# Patient Record
Sex: MALE | Race: BLACK OR AFRICAN AMERICAN | NOT HISPANIC OR LATINO | Employment: OTHER | URBAN - METROPOLITAN AREA
[De-identification: names, ages, dates, MRNs, and addresses within clinical notes are randomized per-mention and may not be internally consistent; named-entity substitution may affect disease eponyms.]

---

## 2017-04-06 ENCOUNTER — LAB VISIT (OUTPATIENT)
Dept: LAB | Facility: HOSPITAL | Age: 42
End: 2017-04-06
Attending: UROLOGY
Payer: MEDICARE

## 2017-04-06 ENCOUNTER — OFFICE VISIT (OUTPATIENT)
Dept: UROLOGY | Facility: CLINIC | Age: 42
End: 2017-04-06
Payer: MEDICARE

## 2017-04-06 VITALS
HEIGHT: 72 IN | DIASTOLIC BLOOD PRESSURE: 118 MMHG | WEIGHT: 192 LBS | HEART RATE: 89 BPM | BODY MASS INDEX: 26.01 KG/M2 | SYSTOLIC BLOOD PRESSURE: 198 MMHG

## 2017-04-06 DIAGNOSIS — C61 PROSTATE CANCER: Primary | ICD-10-CM

## 2017-04-06 DIAGNOSIS — Z01.818 PRE-TRANSPLANT EVALUATION FOR CHRONIC KIDNEY DISEASE: ICD-10-CM

## 2017-04-06 DIAGNOSIS — C61 PROSTATE CANCER: ICD-10-CM

## 2017-04-06 LAB — COMPLEXED PSA SERPL-MCNC: 3.1 NG/ML

## 2017-04-06 PROCEDURE — 84153 ASSAY OF PSA TOTAL: CPT

## 2017-04-06 PROCEDURE — 99999 PR PBB SHADOW E&M-EST. PATIENT-LVL III: CPT | Mod: PBBFAC,TXP,, | Performed by: UROLOGY

## 2017-04-06 PROCEDURE — 99214 OFFICE O/P EST MOD 30 MIN: CPT | Mod: S$PBB,TXP,, | Performed by: UROLOGY

## 2017-04-06 PROCEDURE — 36415 COLL VENOUS BLD VENIPUNCTURE: CPT

## 2017-04-06 RX ORDER — TADALAFIL 5 MG/1
5 TABLET ORAL
Qty: 10 TABLET | Refills: 11 | Status: SHIPPED | OUTPATIENT
Start: 2017-04-06 | End: 2018-01-23

## 2017-04-06 RX ORDER — LABETALOL 300 MG/1
TABLET, FILM COATED ORAL
COMMUNITY
Start: 2017-03-28

## 2017-04-06 NOTE — PROGRESS NOTES
Clinic Note  4/6/2017      Subjective:       Patient ID:  Prem is a 41 y.o. male being seen for an new visit.      Chief Complaint:   HPI     Prem Saldana is a 41 y.o. male recently diagnosed with  [id number] prostate cancer. Lives in [place]. 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  Consult from Dr. Rojas.  Originally from [place]. [place]. On Trelstar, started 12/8/2016.     PSAi- 158  Stage- T3/4  Biopsy 10/24/16- [place] 3+4 12/12 cores positive  NENA score- 8  High risk disease  Imaging- Bone scan - no mets, CT scan- no nodes but loss of rectal fat plane    Lab Results   Component Value Date    .0 (H) 07/27/2016    .4 (H) 06/02/2016        Past Medical History:   Diagnosis Date    Anemia of renal disease     ESRD on dialysis since 4/3/15     Essential hypertension     Secondary hyperparathyroidism of renal origin      Family History   Problem Relation Age of Onset    Diabetes Mother     Cancer Maternal Grandmother     Hypertension Maternal Grandfather     Heart attack Maternal Grandfather      MI in his 60s or 70s    Kidney disease Neg Hx      Social History     Social History    Marital status: Single     Spouse name: N/A    Number of children: N/A    Years of education: N/A     Occupational History    Not on file.     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   HENT: Negative for nosebleeds.    Respiratory: Negative for shortness of breath and wheezing.    Cardiovascular: Negative for chest pain, palpitations and leg swelling.   Gastrointestinal: Negative for abdominal distention, abdominal pain, constipation, diarrhea, nausea and vomiting.   Genitourinary: Negative for difficulty urinating, dysuria and hematuria.        Complains of decreased libido and erectile dysfunction.   Musculoskeletal: Negative for arthralgias and back pain.   Skin: Negative for pallor.   Neurological: Negative for dizziness, seizures and syncope.   Hematological: Negative for adenopathy.   Psychiatric/Behavioral: Negative for dysphoric mood.         Objective:      There were no vitals taken for this visit.  Estimated body mass index is 26.91 kg/(m^2) as calculated from the following:    Height as of 12/8/16: 6' (1.829 m).    Weight as of 12/8/16: 90 kg (198 lb 6.6 oz).  Physical Exam   Constitutional: He is oriented to person, place, and time. He appears well-developed and well-nourished. No distress.   HENT:   Head: Atraumatic.   Neck: No tracheal deviation present.   Cardiovascular: Normal rate.    Pulmonary/Chest: Effort normal. No respiratory distress. He has no wheezes.   Abdominal: Soft. Bowel sounds are normal. He exhibits no distension and no mass. There is no tenderness. There is no rebound and no guarding.   Neurological: He is alert and oriented to person, place, and time.   Skin: Skin is  warm and dry. He is not diaphoretic.     Psychiatric: He has a normal mood and affect. His behavior is normal. Judgment and thought content normal.         Assessment and Plan:           Problem List Items Addressed This Visit     Pre-transplant evaluation for chronic kidney disease    Prostate cancer - Primary          Follow up:   Await PSA. Discussed XRT. Cialis 5 mg every 72 hours prn for erectile dysfunction.  F/U as scheduled for [place].    Satish Au

## 2017-04-06 NOTE — MR AVS SNAPSHOT
[facility]  [address]  [place] [address]  Phone: [phone]                  Prem Saldana   2017 10:15 AM   Office Visit    Description:  Male : 1975   Provider:  Satish Au MD   [facility]           Diagnoses this Visit        Comments    Prostate cancer    -  Primary     Pre-transplant evaluation for chronic kidney disease                To Do List           Future Appointments        Provider Department Dept Phone    2017 9:30 AM LAB, HEMONC CANCER BLDG [facility] [phone]    2017 11:00 AM MD [facility] [phone]      Goals (5 Years of Data)     None      Follow-Up and Disposition     Return in about 2 months (around 2017).       These Medications        Disp Refills Start End    tadalafil (CIALIS) 5 MG tablet 10 tablet 11 2017    Take 1 tablet (5 mg total) by mouth every 72 hours as needed for Erectile Dysfunction. - Oral    Pharmacy: [id number]  [place]ANCY ASHRAF - [address]  #: [phone]         [facility]     [facility] Nurse Care Line -  Assistance  Unless otherwise directed by your provider, please contact [facility], our nurse care line that is available for  assistance.     Registered nurses in the [facility] Center provide: appointment scheduling, clinical advisement, health education, and other advisory services.  Call: [phone] (toll free)               Medications           Message regarding Medications     Verify the changes and/or additions to your medication regime listed below are the same as discussed with your clinician today.  If any of these changes or additions are incorrect, please notify your healthcare provider.        START taking these NEW medications        Refills    tadalafil (CIALIS) 5 MG tablet 11    Sig: Take 1 tablet (5 mg total) by mouth every 72  hours as needed for Erectile Dysfunction.    Class: Normal    Route: Oral      STOP taking these medications     cloNIDine (CATAPRES) 0.1 MG tablet Take 0.1 mg by mouth 3 (three) times daily.           Verify that the below list of medications is an accurate representation of the medications you are currently taking.  If none reported, the list may be blank. If incorrect, please contact your healthcare provider. Carry this list with you in case of emergency.           Current Medications     calcitRIOL (ROCALTROL) 0.25 MCG Cap Take 0.25 mcg by mouth 2 (two) times daily.    labetalol (NORMODYNE) 300 MG tablet     minoxidil (LONITEN) 10 MG Tab Take 2.5 mg by mouth 2 (two) times daily.    tadalafil (CIALIS) 5 MG tablet Take 1 tablet (5 mg total) by mouth every 72 hours as needed for Erectile Dysfunction.           Clinical Reference Information           Your Vitals Were     BP Pulse Height Weight BMI    198/118 (BP Location: Right arm, Patient Position: Sitting, BP Method: Automatic) 89 6' (1.829 m) 87.1 kg (192 lb 0.3 oz) 26.04 kg/m2      Blood Pressure          Most Recent Value    BP  (!)  198/118      Allergies as of 4/6/2017     No Known Allergies      Immunizations Administered on Date of Encounter - 4/6/2017     None      Orders Placed During Today's Visit     Future Labs/Procedures Expected by Expires    Prostate Specific Antigen, Diagnostic  6/6/2017 4/6/2018      Maintenance Dialysis History     Start End Type Comments Center    4/3/2015  [place]  [facility]            Current Dialysis Center Information     [facility] [address] Phone #:  [phone]    Contact:  N/A [place]Y [place]RE[place]  [address] Fax #:  [phone]            Transplant Information        Txp Date Organ Coordinator Care Team     Kidney Amanda Cary Referring Physician:  Edward Pabon MD   Current Nephrologist:  Edward Pabon MD         Smoking Cessation     If you would like to quit  smoking:   You may be eligible for free services if you are a [place] resident and started smoking cigarettes before September 1, 1988.  Call the Smoking Cessation Trust (SCT) toll free at (598) 071-4394 or (513) 846-4836.   Call 1-800-QUIT-NOW if you do not meet the above criteria.   Contact us via email: [email or web address]   View our website for more information: [email or web address]        Language Assistance Services     ATTENTION: Language assistance services are available, free of charge. Please call [phone].      ATENCIÓN: Si habla español, tiene a kurtz disposición servicios gratuitos de asistencia lingüística. Llame al [phone].     CHÚ Ý: N?u b?n nói Ti?ng Vi?t, có các d?ch v? h? tr? ngôn ng? mi?n phí dành cho b?n. G?i s? [phone].         [facility] complies with applicable Federal civil rights laws and does not discriminate on the basis of race, color, national origin, age, disability, or sex.

## 2017-06-08 ENCOUNTER — LAB VISIT (OUTPATIENT)
Dept: LAB | Facility: HOSPITAL | Age: 42
End: 2017-06-08
Attending: UROLOGY
Payer: MEDICARE

## 2017-06-08 DIAGNOSIS — C61 PROSTATE CANCER: ICD-10-CM

## 2017-06-08 LAB — COMPLEXED PSA SERPL-MCNC: 2.3 NG/ML

## 2017-06-08 PROCEDURE — 84153 ASSAY OF PSA TOTAL: CPT | Mod: TXP

## 2017-06-08 PROCEDURE — 36415 COLL VENOUS BLD VENIPUNCTURE: CPT | Mod: TXP

## 2017-06-12 ENCOUNTER — TELEPHONE (OUTPATIENT)
Dept: UROLOGY | Facility: CLINIC | Age: 42
End: 2017-06-12

## 2017-06-12 NOTE — TELEPHONE ENCOUNTER
I spoke with patient, who stated due to transportation issues he can't keep his appt. And will not be able to come in until July he will be behind for his trelstar injection.  notified.

## 2017-06-12 NOTE — TELEPHONE ENCOUNTER
----- Message from Eusebia Gaines sent at 6/12/2017 11:04 AM CDT -----  Contact: Pt  Pt called in to reschedule his appt from 6/13/2017 due to having no transportation.    Pt can be reached at [phone]    Thank you

## 2017-06-12 NOTE — TELEPHONE ENCOUNTER
----- Message from Eusebia Gaines sent at 6/12/2017 11:04 AM CDT -----  Contact: Pt  Pt called in to reschedule his appt from 6/13/2017 due to having no transportation.    Pt can be reached at [phone]    Thank you

## 2017-07-06 ENCOUNTER — OFFICE VISIT (OUTPATIENT)
Dept: UROLOGY | Facility: CLINIC | Age: 42
End: 2017-07-06
Payer: MEDICARE

## 2017-07-06 VITALS
WEIGHT: 192 LBS | SYSTOLIC BLOOD PRESSURE: 162 MMHG | HEIGHT: 72 IN | DIASTOLIC BLOOD PRESSURE: 88 MMHG | RESPIRATION RATE: 15 BRPM | BODY MASS INDEX: 26.01 KG/M2 | HEART RATE: 75 BPM

## 2017-07-06 DIAGNOSIS — C61 PROSTATE CANCER: Primary | ICD-10-CM

## 2017-07-06 PROCEDURE — 99213 OFFICE O/P EST LOW 20 MIN: CPT | Mod: PBBFAC,TXP | Performed by: UROLOGY

## 2017-07-06 PROCEDURE — 99999 PR PBB SHADOW E&M-EST. PATIENT-LVL III: CPT | Mod: PBBFAC,TXP,, | Performed by: UROLOGY

## 2017-07-06 PROCEDURE — 99214 OFFICE O/P EST MOD 30 MIN: CPT | Mod: S$PBB,NTX,, | Performed by: UROLOGY

## 2017-07-06 RX ADMIN — TRIPTORELIN PAMOATE 22.5 MG: KIT at 01:07

## 2017-07-06 NOTE — PROGRESS NOTES
Distress Screening Results: Psychosocial Distress screening score of Distress Score: 0 noted and reviewed. No intervention indicated.

## 2017-07-06 NOTE — PROGRESS NOTES
Clinic Note  7/6/2017      Subjective:         Chief Complaint:   HPI  Prem Saldana is a 42 y.o. male recently diagnosed with  [id number] prostate cancer. Lives in [place]. 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  Consult from Dr. Rojas.  Originally from [place]. [place]. On Trelstar, started 12/8/2016.     PSAi- 158  Stage- T3/4  Biopsy 10/24/16- [place] 3+4 12/12 cores positive  NENA score- 8  High risk disease  Imaging- Bone scan - no mets, CT scan- no nodes but loss of rectal fat plane      Lab Results   Component Value Date    .0 (H) 07/27/2016    .4 (H) 06/02/2016    PSADIAG 2.3 06/08/2017    PSADIAG 3.1 04/06/2017      Past Medical History:   Diagnosis Date    Anemia of renal disease     ESRD on dialysis since 4/3/15     Essential hypertension     Secondary hyperparathyroidism of renal origin      Family History   Problem Relation Age of Onset    Diabetes Mother     Cancer Maternal Grandmother     Hypertension Maternal Grandfather     Heart attack Maternal Grandfather      MI in his 60s or 70s    Kidney disease Neg Hx      Social History     Social History    Marital status: Single     Spouse name: N/A    Number of children: N/A    Years of education: N/A     Occupational History    Not on file.     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   HENT: Negative for nosebleeds.    Respiratory: Positive for chest tightness. Negative for shortness of breath and wheezing.    Cardiovascular: Negative for chest pain, palpitations and leg swelling.   Gastrointestinal: Negative for abdominal distention, abdominal pain, constipation, diarrhea, nausea and vomiting.   Genitourinary: Negative for dysuria and nocturia.   Musculoskeletal: Negative for arthralgias and back pain.   Skin: Negative for pallor.   Neurological: Negative for dizziness, seizures and syncope.   Hematological: Negative for adenopathy.   Psychiatric/Behavioral: Negative for dysphoric mood.         Objective:      BP (!) 162/88   Pulse 75   Resp 15   Ht 6' (1.829 m)   Wt 87.1 kg (192 lb)   BMI 26.04 kg/m²   Estimated body mass index is 26.04 kg/m² as calculated from the following:    Height as of this encounter: 6' (1.829 m).    Weight as of this encounter: 87.1 kg (192 lb).  Physical Exam   Constitutional: He is oriented to person, place, and time. He appears well-developed and well-nourished. No distress.   HENT:   Head: Atraumatic.   Neck: No tracheal deviation present.   Cardiovascular: Normal rate.    Pulmonary/Chest: Effort normal. No respiratory distress. He has no wheezes.   Abdominal: Soft. Bowel sounds are normal. He exhibits no distension and no mass. There is no tenderness. There is no rebound and no guarding.   Neurological: He is alert and oriented to person, place, and time.   Skin: Skin is warm and dry. He is  not diaphoretic.     Psychiatric: He has a normal mood and affect. His behavior is normal. Judgment and thought content normal.         Assessment and Plan:           Problem List Items Addressed This Visit     None      Visit Diagnoses    None.         Follow up:   6 months with PSA and Trelstar.  Patient interested in exploring option of XRT.  Consult Dr. Handy.    Satish Au

## 2017-08-03 ENCOUNTER — HOSPITAL ENCOUNTER (OUTPATIENT)
Dept: INTENSIVE CARE | Facility: HOSPITAL | Age: 42
End: 2017-08-04
Attending: INTERNAL MEDICINE | Admitting: INTERNAL MEDICINE

## 2017-08-03 LAB
ABS NEUT (OLG): 4.15 X10(3)/MCL (ref 2.1–9.2)
ALBUMIN SERPL-MCNC: 3 GM/DL (ref 3.4–5)
ALBUMIN/GLOB SERPL: 0.8 {RATIO}
ALP SERPL-CCNC: 110 UNIT/L (ref 50–136)
ALT SERPL-CCNC: 14 UNIT/L (ref 12–78)
APTT PPP: 32.6 SECOND(S) (ref 20.6–36)
AST SERPL-CCNC: 15 UNIT/L (ref 15–37)
BASOPHILS # BLD AUTO: 0 X10(3)/MCL (ref 0–0.2)
BASOPHILS NFR BLD AUTO: 0 %
BILIRUB SERPL-MCNC: 0.3 MG/DL (ref 0.2–1)
BILIRUBIN DIRECT+TOT PNL SERPL-MCNC: 0.1 MG/DL (ref 0–0.2)
BILIRUBIN DIRECT+TOT PNL SERPL-MCNC: 0.2 MG/DL (ref 0–0.8)
BUN SERPL-MCNC: 44 MG/DL (ref 7–18)
CALCIUM SERPL-MCNC: 9 MG/DL (ref 8.5–10.1)
CHLORIDE SERPL-SCNC: 112 MMOL/L (ref 98–107)
CO2 SERPL-SCNC: 24 MMOL/L (ref 21–32)
CREAT SERPL-MCNC: 3.68 MG/DL (ref 0.7–1.3)
EOSINOPHIL # BLD AUTO: 0 X10(3)/MCL (ref 0–0.9)
EOSINOPHIL NFR BLD AUTO: 1 %
ERYTHROCYTE [DISTWIDTH] IN BLOOD BY AUTOMATED COUNT: 14.4 % (ref 11.5–17)
GLOBULIN SER-MCNC: 3.6 GM/DL (ref 2.4–3.5)
GLUCOSE SERPL-MCNC: 105 MG/DL (ref 74–106)
HBV SURFACE AG SERPL QL IA: NEGATIVE
HCT VFR BLD AUTO: 39.3 % (ref 42–52)
HGB BLD-MCNC: 12.4 GM/DL (ref 14–18)
INR PPP: 0.97 (ref 0–1.27)
LYMPHOCYTES # BLD AUTO: 0.5 X10(3)/MCL (ref 0.6–4.6)
LYMPHOCYTES NFR BLD AUTO: 9 %
MCH RBC QN AUTO: 28.2 PG (ref 27–31)
MCHC RBC AUTO-ENTMCNC: 31.6 GM/DL (ref 33–36)
MCV RBC AUTO: 89.3 FL (ref 80–94)
MONOCYTES # BLD AUTO: 0.3 X10(3)/MCL (ref 0.1–1.3)
MONOCYTES NFR BLD AUTO: 6 %
NEUTROPHILS # BLD AUTO: 4.15 X10(3)/MCL (ref 2.1–9.2)
NEUTROPHILS NFR BLD AUTO: 82 %
PLATELET # BLD AUTO: 219 X10(3)/MCL (ref 130–400)
PMV BLD AUTO: 10.1 FL (ref 9.4–12.4)
POTASSIUM SERPL-SCNC: 3.6 MMOL/L (ref 3.5–5.1)
PROT SERPL-MCNC: 6.6 GM/DL (ref 6.4–8.2)
PROTHROMBIN TIME: 12.7 SECOND(S) (ref 12.1–14.2)
RBC # BLD AUTO: 4.4 X10(6)/MCL (ref 4.7–6.1)
SODIUM SERPL-SCNC: 142 MMOL/L (ref 136–145)
WBC # SPEC AUTO: 5 X10(3)/MCL (ref 4.5–11.5)

## 2017-08-04 LAB
BUN SERPL-MCNC: 36 MG/DL (ref 7–18)
CALCIUM SERPL-MCNC: 9 MG/DL (ref 8.5–10.1)
CHLORIDE SERPL-SCNC: 110 MMOL/L (ref 98–107)
CHOLEST SERPL-MCNC: 233 MG/DL (ref 0–200)
CHOLEST/HDLC SERPL: 4.2 {RATIO} (ref 0–5)
CO2 SERPL-SCNC: 23 MMOL/L (ref 21–32)
CREAT SERPL-MCNC: 3.7 MG/DL (ref 0.7–1.3)
ERYTHROCYTE [DISTWIDTH] IN BLOOD BY AUTOMATED COUNT: 14.3 % (ref 11.5–17)
EST. AVERAGE GLUCOSE BLD GHB EST-MCNC: 97 MG/DL
GLUCOSE SERPL-MCNC: 110 MG/DL (ref 74–106)
HBA1C MFR BLD: 5 % (ref 4.2–6.3)
HCT VFR BLD AUTO: 38.9 % (ref 42–52)
HDLC SERPL-MCNC: 55 MG/DL (ref 35–60)
HGB BLD-MCNC: 13 GM/DL (ref 14–18)
LDLC SERPL CALC-MCNC: 144 MG/DL (ref 0–129)
MCH RBC QN AUTO: 28.9 PG (ref 27–31)
MCHC RBC AUTO-ENTMCNC: 33.4 GM/DL (ref 33–36)
MCV RBC AUTO: 86.4 FL (ref 80–94)
PLATELET # BLD AUTO: 251 X10(3)/MCL (ref 130–400)
PMV BLD AUTO: 11.1 FL (ref 9.4–12.4)
POTASSIUM SERPL-SCNC: 3.7 MMOL/L (ref 3.5–5.1)
RBC # BLD AUTO: 4.5 X10(6)/MCL (ref 4.7–6.1)
SODIUM SERPL-SCNC: 146 MMOL/L (ref 136–145)
TRIGL SERPL-MCNC: 168 MG/DL (ref 30–150)
VLDLC SERPL CALC-MCNC: 34 MG/DL
WBC # SPEC AUTO: 4.4 X10(3)/MCL (ref 4.5–11.5)

## 2017-08-07 ENCOUNTER — TELEPHONE (OUTPATIENT)
Dept: RADIATION ONCOLOGY | Facility: CLINIC | Age: 42
End: 2017-08-07

## 2017-08-07 NOTE — TELEPHONE ENCOUNTER
Called pt re: missed appt with Dr. EVANGELISTA this morning.  No answer; left message to call back.  Will continue to f/u.

## 2017-08-08 ENCOUNTER — TELEPHONE (OUTPATIENT)
Dept: RADIATION ONCOLOGY | Facility: CLINIC | Age: 42
End: 2017-08-08

## 2017-08-08 NOTE — TELEPHONE ENCOUNTER
Called again. No answer/left message.  Will continue to f/u.     ----- Message from Diana Rabago RN sent at 8/7/2017 10:07 AM CDT -----  Call re: no show yesterday  appt with TS.

## 2017-08-11 ENCOUNTER — TELEPHONE (OUTPATIENT)
Dept: RADIATION ONCOLOGY | Facility: CLINIC | Age: 42
End: 2017-08-11

## 2017-08-11 NOTE — TELEPHONE ENCOUNTER
Called to reschedule missed appt.  No answer at mobile/left message.  Called second number, no answer/no answering machine.  Will f/u next week.

## 2017-08-15 ENCOUNTER — TELEPHONE (OUTPATIENT)
Dept: TRANSPLANT | Facility: CLINIC | Age: 42
End: 2017-08-15

## 2017-08-15 NOTE — TELEPHONE ENCOUNTER
Attempted to contact patient, left voicemail, to discuss follow up with Oncology/Urology for prostate cancer. Patient's transplant evaluation is outdated. Will send 30 day letter for patient to contact Dr. Carlson's for follow up appt. If no response to phone call/30 day letter, chart will be closed. He may be re-referred for consideration for transplant in the future but will need to come in with Urology clearance.

## 2017-08-16 ENCOUNTER — TELEPHONE (OUTPATIENT)
Dept: TRANSPLANT | Facility: CLINIC | Age: 42
End: 2017-08-16

## 2017-08-16 NOTE — TELEPHONE ENCOUNTER
Spoke to patient, he rescheduled his appt with Dr. Handy for 8/18/17. Informed him that we sent 30 day letter so just continue to follow up with Dr. Handy and go through recommended cancer treatment. Will need clearance for transplant. Pt voiced understanding.

## 2017-08-18 ENCOUNTER — DOCUMENTATION ONLY (OUTPATIENT)
Dept: RADIATION ONCOLOGY | Facility: CLINIC | Age: 42
End: 2017-08-18

## 2017-08-18 ENCOUNTER — INITIAL CONSULT (OUTPATIENT)
Dept: RADIATION ONCOLOGY | Facility: CLINIC | Age: 42
End: 2017-08-18
Payer: MEDICARE

## 2017-08-18 VITALS
WEIGHT: 198.19 LBS | TEMPERATURE: 98 F | RESPIRATION RATE: 18 BRPM | SYSTOLIC BLOOD PRESSURE: 197 MMHG | DIASTOLIC BLOOD PRESSURE: 113 MMHG | HEART RATE: 82 BPM | BODY MASS INDEX: 26.88 KG/M2

## 2017-08-18 DIAGNOSIS — Z99.2 ESRD ON DIALYSIS: Chronic | ICD-10-CM

## 2017-08-18 DIAGNOSIS — N18.6 ESRD ON DIALYSIS: Chronic | ICD-10-CM

## 2017-08-18 DIAGNOSIS — C61 CANCER OF PROSTATE WITH HIGH RECURRENCE RISK (STAGE T3A OR GLEASON 8-10 OR PSA > 20): Primary | ICD-10-CM

## 2017-08-18 PROCEDURE — 99999 PR PBB SHADOW E&M-EST. PATIENT-LVL III: CPT | Mod: PBBFAC,TXP,, | Performed by: RADIOLOGY

## 2017-08-18 PROCEDURE — 99213 OFFICE O/P EST LOW 20 MIN: CPT | Mod: PBBFAC,TXP | Performed by: RADIOLOGY

## 2017-08-18 PROCEDURE — 99203 OFFICE O/P NEW LOW 30 MIN: CPT | Mod: S$PBB,TXP,, | Performed by: RADIOLOGY

## 2017-08-18 RX ORDER — AMOXICILLIN AND CLAVULANATE POTASSIUM 875; 125 MG/1; MG/1
TABLET, FILM COATED ORAL
COMMUNITY
Start: 2017-08-15 | End: 2018-01-23

## 2017-08-18 NOTE — PROGRESS NOTES
HISTORY OF PRESENT ILLNESS:   This patient is referred for discussion of definitive radiotherapy for a locally advanced adenocarcinoma of the prostate.     Mr. Saldana's PMHx is significant for end stage renal disease.  He has been on peritoneal dialysis for the past 2.5 years.  The patient was being evaluated for possible transplant when screening PSA in June of 2015 returned markedly elevated at 130.4 ng/ml.  Repeat PSA on 7/27/16 remained elevated at 158 ng/ml.  The patient was referred to urology for further evaluation.  BAUTISTA revealed a very firm 35 gm prostate.  The patient was taken for TRUS and biopsy of the prostate on 10/24/16.  The prostate measured 38 cc.  100% of his cores contained adenocarcinoma.  The [place] score was 7 (4+3) in biopsies from the Lt. mid gland and involved 95% of the cores.  The remaining areas revealed [place] 7 (3+4) disease and involved 90 - 95% of the specimens.  Further work up with bone scan and CT scan of the abdomen and pelvis on 11/11/16 did not reveal evidence of metastatic disease.  CT did reveal an enlarged prostate.  There was also a very small sclerotic area in the Rt. femur thought to represent a bone island.  The patient was evaluated in Dr. Au's clinic.  The prostate was felt to be fixed in place with evidence of extracapsular extension.  He began hormonal therapy with Trelstar.  His second 6 month injection was given on 7/6/17.  Repeat PSA on 6/8/17 returned at 2.3 ng/ml.  The patient presents for discussion of definitive radiotherapy.  Today, the patient states he feels well.  Notes hot flashes.  States he still produces a small amount of urine.        REVIEW OF SYSTEMS:   Review of Systems   Constitutional: Positive for malaise/fatigue. Negative for chills, fever and weight loss.   Respiratory: Negative for cough and sputum production.    Cardiovascular: Negative for chest pain and palpitations.   Gastrointestinal: Negative for abdominal pain, constipation and  diarrhea.   Genitourinary: Negative for dysuria, frequency, hematuria and urgency.   Neurological: Negative for weakness.         PAST MEDICAL HISTORY:  Past Medical History:   Diagnosis Date    Anemia of renal disease     ESRD on dialysis since 4/3/15     Essential hypertension     Secondary hyperparathyroidism of renal origin        PAST SURGICAL HISTORY:  Past Surgical History:   Procedure Laterality Date    AV FISTULA PLACEMENT Left 07/2015    Peritoneal Dialysis Catheter Placement  01/2016    Permcath Placement          ALLERGIES:   Review of patient's allergies indicates:  No Known Allergies    MEDICATIONS:  Current Outpatient Prescriptions   Medication Sig    amoxicillin-clavulanate 875-125mg (AUGMENTIN) 875-125 mg per tablet     calcitRIOL (ROCALTROL) 0.25 MCG Cap Take 0.25 mcg by mouth 2 (two) times daily.    labetalol (NORMODYNE) 300 MG tablet     minoxidil (LONITEN) 10 MG Tab Take 2.5 mg by mouth 2 (two) times daily.    tadalafil (CIALIS) 5 MG tablet Take 1 tablet (5 mg total) by mouth every 72 hours as needed for Erectile Dysfunction.     Current Facility-Administered Medications   Medication    triptorelin pamoate Syrg 22.5 mg       SOCIAL HISTORY:  Social History     Social History    Marital status: Single     Spouse name: N/A    Number of children: N/A    Years of education: N/A     Occupational History    Not on file.     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   The remainder of his exam was deferred.     ASSESSMENT/PLAN:  Stage IV (T4, N0, M0) adenocarcinoma of the prostate    ECO    I had a long discussion with the patient.  We reviewed the prognotic factors associated with prostate cancer.  Explained that based on his presentation, he is considered to have "high risk, high volume " prostate cancer.  Discussed the implications of this classification.  Explained there is a chance that, although his work up was negative, he could have micrometastatic disease.  We discussed these concepts.  Discussed the rational for consideration of definitive radiotherapy to the prostate seminal vesicles and lower pelvic nodes.  Discussed the procedures, risks and benefits of therapy.  The patient would like to be considered for transplant in the future.  Explained this would require definitive radiotherapy followed by 12 - 18 months of continued hormonal deprivation, followed by 3 years of follow up with a very low PSA after he begins to produce testosterone.  The patient would like to proceed with therapy.  He currently lives 2 + hours from [place].  Will arrange for evaluation in [facility] for radiotherapy.  We would recommend 45 Gy to the prostate, SV and pelvic nodes followed by a boost to prostate gland.  Thank you for allowing us to participate in the care of this patient.      Psychosocial Distress screening score of Distress Score: 1 noted and reviewed. No intervention indicated.    I " spent approximately 45 minutes reviewing the available records and evaluating the patient, out of which over 50% of the time was spent face to face with the patient in counseling and coordinating this patient's care.

## 2017-08-18 NOTE — LETTER
August 18, 2017      Satish Au MD  [address]  [place] [address]           [facility]  [address] [place] [address]  Phone: [phone]  Fax: [phone]          Patient: Prem Saldana   MR Number: [id number]   YOB: 1975   Date of Visit: 8/18/2017       Dear Dr. Satish Au:    Thank you for referring Prem Saldana to me for evaluation. Attached you will find relevant portions of my assessment and plan of care.    If you have questions, please do not hesitate to call me. I look forward to following Prem Saldana along with you.    Sincerely,    Joseph Handy Jr., MD    Enclosure  CC:  No Recipients    If you would like to receive this communication electronically, please contact [email or web address] or [phone] to request more information on [employer] access.    For providers and/or their staff who would like to refer a patient to [facility], please contact us through our one-stop-shop provider referral line, [facility], at [phone].    If you feel you have received this communication in error or would no longer like to receive these types of communications, please e-mail [email or web address]

## 2017-08-18 NOTE — PROGRESS NOTES
Called [facility] per Dr. Handy to refer this patient for radiation.  S/w nurse; who said to send progress notes and they will follow up with pt.  Fax was received.  Will follow up next week.

## 2017-08-23 ENCOUNTER — HISTORICAL (OUTPATIENT)
Dept: RADIATION THERAPY | Facility: HOSPITAL | Age: 42
End: 2017-08-23

## 2017-09-07 ENCOUNTER — HISTORICAL (OUTPATIENT)
Dept: RADIATION THERAPY | Facility: HOSPITAL | Age: 42
End: 2017-09-07

## 2017-09-27 ENCOUNTER — TELEPHONE (OUTPATIENT)
Dept: TRANSPLANT | Facility: CLINIC | Age: 42
End: 2017-09-27

## 2017-09-27 NOTE — TELEPHONE ENCOUNTER
Left voicemail for patient. He still has not responded to 30 day letter. Calling to inquire about prostate cancer treatment in [place]. The dialysis nurse states he is receiving radiation but she does not know where. She also stated patient is currently using Medicare Transportation and does not have his own at this time. She states he takes a few days to return calls as well.

## 2017-09-28 ENCOUNTER — TELEPHONE (OUTPATIENT)
Dept: TRANSPLANT | Facility: CLINIC | Age: 42
End: 2017-09-28

## 2017-09-28 NOTE — TELEPHONE ENCOUNTER
Mr. Saldana called to inform that he is in his first week of radiation (1 of 8) for prostate cancer. States he is being treated at [facility]. Will request records once treatment is complete.

## 2017-12-04 ENCOUNTER — TELEPHONE (OUTPATIENT)
Dept: TRANSPLANT | Facility: CLINIC | Age: 42
End: 2017-12-04

## 2017-12-04 NOTE — TELEPHONE ENCOUNTER
"Transportation forms:       SW contacted pt regarding transportation difficulties. Pt reports he is continuing to utilize Medicare transportation to and from radiation treatment. Pt reports he missed radiation treatment on Friday because Medicare transportation did not  pt. SW inquired if anyone else was able to bring him to treatment and pt reports "I just missed it". SW provided strong concern regarding transportation and it's correlation with transplant. Pt verbalized understanding. Pt reports he and his polina are "working on getting a car in the next two months". SW verbalized understanding and inquired if pt has a secondary transportation plan. Pt reports his fiance's mother and sister will provide transportation for pt as needed. Pt provided permission to speak with pt's polina's mother and sister to confirm transportation plan.     SW contacted pt's polina's sister Paula to confirm transportation plan. SW was not able to speak with Paula and was not able to leave a message due to voicemail box being full.     SW contacted Ines,pt's polina's mother, to confirm transportation plan. SW provided education regarding transportation and per/post transplant expectations. Ines reports " well, it would depend on the day but one of us will be able to drive them. I would probably be the one most of the time". SW provided understanding and education regarding the need for transportation at any given moment, especially once organ is accepted and post transplant care. Godfrey verbalized understanding and reports ability to help. SW strongly encouraged Godfrey to help form team of reliable people who can help with transportation. Godfrey verbalized understanding and agreement.     LISSA attempted to contact dialysis unit for more insight and was unable to speak with her. SW will attempt to follow up at a later time. SW remains available.   "

## 2017-12-14 ENCOUNTER — TELEPHONE (OUTPATIENT)
Dept: TRANSPLANT | Facility: CLINIC | Age: 42
End: 2017-12-14

## 2018-01-15 ENCOUNTER — TELEPHONE (OUTPATIENT)
Dept: TRANSPLANT | Facility: CLINIC | Age: 43
End: 2018-01-15

## 2018-01-15 NOTE — TELEPHONE ENCOUNTER
----- Message from Lauren Helms sent at 1/15/2018  1:38 PM CST -----  Contact: girlfriend.. Comfort  Please call back and explain exactly what clearances the patient needs. [phone].  He is seeing a oncologist for radiation treatment and urology locally. Was see our physicians but it was too far to travel.

## 2018-01-16 ENCOUNTER — TELEPHONE (OUTPATIENT)
Dept: TRANSPLANT | Facility: CLINIC | Age: 43
End: 2018-01-16

## 2018-01-16 NOTE — TELEPHONE ENCOUNTER
----- Message from Belia Mueller sent at 1/15/2018  4:24 PM CST -----  Contact: Pt  Returning Amanda call, [phone]

## 2018-01-23 ENCOUNTER — TELEPHONE (OUTPATIENT)
Dept: TRANSPLANT | Facility: CLINIC | Age: 43
End: 2018-01-23

## 2018-01-23 ENCOUNTER — OFFICE VISIT (OUTPATIENT)
Dept: UROLOGY | Facility: CLINIC | Age: 43
End: 2018-01-23
Payer: MEDICARE

## 2018-01-23 ENCOUNTER — LAB VISIT (OUTPATIENT)
Dept: LAB | Facility: HOSPITAL | Age: 43
End: 2018-01-23
Attending: UROLOGY
Payer: MEDICARE

## 2018-01-23 VITALS
DIASTOLIC BLOOD PRESSURE: 112 MMHG | BODY MASS INDEX: 27.17 KG/M2 | HEART RATE: 99 BPM | SYSTOLIC BLOOD PRESSURE: 181 MMHG | WEIGHT: 200.63 LBS | HEIGHT: 72 IN

## 2018-01-23 DIAGNOSIS — Z76.82 ORGAN TRANSPLANT CANDIDATE: Primary | ICD-10-CM

## 2018-01-23 DIAGNOSIS — C61 PROSTATE CANCER: ICD-10-CM

## 2018-01-23 DIAGNOSIS — C61 PROSTATE CANCER: Primary | ICD-10-CM

## 2018-01-23 LAB — COMPLEXED PSA SERPL-MCNC: 0.21 NG/ML

## 2018-01-23 PROCEDURE — 99213 OFFICE O/P EST LOW 20 MIN: CPT | Mod: PBBFAC,TXP | Performed by: UROLOGY

## 2018-01-23 PROCEDURE — 36415 COLL VENOUS BLD VENIPUNCTURE: CPT | Mod: TXP

## 2018-01-23 PROCEDURE — 99214 OFFICE O/P EST MOD 30 MIN: CPT | Mod: S$PBB,NTX,, | Performed by: UROLOGY

## 2018-01-23 PROCEDURE — 84153 ASSAY OF PSA TOTAL: CPT | Mod: TXP

## 2018-01-23 PROCEDURE — 99999 PR PBB SHADOW E&M-EST. PATIENT-LVL III: CPT | Mod: PBBFAC,TXP,, | Performed by: UROLOGY

## 2018-01-23 RX ORDER — HYDROCODONE BITARTRATE AND ACETAMINOPHEN 10; 325 MG/1; MG/1
TABLET ORAL
COMMUNITY
Start: 2018-01-04 | End: 2019-01-29

## 2018-01-23 NOTE — TELEPHONE ENCOUNTER
Pt called while in his clinic visit with Dr. Au. Spoke to Dr. Au, who states patient just completed his radiation treatments in [place] (kidney coordinator is trying to get those records) and states his PSA is normal but it may take up to 12 months for all cancer cells to be destroyed by the radiation. He will follow up with Dr. Au every 6 months. Will present information to the Kidney Transplant Team.

## 2018-01-23 NOTE — PROGRESS NOTES
Clinic Note  1/23/2018      Subjective:         Chief Complaint:   HPI  Prem Saldana is a 42 y.o. male diagnosed with  [id number] prostate cancer. Lives in [place]. 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  Consult from Dr. Rojas.  Originally from [place]. [place]. On Trelstar, started 12/8/2016. Completed XRT two weeks ago.     PSAi- 158  Stage- T3/4  Biopsy 10/24/16- [place] 3+4 12/12 cores positive  NENA score- 8  High risk disease  Imaging- Bone scan - no mets, CT scan- no nodes but loss of rectal fat plane         Lab Results   Component Value Date    .0 (H) 07/27/2016    .4 (H) 06/02/2016    PSADIAG 0.21 01/23/2018    PSADIAG 2.3 06/08/2017    PSADIAG 3.1 04/06/2017      Past Medical History:   Diagnosis Date    Anemia of renal disease     ESRD on dialysis since 4/3/15     Essential hypertension     Secondary hyperparathyroidism of renal origin      Family History   Problem Relation Age of Onset    Diabetes Mother     Cancer Maternal Grandmother     Hypertension Maternal Grandfather     Heart attack Maternal Grandfather      MI in his 60s or 70s    Kidney disease Neg Hx      Social History     Social History    Marital status: Single     Spouse name: N/A    Number of children: N/A    Years of education: N/A     Occupational History    Not on file.     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)     Past Surgical History:   Procedure Laterality Date     AV FISTULA PLACEMENT Left 07/2015    Peritoneal Dialysis Catheter Placement  01/2016    Permcath Placement        Patient Active Problem List   Diagnosis    ESRD on dialysis since 4/3/15    Essential hypertension    Anemia of renal disease    Secondary hyperparathyroidism of renal origin    Organ transplant candidate    Pre-transplant evaluation for chronic kidney disease    Prostate cancer    Cancer of prostate with high recurrence risk (stage T3a or Lee 8-10 or PSA > 20)     Review of Systems   Constitutional: Negative for appetite change, chills, fatigue, fever and unexpected weight change.   HENT: Negative for nosebleeds.    Respiratory: Negative for chest tightness, shortness of breath and wheezing.    Cardiovascular: Negative for chest pain, palpitations and leg swelling.   Gastrointestinal: Negative for abdominal distention, abdominal pain, constipation, diarrhea, nausea and vomiting.   Genitourinary: Negative for dysuria and hematuria.   Musculoskeletal: Negative for arthralgias and back pain.   Skin: Negative for pallor.   Neurological: Negative for dizziness, seizures and syncope.   Hematological: Negative for adenopathy.   Psychiatric/Behavioral: Negative for dysphoric mood.         Objective:      There were no vitals taken for this visit.  Estimated body mass index is 26.88 kg/m² as calculated from the following:    Height as of 7/6/17: 6' (1.829 m).    Weight as of 8/18/17: 89.9 kg (198 lb 3.1 oz).  Physical Exam   Constitutional: He is oriented to person, place, and time. He appears well-developed and well-nourished. No distress.   HENT:   Head: Atraumatic.   Neck: No tracheal deviation present.   Cardiovascular: Normal rate.    Pulmonary/Chest: Effort normal. No respiratory distress. He has no wheezes.   Abdominal: Soft. Bowel sounds are normal. He exhibits no distension and no mass. There is no tenderness. There is no rebound and no guarding.   Neurological: He is alert and oriented to  person, place, and time.   Skin: Skin is warm and dry. He is not diaphoretic.     Psychiatric: He has a normal mood and affect. His behavior is normal. Judgment and thought content normal.         Assessment and Plan:           Problem List Items Addressed This Visit     Prostate cancer - Primary          Follow up:     2nd Trelstar today. Continue LHRH therapy.  6 months with PSA.  Satish Au

## 2018-02-19 ENCOUNTER — HOSPITAL ENCOUNTER (OUTPATIENT)
Dept: CARDIOLOGY | Facility: HOSPITAL | Age: 43
Discharge: HOME | End: 2018-02-19
Attending: INTERNAL MEDICINE
Payer: MEDICARE

## 2018-02-19 ENCOUNTER — HOSPITAL ENCOUNTER (OUTPATIENT)
Dept: RADIOLOGY | Facility: HOSPITAL | Age: 43
Discharge: HOME | End: 2018-02-19
Attending: INTERNAL MEDICINE
Payer: MEDICARE

## 2018-02-19 ENCOUNTER — HOSPITAL ENCOUNTER (OUTPATIENT)
Dept: PULMONOLOGY | Facility: HOSPITAL | Age: 43
Discharge: HOME | End: 2018-02-19
Attending: INTERNAL MEDICINE
Payer: MEDICARE

## 2018-02-19 DIAGNOSIS — Z76.82 ORGAN TRANSPLANT CANDIDATE: ICD-10-CM

## 2018-02-19 DIAGNOSIS — I51.7 CARDIOMEGALY: ICD-10-CM

## 2018-02-19 LAB
AORTIC VALVE REGURGITATION: ABNORMAL
DIASTOLIC DYSFUNCTION: NO
GLOBAL PERICARDIAL EFFUSION: ABNORMAL
RETIRED EF AND QEF - SEE NOTES: 60 (ref 55–65)

## 2018-02-19 PROCEDURE — 78452 HT MUSCLE IMAGE SPECT MULT: CPT | Mod: 26,TXP,, | Performed by: INTERNAL MEDICINE

## 2018-02-19 PROCEDURE — 93306 TTE W/DOPPLER COMPLETE: CPT | Mod: 26,TXP,, | Performed by: INTERNAL MEDICINE

## 2018-02-19 PROCEDURE — A9502 TC99M TETROFOSMIN: HCPCS | Mod: TXP

## 2018-02-19 PROCEDURE — 93016 CV STRESS TEST SUPVJ ONLY: CPT | Mod: TXP,,, | Performed by: INTERNAL MEDICINE

## 2018-02-19 PROCEDURE — 93306 TTE W/DOPPLER COMPLETE: CPT | Mod: TXP

## 2018-02-19 PROCEDURE — 93018 CV STRESS TEST I&R ONLY: CPT | Mod: TXP,,, | Performed by: INTERNAL MEDICINE

## 2018-02-19 PROCEDURE — 93017 CV STRESS TEST TRACING ONLY: CPT | Mod: TXP | Performed by: GENERAL PRACTICE

## 2018-02-19 RX ORDER — REGADENOSON 0.08 MG/ML
0.4 INJECTION, SOLUTION INTRAVENOUS ONCE
Qty: 0.4 MG | Refills: 0 | Status: SHIPPED | OUTPATIENT
Start: 2018-02-19 | End: 2018-02-20 | Stop reason: SDUPTHER

## 2018-02-19 NOTE — PROGRESS NOTES
Negative NM stress test; LV EF reported as low at 40% on resting but stress test is not best study to evaluate EF and he had a 2D echo today which showed normal EF of 60-65%.

## 2018-02-20 RX ORDER — REGADENOSON 0.08 MG/ML
0.4 INJECTION, SOLUTION INTRAVENOUS ONCE
Qty: 0.4 MG | Refills: 0 | OUTPATIENT
Start: 2018-02-19 | End: 2018-02-19

## 2018-02-20 RX ORDER — REGADENOSON 0.08 MG/ML
0.4 INJECTION, SOLUTION INTRAVENOUS ONCE
Qty: 0.4 MG | Refills: 0 | OUTPATIENT
Start: 2018-02-19 | End: 2018-02-20 | Stop reason: HOSPADM

## 2018-03-02 ENCOUNTER — TELEPHONE (OUTPATIENT)
Dept: RADIOLOGY | Facility: HOSPITAL | Age: 43
End: 2018-03-02

## 2018-03-13 ENCOUNTER — TELEPHONE (OUTPATIENT)
Dept: TRANSPLANT | Facility: HOSPITAL | Age: 43
End: 2018-03-13

## 2018-03-13 NOTE — TELEPHONE ENCOUNTER
"SW attempted to contact pt regarding transportation plan, which was previously discussed. Previously pt reported "he and his fiance are "working on getting a car in the next two months". SW was able to leave a detailed message and will follow up at a later time. SW remains available. Pt did report, and SW confirmed, pt's fiance's mother will also provide transportation "most of the time".Until additional transportation plan is confirmed, pt's transplant suitability is below    Transplant Suitability:  Patient presents as a suitable candidate for kidney transplant at this time. Based on psychosocial risk factors, patient presents as medium risk, due to transportation plan of primary caregiver (couple did not have a car as of 12/4/2017). SW did confirm pt's fiance's mother will provide transportation "most of the time".   "

## 2018-03-15 ENCOUNTER — DOCUMENTATION ONLY (OUTPATIENT)
Dept: TRANSPLANT | Facility: CLINIC | Age: 43
End: 2018-03-15

## 2018-03-15 NOTE — NURSING
PRE-TRANSPLANT NOTE:    This patient's medication therapy was evaluated as part of his pre-transplant evaluation.    The following pharmacologic concerns were noted: Patient has Norco on medication profile     Current Outpatient Prescriptions   Medication Sig Dispense Refill    calcitRIOL (ROCALTROL) 0.25 MCG Cap Take 0.25 mcg by mouth 2 (two) times daily.      hydrocodone-acetaminophen 10-325mg (NORCO)  mg Tab       labetalol (NORMODYNE) 300 MG tablet       minoxidil (LONITEN) 10 MG Tab Take 2.5 mg by mouth 2 (two) times daily.       Current Facility-Administered Medications   Medication Dose Route Frequency Provider Last Rate Last Dose    triptorelin pamoate Syrg 22.5 mg  22.5 mg Intramuscular Q6 Months Satish Au MD   22.5 mg at 07/06/17 1341       I am available for consultation and can be contacted, as needed by the other members of the Kidney Transplant team.

## 2018-03-19 ENCOUNTER — TELEPHONE (OUTPATIENT)
Dept: TRANSPLANT | Facility: CLINIC | Age: 43
End: 2018-03-19

## 2018-03-19 ENCOUNTER — SOCIAL WORK (OUTPATIENT)
Dept: TRANSPLANT | Facility: CLINIC | Age: 43
End: 2018-03-19

## 2018-03-19 NOTE — TELEPHONE ENCOUNTER
SW followed up with pt regarding his transportation plan post transplant.    Pt reports that his plan is largely unchanged with his significant other's mother: Ines Medina and sister: Sally Medina providing transportation. SW asked if pt had his own vehicle as well. Pt reports that he does have his own car but that sometimes he has problems with having enough gas money to get to and from appointments. SW expressed concern and explained why the transplant team needs to evaluate this plan. Pt reports that Ines and Sally can assist financially as well and reports that he will be able to get to his appointments.     Psychosocial Suitability: Patient presents as an acceptable candidate for kidney transplant at this time. Based on psychosocial risk factors, patient presents as medium risk, due to pt's reported concerns about paying for gas for frequent appointments, however reports that he can receive assistance from his significant other's mother and sister. After explanation, pt seemed to understand the importance of having a strong plan and back up plan in place to maintain care post -transplant.

## 2018-03-19 NOTE — PROGRESS NOTES
"According to dialysis unit medical record, in the last three months pt has, 0 AMAs, 0  No Shows and denies issues with transportation and labs. Pt's last PTH was 671 on 3/8/2018. "peritonitis since Dec 17". Form indicates caregiver concerns, "however, pt voices family members are able to assist".       Confirmed by dialysis unit-LISSA      "

## 2018-03-21 ENCOUNTER — TELEPHONE (OUTPATIENT)
Dept: RADIOLOGY | Facility: HOSPITAL | Age: 43
End: 2018-03-21

## 2018-03-22 ENCOUNTER — HOSPITAL ENCOUNTER (OUTPATIENT)
Dept: RADIOLOGY | Facility: HOSPITAL | Age: 43
Discharge: HOME | End: 2018-03-22
Attending: INTERNAL MEDICINE
Payer: MEDICARE

## 2018-03-22 DIAGNOSIS — Z76.82 ORGAN TRANSPLANT CANDIDATE: ICD-10-CM

## 2018-03-22 PROCEDURE — 71046 X-RAY EXAM CHEST 2 VIEWS: CPT | Mod: 26,TXP,, | Performed by: RADIOLOGY

## 2018-03-22 PROCEDURE — 76770 US EXAM ABDO BACK WALL COMP: CPT | Mod: 26,TXP,, | Performed by: RADIOLOGY

## 2018-03-22 PROCEDURE — 71046 X-RAY EXAM CHEST 2 VIEWS: CPT | Mod: TC,TXP

## 2018-03-22 PROCEDURE — 76770 US EXAM ABDO BACK WALL COMP: CPT | Mod: TC,TXP

## 2018-03-23 ENCOUNTER — COMMITTEE REVIEW (OUTPATIENT)
Dept: TRANSPLANT | Facility: CLINIC | Age: 43
End: 2018-03-23

## 2018-03-23 ENCOUNTER — TELEPHONE (OUTPATIENT)
Dept: TRANSPLANT | Facility: CLINIC | Age: 43
End: 2018-03-23

## 2018-03-23 NOTE — TELEPHONE ENCOUNTER
Spoke to wife of patient, explained committee's decision of denial at this time due to not being cleared from XRT treatment for prostate cancer. May be re-referred in the future once cleared by Urology. Encouraged patient to also work on financial plan for transplant. Wife of pt voiced understanding.

## 2018-03-23 NOTE — COMMITTEE REVIEW
Native Organ Dx: Hypertensive Nephrosclerosis      Not approved for LRD/CAD transplant due to recent treatment for prostate cancer, not cleared by Urology at this time. Patient can be re-referred once cleared by urology and has acceptable caregiver and financial plan (pt reported to  that he now has transportation but no money for gas to come to [place]). Urologist stated it may take up to 12 months to see if patient is cleared of all cancer. Workup is outdated.       Note written by Amanda Cary RN    ===============================================    I was present at the meeting and attest to the decision of the committee.    Anaid Belcher MD

## 2018-03-23 NOTE — TELEPHONE ENCOUNTER
----- Message from Vanessa Muñoz sent at 3/23/2018 12:09 PM CDT -----  Contact: wife  Returning call to Amanda Adorno    Pt contact [phone]

## 2018-03-23 NOTE — LETTER
March 23, 2018    Prem Saldana  [address]  [place] [address]    Dear Prem Saldana:  MRN: [id number]    It is the duty of the [facility] Selection Committee to determine which patients are candidates for a transplant. For this reason, our committee has the difficult task of evaluating patients to determine which ones have the greatest chance of having a successful transplant. We are aware of the magnitude of this responsibility, and we approach it with reverence and humility.    It is with regret I inform you that you are not approved as a transplant candidate due to need for clearance from Urology for recent prostate cancer.  Your future transplant appointments for kidney transplant have been canceled.  Based on this review, we have determined that at this time, you are not a candidate for a transplant at [facility].      The selection committee carefully considers each patient's transplant candidacy and determines whether it is safe to proceed with transplantation on a case-by-case basis using established selection criteria.  At present, the risk of proceeding with an elective transplant surgery has become too high.                                                                               Although the selection committee believes you are not a suitable transplant candidate, you have the option to be evaluated at other transplant centers who may have different selection criteria.  You may request your [facility] records be sent to any center of your choice by contacting our Medical Records Department at [phone].                                                                               Attached is a letter from the United Network for Organ Sharing (UNOS).  It describes the services and information offered to patients by UNOS and the Organ Procurement and Transplant Network.    The [facility] Selection Committee sincerely wishes you the best and remains available to answer any questions.   Please do not hesitate to contact our pre-transplant office if we can assist you in any other way.                                                                               Sincerely,      Phyllis Mccarty MD  Medical Director, Kidney & Kidney/Pancreas Transplantation    [facility]    Encl: UNOS Letter          OPTN/UNOS: Your Resource for Organ Transplant Information        If you have a question regarding your own medical care, you always should call your transplant center first. However, for general organ transplant-related information, you can call the [employer] Network for Organ Sharing (UNOS) toll-free patient services line at 1-386.978.1749.    Anyone, including potential transplant candidates, recipients, family members/friends, living donors, and/or donor family members can call this number to:    · talk about organ donation, living donation, how transplant and donation work, the donation process, transplant policies, and transplant/donor information;  · get a free patient information kit with helpful booklets, waiting list and transplant information, and a list of all transplant centers;  · ask questions about the Organ Procurement and Transplantation Network (OPTN) web site (www.optn.transplant.hrsa.gov); the UNOS Web site (www.unos.org); or the UNOS web site for living donors and transplant recipients (www.transplantliving.org);  · learn how UNOS and the OPTN can help you;  · talk about any concerns that you may have with a transplant center and how they perform    UNOS is a not-for-profit organization that provides all of the administrative services for the national OPTN under federal contract to the Health Resources and Services Administration (HRSA), an agency under the U.S. Department of Health and Human Services (HHS).     UNOS and OPTN responsibilities include:    · writing educational material for patients, the public and professionals;  · helping to make  people aware of the need for donated organs and tissue;  · writing organ transplant policy with help from doctors, nurses, transplant patients/candidates, donor families, living donors, and the public;  · coordinating the organ matching and placement process;  · collecting information about every organ transplant and donation that occurs in the [place].    Remember, you should contact your transplant center directly if you have questions or concerns about your own medical care including medical records, work-up progress and test reports. [facility] is not your transplant center, and staff at [facility] will not be able to transfer you to your transplant center, so keep your transplant centers phone number handy. But, while you research your transplant needs and learn as much as you can about transplantation and donation, we welcome your call to our toll-free patient services line at 1-273.471.1212.

## 2018-05-24 DIAGNOSIS — C61 PROSTATE CANCER: Primary | ICD-10-CM

## 2018-05-25 ENCOUNTER — HISTORICAL (OUTPATIENT)
Dept: ADMINISTRATIVE | Facility: HOSPITAL | Age: 43
End: 2018-05-25

## 2018-05-30 LAB
FINAL CULTURE: NORMAL
FINAL CULTURE: NORMAL
GRAM STN SPEC: NORMAL

## 2018-05-31 LAB
FINAL CULTURE: NORMAL
FINAL CULTURE: NORMAL

## 2018-06-03 LAB — FINAL CULTURE: NORMAL

## 2018-06-05 ENCOUNTER — HISTORICAL (OUTPATIENT)
Dept: ADMINISTRATIVE | Facility: HOSPITAL | Age: 43
End: 2018-06-05

## 2018-06-11 LAB
FINAL CULTURE: NORMAL
FINAL CULTURE: NORMAL

## 2018-07-26 ENCOUNTER — LAB VISIT (OUTPATIENT)
Dept: LAB | Facility: HOSPITAL | Age: 43
End: 2018-07-26
Attending: UROLOGY
Payer: MEDICARE

## 2018-07-26 ENCOUNTER — OFFICE VISIT (OUTPATIENT)
Dept: UROLOGY | Facility: CLINIC | Age: 43
End: 2018-07-26
Payer: MEDICARE

## 2018-07-26 VITALS
HEART RATE: 79 BPM | RESPIRATION RATE: 16 BRPM | DIASTOLIC BLOOD PRESSURE: 84 MMHG | WEIGHT: 180 LBS | HEIGHT: 72 IN | SYSTOLIC BLOOD PRESSURE: 137 MMHG | BODY MASS INDEX: 24.38 KG/M2

## 2018-07-26 DIAGNOSIS — C61 PROSTATE CANCER: Primary | ICD-10-CM

## 2018-07-26 DIAGNOSIS — C61 PROSTATE CANCER: ICD-10-CM

## 2018-07-26 LAB — COMPLEXED PSA SERPL-MCNC: 0.03 NG/ML

## 2018-07-26 PROCEDURE — 99213 OFFICE O/P EST LOW 20 MIN: CPT | Mod: PBBFAC,25 | Performed by: UROLOGY

## 2018-07-26 PROCEDURE — 36415 COLL VENOUS BLD VENIPUNCTURE: CPT

## 2018-07-26 PROCEDURE — 84153 ASSAY OF PSA TOTAL: CPT

## 2018-07-26 PROCEDURE — 96402 CHEMO HORMON ANTINEOPL SQ/IM: CPT | Mod: PBBFAC

## 2018-07-26 PROCEDURE — 99999 PR PBB SHADOW E&M-EST. PATIENT-LVL III: CPT | Mod: PBBFAC,,, | Performed by: UROLOGY

## 2018-07-26 PROCEDURE — 99214 OFFICE O/P EST MOD 30 MIN: CPT | Mod: S$PBB,,, | Performed by: UROLOGY

## 2018-07-26 RX ADMIN — LEUPROLIDE ACETATE 45 MG: KIT at 01:07

## 2018-07-26 NOTE — PROGRESS NOTES
Clinic Note  7/26/2018      Subjective:         Chief Complaint:   HPI  Prem Saldana is a 43 y.o. male diagnosed with  [id number] prostate cancer. Lives in [place]. 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  Consult from Dr. Rojas.  Originally from [place]. [place]. On Trelstar, started 12/8/2016. Completed XRT 6 months ago.  Got peritonititis 1 month ago, now on ESRD. erectile dysfunction an issue, pills did not help.Discussed other options. Wants to try CAROL.     PSAi- 158  Stage- T3/4  Biopsy 10/24/16- [place] 3+4 12/12 cores positive  NENA score- 8  High risk disease  Imaging- Bone scan - no mets, CT scan- no nodes but loss of rectal fat plane            Lab Results   Component Value Date    .0 (H) 07/27/2016    .4 (H) 06/02/2016    PSADIAG 0.03 07/26/2018    PSADIAG 0.21 01/23/2018    PSADIAG 2.3 06/08/2017    PSADIAG 3.1 04/06/2017      Past Medical History:   Diagnosis Date    Anemia of renal disease     ESRD on dialysis since 4/3/15     Essential hypertension     Secondary hyperparathyroidism of renal origin      Family History   Problem Relation Age of Onset    Diabetes Mother     Cancer Maternal Grandmother     Hypertension Maternal Grandfather     Heart attack Maternal Grandfather         MI in his 60s or 70s    Kidney disease Neg Hx      Social History     Social History    Marital status: Single     Spouse name: N/A    Number of children: N/A    Years of education: N/A     Occupational History    Not on file.     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)     Past Surgical History:   Procedure Laterality Date    AV FISTULA PLACEMENT Left 07/2015    Peritoneal Dialysis Catheter Placement  01/2016    Permcath Placement        Patient Active Problem List   Diagnosis    ESRD on dialysis since 4/3/15    Essential hypertension    Anemia of renal disease    Secondary hyperparathyroidism of renal origin    Organ transplant candidate    Pre-transplant evaluation for chronic kidney disease    Prostate cancer    Cancer of prostate with high recurrence risk (stage T3a or Lee 8-10 or PSA > 20)     Review of Systems   Constitutional: Negative for appetite change, chills, fatigue, fever and unexpected weight change.   HENT: Negative for nosebleeds.    Respiratory: Negative for shortness of breath and wheezing.    Cardiovascular: Negative for chest pain, palpitations and leg swelling.   Gastrointestinal: Negative for abdominal distention, abdominal pain, constipation, diarrhea, nausea and vomiting.   Genitourinary: Negative for dysuria and hematuria.   Musculoskeletal: Negative for arthralgias and back pain.   Skin: Negative for pallor.   Neurological: Negative for dizziness, seizures and syncope.   Hematological: Negative for adenopathy.   Psychiatric/Behavioral: Negative for dysphoric mood.         Objective:      There were no vitals taken for this visit.  Estimated body mass index is 27.21 kg/m² as calculated from the following:    Height as of 1/23/18: 6' (1.829 m).    Weight as of 1/23/18: 91 kg (200 lb 9.9 oz).  Physical Exam   Constitutional: He is oriented to person, place, and time. He appears well-developed and well-nourished. No distress.   HENT:   Head: Atraumatic.   Neck: No tracheal deviation present.   Cardiovascular: Normal rate.    Pulmonary/Chest: Effort normal. No respiratory distress. He has no wheezes.   Abdominal: Soft. Bowel sounds  are normal. He exhibits no distension and no mass. There is no tenderness. There is no rebound and no guarding.   Neurological: He is alert and oriented to person, place, and time.   Skin: Skin is warm and dry. He is not diaphoretic.     Psychiatric: He has a normal mood and affect. His behavior is normal. Judgment and thought content normal.         Assessment and Plan:           Problem List Items Addressed This Visit     Prostate cancer - Primary          Follow up:   Lindsey today.  6 months with PSA.    Satish Au

## 2018-08-07 ENCOUNTER — TELEPHONE (OUTPATIENT)
Dept: UROLOGY | Facility: CLINIC | Age: 43
End: 2018-08-07

## 2018-08-07 DIAGNOSIS — C61 PROSTATE CANCER: Primary | ICD-10-CM

## 2018-08-07 DIAGNOSIS — N52.35 ERECTILE DYSFUNCTION FOLLOWING RADIATION THERAPY: ICD-10-CM

## 2018-08-07 NOTE — TELEPHONE ENCOUNTER
----- Message from Vidya Barron LPN sent at 8/7/2018  3:25 PM CDT -----  Contact: Self- [phone]  Hanny ortega you order this ?  ----- Message -----  From: Debby Gutiérrez  Sent: 8/7/2018   2:04 PM  To: Angely SANCHEZ Staff    Angely- pt called to get a prescription for a CAROL sent to [facility]- please contact pt at [phone]

## 2018-08-07 NOTE — TELEPHONE ENCOUNTER
----- Message from Debby Gutiérrez sent at 8/7/2018  2:04 PM CDT -----  Contact: Self- [phone]  Angely- pt called to get a prescription for a CAROL sent to [facility]- please contact pt at [phone]

## 2019-01-29 ENCOUNTER — OFFICE VISIT (OUTPATIENT)
Dept: UROLOGY | Facility: CLINIC | Age: 44
End: 2019-01-29
Payer: MEDICARE

## 2019-01-29 ENCOUNTER — LAB VISIT (OUTPATIENT)
Dept: LAB | Facility: HOSPITAL | Age: 44
End: 2019-01-29
Attending: UROLOGY
Payer: MEDICARE

## 2019-01-29 VITALS
HEIGHT: 72 IN | HEART RATE: 72 BPM | SYSTOLIC BLOOD PRESSURE: 147 MMHG | DIASTOLIC BLOOD PRESSURE: 73 MMHG | RESPIRATION RATE: 16 BRPM | BODY MASS INDEX: 23.84 KG/M2 | WEIGHT: 176 LBS

## 2019-01-29 DIAGNOSIS — C61 PROSTATE CANCER: ICD-10-CM

## 2019-01-29 DIAGNOSIS — C61 PROSTATE CANCER: Primary | ICD-10-CM

## 2019-01-29 LAB — COMPLEXED PSA SERPL-MCNC: 0.01 NG/ML

## 2019-01-29 PROCEDURE — 96402 CHEMO HORMON ANTINEOPL SQ/IM: CPT | Mod: PBBFAC

## 2019-01-29 PROCEDURE — 99214 OFFICE O/P EST MOD 30 MIN: CPT | Mod: S$PBB,,, | Performed by: UROLOGY

## 2019-01-29 PROCEDURE — 99999 PR PBB SHADOW E&M-EST. PATIENT-LVL III: ICD-10-PCS | Mod: PBBFAC,,, | Performed by: UROLOGY

## 2019-01-29 PROCEDURE — 84153 ASSAY OF PSA TOTAL: CPT

## 2019-01-29 PROCEDURE — 99213 OFFICE O/P EST LOW 20 MIN: CPT | Mod: PBBFAC,25 | Performed by: UROLOGY

## 2019-01-29 PROCEDURE — 36415 COLL VENOUS BLD VENIPUNCTURE: CPT

## 2019-01-29 PROCEDURE — 99999 PR PBB SHADOW E&M-EST. PATIENT-LVL III: CPT | Mod: PBBFAC,,, | Performed by: UROLOGY

## 2019-01-29 PROCEDURE — 99214 PR OFFICE/OUTPT VISIT, EST, LEVL IV, 30-39 MIN: ICD-10-PCS | Mod: S$PBB,,, | Performed by: UROLOGY

## 2019-01-29 RX ADMIN — LEUPROLIDE ACETATE 45 MG: KIT at 02:01

## 2019-01-29 NOTE — PROGRESS NOTES
Clinic Note  1/29/2019      Subjective:         Chief Complaint:   HPI  Prem Saldana is a 43 y.o. male diagnosed with  [id number] prostate cancer. Lives in [place]. 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  Consult from Dr. Rojas.  Originally from [place]. [place]. On Trelstar, started 12/8/2016. Completed XRT 6 months ago.  Got peritonititis 1 month ago, now on ESRD. erectile dysfunction an issue, pills did not help.Discussed other options. Wants to try CAROL.  Just moved to [facility]. Works at [email or web address].     PSAi- 158  Stage- T3/4  Biopsy 10/24/16- [place] 3+4 12/12 cores positive  NENA score- 8  High risk disease  Imaging- Bone scan - no mets, CT scan- no nodes but loss of rectal fat plane      Lab Results   Component Value Date    .0 (H) 07/27/2016    .4 (H) 06/02/2016    PSADIAG 0.01 01/29/2019    PSADIAG 0.03 07/26/2018    PSADIAG 0.21 01/23/2018    PSADIAG 2.3 06/08/2017    PSADIAG 3.1 04/06/2017      Past Medical History:   Diagnosis Date    Anemia of renal disease     ESRD on dialysis since 4/3/15     Essential hypertension     Secondary hyperparathyroidism of renal origin      Family History   Problem Relation Age of Onset    Diabetes Mother     Cancer Maternal Grandmother     Hypertension Maternal Grandfather     Heart attack Maternal Grandfather         MI in his 60s or 70s    Kidney disease Neg Hx      Social History     Socioeconomic History    Marital status: Single     Spouse name: Not on file    Number of children: Not on file    Years of education: Not on file    Highest education level: Not on file   Social Needs    Financial resource strain: Not on file    Food insecurity - worry: Not on file    Food insecurity - inability: Not on file    Transportation needs - medical: Not on file    Transportation needs - non-medical: Not on file   Occupational History    Not on file   Tobacco Use    Smoking status: Former Smoker     Packs/day: 1.00     Years: 15.00      Pack years: 15.00     Types: Cigarettes    Smokeless tobacco: Never Used    Tobacco comment: currently smokes 2-3 cigs/week; has been smoking for >20 years; at the most smoked 1 ppd   Substance and Sexual Activity    Alcohol use: No     Comment: previously social drinker    Drug use: No     Comment: remote use of marijuana >20 years ago    Sexual activity: No   Other Topics Concern    Not on file   Social History Narrative    Lives with fiance and two dogs     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)     Past Surgical History:   Procedure Laterality Date    AV FISTULA PLACEMENT Left 07/2015    Peritoneal Dialysis Catheter Placement  01/2016    Permcath Placement        Patient Active Problem List   Diagnosis    ESRD on dialysis since 4/3/15    Essential hypertension    Anemia of renal disease    Secondary hyperparathyroidism of renal origin    Organ transplant candidate    Pre-transplant evaluation for chronic kidney disease    Prostate cancer    Cancer of prostate with high recurrence risk (stage T3a or Lee 8-10 or PSA > 20)     Review of Systems   Constitutional: Negative for appetite change, chills, fatigue, fever and unexpected weight change.   HENT: Negative for nosebleeds.    Respiratory: Negative for shortness of breath and wheezing.    Cardiovascular: Negative for chest pain, palpitations and leg swelling.   Gastrointestinal: Negative for abdominal distention, abdominal pain, constipation, diarrhea, nausea and vomiting.   Genitourinary: Negative for dysuria and hematuria.   Musculoskeletal: Negative for arthralgias and back pain.   Skin: Negative for pallor.   Neurological: Negative for dizziness, seizures and syncope.   Hematological: Negative for adenopathy.   Psychiatric/Behavioral: Negative for dysphoric mood.         Objective:      There were no vitals taken for this visit.  Estimated body mass index is 24.41 kg/m² as calculated from  the following:    Height as of 7/26/18: 6' (1.829 m).    Weight as of 7/26/18: 81.6 kg (180 lb).  Physical Exam      Assessment and Plan:           Problem List Items Addressed This Visit     Prostate cancer - Primary          Follow up:   6 months with PSA.  Lupron today.    Satish Au

## 2019-04-09 ENCOUNTER — HISTORICAL (OUTPATIENT)
Dept: ADMINISTRATIVE | Facility: HOSPITAL | Age: 44
End: 2019-04-09

## 2019-04-10 LAB
FINAL CULTURE: NORMAL
GRAM STN SPEC: NORMAL

## 2019-04-14 LAB — FINAL CULTURE: NORMAL

## 2019-06-19 DIAGNOSIS — C61 PROSTATE CANCER: Primary | ICD-10-CM

## 2019-07-30 ENCOUNTER — OFFICE VISIT (OUTPATIENT)
Dept: UROLOGY | Facility: CLINIC | Age: 44
End: 2019-07-30
Payer: MEDICARE

## 2019-07-30 ENCOUNTER — LAB VISIT (OUTPATIENT)
Dept: LAB | Facility: HOSPITAL | Age: 44
End: 2019-07-30
Attending: UROLOGY
Payer: MEDICARE

## 2019-07-30 VITALS
HEART RATE: 89 BPM | WEIGHT: 169.75 LBS | HEIGHT: 72 IN | SYSTOLIC BLOOD PRESSURE: 209 MMHG | BODY MASS INDEX: 22.99 KG/M2 | DIASTOLIC BLOOD PRESSURE: 118 MMHG

## 2019-07-30 DIAGNOSIS — C61 PROSTATE CANCER: ICD-10-CM

## 2019-07-30 DIAGNOSIS — C61 PROSTATE CANCER: Primary | ICD-10-CM

## 2019-07-30 DIAGNOSIS — Z92.3 HISTORY OF EXTERNAL BEAM RADIATION THERAPY: ICD-10-CM

## 2019-07-30 LAB — COMPLEXED PSA SERPL-MCNC: <0.01 NG/ML (ref 0–4)

## 2019-07-30 PROCEDURE — 99213 OFFICE O/P EST LOW 20 MIN: CPT | Mod: PBBFAC | Performed by: NURSE PRACTITIONER

## 2019-07-30 PROCEDURE — 99999 PR PBB SHADOW E&M-EST. PATIENT-LVL III: ICD-10-PCS | Mod: PBBFAC,,, | Performed by: NURSE PRACTITIONER

## 2019-07-30 PROCEDURE — 96402 CHEMO HORMON ANTINEOPL SQ/IM: CPT | Mod: ,,, | Performed by: NURSE PRACTITIONER

## 2019-07-30 PROCEDURE — 99214 PR OFFICE/OUTPT VISIT, EST, LEVL IV, 30-39 MIN: ICD-10-PCS | Mod: S$PBB,25,, | Performed by: NURSE PRACTITIONER

## 2019-07-30 PROCEDURE — 84153 ASSAY OF PSA TOTAL: CPT

## 2019-07-30 PROCEDURE — 36415 COLL VENOUS BLD VENIPUNCTURE: CPT

## 2019-07-30 PROCEDURE — 99999 PR PBB SHADOW E&M-EST. PATIENT-LVL III: CPT | Mod: PBBFAC,,, | Performed by: NURSE PRACTITIONER

## 2019-07-30 PROCEDURE — 96402 PR CHEMOTHER HORMON ANTINEOPL SUB-Q/IM: ICD-10-PCS | Mod: ,,, | Performed by: NURSE PRACTITIONER

## 2019-07-30 PROCEDURE — 96402 CHEMO HORMON ANTINEOPL SQ/IM: CPT | Mod: PBBFAC

## 2019-07-30 PROCEDURE — 99214 OFFICE O/P EST MOD 30 MIN: CPT | Mod: S$PBB,25,, | Performed by: NURSE PRACTITIONER

## 2019-07-30 RX ADMIN — LEUPROLIDE ACETATE 45 MG: KIT at 01:07

## 2019-07-30 NOTE — PROGRESS NOTES
Subjective:       Patient ID: Prem Saldana is a 44 y.o. male.    Chief Complaint: Follow-up and Prostate Cancer (hx of XRT)      Prem Saldana is a 44 y.o. male diagnosed with  [id number] prostate cancer. Lives in [place]. 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  Consult from Dr. Rojas.  Originally from [place]. [place].   Just moved to [facility]. Works at [employer].  On Trelstar, started 12/8/2016.   Explained this would require definitive radiotherapy followed by 12 - 18 months of continued hormonal deprivation, followed by 3 years of follow up with a very low PSA after he begins to produce testosterone  Completed XRT 12/08/2017 at [facility]  Got peritonititis 1 month ago, now on ESRD. erectile dysfunction an issue, pills did not help.Discussed other options. Wants to try CAROL.       PSAi- 158  Stage- T3/4  Biopsy 10/24/16- [place] 3+4 12/12 cores positive  NENA score- 8  High risk disease  Imaging- Bone scan - no mets, CT scan- no nodes but loss of rectal fat plane      01/29/2019 was his last visit.  Today would feliz his 18 month of hormonal deprivation after XRT.  He voices no complaints.  He still on Dialysis; makes very little urine.                                 PSA                  <0.01               07/30/2019                 PSA                  0.01                01/29/2019                 PSA                  0.03                07/26/2018                 PSA                  0.21                01/23/2018                 PSA                  2.3                 06/08/2017                 PSA                  3.1                 04/06/2017                 PSA              158.0 (H)           07/27/2016                 PSA              130.4 (H)           06/02/2016     Review of Systems   Constitutional: Negative for activity change, appetite change, chills and fever.   HENT: Negative for facial swelling and trouble swallowing.    Eyes: Negative for visual  disturbance.   Respiratory: Negative for chest tightness and shortness of breath.    Cardiovascular: Negative for chest pain and palpitations.   Gastrointestinal: Negative.  Negative for abdominal pain, constipation, diarrhea, nausea and vomiting.   Genitourinary: Positive for decreased urine volume. Negative for difficulty urinating, dysuria, flank pain, hematuria, penile pain, penile swelling, scrotal swelling and testicular pain.   Musculoskeletal: Negative for back pain, gait problem, myalgias and neck stiffness.   Skin: Negative for rash.   Neurological: Negative for dizziness and speech difficulty.   Hematological: Does not bruise/bleed easily.   Psychiatric/Behavioral: Negative for behavioral problems.       Objective:      Physical Exam   Nursing note and vitals reviewed.  Constitutional: He is oriented to person, place, and time. He appears well-developed and well-nourished.  Non-toxic appearance. He does not have a sickly appearance.   HENT:   Head: Normocephalic and atraumatic.   Right Ear: External ear normal.   Left Ear: External ear normal.   Nose: Nose normal.   Mouth/Throat: Mucous membranes are normal.   Eyes: Conjunctivae and lids are normal. No scleral icterus.   Neck: Trachea normal, normal range of motion and full passive range of motion without pain. Neck supple. No JVD present. No tracheal deviation present.   Cardiovascular: Normal rate, S1 normal and S2 normal.    Pulmonary/Chest: Effort normal. No respiratory distress. He exhibits no tenderness.   Abdominal: Soft. Normal appearance and bowel sounds are normal. There is no hepatosplenomegaly. There is no tenderness. There is no CVA tenderness.   Genitourinary: Penis normal.   Musculoskeletal: Normal range of motion.   AV fistula noted to MARIBELE.     Neurological: He is alert and oriented to person, place, and time. He has normal strength.   Skin: Skin is warm, dry and intact.     Psychiatric: He has a normal mood and affect. His behavior is  normal. Judgment and thought content normal.       Assessment:       1. Prostate cancer    2. History of external beam radiation therapy        Plan:         I spent 25 minutes with the patient of which more than half was spent in direct consultation with the patient in regards to our treatment and plan.    Education and recommendations of today's plan of care including home remedies.  We discussed his new PSA results.  Reviewed Dr. Handy plan/recomendations.  Lupron 45 mg IM today.   RTC 6 months with PSA.

## 2019-07-30 NOTE — PATIENT INSTRUCTIONS
PSA                  <0.01               07/30/2019                 PSA                  0.01                01/29/2019                 PSA                  0.03                07/26/2018                 PSA                  0.21                01/23/2018                 PSA                  2.3                 06/08/2017                 PSA                  3.1                 04/06/2017                 PSA              158.0 (H)           07/27/2016                 PSA              130.4 (H)           06/02/2016         What Is Prostate Cancer?    Cancer is when cells in the body change and grow out of control. Cancer cells can form lumps of tissue called tumors. Cancer that starts in the prostate is called prostate cancer. It can grow and spread beyond the prostate. Cancer that spreads is harder to treat.  Understanding the prostate  The prostate is a gland in men about the size and shape of a walnut. It surrounds the upper part of the urethra. This is the tube that carries urine from the bladder. The prostate makes some of the fluid thats part of semen. During orgasm, semen leaves the body through the urethra.  When prostate cancer forms  As a man ages, the cells of his prostate may change to form tumors or other growths. The types of growths include:  · Noncancerous growths. As a man ages, the prostate may grow larger. This is called benign prostatic hyperplasia (BPH). With BPH, extra prostate tissue often squeezes the urethra, causing symptoms such as trouble urinating. But BPH is not cancer and does not lead to cancer.  · Atypical cells. Sometimes prostate cells dont look like normal (typical) prostate cells. One type of abnormal growth is called prostatic intraepithelial neoplasia or PIN. Although PIN cells are not cancer cells, they may be a sign that cancer is likely to form.  · Cancer. When abnormal prostate cells grow out of control and start to invade other tissues, they are called cancer cells.  These cells may or may not lead to symptoms. Some tumors can be felt during a physical exam, and some cant. Prostate cancer may grow into nearby organs or spread to nearby lymph nodes. Lymph nodes are small organs around the body that are part of the immune system. In some cases, the cancer spreads to bones or organs in distant parts of the body. This is called metastasis.  Diagnosing prostate cancer  Prostate cancer may not cause symptoms at first. Urinary problems are often not a sign of cancer, but of another condition, such as BPH. To find out if you have prostate cancer, your healthcare provider must examine you and order tests. The tests help confirm a diagnosis of cancer. They also help give more information about a cancerous tumor. Tests might include:  · Prostate specific antigen (PSA) testing. PSA is a chemical made by prostate tissue. The amount of PSA in the blood (PSA level) is tested to check a mans risk for prostate cancer. In general, a high or rising PSA level may mean an increased cancer risk. A PSA test by itself cannot show if a man has prostate cancer. PSA testing is also used to check the success of cancer treatments.  · Core needle biopsy. This test is done to determine if a man has prostate cancer. A hollow needle is used to take small pieces of tissue from the prostate. This helps give more information about the cells. Before the test, pain medicine may be given to prevent pain. During the test, a small probe is inserted into the rectum. The probe sends an image of the prostate to a video monitor. With this image as a guide, the healthcare provider uses a thin, hollow needle to remove tiny tissue samples from the prostate. These are sent to a lab where they are looked at for cancer cells.  Date Last Reviewed: 5/1/2017  © 3444-5858 [employer]. [address] [place] [address], [place] [address]. All rights reserved. This information is not intended as a substitute for professional  medical care. Always follow your healthcare professional's instructions.        Hormone Therapy for Prostate Cancer  Androgens are male hormones. Androgens such as testosterone are made in the testicles. Prostate cancer cells need androgens to grow. Reducing the amount of androgens in the body or blocking prostate cancer cells from using them can help treat prostate cancer. This therapy does not cure the cancer, but it can help control it. It may be used alone. Or it may be used with radiation therapy to help make this treatment more effective. Read below to learn more about this treatment.  How the therapy is done  Hormone therapy can be done with:  · LHRH (GnRH) agonists or antagonists. These are medicines that stop the testicles from making androgens. These are injected into a muscle or just under the skin. This is done every few weeks or months. Or they may be given with a small device put under the skin on the inside of the arm. This implant gives a steady dose of medicine over time. LHRH agonists and antagonists are often used with anti-androgens (see below).  · Anti-androgens. These are medicines that stop cancer cells from using androgens as a way to grow. These come in pill form and are taken by mouth. They are often used along with other forms of hormone therapy.  · CYP17 inhibitors. These slow the amount of hormones made in prostate cancer cells and other body cells. They are given as pills. They are often used along with other forms of hormone therapy.  · Other medicines. These may include corticosteroids, estrogens, or anti-fungal medicines. These can also help lower the levels of androgens in the body. They are used less often than the medicines listed above.  · Orchiectomy. This is surgery to remove the testicles. This stops the body from making most androgens. Artificial (prosthetic) testicles can be placed afterward to give the look of real testicles.  Possible side effects   Side effects are  similar for most types of hormone therapy, but they can vary a bit between medicines. Possible side effects can include:  · Sudden increase in body heat (hot flashes)  · Tiredness  · Less interest in sex  · Inability to get or keep an erection  · Decrease in size of penis and testicles  · Changes in facial hair  · Mood changes, such as depression, irritability, or anxiety  · Trouble with memory and concentration  · Loss of muscle  · Diarrhea  · Nausea  · Weight gain  · Breast-area tenderness or growth  · Low red blood cell count (anemia)  · Hair loss  · Bone thinning (osteoporosis)  · Increased risk of heart disease, stroke, and diabetes  Coping with side effects  Some of the side effects are temporary. Others are more long-lasting. This depends on the type of hormone therapy used, and how it affects your body. Most side effects of orchiectomy are permanent. Your health care provider can tell you more. To help cope with side effects, try the tips below.  · Talk to your health care provider about your symptoms. He or she may prescribe medicines that can help you feel better and reduce problems.  · If you have hot flashes, dont take hot showers. Dont use hot tubs or saunas.  · Dont eat spicy food or drink alcohol. Dont have caffeine.  · Get regular physical activity.  · Eat a healthy diet.  · Keep mentally active.  · Work with your partner to manage sexual changes.  · Try counseling or support groups.  Follow-up care  During the course of your treatment, youll have regular visits with your health care provider. You may also have tests. These let your health care provider check your health and see how well the treatment is working. After treatment ends, you and your health care provider will discuss the results. Youll also discuss whether you need additional cancer treatments.  Resources  For more information about cancer and its treatment, visit the websites listed below:  · American Cancer Society   · National  Cancer Temple   · [employer]   Date Last Reviewed: 3/30/2015  © 7918-5979 [employer]. [address] [place] [address], [place] [address]. All rights reserved. This information is not intended as a substitute for professional medical care. Always follow your healthcare professional's instructions.        Leuprolide injection  What is this medicine?  LEUPROLIDE (loo PROE lide) is a man-made hormone. It is used to treat the symptoms of prostate cancer. This medicine may also be used to treat children with early onset of puberty. It may be used for other hormonal conditions.  How should I use this medicine?  This medicine is for injection under the skin or into a muscle. You will be taught how to prepare and give this medicine. Use exactly as directed. Take your medicine at regular intervals. Do not take your medicine more often than directed.  It is important that you put your used needles and syringes in a special sharps container. Do not put them in a trash can. If you do not have a sharps container, call your pharmacist or healthcare provider to get one.  Talk to your pediatrician regarding the use of this medicine in children. While this medicine may be prescribed for children as young as 8 years for selected conditions, precautions do apply.  What side effects may I notice from receiving this medicine?  Side effects that you should report to your doctor or health care professional as soon as possible:  · allergic reactions like skin rash, itching or hives, swelling of the face, lips, or tongue  · breathing problems  · chest pain  · depression or memory disorders  · pain in your legs or groin  · pain at site where injected  · severe headache  · swelling of the feet and legs  · visual changes  · vomiting  Side effects that usually do not require medical attention (report to your doctor or health care professional if they continue or are bothersome):  · breast swelling or tenderness  · decrease in sex drive or  performance  · diarrhea  · hot flashes  · loss of appetite  · muscle, joint, or bone pains  · nausea  · redness or irritation at site where injected  · skin problems or acne  What may interact with this medicine?  Do not take this medicine with any of the following medications:  · chasteberry  This medicine may also interact with the following medications:  · herbal or dietary supplements, like black cohosh or DHEA  · female hormones, like estrogens or progestins and birth control pills, patches, rings, or injections  · male hormones, like testosterone  What if I miss a dose?  If you miss a dose, take it as soon as you can. If it is almost time for your next dose, take only that dose. Do not take double or extra doses.  Where should I keep my medicine?  Keep out of the reach of children.  Store below 25 degrees C (77 degrees F). Do not freeze. Protect from light. Do not use if it is not clear or if there are particles present. Throw away any unused medicine after the expiration date.  What should I tell my health care provider before I take this medicine?  They need to know if you have any of these conditions:  · diabetes  · heart disease or previous heart attack  · high blood pressure  · high cholesterol  · pain or difficulty passing urine  · spinal cord metastasis  · stroke  · tobacco smoker  · an unusual or allergic reaction to leuprolide, benzyl alcohol, other medicines, foods, dyes, or preservatives  · pregnant or trying to get pregnant  · breast-feeding  What should I watch for while using this medicine?  Visit your doctor or health care professional for regular checks on your progress. During the first week, your symptoms may get worse, but then will improve as you continue your treatment. You may get hot flashes, increased bone pain, increased difficulty passing urine, or an aggravation of nerve symptoms. Discuss these effects with your doctor or health care professional, some of them may improve with  continued use of this medicine.  Female patients may experience a menstrual cycle or spotting during the first 2 months of therapy with this medicine. If this continues, contact your doctor or health care professional.  NOTE:This sheet is a summary. It may not cover all possible information. If you have questions about this medicine, talk to your doctor, pharmacist, or health care provider. Copyright© 2017 [employer]

## 2020-07-30 NOTE — TELEPHONE ENCOUNTER
----- Message from Belia Mueller sent at 12/14/2017  2:47 PM CST -----  [facility].     How far back was it suppose to take place?    Call her @ [phone]    present

## 2021-05-12 ENCOUNTER — PATIENT MESSAGE (OUTPATIENT)
Dept: RESEARCH | Facility: HOSPITAL | Age: 46
End: 2021-05-12

## 2022-04-30 NOTE — DISCHARGE SUMMARY
Patient:   Chance Wallace             MRN: [id number]            FIN: [id number]               Age:   42 years     Sex:  Male     :  1975   Associated Diagnoses:   None   Author:   Sia AUSTIN, Jenn WILKINS      Discharge Information      Discharge Summary Information   ADMIT/DISCHARGE DATE   Admit Date: 2017  Discharge Date: 2017     Procedures   No procedures recorded for this visit.      Hospital Course   Admission diagnoses    acute vertigo  ESRD  htn urgency    Discharge Diagnoses    acute vertigo  ESRD  htn urgency    43-year-old  gentleman with uncontrolled hypertension, end-stage renal disease on peritoneal dialysis who presented with dizziness.  The patient's blood pressure medications have undergone a recent change.  He was admitted to rule out a central cause of vertigo.  MRI/MRA of the brain was ordered.  Both of these were negative.  The patient's symptoms had resolved.  He is to follow up with his PCP for possible peripheral etiologies should the symptoms return.          Physical Examination   General:  Alert and oriented, No acute distress.    Neck:  Supple, Non-tender.    Respiratory:  Lungs are clear to auscultation, Respirations are non-labored.    Cardiovascular:  Normal rate, Regular rhythm.    Gastrointestinal:  Soft, Non-tender.       Discharge Plan   Discharge Summary Plan   Discharge disposition: discharge to home.     Orders      Education and Follow-up   Counseled: patient, family, regarding diagnosis, regarding treatment, regarding medications.     Discharge Planning: ; Follow up with primary care provider Within 1 week, time spent on discharge disposition included the following: final examination of the patient; discussion of the hospital stay; instructions for continuing care; final diagnoses; patient/family counseling; preparation of discharge records; preparation of prescriptions; referral forms; chart review.  Total time spent on discharge  disposition was 31 minutes.   .

## 2022-04-30 NOTE — ED PROVIDER NOTES
Patient:   Chance Wallace             MRN: [id number]            FIN: [id number]               Age:   42 years     Sex:  Male     :  1975   Associated Diagnoses:   Vertigo; Hypertension; ESRD on dialysis; History of CVA in adulthood   Author:   John Cook      Basic Information   Time seen: Date & time 8/3/2017 13:02:00.   History source: Patient.   Arrival mode: Ambulance.   History limitation: None.   Additional information: Patient's physician(s): Kamari Ortiz MD, Chief Complaint from Nursing Triage Note : Chief Complaint   8/3/2017 12:40 CDT       Chief Complaint           Pt was doing HD and started with n/v, dizziness, generalized weakness, headache, and HTN. initial bp 202/146. Completed whole treatment. headache resolved. Denies any other s/s.  (Modified) .      History of Present Illness   The patient presents with 41 y/o male with hx of HTN and ESRD presents to the ED via EMS with wife and mother-in-law at bedside c/o dizziness with associated N/V and loss of balance onset of this morning (8/3/17). Pt states that upon waking the room felt as though it were spinning; states that this complaint is similar to when he was first diagnosed with ESRD. Pt's MIL notes that pt had an involuntary BM. Pt denies new medication and recent illness. Pt receives hemodialysis..  The onset was 8/3/2017 .  The course/duration of symptoms is constant.  The character of symptoms is spinning and off-balance.  The degree at present is moderate.  The exacerbating factor is Sitting up.  The relieving factor is lying down.  Risk factors consist of hypertension, not medication change and not recent illness/injury.  Prior episodes: rare.  Therapy today: emergency medical services.  Associated symptoms: nausea, vomiting and altered coordination.        Review of Systems   Constitutional symptoms:  Negative except as documented in HPI.   Skin symptoms:  Negative except as documented in HPI.   Eye symptoms:   Negative except as documented in HPI.   ENMT symptoms:  Negative except as documented in HPI.   Respiratory symptoms:  Negative except as documented in HPI.   Cardiovascular symptoms:  Negative except as documented in HPI.   Gastrointestinal symptoms:  Nausea, vomiting, Incontinence.    Genitourinary symptoms:  Negative except as documented in HPI.   Musculoskeletal symptoms:  Negative except as documented in HPI.   Neurologic symptoms:  Dizziness, abnormal balance (unable to walk).    Psychiatric symptoms:  Negative except as documented in HPI.   Endocrine symptoms:  Negative except as documented in HPI.   Hematologic/Lymphatic symptoms:  Negative except as documented in HPI.   Allergy/immunologic symptoms:  Negative except as documented in HPI.      Health Status   Allergies: No known allergies.   Medications:  (Selected)   Prescriptions  Prescribed  labetalol 200 mg oral tablet: 200 mg = 1 tab(s), Oral, TID, # 90 tab(s), 3 Refill(s)  Documented Medications  Documented  minoxidil 10 mg oral tablet: 20 mg = 2 tab(s), Oral, BID.   Immunizations: Per nurse's notes.      Past Medical/ Family/ Social History   Medical history:    Resolved  Arthritis ([id number]):  Resolved.,   Renal disease due to HTN.   Surgical history:    Permacath Removal (None) on 4/6/2015 at 39 Years.  Comments:  4/6/2015 09:50 - Mckenzie Parra RN  auto-populated from documented surgical case  Permacath Placement (None) on 4/6/2015 at 39 Years.  Comments:  4/6/2015 09:50 - Mckenzie Parra RN  auto-populated from documented surgical case.   Family history:    Mother  Diabetes mellitus type 1.  .   Social history: Alcohol use: Denies, Tobacco use: Smokes 0.5 pack(s) per day, Drug use: Denies, Family/social situation: .      Physical Examination               Vital Signs   Vital Signs   8/3/2017 12:58 CDT       Peripheral Pulse Rate     73 bpm                             Respiratory Rate          21 br/min                             SpO2                       100 %                             Oxygen Therapy            Room air                             Systolic Blood Pressure   228 mmHg  HI                             Diastolic Blood Pressure  118 mmHg  HI                             Mean Arterial Pressure, Cuff              155 mmHg    8/3/2017 12:40 CDT       Temperature Oral          36.8 DegC                             Peripheral Pulse Rate     78 bpm                             Respiratory Rate          21 br/min                             SpO2                      100 %                             Oxygen Therapy            Room air                             Systolic Blood Pressure   193 mmHg  HI                             Diastolic Blood Pressure  121 mmHg  HI  .   Measurements   8/3/2017 12:40 CDT       Weight Dosing             89 kg                             Weight Measured and Calculated in Lbs     196.21 lb                             Weight Estimated          89 kg                             Height/Length Dosing      182 cm                             Height/Length Estimated   182 cm                             Body Mass Index Estimated 26.87 kg/m2  .   Basic Oxygen Information   8/3/2017 12:58 CDT       SpO2                      100 %                             Oxygen Therapy            Room air    8/3/2017 12:40 CDT       SpO2                      100 %                             Oxygen Therapy            Room air  .   General:  Alert, no acute distress.    Skin:  Warm, dry.    Head:  Normocephalic, atraumatic.    Neck:  Supple, trachea midline, no tenderness.    Eye:  Pupils are equal, round and reactive to light, extraocular movements are intact.    Ears, nose, mouth and throat:  Oral mucosa moist, no pharyngeal erythema or exudate.    Cardiovascular:  Regular rate and rhythm, No murmur, Normal peripheral perfusion, No edema.    Respiratory:  Lungs are clear to auscultation, respirations are non-labored, breath sounds are  equal, Symmetrical chest wall expansion.    Chest wall:  No tenderness.   Back:  Nontender, Normal range of motion, Normal alignment.    Musculoskeletal:  Normal ROM, normal strength, no tenderness.    Gastrointestinal:  Soft, Nontender, Non distended, Normal bowel sounds, Peritoneal dialysis.    Neurological:  Alert and oriented to person, place, time, and situation, No focal neurological deficit observed, CN II-XII intact.    Lymphatics:  No lymphadenopathy.   Psychiatric:  Cooperative, appropriate mood & affect, normal judgment.       Medical Decision Making   Documents reviewed:  Emergency department nurses' notes.   Orders  Laboratory    CBC w/ Auto Diff, Ellis Luciano MD, 08/03/17, 13:20, Ordered    CMP, Ellis Luciano MD, 08/03/17, 13:20, Ordered    PT, Ellis Luciano MD, 08/03/17, 13:20, Ordered    PTT, Ellis Luciano MD, 08/03/17, 13:20, Ordered  CT / MRI / Ultrasound    CT Brain Cranium W/O Contrast, Ellis Luciano MD, 08/03/17, 13:20, Ordered.   Results review:  Lab results : [facility]   8/3/2017 13:30 CDT       Sodium Lvl                142 mmol/L                             Potassium Lvl             3.6 mmol/L                             Chloride                  112 mmol/L  HI                             CO2                       24.0 mmol/L                             Calcium Lvl               9.0 mg/dL                             Glucose Lvl               105 mg/dL                             BUN                       44.0 mg/dL  HI                             Creatinine                3.68 mg/dL  HI                             eGFR-AA                   23 mL/min/1.73 m2  NA                             eGFR-NATIVIDAD                  19 mL/min/1.73 m2  NA                             Bili Total                0.3 mg/dL                             Bili Direct               0.10 mg/dL                             Bili Indirect             0.20 mg/dL                             AST                        15 unit/L                             ALT                       14 unit/L                             Alk Phos                  110 unit/L                             Total Protein             6.6 gm/dL                             Albumin Lvl               3.00 gm/dL  LOW                             Globulin                  3.60 gm/dL  HI                             A/G Ratio                 0.8  NA                             PT                        12.7 second(s)                             INR                       0.97                             PTT                       32.6 second(s)                             WBC                       5.0 x10(3)/mcL                             RBC                       4.40 x10(6)/mcL  LOW                             Hgb                       12.4 gm/dL  LOW                             Hct                       39.3 %  LOW                             Platelet                  219 x10(3)/mcL                             MCV                       89.3 fL                             MCH                       28.2 pg                             MCHC                      31.6 gm/dL  LOW                             RDW                       14.4 %                             MPV                       10.1 fL                             Abs Neut                  4.15 x10(3)/mcL                             Neutro Auto               82 %  NA                             Lymph Auto                9 %  NA                             Mono Auto                 6 %  NA                             Eos Auto                  1 %  NA                             Abs Eos                   0.0 x10(3)/mcL                             Basophil Auto             0 %  NA                             Abs Neutro                4.15 x10(3)/mcL                             Abs Lymph                 0.5 x10(3)/mcL  LOW                             Abs Mono                  0.3 x10(3)/mcL                              Abs Baso                  0.0 x10(3)/mcL    .   Head Computed Tomography:  Time reported 8/3/2017 14:08:00, without contrast, no acute disease process, no intracranial hemorrhage, no midline shift, interpretation by Emergency Physician,            * Final Report *    Reason For Exam  r/o CVA;Dizziness , vertigo    Radiology Report  Indication: Dizziness, vertigo     FINDINGS: Continuous axial images were performed through the levels of  the brain and skull base and are displayed in brain and bone window  settings. This study is compared to a prior CT dated 3/28/2015 which  identified a lacunar infarct involving the head of the left caudate  nucleus and adjacent basal ganglia.     There is no evidence of acute hemorrhage or midline shift. The old  lacunar infarct also seen in 2015 is again identified involving the  head of the left caudate nucleus and adjacent anterior limb of the  internal capsule of the right basal ganglia. Additionally, an interval  but nonacute lacunar infarct has also occurred since March, 2015  involving the lateral aspect of the left basal ganglia. No evidence of  acute infarction is appreciated. Ventricles and extra-axial fluid  spaces appear normal for patient age. Orbital contents appear  unremarkable. Visualized paranasal sinuses are clear except for  opacification of a single mid left ethmoid air cell by focal sinusitis  or an incidental mucocele. The left mastoid air cells and middle ear  cavity are opacified by fluid consistent with left mastoiditis/otitis  media. Similar opacification of the mastoid air spaces on the left  were also visible in retrospect in 2015.     IMPRESSION:  1. Nonacute lacunar infarcts are identified in the left basal ganglia  and head of the left caudate nucleus. The infarct in the left basal  ganglia has occurred since a prior CT performed 3/28/2015.  2. No acute intracranial abnormalities are appreciated.  3. Left mastoiditis and otitis  media.       Signature Line  Electronically Signed By: Kwabena Patino MD  Date/Time Signed: 08/03/2017 14:08  .       Reexamination/ Reevaluation   Vital signs   Basic Oxygen Information   8/3/2017 12:58 CDT       SpO2                      100 %                             Oxygen Therapy            Room air    8/3/2017 12:40 CDT       SpO2                      100 %                             Oxygen Therapy            Room air     Patient feeling better in the ED, but certainly has had multiple strokes in the past.  When discussing with patient and wife, they were aware that he may have had one at the time of his initial diagnosis of renal failure, and that when he had similar symptoms.  Will admit for stroke workup      Impression and Plan   Diagnosis   Vertigo (IIN31-VE R42)   Hypertension (UZF85-HA I10)   ESRD on dialysis (ULR53-WD N18.6)   History of CVA in adulthood (BYO83-KA Z86.73)   Plan   Condition: Stable.    Disposition: Admit time  8/3/2017 16:37:00, Place in Observation Telemetry Unit, Lilliana AUSTIN, Carla.    Counseled: Patient, Family, Regarding diagnosis, Regarding diagnostic results, Regarding treatment plan, Patient indicated understanding of instructions.    Notes: I, John Cook, acted solely as a scribe for and in the presence of Dr. Luciano who performed the service., I  personally performed all of the above services as recorded in this chart.

## 2022-04-30 NOTE — CONSULTS
DATE OF CONSULTATION:      ATTENDING PHYSICIAN:  Physician ER  CONSULTING PHYSICIAN:  Grey Stein MD    This is a pleasant 42-year-old  male with end stage renal disease on CCPD for the last two years who was admitted here with poorly controlled hypertension as well as dizziness, noted to have what appears to be left-sided mastoiditis and otitis media.  Currently he has been initiated on therapy.  He states he is feeling better.  We are being consulted to follow for his CCPD requirements.    PAST MEDICAL HISTORY:  Has been positive for severe hypertension, which has been very difficult to control according to discussions with Dr. Aguirre and the patient, they state that they finally have his pressures reasonably controlled with Labetalol as well as minoxidil. However, his pressures still remain 150 to 170 systolic.  He has a history of decreased hearing in that left ear since he was a teenager.  He has a history of stroke two years ago without any significant residuals.  Otherwise he denied any fevers or chills.  He has complained of more dizziness lately consistent with possible middle ear issue.  He has not lost any consciousness.    ALLERGIES:  NONE KNOWN.    CURRENT MEDICATIONS:  Reviewed.    PAST SURGICAL HISTORY:  Peritoneal dialysis catheter placement about two years ago and previous hemodialysis catheter placements and removal.    SOCIAL HISTORY:  Negative for tobacco.  No drug abuse.  He lives at home with his wife.  He quit drinking about 5 to 6 years ago.    REVIEW OF SYSTEMS:  Otherwise noted.    PHYSICAL EXAMINATION:    GENERAL:  Very pleasant well developed and nourished black male.  He is in no acute distress awake and alert.    VITAL SIGNS:  Blood pressure is 155/83.  Heart rate is 93.  He is afebrile.    HEENT:  Reveals evidence of decreased hearing on the left side but otherwise atraumatic.  His throat was clear.    NECK:  No venous distension.  No lymphadenopathy.    LUNGS:   Clear to auscultation.    HEART:  Regular without rub.    ABDOMEN:  Soft, nontender.  Peritoneal dialysis catheter exit site is clear.    EXTREMITIES:  Nonedematous.    LABORATORY DATA:  The patient's INR is 0.97, hemoglobin 13, hematocrit 39, BUN and creatinine 36 and 3.7, respectively.  Potassium was 3.7 this morning.    IMPRESSION:    1. End stage renal disease on CCPD - prescription two x 2000 exchanges overnight + 1500 ml dwell over five hours.    2. Hypertension- not optimally controlled.  3. Left otitis media with dizziness.    PLANS:  Continue topical antibiotics.  He is feeling better.  I will add small dose of nifedipine extended release 30 mg daily today to see if we can get a better control of his blood pressure.  Overall prognosis is guarded.  We will continue dialysis nightly.        ______________________________  MD ARI Rubio/JESI  DD:  08/04/2017  Time:  09:27AM  DT:  08/04/2017  Time:  09:59AM  Job #:  [id number]    cc: Kamari Ortiz MD

## 2022-04-30 NOTE — H&P
Patient:   Chance Wallace             MRN: [id number]            FIN: [id number]               Age:   42 years     Sex:  Male     :  1975   Associated Diagnoses:   None   Author:   Carla Tijerina MD      Basic Information   Source of history:  Self.    Present at bedside:  Family member.    Referral source:  Emergency department.       Chief Complaint   dizziness      History of Present Illness   This is a 42-year-old -American male with history of long-standing uncontrolled hypertension, end-stage renal disease on peritoneal dialysis for the past 2 years and history of CVA 2 years ago without residual deficits was brought in by the family today because of dizziness.  The patient was doing well until this morning when he woke up and felt dizzy.  He took his blood pressure pills in the morning and he threw them up.  The patient feels more dizzy and nauseated when he tries to sit up.  He denies fever, bleeding, chest pain, abdominal pain, diarrhea or constipation.  The patient's blood pressure has been high for a while.  His medications have been adjusted as as an outpatient.  He used to be in 5 different medications but lately has only been on minoxidil and labetalol.  The dose of minoxidil was increased and the other medications were discontinued.  No medication change for the last 2 months.  He said he used to be on hydralazine which made him feel very lethargic.  He was given IV hydralazine and he felt like he was rushing after getting that medication.  No rash or flushing noted.  He also states that he has hearing problem in his left ear since he was a teenager.  And he is not able to hear well from the ear.  He denies any ear discharge.      Review of Systems   All other systems were reviewed and were negative except as documented      Health Status   Allergies:    Allergic Reactions (All)  No Known Medication Allergies,    Allergies (1) Active Reaction  No Known Medication Allergies None  Documented     Current medications:  (Selected)   Inpatient Medications  Ordered  Zofran: 4 mg, form: Injection, IV Push, q4hr PRN for nausea, first dose 08/03/17 17:45:00 CDT, choose first if ordered with other treatments for nausea, 26,051  cloNIDine: 0.2 mg, form: Tab, Oral, q8hr PRN for hypertension, first dose 08/03/17 17:45:00 CDT, SBP > 180____, 30,023  labetalol 200 mg oral tablet: 200 mg, form: Tab, Oral, TID, first dose 08/03/17 22:00:00 CDT, 26,022  labetalol: 20 mg, form: Soln, IV Push, q2hr PRN for hypertension, first dose 08/03/17 17:45:00 CDT, SBP > _180___ and/or DBP > ____ not to exceed 300mg/24hrs, 26,046  minoxidil: 20 mg, form: Tab, Oral, BID, first dose 08/03/17 21:00:00 CDT, 24,034  Prescriptions  Prescribed  labetalol 200 mg oral tablet: 200 mg = 1 tab(s), Oral, TID, # 90 tab(s), 3 Refill(s)  Documented Medications  Documented  minoxidil 10 mg oral tablet: 20 mg = 2 tab(s), Oral, BID      Histories   Past Medical History: Hypertension, end-stage renal disease, hyperlipidemia, history of CVA 2 years ago   Family History: Diabetes in mother   Procedure history: Peritoneal dialysis catheter   Social History     Denies tobacco, drug use, quit alcohol for 5 years ago.        Physical Examination   General:  Alert and oriented, No acute distress.    Eye:  Pupils are equal, round and reactive to light, Extraocular movements are intact, Normal conjunctiva.    HENT:  Normocephalic, Oral mucosa is moist, no left ear discharge, mild left ear tenderness .    Neck:  Supple, Non-tender, No carotid bruit.    Respiratory:  Lungs are clear to auscultation, Respirations are non-labored.    Cardiovascular:  Normal rate, Regular rhythm, No murmur, Good pulses equal in all extremities.    Gastrointestinal:  Soft, Non-tender, Non-distended, Normal bowel sounds, peritoneal dialysis catheter.       Vital Signs (last 24 hrs)_____  Last Charted___________  Temp Oral     36.8 DegC  (AUG 03 12:40)  Heart Rate  Peripheral   H 111bpm  (AUG 03 17:44)  Resp Rate         17 br/min  (AUG 03 17:44)  SBP      H 227mmHg  (AUG 03 17:44)  DBP      H 124mmHg  (AUG 03 17:44)  SpO2      98 %  (AUG 03 17:44)     Genitourinary:  No costovertebral angle tenderness.    Musculoskeletal:  Normal range of motion, Normal strength.    Integumentary:  Warm, Dry.    Neurologic:  Alert, Oriented, No focal deficits, Cranial Nerves II-XII are grossly intact.    Cognition and Speech:  Oriented.    Psychiatric:  Cooperative.       Health Maintenance      Review / Management   Results review:     Labs (Last four charted values)  Glucose              105 (AUG 03)   PT                   12.7 (AUG 03)   INR                  0.97 (AUG 03)   PTT                  32.6 (AUG 03) .    Laboratory Results   Today's Lab Results : PowerNote Discrete Results   8/3/2017 13:30 CDT       WBC                       5.0 x10(3)/mcL                             RBC                       4.40 x10(6)/mcL  LOW                             Hgb                       12.4 gm/dL  LOW                             Hct                       39.3 %  LOW                             Platelet                  219 x10(3)/mcL                             MCV                       89.3 fL                             MCH                       28.2 pg                             MCHC                      31.6 gm/dL  LOW                             RDW                       14.4 %                             MPV                       10.1 fL                             Abs Neut                  4.15 x10(3)/mcL                             Neutro Auto               82 %  NA                             Lymph Auto                9 %  NA                             Mono Auto                 6 %  NA                             Eos Auto                  1 %  NA                             Abs Eos                   0.0 x10(3)/mcL                             Basophil Auto             0 %  NA                              Abs Neutro                4.15 x10(3)/mcL                             Abs Lymph                 0.5 x10(3)/mcL  LOW                             Abs Mono                  0.3 x10(3)/mcL                             Abs Baso                  0.0 x10(3)/mcL                             PT                        12.7 second(s)                             INR                       0.97                             PTT                       32.6 second(s)                             Sodium Lvl                142 mmol/L                             Potassium Lvl             3.6 mmol/L                             Chloride                  112 mmol/L  HI                             CO2                       24.0 mmol/L                             Calcium Lvl               9.0 mg/dL                             Glucose Lvl               105 mg/dL                             BUN                       44.0 mg/dL  HI                             Creatinine                3.68 mg/dL  HI                             eGFR-AA                   23 mL/min/1.73 m2  NA                             eGFR-NATIVIDAD                  19 mL/min/1.73 m2  NA                             Bili Total                0.3 mg/dL                             Bili Direct               0.10 mg/dL                             Bili Indirect             0.20 mg/dL                             AST                       15 unit/L                             ALT                       14 unit/L                             Alk Phos                  110 unit/L                             Total Protein             6.6 gm/dL                             Albumin Lvl               3.00 gm/dL  LOW                             Globulin                  3.60 gm/dL  HI                             A/G Ratio                 0.8  NA        Radiology results   Rad Results (ST)   Accession: [id number]  Order: CT Head W/O Contrast  Report Dt/Tm: 08/03/2017 14:10  Report:    Indication: Dizziness, vertigo     FINDINGS: Continuous axial images were performed through the levels of  the brain and skull base and are displayed in brain and bone window  settings. This study is compared to a prior CT dated 3/28/2015 which  identified a lacunar infarct involving the head of the left caudate  nucleus and adjacent basal ganglia.     There is no evidence of acute hemorrhage or midline shift. The old  lacunar infarct also seen in 2015 is again identified involving the  head of the left caudate nucleus and adjacent anterior limb of the  internal capsule of the right basal ganglia. Additionally, an interval  but nonacute lacunar infarct has also occurred since March, 2015  involving the lateral aspect of the left basal ganglia. No evidence of  acute infarction is appreciated. Ventricles and extra-axial fluid  spaces appear normal for patient age. Orbital contents appear  unremarkable. Visualized paranasal sinuses are clear except for  opacification of a single mid left ethmoid air cell by focal sinusitis  or an incidental mucocele. The left mastoid air cells and middle ear  cavity are opacified by fluid consistent with left mastoiditis/otitis  media. Similar opacification of the mastoid air spaces on the left  were also visible in retrospect in 2015.     IMPRESSION:  1. Nonacute lacunar infarcts are identified in the left basal ganglia  and head of the left caudate nucleus. The infarct in the left basal  ganglia has occurred since a prior CT performed 3/28/2015.  2. No acute intracranial abnormalities are appreciated.  3. Left mastoiditis and otitis media.            Impression and Plan   Dizziness  Abnormal ct head with non acute infarcts, h/o cva in past, currenlty on focal deficits  HYpertensive urgency  ESRD  ? chronic left mastoiditis  ? Anxiety      plan  - follow up mri/mra  - continue home bp meds minoxidil, labetalol  - prn clonidine, labetalol  - will d/c hydralazine  - prn ativan for  anxiety  - check lipid profile in am   - consult renal for peritoneal hemodialysis  - check labs in am   - meclizine for dizziness  - ofloxacin ear drops left ear bid      Code status: full  Dvt px: scds

## 2024-01-01 ENCOUNTER — HOSPITAL ENCOUNTER (INPATIENT)
Facility: HOSPITAL | Age: 49
LOS: 18 days | DRG: 283 | End: 2024-09-28
Attending: EMERGENCY MEDICINE | Admitting: INTERNAL MEDICINE
Payer: MEDICAID

## 2024-01-01 VITALS
BODY MASS INDEX: 19.1 KG/M2 | HEIGHT: 71 IN | WEIGHT: 136.44 LBS | DIASTOLIC BLOOD PRESSURE: 61 MMHG | OXYGEN SATURATION: 89 % | SYSTOLIC BLOOD PRESSURE: 84 MMHG | TEMPERATURE: 98 F

## 2024-01-01 DIAGNOSIS — R09.89 DECREASED VASCULAR FLOW: ICD-10-CM

## 2024-01-01 DIAGNOSIS — R07.9 CHEST PAIN OF UNCERTAIN ETIOLOGY: ICD-10-CM

## 2024-01-01 DIAGNOSIS — R07.9 CHEST PAIN: ICD-10-CM

## 2024-01-01 DIAGNOSIS — Z99.2 ESRD ON DIALYSIS: Chronic | ICD-10-CM

## 2024-01-01 DIAGNOSIS — I21.4 NSTEMI (NON-ST ELEVATED MYOCARDIAL INFARCTION): ICD-10-CM

## 2024-01-01 DIAGNOSIS — I47.20 V-TACH: ICD-10-CM

## 2024-01-01 DIAGNOSIS — R06.02 SHORTNESS OF BREATH: ICD-10-CM

## 2024-01-01 DIAGNOSIS — R94.31 ST ELEVATION: ICD-10-CM

## 2024-01-01 DIAGNOSIS — I47.20 V TACH: ICD-10-CM

## 2024-01-01 DIAGNOSIS — R57.0 CARDIOGENIC SHOCK: ICD-10-CM

## 2024-01-01 DIAGNOSIS — I50.9 CONGESTIVE HEART FAILURE, UNSPECIFIED HF CHRONICITY, UNSPECIFIED HEART FAILURE TYPE: Primary | ICD-10-CM

## 2024-01-01 DIAGNOSIS — N18.6 ESRD ON DIALYSIS: Chronic | ICD-10-CM

## 2024-01-01 DIAGNOSIS — K92.2 GASTROINTESTINAL HEMORRHAGE, UNSPECIFIED GASTROINTESTINAL HEMORRHAGE TYPE: ICD-10-CM

## 2024-01-01 DIAGNOSIS — I42.9 CARDIOMYOPATHY, UNSPECIFIED TYPE: ICD-10-CM

## 2024-01-01 DIAGNOSIS — Z09 FOLLOW-UP EXAM: ICD-10-CM

## 2024-01-01 DIAGNOSIS — I25.10 CAD (CORONARY ARTERY DISEASE): ICD-10-CM

## 2024-01-01 DIAGNOSIS — Z99.2 ESRD ON DIALYSIS: ICD-10-CM

## 2024-01-01 DIAGNOSIS — E87.5 HYPERKALEMIA: ICD-10-CM

## 2024-01-01 DIAGNOSIS — R57.9 SHOCK: ICD-10-CM

## 2024-01-01 DIAGNOSIS — N18.6 ESRD ON DIALYSIS: ICD-10-CM

## 2024-01-01 DIAGNOSIS — R79.89 ELEVATED TROPONIN: ICD-10-CM

## 2024-01-01 DIAGNOSIS — R07.9 CHEST PAIN, UNSPECIFIED TYPE: ICD-10-CM

## 2024-01-01 DIAGNOSIS — R00.0 TACHYCARDIA: ICD-10-CM

## 2024-01-01 DIAGNOSIS — I49.9 ARRHYTHMIA: ICD-10-CM

## 2024-01-01 DIAGNOSIS — R78.81 BACTEREMIA DUE TO GRAM-POSITIVE BACTERIA: ICD-10-CM

## 2024-01-01 LAB
ABS NEUT (OLG): 10.94 X10(3)/MCL (ref 2.1–9.2)
ABS NEUT (OLG): 16.08 X10(3)/MCL (ref 2.1–9.2)
ABS NEUT (OLG): 16.93 X10(3)/MCL (ref 2.1–9.2)
ABS NEUT (OLG): 18 X10(3)/MCL (ref 2.1–9.2)
ABS NEUT (OLG): 18.25 X10(3)/MCL (ref 2.1–9.2)
ABS NEUT (OLG): 18.37 X10(3)/MCL (ref 2.1–9.2)
ABS NEUT (OLG): 18.64 X10(3)/MCL (ref 2.1–9.2)
ABS NEUT (OLG): 21.84 X10(3)/MCL (ref 2.1–9.2)
ABS NEUT (OLG): 23.25 X10(3)/MCL (ref 2.1–9.2)
ABS NEUT (OLG): 26.51 X10(3)/MCL (ref 2.1–9.2)
ABS NEUT (OLG): 26.68 X10(3)/MCL (ref 2.1–9.2)
ABS NEUT (OLG): 27.1 X10(3)/MCL (ref 2.1–9.2)
ABS NEUT (OLG): 32.94 X10(3)/MCL (ref 2.1–9.2)
ABS NEUT (OLG): 9.15 X10(3)/MCL (ref 2.1–9.2)
ABS NEUT (OLG): 9.28 X10(3)/MCL (ref 2.1–9.2)
ACANTHOCYTES (OLG): ABNORMAL
ACANTHOCYTES (OLG): ABNORMAL
ACINETOBACTER CALCOACETICUS-BAUMANNII COMPLEX (OHS): NOT DETECTED
ACINETOBACTER CALCOACETICUS-BAUMANNII COMPLEX (OHS): NOT DETECTED
ALBUMIN SERPL-MCNC: 1.7 G/DL (ref 3.5–5)
ALBUMIN SERPL-MCNC: 1.8 G/DL (ref 3.5–5)
ALBUMIN SERPL-MCNC: 1.9 G/DL (ref 3.5–5)
ALBUMIN SERPL-MCNC: 1.9 G/DL (ref 3.5–5)
ALBUMIN SERPL-MCNC: 2 G/DL (ref 3.5–5)
ALBUMIN SERPL-MCNC: 2.1 G/DL (ref 3.5–5)
ALBUMIN SERPL-MCNC: 2.2 G/DL (ref 3.5–5)
ALBUMIN SERPL-MCNC: 2.3 G/DL (ref 3.5–5)
ALBUMIN SERPL-MCNC: 2.4 G/DL (ref 3.5–5)
ALBUMIN SERPL-MCNC: 2.5 G/DL (ref 3.5–5)
ALBUMIN SERPL-MCNC: 2.5 G/DL (ref 3.5–5)
ALBUMIN SERPL-MCNC: 2.7 G/DL (ref 3.5–5)
ALBUMIN SERPL-MCNC: 3.2 G/DL (ref 3.5–5)
ALBUMIN SERPL-MCNC: 3.2 G/DL (ref 3.5–5)
ALBUMIN/GLOB SERPL: 0.4 RATIO (ref 1.1–2)
ALBUMIN/GLOB SERPL: 0.4 RATIO (ref 1.1–2)
ALBUMIN/GLOB SERPL: 0.5 RATIO (ref 1.1–2)
ALBUMIN/GLOB SERPL: 0.6 RATIO (ref 1.1–2)
ALBUMIN/GLOB SERPL: 0.7 RATIO (ref 1.1–2)
ALBUMIN/GLOB SERPL: 0.8 RATIO (ref 1.1–2)
ALBUMIN/GLOB SERPL: 0.9 RATIO (ref 1.1–2)
ALLENS TEST BLOOD GAS (OHS): ABNORMAL
ALLENS TEST BLOOD GAS (OHS): NORMAL
ALLENS TEST BLOOD GAS (OHS): YES
ALP SERPL-CCNC: 1061 UNIT/L (ref 40–150)
ALP SERPL-CCNC: 355 UNIT/L (ref 40–150)
ALP SERPL-CCNC: 356 UNIT/L (ref 40–150)
ALP SERPL-CCNC: 364 UNIT/L (ref 40–150)
ALP SERPL-CCNC: 371 UNIT/L (ref 40–150)
ALP SERPL-CCNC: 384 UNIT/L (ref 40–150)
ALP SERPL-CCNC: 386 UNIT/L (ref 40–150)
ALP SERPL-CCNC: 393 UNIT/L (ref 40–150)
ALP SERPL-CCNC: 395 UNIT/L (ref 40–150)
ALP SERPL-CCNC: 402 UNIT/L (ref 40–150)
ALP SERPL-CCNC: 424 UNIT/L (ref 40–150)
ALP SERPL-CCNC: 439 UNIT/L (ref 40–150)
ALP SERPL-CCNC: 458 UNIT/L (ref 40–150)
ALP SERPL-CCNC: 466 UNIT/L (ref 40–150)
ALP SERPL-CCNC: 489 UNIT/L (ref 40–150)
ALP SERPL-CCNC: 493 UNIT/L (ref 40–150)
ALP SERPL-CCNC: 521 UNIT/L (ref 40–150)
ALP SERPL-CCNC: 561 UNIT/L (ref 40–150)
ALP SERPL-CCNC: 575 UNIT/L (ref 40–150)
ALP SERPL-CCNC: 841 UNIT/L (ref 40–150)
ALP SERPL-CCNC: 883 UNIT/L (ref 40–150)
ALT SERPL-CCNC: 105 UNIT/L (ref 0–55)
ALT SERPL-CCNC: 1258 UNIT/L (ref 0–55)
ALT SERPL-CCNC: 144 UNIT/L (ref 0–55)
ALT SERPL-CCNC: 182 UNIT/L (ref 0–55)
ALT SERPL-CCNC: 1927 UNIT/L (ref 0–55)
ALT SERPL-CCNC: 6 UNIT/L (ref 0–55)
ALT SERPL-CCNC: 6 UNIT/L (ref 0–55)
ALT SERPL-CCNC: 65 UNIT/L (ref 0–55)
ALT SERPL-CCNC: 7 UNIT/L (ref 0–55)
ALT SERPL-CCNC: 8 UNIT/L (ref 0–55)
ALT SERPL-CCNC: 9 UNIT/L (ref 0–55)
ALT SERPL-CCNC: <5 UNIT/L (ref 0–55)
ANION GAP SERPL CALC-SCNC: 15 MEQ/L
ANION GAP SERPL CALC-SCNC: 16 MEQ/L
ANION GAP SERPL CALC-SCNC: 17 MEQ/L
ANION GAP SERPL CALC-SCNC: 18 MEQ/L
ANION GAP SERPL CALC-SCNC: 19 MEQ/L
ANION GAP SERPL CALC-SCNC: 20 MEQ/L
ANION GAP SERPL CALC-SCNC: 21 MEQ/L
ANION GAP SERPL CALC-SCNC: 23 MEQ/L
ANION GAP SERPL CALC-SCNC: 24 MEQ/L
ANION GAP SERPL CALC-SCNC: 29 MEQ/L
ANION GAP SERPL CALC-SCNC: 29 MEQ/L
ANION GAP SERPL CALC-SCNC: 30 MEQ/L
ANION GAP SERPL CALC-SCNC: 31 MEQ/L
ANION GAP SERPL CALC-SCNC: 32 MEQ/L
ANION GAP SERPL CALC-SCNC: 33 MEQ/L
ANISOCYTOSIS BLD QL SMEAR: ABNORMAL
APICAL FOUR CHAMBER EJECTION FRACTION: 20 %
APICAL FOUR CHAMBER EJECTION FRACTION: 4 %
APICAL TWO CHAMBER EJECTION FRACTION: 12 %
APICAL TWO CHAMBER EJECTION FRACTION: 2 %
APTT PPP: 32.1 SECONDS (ref 23.2–33.7)
AST SERPL-CCNC: 11 UNIT/L (ref 5–34)
AST SERPL-CCNC: 13 UNIT/L (ref 5–34)
AST SERPL-CCNC: 14 UNIT/L (ref 5–34)
AST SERPL-CCNC: 15 UNIT/L (ref 5–34)
AST SERPL-CCNC: 16 UNIT/L (ref 5–34)
AST SERPL-CCNC: 18 UNIT/L (ref 5–34)
AST SERPL-CCNC: 19 UNIT/L (ref 5–34)
AST SERPL-CCNC: 20 UNIT/L (ref 5–34)
AST SERPL-CCNC: 204 UNIT/L (ref 5–34)
AST SERPL-CCNC: 224 UNIT/L (ref 5–34)
AST SERPL-CCNC: 25 UNIT/L (ref 5–34)
AST SERPL-CCNC: 489 UNIT/L (ref 5–34)
AST SERPL-CCNC: 87 UNIT/L (ref 5–34)
AST SERPL-CCNC: 8989 UNIT/L (ref 5–34)
AST SERPL-CCNC: >4202 UNIT/L (ref 5–34)
AV INDEX (PROSTH): 0.45
AV INDEX (PROSTH): 0.58
AV INDEX (PROSTH): 0.68
AV MEAN GRADIENT: 2 MMHG
AV MEAN GRADIENT: 4 MMHG
AV MEAN GRADIENT: 8 MMHG
AV PEAK GRADIENT: 14 MMHG
AV PEAK GRADIENT: 4 MMHG
AV PEAK GRADIENT: 6 MMHG
AV REGURGITATION PRESSURE HALF TIME: 331 MS
AV REGURGITATION PRESSURE HALF TIME: 553 MS
AV REGURGITATION PRESSURE HALF TIME: 759 MS
AV VALVE AREA BY VELOCITY RATIO: 1.84 CM²
AV VALVE AREA BY VELOCITY RATIO: 2.49 CM²
AV VALVE AREA BY VELOCITY RATIO: 2.64 CM²
AV VALVE AREA: 1.85 CM²
AV VALVE AREA: 2.34 CM²
AV VALVE AREA: 2.4 CM²
AV VELOCITY RATIO: 0.44
AV VELOCITY RATIO: 0.6
AV VELOCITY RATIO: 0.76
B-OH-BUTYR SERPL-MCNC: 0.5 MMOL/L
BACTERIA BLD CULT: ABNORMAL
BACTERIA BLD CULT: NORMAL
BACTERIA SPT CULT: NORMAL
BACTEROIDES FRAGILIS (OHS): NOT DETECTED
BACTEROIDES FRAGILIS (OHS): NOT DETECTED
BASE EXCESS BLD CALC-SCNC: -0.7 MMOL/L
BASE EXCESS BLD CALC-SCNC: -1.5 MMOL/L (ref -2–2)
BASE EXCESS BLD CALC-SCNC: -1.8 MMOL/L
BASE EXCESS BLD CALC-SCNC: -10.2 MMOL/L (ref -2–2)
BASE EXCESS BLD CALC-SCNC: -10.6 MMOL/L
BASE EXCESS BLD CALC-SCNC: -14.5 MMOL/L
BASE EXCESS BLD CALC-SCNC: -15.4 MMOL/L (ref -2–2)
BASE EXCESS BLD CALC-SCNC: -16.5 MMOL/L (ref -2–2)
BASE EXCESS BLD CALC-SCNC: -3.6 MMOL/L (ref -2–2)
BASE EXCESS BLD CALC-SCNC: -4.6 MMOL/L
BASE EXCESS BLD CALC-SCNC: -5.5 MMOL/L
BASE EXCESS BLD CALC-SCNC: -6 MMOL/L (ref -2–2)
BASE EXCESS BLD CALC-SCNC: -6.2 MMOL/L (ref -2–2)
BASE EXCESS BLD CALC-SCNC: -6.9 MMOL/L (ref -2–2)
BASE EXCESS BLD CALC-SCNC: -8.3 MMOL/L (ref -2–2)
BASE EXCESS BLD CALC-SCNC: 1.6 MMOL/L
BASE EXCESS BLD CALC-SCNC: 2.4 MMOL/L
BASE EXCESS BLD CALC-SCNC: 2.5 MMOL/L
BASOPHILS # BLD AUTO: 0 X10(3)/MCL
BASOPHILS # BLD AUTO: 0.01 X10(3)/MCL
BASOPHILS # BLD AUTO: 0.02 X10(3)/MCL
BASOPHILS # BLD AUTO: 0.02 X10(3)/MCL
BASOPHILS # BLD AUTO: 0.08 X10(3)/MCL
BASOPHILS # BLD AUTO: 0.12 X10(3)/MCL
BASOPHILS # BLD AUTO: 0.31 X10(3)/MCL
BASOPHILS NFR BLD AUTO: 0 %
BASOPHILS NFR BLD AUTO: 0.3 %
BASOPHILS NFR BLD AUTO: 0.4 %
BASOPHILS NFR BLD AUTO: 0.5 %
BASOPHILS NFR BLD AUTO: 0.5 %
BASOPHILS NFR BLD AUTO: 0.6 %
BASOPHILS NFR BLD AUTO: 0.9 %
BILIRUB SERPL-MCNC: 0.5 MG/DL
BILIRUB SERPL-MCNC: 0.6 MG/DL
BILIRUB SERPL-MCNC: 0.9 MG/DL
BILIRUB SERPL-MCNC: 1 MG/DL
BILIRUB SERPL-MCNC: 1 MG/DL
BILIRUB SERPL-MCNC: 1.3 MG/DL
BILIRUB SERPL-MCNC: 1.3 MG/DL
BILIRUB SERPL-MCNC: 1.4 MG/DL
BILIRUB SERPL-MCNC: 1.4 MG/DL
BILIRUB SERPL-MCNC: 1.5 MG/DL
BILIRUB SERPL-MCNC: 1.6 MG/DL
BILIRUB SERPL-MCNC: 1.7 MG/DL
BILIRUB SERPL-MCNC: 10.4 MG/DL
BILIRUB SERPL-MCNC: 2 MG/DL
BILIRUB SERPL-MCNC: 2.3 MG/DL
BILIRUB SERPL-MCNC: 2.3 MG/DL
BILIRUB SERPL-MCNC: 3.8 MG/DL
BILIRUB SERPL-MCNC: 6.1 MG/DL
BILIRUB SERPL-MCNC: 9.3 MG/DL
BLOOD GAS SAMPLE TYPE (OHS): ABNORMAL
BLOOD GAS SAMPLE TYPE (OHS): NORMAL
BNP BLD-MCNC: ABNORMAL PG/ML
BSA FOR ECHO PROCEDURE: 1.92 M2
BUN SERPL-MCNC: 23.4 MG/DL (ref 8.9–20.6)
BUN SERPL-MCNC: 24 MG/DL (ref 8.9–20.6)
BUN SERPL-MCNC: 24.3 MG/DL (ref 8.9–20.6)
BUN SERPL-MCNC: 25.2 MG/DL (ref 8.9–20.6)
BUN SERPL-MCNC: 25.8 MG/DL (ref 8.9–20.6)
BUN SERPL-MCNC: 30.5 MG/DL (ref 8.9–20.6)
BUN SERPL-MCNC: 31.8 MG/DL (ref 8.9–20.6)
BUN SERPL-MCNC: 31.8 MG/DL (ref 8.9–20.6)
BUN SERPL-MCNC: 32.4 MG/DL (ref 8.9–20.6)
BUN SERPL-MCNC: 36.8 MG/DL (ref 8.9–20.6)
BUN SERPL-MCNC: 37.6 MG/DL (ref 8.9–20.6)
BUN SERPL-MCNC: 39.4 MG/DL (ref 8.9–20.6)
BUN SERPL-MCNC: 43 MG/DL (ref 8.9–20.6)
BUN SERPL-MCNC: 43.5 MG/DL (ref 8.9–20.6)
BUN SERPL-MCNC: 44.1 MG/DL (ref 8.9–20.6)
BUN SERPL-MCNC: 45.3 MG/DL (ref 8.9–20.6)
BUN SERPL-MCNC: 46.7 MG/DL (ref 8.9–20.6)
BUN SERPL-MCNC: 46.8 MG/DL (ref 8.9–20.6)
BUN SERPL-MCNC: 47 MG/DL (ref 8.9–20.6)
BUN SERPL-MCNC: 49 MG/DL (ref 8.9–20.6)
BUN SERPL-MCNC: 49.6 MG/DL (ref 8.9–20.6)
BUN SERPL-MCNC: 50.3 MG/DL (ref 8.9–20.6)
BUN SERPL-MCNC: 50.5 MG/DL (ref 8.9–20.6)
BUN SERPL-MCNC: 50.6 MG/DL (ref 8.9–20.6)
BUN SERPL-MCNC: 51.4 MG/DL (ref 8.9–20.6)
BUN SERPL-MCNC: 52.3 MG/DL (ref 8.9–20.6)
BUN SERPL-MCNC: 53.2 MG/DL (ref 8.9–20.6)
BUN SERPL-MCNC: 53.2 MG/DL (ref 8.9–20.6)
BUN SERPL-MCNC: 53.6 MG/DL (ref 8.9–20.6)
BUN SERPL-MCNC: 54.1 MG/DL (ref 8.9–20.6)
BUN SERPL-MCNC: 55.6 MG/DL (ref 8.9–20.6)
BUN SERPL-MCNC: 57.1 MG/DL (ref 8.9–20.6)
BUN SERPL-MCNC: 57.4 MG/DL (ref 8.9–20.6)
BUN SERPL-MCNC: 58.4 MG/DL (ref 8.9–20.6)
BUN SERPL-MCNC: 59.2 MG/DL (ref 8.9–20.6)
BUN SERPL-MCNC: 59.3 MG/DL (ref 8.9–20.6)
BUN SERPL-MCNC: 60.5 MG/DL (ref 8.9–20.6)
BUN SERPL-MCNC: 62.6 MG/DL (ref 8.9–20.6)
BUN SERPL-MCNC: 67.2 MG/DL (ref 8.9–20.6)
BUN SERPL-MCNC: 69.7 MG/DL (ref 8.9–20.6)
BUN SERPL-MCNC: 70.3 MG/DL (ref 8.9–20.6)
BUN SERPL-MCNC: 78.8 MG/DL (ref 8.9–20.6)
BUN SERPL-MCNC: 81.4 MG/DL (ref 8.9–20.6)
BURR CELLS (OLG): ABNORMAL
C AURIS DNA BLD POS QL NAA+NON-PROBE: NOT DETECTED
C AURIS DNA BLD POS QL NAA+NON-PROBE: NOT DETECTED
C GATTII+NEOFOR DNA CSF QL NAA+NON-PROBE: NOT DETECTED
C GATTII+NEOFOR DNA CSF QL NAA+NON-PROBE: NOT DETECTED
CA-I BLD-SCNC: 0.91 MMOL/L (ref 1.12–1.23)
CA-I BLD-SCNC: 0.92 MMOL/L (ref 1.12–1.23)
CA-I BLD-SCNC: 0.96 MMOL/L (ref 1.12–1.23)
CA-I BLD-SCNC: 1.04 MMOL/L (ref 1.12–1.23)
CA-I BLD-SCNC: 1.06 MMOL/L (ref 1.12–1.23)
CA-I BLD-SCNC: 1.08 MMOL/L (ref 1.12–1.23)
CA-I BLD-SCNC: 1.09 MMOL/L (ref 1.12–1.23)
CA-I BLD-SCNC: 1.09 MMOL/L (ref 1.12–1.23)
CA-I BLD-SCNC: 1.1 MMOL/L (ref 1.12–1.23)
CA-I BLD-SCNC: 1.11 MMOL/L (ref 1.12–1.23)
CA-I BLD-SCNC: 1.12 MMOL/L (ref 1.12–1.23)
CA-I BLD-SCNC: 1.12 MMOL/L (ref 1.12–1.23)
CA-I BLD-SCNC: 1.13 MMOL/L (ref 1.12–1.23)
CA-I BLD-SCNC: 1.13 MMOL/L (ref 1.12–1.23)
CA-I BLD-SCNC: 1.15 MMOL/L (ref 1.12–1.23)
CA-I BLD-SCNC: 1.16 MMOL/L (ref 1.12–1.32)
CA-I BLD-SCNC: 1.17 MMOL/L (ref 1.12–1.23)
CA-I BLD-SCNC: 1.18 MMOL/L (ref 1.12–1.23)
CALCIUM SERPL-MCNC: 7.7 MG/DL (ref 8.4–10.2)
CALCIUM SERPL-MCNC: 7.9 MG/DL (ref 8.4–10.2)
CALCIUM SERPL-MCNC: 8 MG/DL (ref 8.4–10.2)
CALCIUM SERPL-MCNC: 8 MG/DL (ref 8.4–10.2)
CALCIUM SERPL-MCNC: 8.2 MG/DL (ref 8.4–10.2)
CALCIUM SERPL-MCNC: 8.3 MG/DL (ref 8.4–10.2)
CALCIUM SERPL-MCNC: 8.4 MG/DL (ref 8.4–10.2)
CALCIUM SERPL-MCNC: 8.5 MG/DL (ref 8.4–10.2)
CALCIUM SERPL-MCNC: 8.6 MG/DL (ref 8.4–10.2)
CALCIUM SERPL-MCNC: 8.6 MG/DL (ref 8.4–10.2)
CALCIUM SERPL-MCNC: 8.7 MG/DL (ref 8.4–10.2)
CALCIUM SERPL-MCNC: 8.8 MG/DL (ref 8.4–10.2)
CALCIUM SERPL-MCNC: 8.9 MG/DL (ref 8.4–10.2)
CALCIUM SERPL-MCNC: 8.9 MG/DL (ref 8.4–10.2)
CALCIUM SERPL-MCNC: 9 MG/DL (ref 8.4–10.2)
CALCIUM SERPL-MCNC: 9 MG/DL (ref 8.4–10.2)
CALCIUM SERPL-MCNC: 9.1 MG/DL (ref 8.4–10.2)
CALCIUM SERPL-MCNC: 9.3 MG/DL (ref 8.4–10.2)
CALCIUM SERPL-MCNC: 9.4 MG/DL (ref 8.4–10.2)
CALCIUM SERPL-MCNC: 9.6 MG/DL (ref 8.4–10.2)
CALCIUM SERPL-MCNC: 9.7 MG/DL (ref 8.4–10.2)
CANDIDA ALBICANS (OHS): NOT DETECTED
CANDIDA ALBICANS (OHS): NOT DETECTED
CANDIDA GLABRATA (OHS): NOT DETECTED
CANDIDA GLABRATA (OHS): NOT DETECTED
CANDIDA KRUSEI (OHS): NOT DETECTED
CANDIDA KRUSEI (OHS): NOT DETECTED
CANDIDA PARAPSILOSIS (OHS): NOT DETECTED
CANDIDA PARAPSILOSIS (OHS): NOT DETECTED
CANDIDA TROPICALIS (OHS): NOT DETECTED
CANDIDA TROPICALIS (OHS): NOT DETECTED
CHLORIDE SERPL-SCNC: 100 MMOL/L (ref 98–107)
CHLORIDE SERPL-SCNC: 100 MMOL/L (ref 98–107)
CHLORIDE SERPL-SCNC: 101 MMOL/L (ref 98–107)
CHLORIDE SERPL-SCNC: 105 MMOL/L (ref 98–107)
CHLORIDE SERPL-SCNC: 80 MMOL/L (ref 98–107)
CHLORIDE SERPL-SCNC: 83 MMOL/L (ref 98–107)
CHLORIDE SERPL-SCNC: 84 MMOL/L (ref 98–107)
CHLORIDE SERPL-SCNC: 85 MMOL/L (ref 98–107)
CHLORIDE SERPL-SCNC: 85 MMOL/L (ref 98–107)
CHLORIDE SERPL-SCNC: 86 MMOL/L (ref 98–107)
CHLORIDE SERPL-SCNC: 88 MMOL/L (ref 98–107)
CHLORIDE SERPL-SCNC: 90 MMOL/L (ref 98–107)
CHLORIDE SERPL-SCNC: 91 MMOL/L (ref 98–107)
CHLORIDE SERPL-SCNC: 92 MMOL/L (ref 98–107)
CHLORIDE SERPL-SCNC: 93 MMOL/L (ref 98–107)
CHLORIDE SERPL-SCNC: 94 MMOL/L (ref 98–107)
CHLORIDE SERPL-SCNC: 95 MMOL/L (ref 98–107)
CHLORIDE SERPL-SCNC: 96 MMOL/L (ref 98–107)
CHLORIDE SERPL-SCNC: 97 MMOL/L (ref 98–107)
CHLORIDE SERPL-SCNC: 98 MMOL/L (ref 98–107)
CHLORIDE SERPL-SCNC: 98 MMOL/L (ref 98–107)
CHLORIDE SERPL-SCNC: 99 MMOL/L (ref 98–107)
CHOLEST SERPL-MCNC: 156 MG/DL
CHOLEST/HDLC SERPL: 5 {RATIO} (ref 0–5)
CO2 BLDA-SCNC: 10.6 MMOL/L
CO2 BLDA-SCNC: 12.1 MMOL/L
CO2 BLDA-SCNC: 13.6 MMOL/L
CO2 BLDA-SCNC: 16.5 MMOL/L
CO2 BLDA-SCNC: 16.9 MMOL/L
CO2 BLDA-SCNC: 18.1 MMOL/L
CO2 BLDA-SCNC: 20.4 MMOL/L
CO2 BLDA-SCNC: 20.8 MMOL/L
CO2 BLDA-SCNC: 22.7 MMOL/L
CO2 BLDA-SCNC: 22.9 MMOL/L
CO2 BLDA-SCNC: 22.9 MMOL/L
CO2 BLDA-SCNC: 23.4 MMOL/L
CO2 BLDA-SCNC: 23.5 MMOL/L
CO2 BLDA-SCNC: 23.9 MMOL/L
CO2 BLDA-SCNC: 25.4 MMOL/L
CO2 BLDA-SCNC: 25.4 MMOL/L
CO2 BLDA-SCNC: 27.4 MMOL/L
CO2 BLDA-SCNC: 27.6 MMOL/L
CO2 SERPL-SCNC: 10 MMOL/L (ref 22–29)
CO2 SERPL-SCNC: 11 MMOL/L (ref 22–29)
CO2 SERPL-SCNC: 11 MMOL/L (ref 22–29)
CO2 SERPL-SCNC: 12 MMOL/L (ref 22–29)
CO2 SERPL-SCNC: 12 MMOL/L (ref 22–29)
CO2 SERPL-SCNC: 13 MMOL/L (ref 22–29)
CO2 SERPL-SCNC: 13 MMOL/L (ref 22–29)
CO2 SERPL-SCNC: 14 MMOL/L (ref 22–29)
CO2 SERPL-SCNC: 16 MMOL/L (ref 22–29)
CO2 SERPL-SCNC: 17 MMOL/L (ref 22–29)
CO2 SERPL-SCNC: 18 MMOL/L (ref 22–29)
CO2 SERPL-SCNC: 19 MMOL/L (ref 22–29)
CO2 SERPL-SCNC: 20 MMOL/L (ref 22–29)
CO2 SERPL-SCNC: 21 MMOL/L (ref 22–29)
CO2 SERPL-SCNC: 22 MMOL/L (ref 22–29)
CO2 SERPL-SCNC: 22 MMOL/L (ref 22–29)
CO2 SERPL-SCNC: 23 MMOL/L (ref 22–29)
CO2 SERPL-SCNC: 23 MMOL/L (ref 22–29)
CO2 SERPL-SCNC: 24 MMOL/L (ref 22–29)
COHGB MFR BLDA: 1.7 % (ref 0.5–1.5)
COHGB MFR BLDA: 1.9 % (ref 0.5–1.5)
COHGB MFR BLDA: 2 %
COHGB MFR BLDA: 2 % (ref 0.5–1.5)
COHGB MFR BLDA: 2.2 % (ref 0.5–1.5)
COHGB MFR BLDA: 2.2 % (ref 0.5–1.5)
COHGB MFR BLDA: 2.3 % (ref 0.5–1.5)
COHGB MFR BLDA: 2.5 % (ref 0.5–1.5)
COHGB MFR BLDA: 3.3 % (ref 0.5–1.5)
CREAT SERPL-MCNC: 3.05 MG/DL (ref 0.73–1.18)
CREAT SERPL-MCNC: 3.08 MG/DL (ref 0.73–1.18)
CREAT SERPL-MCNC: 3.12 MG/DL (ref 0.73–1.18)
CREAT SERPL-MCNC: 3.13 MG/DL (ref 0.73–1.18)
CREAT SERPL-MCNC: 3.17 MG/DL (ref 0.73–1.18)
CREAT SERPL-MCNC: 3.18 MG/DL (ref 0.73–1.18)
CREAT SERPL-MCNC: 3.2 MG/DL (ref 0.73–1.18)
CREAT SERPL-MCNC: 3.21 MG/DL (ref 0.73–1.18)
CREAT SERPL-MCNC: 3.36 MG/DL (ref 0.73–1.18)
CREAT SERPL-MCNC: 3.38 MG/DL (ref 0.73–1.18)
CREAT SERPL-MCNC: 3.38 MG/DL (ref 0.73–1.18)
CREAT SERPL-MCNC: 3.39 MG/DL (ref 0.73–1.18)
CREAT SERPL-MCNC: 3.43 MG/DL (ref 0.73–1.18)
CREAT SERPL-MCNC: 3.53 MG/DL (ref 0.73–1.18)
CREAT SERPL-MCNC: 3.66 MG/DL (ref 0.73–1.18)
CREAT SERPL-MCNC: 3.68 MG/DL (ref 0.73–1.18)
CREAT SERPL-MCNC: 3.72 MG/DL (ref 0.73–1.18)
CREAT SERPL-MCNC: 3.75 MG/DL (ref 0.73–1.18)
CREAT SERPL-MCNC: 3.75 MG/DL (ref 0.73–1.18)
CREAT SERPL-MCNC: 3.76 MG/DL (ref 0.73–1.18)
CREAT SERPL-MCNC: 3.79 MG/DL (ref 0.73–1.18)
CREAT SERPL-MCNC: 3.81 MG/DL (ref 0.73–1.18)
CREAT SERPL-MCNC: 3.82 MG/DL (ref 0.73–1.18)
CREAT SERPL-MCNC: 3.9 MG/DL (ref 0.73–1.18)
CREAT SERPL-MCNC: 4.06 MG/DL (ref 0.73–1.18)
CREAT SERPL-MCNC: 4.07 MG/DL (ref 0.73–1.18)
CREAT SERPL-MCNC: 4.14 MG/DL (ref 0.73–1.18)
CREAT SERPL-MCNC: 4.5 MG/DL (ref 0.73–1.18)
CREAT SERPL-MCNC: 5.58 MG/DL (ref 0.73–1.18)
CREAT SERPL-MCNC: 5.62 MG/DL (ref 0.73–1.18)
CREAT SERPL-MCNC: 5.63 MG/DL (ref 0.73–1.18)
CREAT SERPL-MCNC: 5.65 MG/DL (ref 0.73–1.18)
CREAT SERPL-MCNC: 5.85 MG/DL (ref 0.73–1.18)
CREAT SERPL-MCNC: 6.1 MG/DL (ref 0.73–1.18)
CREAT SERPL-MCNC: 6.13 MG/DL (ref 0.73–1.18)
CREAT SERPL-MCNC: 6.46 MG/DL (ref 0.73–1.18)
CREAT SERPL-MCNC: 6.65 MG/DL (ref 0.73–1.18)
CREAT SERPL-MCNC: 6.94 MG/DL (ref 0.73–1.18)
CREAT SERPL-MCNC: 7.32 MG/DL (ref 0.73–1.18)
CREAT SERPL-MCNC: 7.55 MG/DL (ref 0.73–1.18)
CREAT SERPL-MCNC: 8.02 MG/DL (ref 0.73–1.18)
CREAT SERPL-MCNC: 8.06 MG/DL (ref 0.73–1.18)
CREAT SERPL-MCNC: 8.07 MG/DL (ref 0.73–1.18)
CREAT/UREA NIT SERPL: 10
CREAT/UREA NIT SERPL: 11
CREAT/UREA NIT SERPL: 12
CREAT/UREA NIT SERPL: 14
CREAT/UREA NIT SERPL: 15
CREAT/UREA NIT SERPL: 16
CREAT/UREA NIT SERPL: 18
CREAT/UREA NIT SERPL: 7
CREAT/UREA NIT SERPL: 8
CREAT/UREA NIT SERPL: 9
CTX-M (OHS): ABNORMAL
CTX-M (OHS): NORMAL
CV ECHO LV RWT: 0.3 CM
CV ECHO LV RWT: 0.4 CM
CV ECHO LV RWT: 0.4 CM
DOP CALC AO PEAK VEL: 1.01 M/S
DOP CALC AO PEAK VEL: 1.27 M/S
DOP CALC AO PEAK VEL: 1.85 M/S
DOP CALC AO VTI: 14.8 CM
DOP CALC AO VTI: 18.7 CM
DOP CALC AO VTI: 30.1 CM
DOP CALC LVOT AREA: 3.5 CM2
DOP CALC LVOT AREA: 4.2 CM2
DOP CALC LVOT AREA: 4.2 CM2
DOP CALC LVOT DIAMETER: 2.1 CM
DOP CALC LVOT DIAMETER: 2.3 CM
DOP CALC LVOT DIAMETER: 2.3 CM
DOP CALC LVOT PEAK VEL: 0.76 M/S
DOP CALC LVOT PEAK VEL: 0.77 M/S
DOP CALC LVOT PEAK VEL: 0.82 M/S
DOP CALC LVOT STROKE VOLUME: 34.62 CM3
DOP CALC LVOT STROKE VOLUME: 44.85 CM3
DOP CALC LVOT STROKE VOLUME: 55.65 CM3
DOP CALC MV VTI: 14.1 CM
DOP CALC MV VTI: 14.4 CM
DOP CALCLVOT PEAK VEL VTI: 10 CM
DOP CALCLVOT PEAK VEL VTI: 10.8 CM
DOP CALCLVOT PEAK VEL VTI: 13.4 CM
DRAWN BY BLOOD GAS (OHS): ABNORMAL
DRAWN BY BLOOD GAS (OHS): NORMAL
E WAVE DECELERATION TIME: 156 MSEC
E/A RATIO: 1.8
E/E' RATIO: 13.47 M/S
E/E' RATIO: 8 M/S
ECHO LV POSTERIOR WALL: 1.1 CM (ref 0.6–1.1)
ECHO LV POSTERIOR WALL: 1.2 CM (ref 0.6–1.1)
ECHO LV POSTERIOR WALL: 1.3 CM (ref 0.6–1.1)
ENTEROBACTER CLOACAE COMPLEX (OHS): NOT DETECTED
ENTEROBACTER CLOACAE COMPLEX (OHS): NOT DETECTED
ENTEROBACTERALES (OHS): NOT DETECTED
ENTEROBACTERALES (OHS): NOT DETECTED
ENTEROCOCCUS FAECALIS (OHS): NOT DETECTED
ENTEROCOCCUS FAECALIS (OHS): NOT DETECTED
ENTEROCOCCUS FAECIUM (OHS): NOT DETECTED
ENTEROCOCCUS FAECIUM (OHS): NOT DETECTED
EOSINOPHIL # BLD AUTO: 0 X10(3)/MCL (ref 0–0.9)
EOSINOPHIL # BLD AUTO: 0.03 X10(3)/MCL (ref 0–0.9)
EOSINOPHIL # BLD AUTO: 0.04 X10(3)/MCL (ref 0–0.9)
EOSINOPHIL # BLD AUTO: 0.04 X10(3)/MCL (ref 0–0.9)
EOSINOPHIL # BLD AUTO: 0.06 X10(3)/MCL (ref 0–0.9)
EOSINOPHIL # BLD AUTO: 0.09 X10(3)/MCL (ref 0–0.9)
EOSINOPHIL # BLD AUTO: 0.14 X10(3)/MCL (ref 0–0.9)
EOSINOPHIL NFR BLD AUTO: 0 %
EOSINOPHIL NFR BLD AUTO: 0.1 %
EOSINOPHIL NFR BLD AUTO: 0.2 %
EOSINOPHIL NFR BLD AUTO: 1.1 %
EOSINOPHIL NFR BLD AUTO: 2.2 %
EOSINOPHIL NFR BLD MANUAL: 0.11 X10(3)/MCL (ref 0–0.9)
EOSINOPHIL NFR BLD MANUAL: 1 %
ERYTHROCYTE [DISTWIDTH] IN BLOOD BY AUTOMATED COUNT: 19 % (ref 11.5–17)
ERYTHROCYTE [DISTWIDTH] IN BLOOD BY AUTOMATED COUNT: 19 % (ref 11.5–17)
ERYTHROCYTE [DISTWIDTH] IN BLOOD BY AUTOMATED COUNT: 19.1 % (ref 11.5–17)
ERYTHROCYTE [DISTWIDTH] IN BLOOD BY AUTOMATED COUNT: 19.1 % (ref 11.5–17)
ERYTHROCYTE [DISTWIDTH] IN BLOOD BY AUTOMATED COUNT: 19.3 % (ref 11.5–17)
ERYTHROCYTE [DISTWIDTH] IN BLOOD BY AUTOMATED COUNT: 19.4 % (ref 11.5–17)
ERYTHROCYTE [DISTWIDTH] IN BLOOD BY AUTOMATED COUNT: 19.4 % (ref 11.5–17)
ERYTHROCYTE [DISTWIDTH] IN BLOOD BY AUTOMATED COUNT: 19.6 % (ref 11.5–17)
ERYTHROCYTE [DISTWIDTH] IN BLOOD BY AUTOMATED COUNT: 19.7 % (ref 11.5–17)
ERYTHROCYTE [DISTWIDTH] IN BLOOD BY AUTOMATED COUNT: 19.9 % (ref 11.5–17)
ERYTHROCYTE [DISTWIDTH] IN BLOOD BY AUTOMATED COUNT: 19.9 % (ref 11.5–17)
ERYTHROCYTE [DISTWIDTH] IN BLOOD BY AUTOMATED COUNT: 20.1 % (ref 11.5–17)
ERYTHROCYTE [DISTWIDTH] IN BLOOD BY AUTOMATED COUNT: 20.4 % (ref 11.5–17)
ERYTHROCYTE [DISTWIDTH] IN BLOOD BY AUTOMATED COUNT: 22.7 % (ref 11.5–17)
ERYTHROCYTE [DISTWIDTH] IN BLOOD BY AUTOMATED COUNT: 23.5 % (ref 11.5–17)
ERYTHROCYTE [DISTWIDTH] IN BLOOD BY AUTOMATED COUNT: 24.7 % (ref 11.5–17)
ERYTHROCYTE [DISTWIDTH] IN BLOOD BY AUTOMATED COUNT: 25.4 % (ref 11.5–17)
ERYTHROCYTE [DISTWIDTH] IN BLOOD BY AUTOMATED COUNT: 25.4 % (ref 11.5–17)
ERYTHROCYTE [DISTWIDTH] IN BLOOD BY AUTOMATED COUNT: 25.8 % (ref 11.5–17)
ESCHERICHIA COLI (OHS): NOT DETECTED
ESCHERICHIA COLI (OHS): NOT DETECTED
FLOW (OHS): 60 LPM
FLOW (OHS): 60 LPM
FLUAV AG UPPER RESP QL IA.RAPID: NOT DETECTED
FLUBV AG UPPER RESP QL IA.RAPID: NOT DETECTED
FRACTIONAL SHORTENING: 12 % (ref 28–44)
FRACTIONAL SHORTENING: 15 % (ref 28–44)
FRACTIONAL SHORTENING: 3 % (ref 28–44)
GENTAMICIN SERPL-MCNC: 1 UG/ML
GENTAMICIN SERPL-MCNC: 2.1 UG/ML
GENTAMICIN SERPL-MCNC: 2.4 UG/ML
GFR SERPLBLD CREATININE-BSD FMLA CKD-EPI: 10 ML/MIN/1.73/M2
GFR SERPLBLD CREATININE-BSD FMLA CKD-EPI: 10 ML/MIN/1.73/M2
GFR SERPLBLD CREATININE-BSD FMLA CKD-EPI: 11 ML/MIN/1.73/M2
GFR SERPLBLD CREATININE-BSD FMLA CKD-EPI: 11 ML/MIN/1.73/M2
GFR SERPLBLD CREATININE-BSD FMLA CKD-EPI: 12 ML/MIN/1.73/M2
GFR SERPLBLD CREATININE-BSD FMLA CKD-EPI: 15 ML/MIN/1.73/M2
GFR SERPLBLD CREATININE-BSD FMLA CKD-EPI: 17 ML/MIN/1.73/M2
GFR SERPLBLD CREATININE-BSD FMLA CKD-EPI: 18 ML/MIN/1.73/M2
GFR SERPLBLD CREATININE-BSD FMLA CKD-EPI: 18 ML/MIN/1.73/M2
GFR SERPLBLD CREATININE-BSD FMLA CKD-EPI: 19 ML/MIN/1.73/M2
GFR SERPLBLD CREATININE-BSD FMLA CKD-EPI: 20 ML/MIN/1.73/M2
GFR SERPLBLD CREATININE-BSD FMLA CKD-EPI: 21 ML/MIN/1.73/M2
GFR SERPLBLD CREATININE-BSD FMLA CKD-EPI: 22 ML/MIN/1.73/M2
GFR SERPLBLD CREATININE-BSD FMLA CKD-EPI: 23 ML/MIN/1.73/M2
GFR SERPLBLD CREATININE-BSD FMLA CKD-EPI: 24 ML/MIN/1.73/M2
GFR SERPLBLD CREATININE-BSD FMLA CKD-EPI: 8 ML/MIN/1.73/M2
GFR SERPLBLD CREATININE-BSD FMLA CKD-EPI: 9 ML/MIN/1.73/M2
GFR SERPLBLD CREATININE-BSD FMLA CKD-EPI: 9 ML/MIN/1.73/M2
GLOBULIN SER-MCNC: 2.5 GM/DL (ref 2.4–3.5)
GLOBULIN SER-MCNC: 2.8 GM/DL (ref 2.4–3.5)
GLOBULIN SER-MCNC: 3.2 GM/DL (ref 2.4–3.5)
GLOBULIN SER-MCNC: 3.3 GM/DL (ref 2.4–3.5)
GLOBULIN SER-MCNC: 3.4 GM/DL (ref 2.4–3.5)
GLOBULIN SER-MCNC: 3.5 GM/DL (ref 2.4–3.5)
GLOBULIN SER-MCNC: 3.6 GM/DL (ref 2.4–3.5)
GLOBULIN SER-MCNC: 3.6 GM/DL (ref 2.4–3.5)
GLOBULIN SER-MCNC: 3.7 GM/DL (ref 2.4–3.5)
GLOBULIN SER-MCNC: 3.7 GM/DL (ref 2.4–3.5)
GLOBULIN SER-MCNC: 3.8 GM/DL (ref 2.4–3.5)
GLOBULIN SER-MCNC: 4 GM/DL (ref 2.4–3.5)
GLOBULIN SER-MCNC: 4 GM/DL (ref 2.4–3.5)
GLOBULIN SER-MCNC: 4.1 GM/DL (ref 2.4–3.5)
GLOBULIN SER-MCNC: 4.1 GM/DL (ref 2.4–3.5)
GLOBULIN SER-MCNC: 4.2 GM/DL (ref 2.4–3.5)
GLOBULIN SER-MCNC: 4.3 GM/DL (ref 2.4–3.5)
GLOBULIN SER-MCNC: 4.9 GM/DL (ref 2.4–3.5)
GLUCOSE SERPL-MCNC: 102 MG/DL (ref 74–100)
GLUCOSE SERPL-MCNC: 104 MG/DL (ref 74–100)
GLUCOSE SERPL-MCNC: 105 MG/DL (ref 74–100)
GLUCOSE SERPL-MCNC: 106 MG/DL (ref 74–100)
GLUCOSE SERPL-MCNC: 108 MG/DL (ref 74–100)
GLUCOSE SERPL-MCNC: 108 MG/DL (ref 74–100)
GLUCOSE SERPL-MCNC: 109 MG/DL (ref 74–100)
GLUCOSE SERPL-MCNC: 111 MG/DL (ref 74–100)
GLUCOSE SERPL-MCNC: 115 MG/DL (ref 74–100)
GLUCOSE SERPL-MCNC: 119 MG/DL (ref 74–100)
GLUCOSE SERPL-MCNC: 122 MG/DL (ref 74–100)
GLUCOSE SERPL-MCNC: 122 MG/DL (ref 74–100)
GLUCOSE SERPL-MCNC: 123 MG/DL (ref 74–100)
GLUCOSE SERPL-MCNC: 128 MG/DL (ref 74–100)
GLUCOSE SERPL-MCNC: 133 MG/DL (ref 74–100)
GLUCOSE SERPL-MCNC: 133 MG/DL (ref 74–100)
GLUCOSE SERPL-MCNC: 135 MG/DL (ref 74–100)
GLUCOSE SERPL-MCNC: 165 MG/DL (ref 74–100)
GLUCOSE SERPL-MCNC: 192 MG/DL (ref 74–100)
GLUCOSE SERPL-MCNC: 39 MG/DL (ref 74–100)
GLUCOSE SERPL-MCNC: 54 MG/DL (ref 74–100)
GLUCOSE SERPL-MCNC: 54 MG/DL (ref 74–100)
GLUCOSE SERPL-MCNC: 59 MG/DL (ref 74–100)
GLUCOSE SERPL-MCNC: 62 MG/DL (ref 74–100)
GLUCOSE SERPL-MCNC: 63 MG/DL (ref 74–100)
GLUCOSE SERPL-MCNC: 64 MG/DL (ref 74–100)
GLUCOSE SERPL-MCNC: 68 MG/DL (ref 74–100)
GLUCOSE SERPL-MCNC: 68 MG/DL (ref 74–100)
GLUCOSE SERPL-MCNC: 74 MG/DL (ref 74–100)
GLUCOSE SERPL-MCNC: 75 MG/DL (ref 74–100)
GLUCOSE SERPL-MCNC: 75 MG/DL (ref 74–100)
GLUCOSE SERPL-MCNC: 76 MG/DL (ref 74–100)
GLUCOSE SERPL-MCNC: 79 MG/DL (ref 74–100)
GLUCOSE SERPL-MCNC: 81 MG/DL (ref 74–100)
GLUCOSE SERPL-MCNC: 82 MG/DL (ref 74–100)
GLUCOSE SERPL-MCNC: 83 MG/DL (ref 74–100)
GLUCOSE SERPL-MCNC: 88 MG/DL (ref 74–100)
GLUCOSE SERPL-MCNC: 89 MG/DL (ref 74–100)
GLUCOSE SERPL-MCNC: 9 MG/DL (ref 74–100)
GLUCOSE SERPL-MCNC: 90 MG/DL (ref 74–100)
GLUCOSE SERPL-MCNC: 96 MG/DL (ref 74–100)
GLUCOSE SERPL-MCNC: 96 MG/DL (ref 74–100)
GLUCOSE SERPL-MCNC: 98 MG/DL (ref 74–100)
GP B STREP DNA CSF QL NAA+NON-PROBE: NOT DETECTED
GP B STREP DNA CSF QL NAA+NON-PROBE: NOT DETECTED
GRAM STN SPEC: ABNORMAL
GRAM STN SPEC: NORMAL
HAEM INFLU DNA CSF QL NAA+NON-PROBE: NOT DETECTED
HAEM INFLU DNA CSF QL NAA+NON-PROBE: NOT DETECTED
HBV SURFACE AG SERPL QL IA: NONREACTIVE
HCO3 BLDA-SCNC: 11.1 MMOL/L (ref 22–26)
HCO3 BLDA-SCNC: 12.6 MMOL/L (ref 22–26)
HCO3 BLDA-SCNC: 15.5 MMOL/L (ref 22–26)
HCO3 BLDA-SCNC: 15.8 MMOL/L (ref 22–26)
HCO3 BLDA-SCNC: 17.1 MMOL/L (ref 22–26)
HCO3 BLDA-SCNC: 19.2 MMOL/L (ref 22–26)
HCO3 BLDA-SCNC: 19.6 MMOL/L (ref 22–26)
HCO3 BLDA-SCNC: 21.4 MMOL/L (ref 22–26)
HCO3 BLDA-SCNC: 21.5 MMOL/L (ref 22–26)
HCO3 BLDA-SCNC: 21.9 MMOL/L (ref 22–26)
HCO3 BLDA-SCNC: 21.9 MMOL/L (ref 22–26)
HCO3 BLDA-SCNC: 22.1 MMOL/L
HCO3 BLDA-SCNC: 22.8 MMOL/L (ref 22–26)
HCO3 BLDA-SCNC: 24.2 MMOL/L (ref 22–26)
HCO3 BLDA-SCNC: 24.4 MMOL/L (ref 22–26)
HCO3 BLDA-SCNC: 26.3 MMOL/L (ref 22–26)
HCO3 BLDA-SCNC: 26.4 MMOL/L (ref 22–26)
HCO3 BLDA-SCNC: 9.9 MMOL/L (ref 22–26)
HCT VFR BLD AUTO: 24.2 % (ref 42–52)
HCT VFR BLD AUTO: 24.8 % (ref 42–52)
HCT VFR BLD AUTO: 25.1 % (ref 42–52)
HCT VFR BLD AUTO: 25.2 % (ref 42–52)
HCT VFR BLD AUTO: 26.1 % (ref 42–52)
HCT VFR BLD AUTO: 26.7 % (ref 42–52)
HCT VFR BLD AUTO: 28.1 % (ref 42–52)
HCT VFR BLD AUTO: 29.4 % (ref 42–52)
HCT VFR BLD AUTO: 29.6 % (ref 42–52)
HCT VFR BLD AUTO: 30.5 % (ref 42–52)
HCT VFR BLD AUTO: 31.4 % (ref 42–52)
HCT VFR BLD AUTO: 31.8 % (ref 42–52)
HCT VFR BLD AUTO: 32.7 % (ref 42–52)
HCT VFR BLD AUTO: 33 % (ref 42–52)
HCT VFR BLD AUTO: 33.2 % (ref 42–52)
HCT VFR BLD AUTO: 33.9 % (ref 42–52)
HCT VFR BLD AUTO: 34.1 % (ref 42–52)
HCT VFR BLD AUTO: 34.6 % (ref 42–52)
HCT VFR BLD AUTO: 35.6 % (ref 42–52)
HCT VFR BLD AUTO: 36.1 % (ref 42–52)
HCT VFR BLD AUTO: 36.3 % (ref 42–52)
HCT VFR BLD AUTO: 36.8 % (ref 42–52)
HCT VFR BLD AUTO: 39.8 % (ref 42–52)
HDLC SERPL-MCNC: 34 MG/DL (ref 35–60)
HGB BLD-MCNC: 10.3 G/DL (ref 14–18)
HGB BLD-MCNC: 10.7 G/DL (ref 14–18)
HGB BLD-MCNC: 11.2 G/DL (ref 14–18)
HGB BLD-MCNC: 11.2 G/DL (ref 14–18)
HGB BLD-MCNC: 11.3 G/DL (ref 14–18)
HGB BLD-MCNC: 11.4 G/DL (ref 14–18)
HGB BLD-MCNC: 11.5 G/DL (ref 14–18)
HGB BLD-MCNC: 11.5 G/DL (ref 14–18)
HGB BLD-MCNC: 11.7 G/DL (ref 14–18)
HGB BLD-MCNC: 11.8 G/DL (ref 14–18)
HGB BLD-MCNC: 11.9 G/DL (ref 14–18)
HGB BLD-MCNC: 11.9 G/DL (ref 14–18)
HGB BLD-MCNC: 12 G/DL (ref 14–18)
HGB BLD-MCNC: 12.7 G/DL (ref 14–18)
HGB BLD-MCNC: 8.3 G/DL (ref 14–18)
HGB BLD-MCNC: 8.7 G/DL (ref 14–18)
HGB BLD-MCNC: 8.8 G/DL (ref 14–18)
HGB BLD-MCNC: 9.1 G/DL (ref 14–18)
HGB BLD-MCNC: 9.3 G/DL (ref 14–18)
HGB BLD-MCNC: 9.3 G/DL (ref 14–18)
HGB BLD-MCNC: 9.5 G/DL (ref 14–18)
HGB BLD-MCNC: 9.7 G/DL (ref 14–18)
HGB BLD-MCNC: 9.8 G/DL (ref 14–18)
HYPOCHROMIA BLD QL SMEAR: ABNORMAL
HYPOCHROMIA BLD QL SMEAR: ABNORMAL
IMM GRANULOCYTES # BLD AUTO: 0.02 X10(3)/MCL (ref 0–0.04)
IMM GRANULOCYTES # BLD AUTO: 0.02 X10(3)/MCL (ref 0–0.04)
IMM GRANULOCYTES # BLD AUTO: 0.03 X10(3)/MCL (ref 0–0.04)
IMM GRANULOCYTES # BLD AUTO: 0.11 X10(3)/MCL (ref 0–0.04)
IMM GRANULOCYTES # BLD AUTO: 0.2 X10(3)/MCL (ref 0–0.04)
IMM GRANULOCYTES # BLD AUTO: 1.06 X10(3)/MCL (ref 0–0.04)
IMM GRANULOCYTES # BLD AUTO: 1.18 X10(3)/MCL (ref 0–0.04)
IMM GRANULOCYTES NFR BLD AUTO: 0.5 %
IMM GRANULOCYTES NFR BLD AUTO: 0.5 %
IMM GRANULOCYTES NFR BLD AUTO: 0.6 %
IMM GRANULOCYTES NFR BLD AUTO: 1.6 %
IMM GRANULOCYTES NFR BLD AUTO: 2.1 %
IMM GRANULOCYTES NFR BLD AUTO: 3.3 %
IMM GRANULOCYTES NFR BLD AUTO: 4 %
IMP (OHS): ABNORMAL
IMP (OHS): NORMAL
INHALED O2 CONCENTRATION: 100 %
INHALED O2 CONCENTRATION: 30 %
INHALED O2 CONCENTRATION: 40 %
INHALED O2 CONCENTRATION: 40 %
INHALED O2 CONCENTRATION: 50 %
INR PPP: 1.2
INR PPP: 3.4
INSTRUMENT WBC (OLG): 10.55 X10(3)/MCL
INSTRUMENT WBC (OLG): 10.64 X10(3)/MCL
INSTRUMENT WBC (OLG): 13.02 X10(3)/MCL
INSTRUMENT WBC (OLG): 17.87 X10(3)/MCL
INSTRUMENT WBC (OLG): 19.14 X10(3)/MCL
INSTRUMENT WBC (OLG): 20.46 X10(3)/MCL
INSTRUMENT WBC (OLG): 20.51 X10(3)/MCL
INSTRUMENT WBC (OLG): 21.16 X10(3)/MCL
INSTRUMENT WBC (OLG): 22.19 X10(3)/MCL
INSTRUMENT WBC (OLG): 23.23 X10(3)/MCL
INSTRUMENT WBC (OLG): 27.04 X10(3)/MCL
INSTRUMENT WBC (OLG): 27.05 X10(3)/MCL
INSTRUMENT WBC (OLG): 27.51 X10(3)/MCL
INSTRUMENT WBC (OLG): 29.78 X10(3)/MCL
INSTRUMENT WBC (OLG): 35.8 X10(3)/MCL
INTERVENTRICULAR SEPTUM: 0.7 CM (ref 0.6–1.1)
INTERVENTRICULAR SEPTUM: 1.2 CM (ref 0.6–1.1)
INTERVENTRICULAR SEPTUM: 1.2 CM (ref 0.6–1.1)
KLEBSIELLA AEROGENES (OHS): NOT DETECTED
KLEBSIELLA AEROGENES (OHS): NOT DETECTED
KLEBSIELLA OXYTOCA (OHS): NOT DETECTED
KLEBSIELLA OXYTOCA (OHS): NOT DETECTED
KLEBSIELLA PNEUMONIAE GROUP (OHS): NOT DETECTED
KLEBSIELLA PNEUMONIAE GROUP (OHS): NOT DETECTED
KPC (OHS): ABNORMAL
KPC (OHS): NORMAL
L MONOCYTOG DNA CSF QL NAA+NON-PROBE: NOT DETECTED
L MONOCYTOG DNA CSF QL NAA+NON-PROBE: NOT DETECTED
LACTATE SERPL-SCNC: 1.2 MMOL/L (ref 0.5–2.2)
LACTATE SERPL-SCNC: 1.3 MMOL/L (ref 0.5–2.2)
LACTATE SERPL-SCNC: 1.3 MMOL/L (ref 0.5–2.2)
LACTATE SERPL-SCNC: 1.4 MMOL/L (ref 0.5–2.2)
LACTATE SERPL-SCNC: 1.5 MMOL/L (ref 0.5–2.2)
LACTATE SERPL-SCNC: 1.7 MMOL/L (ref 0.5–2.2)
LACTATE SERPL-SCNC: 13.6 MMOL/L (ref 0.5–2.2)
LACTATE SERPL-SCNC: 2 MMOL/L (ref 0.5–2.2)
LACTATE SERPL-SCNC: 2.1 MMOL/L (ref 0.5–2.2)
LACTATE SERPL-SCNC: 2.3 MMOL/L (ref 0.5–2.2)
LACTATE SERPL-SCNC: 2.6 MMOL/L (ref 0.5–2.2)
LACTATE SERPL-SCNC: 2.7 MMOL/L (ref 0.5–2.2)
LACTATE SERPL-SCNC: 2.7 MMOL/L (ref 0.5–2.2)
LACTATE SERPL-SCNC: 2.8 MMOL/L (ref 0.5–2.2)
LACTATE SERPL-SCNC: 3.1 MMOL/L (ref 0.5–2.2)
LACTATE SERPL-SCNC: 3.1 MMOL/L (ref 0.5–2.2)
LACTATE SERPL-SCNC: 3.2 MMOL/L (ref 0.5–2.2)
LACTATE SERPL-SCNC: 3.2 MMOL/L (ref 0.5–2.2)
LACTATE SERPL-SCNC: 3.3 MMOL/L (ref 0.5–2.2)
LACTATE SERPL-SCNC: 3.3 MMOL/L (ref 0.5–2.2)
LACTATE SERPL-SCNC: 3.4 MMOL/L (ref 0.5–2.2)
LACTATE SERPL-SCNC: 3.6 MMOL/L (ref 0.5–2.2)
LACTATE SERPL-SCNC: 3.7 MMOL/L (ref 0.5–2.2)
LACTATE SERPL-SCNC: 3.8 MMOL/L (ref 0.5–2.2)
LACTATE SERPL-SCNC: 3.9 MMOL/L (ref 0.5–2.2)
LACTATE SERPL-SCNC: 4.3 MMOL/L (ref 0.5–2.2)
LDLC SERPL CALC-MCNC: 93 MG/DL (ref 50–140)
LEFT ATRIUM AREA SYSTOLIC (APICAL 2 CHAMBER): 24.6 CM2
LEFT ATRIUM AREA SYSTOLIC (APICAL 2 CHAMBER): 28.2 CM2
LEFT ATRIUM AREA SYSTOLIC (APICAL 4 CHAMBER): 29 CM2
LEFT ATRIUM AREA SYSTOLIC (APICAL 4 CHAMBER): 33.1 CM2
LEFT ATRIUM SIZE: 5 CM
LEFT ATRIUM SIZE: 6.2 CM
LEFT ATRIUM VOLUME INDEX MOD: 61.1 ML/M2
LEFT ATRIUM VOLUME INDEX MOD: 68.3 ML/M2
LEFT ATRIUM VOLUME MOD: 110 ML
LEFT ATRIUM VOLUME MOD: 123 CM3
LEFT DIST ANA DIA: 0.3 CM
LEFT DIST ANA PSV: 168 CM/S
LEFT DIST GRAFT PSV: 97 CM/S
LEFT DISTAL GRAFT DEPTH: 0.2 CM
LEFT INFLOW DIA: 0.2 CM
LEFT INFLOW PSV: 161 CM/S
LEFT INTERNAL DIMENSION IN SYSTOLE: 5.7 CM (ref 2.1–4)
LEFT INTERNAL DIMENSION IN SYSTOLE: 5.8 CM (ref 2.1–4)
LEFT INTERNAL DIMENSION IN SYSTOLE: 6.3 CM (ref 2.1–4)
LEFT MID GRAFT DEPTH: 0.2 CM
LEFT MID GRAFT PSV: 123 CM/S
LEFT OUTFLOW DIA: 0.7 CM
LEFT OUTFLOW PSV: 107 CM/S
LEFT PROX ANA DIA: 0.4 CM
LEFT PROX ANA PSV: 109 CM/S
LEFT PROX GRAFT PSV: 96 CM/S
LEFT PROXIMAL GRAFT DEPTH: 0.3 CM
LEFT VENTRICLE DIASTOLIC VOLUME INDEX: 100 ML/M2
LEFT VENTRICLE DIASTOLIC VOLUME INDEX: 120 ML/M2
LEFT VENTRICLE DIASTOLIC VOLUME INDEX: 149.74 ML/M2
LEFT VENTRICLE DIASTOLIC VOLUME: 180 ML
LEFT VENTRICLE DIASTOLIC VOLUME: 216 ML
LEFT VENTRICLE DIASTOLIC VOLUME: 289 ML
LEFT VENTRICLE END DIASTOLIC VOLUME APICAL 2 CHAMBER: 187 ML
LEFT VENTRICLE END DIASTOLIC VOLUME APICAL 2 CHAMBER: 460 ML
LEFT VENTRICLE END DIASTOLIC VOLUME APICAL 4 CHAMBER: 266 ML
LEFT VENTRICLE END DIASTOLIC VOLUME APICAL 4 CHAMBER: 364 ML
LEFT VENTRICLE END SYSTOLIC VOLUME APICAL 2 CHAMBER: 107 ML
LEFT VENTRICLE END SYSTOLIC VOLUME APICAL 2 CHAMBER: 95.6 ML
LEFT VENTRICLE END SYSTOLIC VOLUME APICAL 4 CHAMBER: 103 ML
LEFT VENTRICLE END SYSTOLIC VOLUME APICAL 4 CHAMBER: 134 ML
LEFT VENTRICLE MASS INDEX: 128 G/M2
LEFT VENTRICLE MASS INDEX: 210 G/M2
LEFT VENTRICLE MASS INDEX: 219 G/M2
LEFT VENTRICLE SYSTOLIC VOLUME INDEX: 104.1 ML/M2
LEFT VENTRICLE SYSTOLIC VOLUME INDEX: 88.9 ML/M2
LEFT VENTRICLE SYSTOLIC VOLUME INDEX: 92.8 ML/M2
LEFT VENTRICLE SYSTOLIC VOLUME: 160 ML
LEFT VENTRICLE SYSTOLIC VOLUME: 167 ML
LEFT VENTRICLE SYSTOLIC VOLUME: 201 ML
LEFT VENTRICULAR INTERNAL DIMENSION IN DIASTOLE: 6 CM (ref 3.5–6)
LEFT VENTRICULAR INTERNAL DIMENSION IN DIASTOLE: 6.5 CM (ref 3.5–6)
LEFT VENTRICULAR INTERNAL DIMENSION IN DIASTOLE: 7.4 CM (ref 3.5–6)
LEFT VENTRICULAR MASS: 231.1 G
LEFT VENTRICULAR MASS: 378.64 G
LEFT VENTRICULAR MASS: 422.8 G
LEFT VOLUME FLOW PSV: 221 ML/MIN
LV LATERAL E/E' RATIO: 11.22 M/S
LV LATERAL E/E' RATIO: 6 M/S
LV SEPTAL E/E' RATIO: 12 M/S
LV SEPTAL E/E' RATIO: 16.83 M/S
LVED V (TEICH): 180 ML
LVED V (TEICH): 216 ML
LVED V (TEICH): 289 ML
LVES V (TEICH): 160 ML
LVES V (TEICH): 167 ML
LVES V (TEICH): 201 ML
LVOT MG: 1 MMHG
LVOT MV: 0.47 CM/S
LVOT MV: 0.49 CM/S
LVOT MV: 0.52 CM/S
LYMPHOCYTES # BLD AUTO: 0.32 X10(3)/MCL (ref 0.6–4.6)
LYMPHOCYTES # BLD AUTO: 0.61 X10(3)/MCL (ref 0.6–4.6)
LYMPHOCYTES # BLD AUTO: 0.67 X10(3)/MCL (ref 0.6–4.6)
LYMPHOCYTES # BLD AUTO: 0.68 X10(3)/MCL (ref 0.6–4.6)
LYMPHOCYTES # BLD AUTO: 0.79 X10(3)/MCL (ref 0.6–4.6)
LYMPHOCYTES # BLD AUTO: 0.82 X10(3)/MCL (ref 0.6–4.6)
LYMPHOCYTES # BLD AUTO: 0.84 X10(3)/MCL (ref 0.6–4.6)
LYMPHOCYTES NFR BLD AUTO: 15.4 %
LYMPHOCYTES NFR BLD AUTO: 16.3 %
LYMPHOCYTES NFR BLD AUTO: 18.2 %
LYMPHOCYTES NFR BLD AUTO: 2.4 %
LYMPHOCYTES NFR BLD AUTO: 3 %
LYMPHOCYTES NFR BLD AUTO: 4.9 %
LYMPHOCYTES NFR BLD AUTO: 6 %
LYMPHOCYTES NFR BLD MANUAL: 0.11 X10(3)/MCL
LYMPHOCYTES NFR BLD MANUAL: 0.19 X10(3)/MCL
LYMPHOCYTES NFR BLD MANUAL: 0.27 X10(3)/MCL
LYMPHOCYTES NFR BLD MANUAL: 0.28 X10(3)/MCL
LYMPHOCYTES NFR BLD MANUAL: 0.36 X10(3)/MCL
LYMPHOCYTES NFR BLD MANUAL: 0.43 X10(3)/MCL
LYMPHOCYTES NFR BLD MANUAL: 0.54 X10(3)/MCL
LYMPHOCYTES NFR BLD MANUAL: 0.65 X10(3)/MCL
LYMPHOCYTES NFR BLD MANUAL: 0.89 X10(3)/MCL
LYMPHOCYTES NFR BLD MANUAL: 0.93 X10(3)/MCL
LYMPHOCYTES NFR BLD MANUAL: 1 %
LYMPHOCYTES NFR BLD MANUAL: 1.02 X10(3)/MCL
LYMPHOCYTES NFR BLD MANUAL: 1.03 X10(3)/MCL
LYMPHOCYTES NFR BLD MANUAL: 1.08 X10(3)/MCL
LYMPHOCYTES NFR BLD MANUAL: 1.78 X10(3)/MCL
LYMPHOCYTES NFR BLD MANUAL: 10 %
LYMPHOCYTES NFR BLD MANUAL: 2.12 X10(3)/MCL
LYMPHOCYTES NFR BLD MANUAL: 3 %
LYMPHOCYTES NFR BLD MANUAL: 3 %
LYMPHOCYTES NFR BLD MANUAL: 4 %
LYMPHOCYTES NFR BLD MANUAL: 5 %
LYMPHOCYTES NFR BLD MANUAL: 8 %
MACROCYTES BLD QL SMEAR: ABNORMAL
MAGNESIUM SERPL-MCNC: 1.7 MG/DL (ref 1.6–2.6)
MAGNESIUM SERPL-MCNC: 1.7 MG/DL (ref 1.6–2.6)
MAGNESIUM SERPL-MCNC: 1.8 MG/DL (ref 1.6–2.6)
MAGNESIUM SERPL-MCNC: 1.8 MG/DL (ref 1.6–2.6)
MAGNESIUM SERPL-MCNC: 1.9 MG/DL (ref 1.6–2.6)
MAGNESIUM SERPL-MCNC: 2 MG/DL (ref 1.6–2.6)
MAGNESIUM SERPL-MCNC: 2.1 MG/DL (ref 1.6–2.6)
MAGNESIUM SERPL-MCNC: 2.1 MG/DL (ref 1.6–2.6)
MAGNESIUM SERPL-MCNC: 2.2 MG/DL (ref 1.6–2.6)
MAGNESIUM SERPL-MCNC: 2.3 MG/DL (ref 1.6–2.6)
MAGNESIUM SERPL-MCNC: 2.4 MG/DL (ref 1.6–2.6)
MAGNESIUM SERPL-MCNC: 2.7 MG/DL (ref 1.6–2.6)
MCH RBC QN AUTO: 28.4 PG (ref 27–31)
MCH RBC QN AUTO: 28.7 PG (ref 27–31)
MCH RBC QN AUTO: 28.7 PG (ref 27–31)
MCH RBC QN AUTO: 28.9 PG (ref 27–31)
MCH RBC QN AUTO: 29 PG (ref 27–31)
MCH RBC QN AUTO: 29 PG (ref 27–31)
MCH RBC QN AUTO: 29.1 PG (ref 27–31)
MCH RBC QN AUTO: 29.2 PG (ref 27–31)
MCH RBC QN AUTO: 29.4 PG (ref 27–31)
MCH RBC QN AUTO: 29.4 PG (ref 27–31)
MCH RBC QN AUTO: 29.5 PG (ref 27–31)
MCH RBC QN AUTO: 29.6 PG (ref 27–31)
MCH RBC QN AUTO: 29.7 PG (ref 27–31)
MCH RBC QN AUTO: 29.8 PG (ref 27–31)
MCH RBC QN AUTO: 29.9 PG (ref 27–31)
MCH RBC QN AUTO: 29.9 PG (ref 27–31)
MCH RBC QN AUTO: 30 PG (ref 27–31)
MCH RBC QN AUTO: 30.2 PG (ref 27–31)
MCH RBC QN AUTO: 30.5 PG (ref 27–31)
MCHC RBC AUTO-ENTMCNC: 31.5 G/DL (ref 33–36)
MCHC RBC AUTO-ENTMCNC: 31.6 G/DL (ref 33–36)
MCHC RBC AUTO-ENTMCNC: 31.8 G/DL (ref 33–36)
MCHC RBC AUTO-ENTMCNC: 31.9 G/DL (ref 33–36)
MCHC RBC AUTO-ENTMCNC: 32 G/DL (ref 33–36)
MCHC RBC AUTO-ENTMCNC: 32.4 G/DL (ref 33–36)
MCHC RBC AUTO-ENTMCNC: 32.8 G/DL (ref 33–36)
MCHC RBC AUTO-ENTMCNC: 33 G/DL (ref 33–36)
MCHC RBC AUTO-ENTMCNC: 33 G/DL (ref 33–36)
MCHC RBC AUTO-ENTMCNC: 33.1 G/DL (ref 33–36)
MCHC RBC AUTO-ENTMCNC: 33.1 G/DL (ref 33–36)
MCHC RBC AUTO-ENTMCNC: 33.7 G/DL (ref 33–36)
MCHC RBC AUTO-ENTMCNC: 33.9 G/DL (ref 33–36)
MCHC RBC AUTO-ENTMCNC: 34.1 G/DL (ref 33–36)
MCHC RBC AUTO-ENTMCNC: 34.4 G/DL (ref 33–36)
MCHC RBC AUTO-ENTMCNC: 35.5 G/DL (ref 33–36)
MCHC RBC AUTO-ENTMCNC: 35.5 G/DL (ref 33–36)
MCHC RBC AUTO-ENTMCNC: 36 G/DL (ref 33–36)
MCHC RBC AUTO-ENTMCNC: 36.2 G/DL (ref 33–36)
MCHC RBC AUTO-ENTMCNC: 36.4 G/DL (ref 33–36)
MCHC RBC AUTO-ENTMCNC: 36.7 G/DL (ref 33–36)
MCHC RBC AUTO-ENTMCNC: 36.9 G/DL (ref 33–36)
MCHC RBC AUTO-ENTMCNC: 37 G/DL (ref 33–36)
MCR-1 (OHS): ABNORMAL
MCR-1 (OHS): NORMAL
MCV RBC AUTO: 78.6 FL (ref 80–94)
MCV RBC AUTO: 78.8 FL (ref 80–94)
MCV RBC AUTO: 79 FL (ref 80–94)
MCV RBC AUTO: 80.8 FL (ref 80–94)
MCV RBC AUTO: 80.8 FL (ref 80–94)
MCV RBC AUTO: 80.9 FL (ref 80–94)
MCV RBC AUTO: 81.3 FL (ref 80–94)
MCV RBC AUTO: 83.4 FL (ref 80–94)
MCV RBC AUTO: 85.7 FL (ref 80–94)
MCV RBC AUTO: 86.5 FL (ref 80–94)
MCV RBC AUTO: 86.5 FL (ref 80–94)
MCV RBC AUTO: 86.9 FL (ref 80–94)
MCV RBC AUTO: 88.6 FL (ref 80–94)
MCV RBC AUTO: 88.6 FL (ref 80–94)
MCV RBC AUTO: 89.5 FL (ref 80–94)
MCV RBC AUTO: 89.9 FL (ref 80–94)
MCV RBC AUTO: 89.9 FL (ref 80–94)
MCV RBC AUTO: 90.7 FL (ref 80–94)
MCV RBC AUTO: 90.8 FL (ref 80–94)
MCV RBC AUTO: 93.7 FL (ref 80–94)
MCV RBC AUTO: 93.9 FL (ref 80–94)
MCV RBC AUTO: 95.8 FL (ref 80–94)
MCV RBC AUTO: 95.9 FL (ref 80–94)
MECA/C (OHS): DETECTED
MECA/C (OHS): NORMAL
MECA/C AND MREJ (MRSA)(OHS): ABNORMAL
MECA/C AND MREJ (MRSA)(OHS): NORMAL
MECH RR (OHS): 20 B/MIN
MECH RR (OHS): 22 B/MIN
MECH RR (OHS): 24 B/MIN
MECH RR (OHS): 26 B/MIN
MECH RR (OHS): 30 B/MIN
METAMYELOCYTES NFR BLD MANUAL: 1 %
METAMYELOCYTES NFR BLD MANUAL: 3 %
METAMYELOCYTES NFR BLD MANUAL: 3 %
METHGB MFR BLDA: 0.3 % (ref 0.4–1.5)
METHGB MFR BLDA: 0.4 % (ref 0.4–1.5)
METHGB MFR BLDA: 0.5 %
METHGB MFR BLDA: 0.6 % (ref 0.4–1.5)
METHGB MFR BLDA: 0.6 % (ref 0.4–1.5)
METHGB MFR BLDA: 0.8 % (ref 0.4–1.5)
METHGB MFR BLDA: 0.8 % (ref 0.4–1.5)
METHGB MFR BLDA: 1.2 % (ref 0.4–1.5)
METHGB MFR BLDA: 1.4 % (ref 0.4–1.5)
MICROCYTES BLD QL SMEAR: ABNORMAL
MICROCYTES BLD QL SMEAR: SLIGHT
MODE (OHS): AC
MONOCYTES # BLD AUTO: 0.29 X10(3)/MCL (ref 0.1–1.3)
MONOCYTES # BLD AUTO: 0.36 X10(3)/MCL (ref 0.1–1.3)
MONOCYTES # BLD AUTO: 0.52 X10(3)/MCL (ref 0.1–1.3)
MONOCYTES # BLD AUTO: 0.65 X10(3)/MCL (ref 0.1–1.3)
MONOCYTES # BLD AUTO: 1.11 X10(3)/MCL (ref 0.1–1.3)
MONOCYTES # BLD AUTO: 1.25 X10(3)/MCL (ref 0.1–1.3)
MONOCYTES # BLD AUTO: 1.72 X10(3)/MCL (ref 0.1–1.3)
MONOCYTES NFR BLD AUTO: 12.2 %
MONOCYTES NFR BLD AUTO: 12.6 %
MONOCYTES NFR BLD AUTO: 4.7 %
MONOCYTES NFR BLD AUTO: 4.8 %
MONOCYTES NFR BLD AUTO: 6.7 %
MONOCYTES NFR BLD AUTO: 7.8 %
MONOCYTES NFR BLD AUTO: 8.9 %
MONOCYTES NFR BLD MANUAL: 0.21 X10(3)/MCL (ref 0.1–1.3)
MONOCYTES NFR BLD MANUAL: 0.27 X10(3)/MCL (ref 0.1–1.3)
MONOCYTES NFR BLD MANUAL: 0.46 X10(3)/MCL (ref 0.1–1.3)
MONOCYTES NFR BLD MANUAL: 0.57 X10(3)/MCL (ref 0.1–1.3)
MONOCYTES NFR BLD MANUAL: 0.82 X10(3)/MCL (ref 0.1–1.3)
MONOCYTES NFR BLD MANUAL: 0.83 X10(3)/MCL (ref 0.1–1.3)
MONOCYTES NFR BLD MANUAL: 0.89 X10(3)/MCL (ref 0.1–1.3)
MONOCYTES NFR BLD MANUAL: 1 %
MONOCYTES NFR BLD MANUAL: 1 %
MONOCYTES NFR BLD MANUAL: 1.05 X10(3)/MCL (ref 0.1–1.3)
MONOCYTES NFR BLD MANUAL: 1.06 X10(3)/MCL (ref 0.1–1.3)
MONOCYTES NFR BLD MANUAL: 1.07 X10(3)/MCL (ref 0.1–1.3)
MONOCYTES NFR BLD MANUAL: 1.08 X10(3)/MCL (ref 0.1–1.3)
MONOCYTES NFR BLD MANUAL: 1.3 X10(3)/MCL (ref 0.1–1.3)
MONOCYTES NFR BLD MANUAL: 1.79 X10(3)/MCL (ref 0.1–1.3)
MONOCYTES NFR BLD MANUAL: 10 %
MONOCYTES NFR BLD MANUAL: 2 %
MONOCYTES NFR BLD MANUAL: 2.12 X10(3)/MCL (ref 0.1–1.3)
MONOCYTES NFR BLD MANUAL: 2.15 X10(3)/MCL (ref 0.1–1.3)
MONOCYTES NFR BLD MANUAL: 3 %
MONOCYTES NFR BLD MANUAL: 3 %
MONOCYTES NFR BLD MANUAL: 4 %
MONOCYTES NFR BLD MANUAL: 6 %
MV MEAN GRADIENT: 1 MMHG
MV MEAN GRADIENT: 3 MMHG
MV PEAK A VEL: 0.4 M/S
MV PEAK E VEL: 0.72 M/S
MV PEAK E VEL: 1.01 M/S
MV PEAK GRADIENT: 2 MMHG
MV PEAK GRADIENT: 6 MMHG
MV STENOSIS PRESSURE HALF TIME: 62 MS
MV VALVE AREA BY CONTINUITY EQUATION: 3.11 CM2
MV VALVE AREA BY CONTINUITY EQUATION: 3.95 CM2
MV VALVE AREA P 1/2 METHOD: 3.55 CM2
MYELOCYTES NFR BLD MANUAL: 1 %
MYELOCYTES NFR BLD MANUAL: 3 %
MYELOCYTES NFR BLD MANUAL: 3 %
N MEN DNA CSF QL NAA+NON-PROBE: NOT DETECTED
N MEN DNA CSF QL NAA+NON-PROBE: NOT DETECTED
NDM (OHS): ABNORMAL
NDM (OHS): NORMAL
NEUTROPHILS # BLD AUTO: 10.38 X10(3)/MCL (ref 2.1–9.2)
NEUTROPHILS # BLD AUTO: 2.69 X10(3)/MCL (ref 2.1–9.2)
NEUTROPHILS # BLD AUTO: 2.8 X10(3)/MCL (ref 2.1–9.2)
NEUTROPHILS # BLD AUTO: 23.2 X10(3)/MCL (ref 2.1–9.2)
NEUTROPHILS # BLD AUTO: 3.73 X10(3)/MCL (ref 2.1–9.2)
NEUTROPHILS # BLD AUTO: 31.52 X10(3)/MCL (ref 2.1–9.2)
NEUTROPHILS # BLD AUTO: 4.55 X10(3)/MCL (ref 2.1–9.2)
NEUTROPHILS NFR BLD AUTO: 67.9 %
NEUTROPHILS NFR BLD AUTO: 70.3 %
NEUTROPHILS NFR BLD AUTO: 72.1 %
NEUTROPHILS NFR BLD AUTO: 82.9 %
NEUTROPHILS NFR BLD AUTO: 85.2 %
NEUTROPHILS NFR BLD AUTO: 87.6 %
NEUTROPHILS NFR BLD AUTO: 88.5 %
NEUTROPHILS NFR BLD MANUAL: 80 %
NEUTROPHILS NFR BLD MANUAL: 84 %
NEUTROPHILS NFR BLD MANUAL: 84 %
NEUTROPHILS NFR BLD MANUAL: 86 %
NEUTROPHILS NFR BLD MANUAL: 86 %
NEUTROPHILS NFR BLD MANUAL: 88 %
NEUTROPHILS NFR BLD MANUAL: 88 %
NEUTROPHILS NFR BLD MANUAL: 89 %
NEUTROPHILS NFR BLD MANUAL: 90 %
NEUTROPHILS NFR BLD MANUAL: 91 %
NEUTROPHILS NFR BLD MANUAL: 92 %
NEUTROPHILS NFR BLD MANUAL: 94 %
NEUTROPHILS NFR BLD MANUAL: 96 %
NEUTROPHILS NFR BLD MANUAL: 97 %
NEUTROPHILS NFR BLD MANUAL: 98 %
NRBC BLD AUTO-RTO: 0 %
NRBC BLD AUTO-RTO: 0.1 %
NRBC BLD AUTO-RTO: 0.2 %
NRBC BLD AUTO-RTO: 0.3 %
NRBC BLD AUTO-RTO: 0.5 %
NRBC BLD AUTO-RTO: 0.6 %
NRBC BLD AUTO-RTO: 0.9 %
NRBC BLD AUTO-RTO: 15 %
NRBC BLD AUTO-RTO: 15.4 %
NRBC BLD AUTO-RTO: 16.4 %
NRBC BLD AUTO-RTO: 16.4 %
NRBC BLD AUTO-RTO: 2 %
NRBC BLD MANUAL-RTO: 1 %
NRBC BLD MANUAL-RTO: 1 %
NRBC BLD MANUAL-RTO: 12 %
NRBC BLD MANUAL-RTO: 14 %
NRBC BLD MANUAL-RTO: 16 %
NRBC BLD MANUAL-RTO: 2 %
NRBC BLD MANUAL-RTO: 3 %
O2 HB BLOOD GAS (OHS): 58.9 %
O2 HB BLOOD GAS (OHS): 93.5 % (ref 94–97)
O2 HB BLOOD GAS (OHS): 95.7 % (ref 94–97)
O2 HB BLOOD GAS (OHS): 96.2 % (ref 94–97)
O2 HB BLOOD GAS (OHS): 96.3 % (ref 94–97)
O2 HB BLOOD GAS (OHS): 96.4 % (ref 94–97)
O2 HB BLOOD GAS (OHS): 96.4 % (ref 94–97)
O2 HB BLOOD GAS (OHS): 96.7 % (ref 94–97)
O2 HB BLOOD GAS (OHS): 97.6 % (ref 94–97)
OHS CV RV/LV RATIO: 0.82 CM
OHS LV EJECTION FRACTION SIMPSONS BIPLANE MOD: 16 %
OHS LV EJECTION FRACTION SIMPSONS BIPLANE MOD: 9 %
OHS QRS DURATION: 106 MS
OHS QRS DURATION: 108 MS
OHS QRS DURATION: 112 MS
OHS QRS DURATION: 114 MS
OHS QRS DURATION: 116 MS
OHS QRS DURATION: 118 MS
OHS QRS DURATION: 122 MS
OHS QRS DURATION: 122 MS
OHS QRS DURATION: 126 MS
OHS QRS DURATION: 128 MS
OHS QRS DURATION: 130 MS
OHS QRS DURATION: 162 MS
OHS QTC CALCULATION: 443 MS
OHS QTC CALCULATION: 462 MS
OHS QTC CALCULATION: 467 MS
OHS QTC CALCULATION: 469 MS
OHS QTC CALCULATION: 470 MS
OHS QTC CALCULATION: 472 MS
OHS QTC CALCULATION: 474 MS
OHS QTC CALCULATION: 475 MS
OHS QTC CALCULATION: 477 MS
OHS QTC CALCULATION: 479 MS
OHS QTC CALCULATION: 488 MS
OHS QTC CALCULATION: 503 MS
OHS QTC CALCULATION: 516 MS
OHS QTC CALCULATION: 531 MS
OHS QTC CALCULATION: 547 MS
OVALOCYTES (OLG): ABNORMAL
OVALOCYTES (OLG): ABNORMAL
OXA-48-LIKE (OHS): ABNORMAL
OXA-48-LIKE (OHS): NORMAL
OXYGEN DEVICE BLOOD GAS (OHS): ABNORMAL
OXYHGB MFR BLDA: 10.1 G/DL (ref 12–16)
OXYHGB MFR BLDA: 10.1 G/DL (ref 12–16)
OXYHGB MFR BLDA: 10.2 G/DL (ref 12–16)
OXYHGB MFR BLDA: 10.3 G/DL (ref 12–16)
OXYHGB MFR BLDA: 9.3 G/DL (ref 12–16)
OXYHGB MFR BLDA: 9.4 G/DL (ref 12–16)
OXYHGB MFR BLDA: 9.5 G/DL (ref 12–16)
OXYHGB MFR BLDA: 9.7 G/DL
OXYHGB MFR BLDA: 9.8 G/DL (ref 12–16)
PCO2 BLDA: 22 MMHG (ref 35–45)
PCO2 BLDA: 32 MMHG (ref 35–45)
PCO2 BLDA: 32 MMHG (ref 35–45)
PCO2 BLDA: 33 MMHG (ref 35–45)
PCO2 BLDA: 34 MMHG (ref 35–45)
PCO2 BLDA: 36 MMHG (ref 35–45)
PCO2 BLDA: 36 MMHG (ref 35–45)
PCO2 BLDA: 37 MMHG (ref 35–45)
PCO2 BLDA: 38 MMHG (ref 35–45)
PCO2 BLDA: 38 MMHG (ref 35–45)
PCO2 BLDA: 39 MMHG (ref 35–45)
PCO2 BLDA: 40 MMHG (ref 35–45)
PCO2 BLDA: 40 MMHG (ref 35–45)
PCO2 BLDA: 47 MMHG
PCO2 BLDA: 50 MMHG (ref 35–45)
PCO2 BLDA: 50 MMHG (ref 35–45)
PEEP RESPIRATORY: 5 CMH2O
PEEP RESPIRATORY: 8 CMH2O
PH BLDA: 7.15 [PH] (ref 7.35–7.45)
PH BLDA: 7.19 [PH] (ref 7.35–7.45)
PH BLDA: 7.24 [PH] (ref 7.35–7.45)
PH BLDA: 7.25 [PH] (ref 7.35–7.45)
PH BLDA: 7.25 [PH] (ref 7.35–7.45)
PH BLDA: 7.26 [PH] (ref 7.35–7.45)
PH BLDA: 7.28 [PH]
PH BLDA: 7.28 [PH] (ref 7.35–7.45)
PH BLDA: 7.29 [PH] (ref 7.35–7.45)
PH BLDA: 7.31 [PH] (ref 7.35–7.45)
PH BLDA: 7.31 [PH] (ref 7.35–7.45)
PH BLDA: 7.36 [PH] (ref 7.35–7.45)
PH BLDA: 7.39 [PH] (ref 7.35–7.45)
PH BLDA: 7.41 [PH] (ref 7.35–7.45)
PH BLDA: 7.43 [PH] (ref 7.35–7.45)
PH BLDA: 7.45 [PH] (ref 7.35–7.45)
PH BLDA: 7.46 [PH] (ref 7.35–7.45)
PH BLDA: 7.49 [PH] (ref 7.35–7.45)
PHOSPHATE SERPL-MCNC: 10.3 MG/DL (ref 2.3–4.7)
PHOSPHATE SERPL-MCNC: 3.8 MG/DL (ref 2.3–4.7)
PHOSPHATE SERPL-MCNC: 4.1 MG/DL (ref 2.3–4.7)
PHOSPHATE SERPL-MCNC: 4.4 MG/DL (ref 2.3–4.7)
PHOSPHATE SERPL-MCNC: 4.5 MG/DL (ref 2.3–4.7)
PHOSPHATE SERPL-MCNC: 5.1 MG/DL (ref 2.3–4.7)
PHOSPHATE SERPL-MCNC: 5.1 MG/DL (ref 2.3–4.7)
PHOSPHATE SERPL-MCNC: 5.2 MG/DL (ref 2.3–4.7)
PHOSPHATE SERPL-MCNC: 5.5 MG/DL (ref 2.3–4.7)
PHOSPHATE SERPL-MCNC: 5.5 MG/DL (ref 2.3–4.7)
PHOSPHATE SERPL-MCNC: 5.6 MG/DL (ref 2.3–4.7)
PHOSPHATE SERPL-MCNC: 5.6 MG/DL (ref 2.3–4.7)
PHOSPHATE SERPL-MCNC: 5.9 MG/DL (ref 2.3–4.7)
PHOSPHATE SERPL-MCNC: 6.3 MG/DL (ref 2.3–4.7)
PHOSPHATE SERPL-MCNC: 6.6 MG/DL (ref 2.3–4.7)
PHOSPHATE SERPL-MCNC: 6.9 MG/DL (ref 2.3–4.7)
PHOSPHATE SERPL-MCNC: 9.6 MG/DL (ref 2.3–4.7)
PISA AR MAX VEL: 2.87 M/S
PISA AR MAX VEL: 4.03 M/S
PISA AR MAX VEL: 4.3 M/S
PISA TR MAX VEL: 2.9 M/S
PISA TR MAX VEL: 2.93 M/S
PISA TR MAX VEL: 3 M/S
PLATELET # BLD AUTO: 134 X10(3)/MCL (ref 130–400)
PLATELET # BLD AUTO: 141 X10(3)/MCL (ref 130–400)
PLATELET # BLD AUTO: 174 X10(3)/MCL (ref 130–400)
PLATELET # BLD AUTO: 178 X10(3)/MCL (ref 130–400)
PLATELET # BLD AUTO: 181 X10(3)/MCL (ref 130–400)
PLATELET # BLD AUTO: 184 X10(3)/MCL (ref 130–400)
PLATELET # BLD AUTO: 186 X10(3)/MCL (ref 130–400)
PLATELET # BLD AUTO: 191 X10(3)/MCL (ref 130–400)
PLATELET # BLD AUTO: 193 X10(3)/MCL (ref 130–400)
PLATELET # BLD AUTO: 216 X10(3)/MCL (ref 130–400)
PLATELET # BLD AUTO: 216 X10(3)/MCL (ref 130–400)
PLATELET # BLD AUTO: 223 X10(3)/MCL (ref 130–400)
PLATELET # BLD AUTO: 225 X10(3)/MCL (ref 130–400)
PLATELET # BLD AUTO: 231 X10(3)/MCL (ref 130–400)
PLATELET # BLD AUTO: 251 X10(3)/MCL (ref 130–400)
PLATELET # BLD AUTO: 255 X10(3)/MCL (ref 130–400)
PLATELET # BLD AUTO: 256 X10(3)/MCL (ref 130–400)
PLATELET # BLD AUTO: 260 X10(3)/MCL (ref 130–400)
PLATELET # BLD AUTO: 265 X10(3)/MCL (ref 130–400)
PLATELET # BLD AUTO: 270 X10(3)/MCL (ref 130–400)
PLATELET # BLD AUTO: 276 X10(3)/MCL (ref 130–400)
PLATELET # BLD AUTO: 293 X10(3)/MCL (ref 130–400)
PLATELET # BLD AUTO: 63 X10(3)/MCL (ref 130–400)
PLATELET # BLD EST: ABNORMAL 10*3/UL
PLATELET # BLD EST: ADEQUATE 10*3/UL
PLATELET # BLD EST: ADEQUATE 10*3/UL
PLATELET # BLD EST: NORMAL 10*3/UL
PLATELETS.RETICULATED NFR BLD AUTO: 10.4 % (ref 0.9–11.2)
PLATELETS.RETICULATED NFR BLD AUTO: 11.1 % (ref 0.9–11.2)
PLATELETS.RETICULATED NFR BLD AUTO: 11.2 % (ref 0.9–11.2)
PLATELETS.RETICULATED NFR BLD AUTO: 12.2 % (ref 0.9–11.2)
PLATELETS.RETICULATED NFR BLD AUTO: 3.9 % (ref 0.9–11.2)
PLATELETS.RETICULATED NFR BLD AUTO: 8.6 % (ref 0.9–11.2)
PLATELETS.RETICULATED NFR BLD AUTO: 9.3 % (ref 0.9–11.2)
PLATELETS.RETICULATED NFR BLD AUTO: 9.4 % (ref 0.9–11.2)
PLATELETS.RETICULATED NFR BLD AUTO: 9.7 % (ref 0.9–11.2)
PLATELETS.RETICULATED NFR BLD AUTO: 9.7 % (ref 0.9–11.2)
PLATELETS.RETICULATED NFR BLD AUTO: 9.8 % (ref 0.9–11.2)
PMV BLD AUTO: 10.8 FL (ref 7.4–10.4)
PMV BLD AUTO: 11.1 FL (ref 7.4–10.4)
PMV BLD AUTO: 11.2 FL (ref 7.4–10.4)
PMV BLD AUTO: 11.4 FL (ref 7.4–10.4)
PMV BLD AUTO: 11.5 FL (ref 7.4–10.4)
PMV BLD AUTO: 11.6 FL (ref 7.4–10.4)
PMV BLD AUTO: 11.6 FL (ref 7.4–10.4)
PMV BLD AUTO: 11.7 FL (ref 7.4–10.4)
PMV BLD AUTO: 11.7 FL (ref 7.4–10.4)
PMV BLD AUTO: 11.9 FL (ref 7.4–10.4)
PMV BLD AUTO: 11.9 FL (ref 7.4–10.4)
PMV BLD AUTO: 12 FL (ref 7.4–10.4)
PMV BLD AUTO: 12.1 FL (ref 7.4–10.4)
PMV BLD AUTO: 12.1 FL (ref 7.4–10.4)
PMV BLD AUTO: 12.4 FL (ref 7.4–10.4)
PMV BLD AUTO: 12.4 FL (ref 7.4–10.4)
PMV BLD AUTO: 13.1 FL (ref 7.4–10.4)
PMV BLD AUTO: 13.1 FL (ref 7.4–10.4)
PMV BLD AUTO: 13.5 FL (ref 7.4–10.4)
PMV BLD AUTO: ABNORMAL FL
PO2 BLDA: 112 MMHG (ref 80–100)
PO2 BLDA: 113 MMHG (ref 80–100)
PO2 BLDA: 136 MMHG (ref 80–100)
PO2 BLDA: 201 MMHG (ref 80–100)
PO2 BLDA: 465 MMHG (ref 80–100)
PO2 BLDA: 70 MMHG (ref 80–100)
PO2 BLDA: 70 MMHG (ref 80–100)
PO2 BLDA: 74 MMHG (ref 80–100)
PO2 BLDA: 74 MMHG (ref 80–100)
PO2 BLDA: 77 MMHG (ref 80–100)
PO2 BLDA: 77 MMHG (ref 80–100)
PO2 BLDA: 82 MMHG (ref 80–100)
PO2 BLDA: 83 MMHG (ref 80–100)
PO2 BLDA: 87 MMHG (ref 80–100)
PO2 BLDA: 89 MMHG (ref 80–100)
PO2 BLDA: 96 MMHG (ref 80–100)
PO2 BLDA: 97 MMHG (ref 80–100)
PO2 BLDA: <38 MMHG
POCT GLUCOSE: 100 MG/DL (ref 70–110)
POCT GLUCOSE: 101 MG/DL (ref 70–110)
POCT GLUCOSE: 102 MG/DL (ref 70–110)
POCT GLUCOSE: 103 MG/DL (ref 70–110)
POCT GLUCOSE: 106 MG/DL (ref 70–110)
POCT GLUCOSE: 108 MG/DL (ref 70–110)
POCT GLUCOSE: 112 MG/DL (ref 70–110)
POCT GLUCOSE: 113 MG/DL (ref 70–110)
POCT GLUCOSE: 114 MG/DL (ref 70–110)
POCT GLUCOSE: 114 MG/DL (ref 70–110)
POCT GLUCOSE: 115 MG/DL (ref 70–110)
POCT GLUCOSE: 116 MG/DL (ref 70–110)
POCT GLUCOSE: 117 MG/DL (ref 70–110)
POCT GLUCOSE: 117 MG/DL (ref 70–110)
POCT GLUCOSE: 118 MG/DL (ref 70–110)
POCT GLUCOSE: 120 MG/DL (ref 70–110)
POCT GLUCOSE: 120 MG/DL (ref 70–110)
POCT GLUCOSE: 121 MG/DL (ref 70–110)
POCT GLUCOSE: 121 MG/DL (ref 70–110)
POCT GLUCOSE: 122 MG/DL (ref 70–110)
POCT GLUCOSE: 124 MG/DL (ref 70–110)
POCT GLUCOSE: 125 MG/DL (ref 70–110)
POCT GLUCOSE: 125 MG/DL (ref 70–110)
POCT GLUCOSE: 127 MG/DL (ref 70–110)
POCT GLUCOSE: 131 MG/DL (ref 70–110)
POCT GLUCOSE: 132 MG/DL (ref 70–110)
POCT GLUCOSE: 136 MG/DL (ref 70–110)
POCT GLUCOSE: 143 MG/DL (ref 70–110)
POCT GLUCOSE: 145 MG/DL (ref 70–110)
POCT GLUCOSE: 152 MG/DL (ref 70–110)
POCT GLUCOSE: 165 MG/DL (ref 70–110)
POCT GLUCOSE: 184 MG/DL (ref 70–110)
POCT GLUCOSE: 189 MG/DL (ref 70–110)
POCT GLUCOSE: 244 MG/DL (ref 70–110)
POCT GLUCOSE: 244 MG/DL (ref 70–110)
POCT GLUCOSE: 29 MG/DL (ref 70–110)
POCT GLUCOSE: 295 MG/DL (ref 70–110)
POCT GLUCOSE: 40 MG/DL (ref 70–110)
POCT GLUCOSE: 41 MG/DL (ref 70–110)
POCT GLUCOSE: 48 MG/DL (ref 70–110)
POCT GLUCOSE: 53 MG/DL (ref 70–110)
POCT GLUCOSE: 56 MG/DL (ref 70–110)
POCT GLUCOSE: 59 MG/DL (ref 70–110)
POCT GLUCOSE: 60 MG/DL (ref 70–110)
POCT GLUCOSE: 60 MG/DL (ref 70–110)
POCT GLUCOSE: 65 MG/DL (ref 70–110)
POCT GLUCOSE: 65 MG/DL (ref 70–110)
POCT GLUCOSE: 76 MG/DL (ref 70–110)
POCT GLUCOSE: 77 MG/DL (ref 70–110)
POCT GLUCOSE: 78 MG/DL (ref 70–110)
POCT GLUCOSE: 79 MG/DL (ref 70–110)
POCT GLUCOSE: 79 MG/DL (ref 70–110)
POCT GLUCOSE: 80 MG/DL (ref 70–110)
POCT GLUCOSE: 80 MG/DL (ref 70–110)
POCT GLUCOSE: 81 MG/DL (ref 70–110)
POCT GLUCOSE: 82 MG/DL (ref 70–110)
POCT GLUCOSE: 83 MG/DL (ref 70–110)
POCT GLUCOSE: 83 MG/DL (ref 70–110)
POCT GLUCOSE: 84 MG/DL (ref 70–110)
POCT GLUCOSE: 85 MG/DL (ref 70–110)
POCT GLUCOSE: 85 MG/DL (ref 70–110)
POCT GLUCOSE: 86 MG/DL (ref 70–110)
POCT GLUCOSE: 86 MG/DL (ref 70–110)
POCT GLUCOSE: 87 MG/DL (ref 70–110)
POCT GLUCOSE: 87 MG/DL (ref 70–110)
POCT GLUCOSE: 90 MG/DL (ref 70–110)
POCT GLUCOSE: 92 MG/DL (ref 70–110)
POCT GLUCOSE: 93 MG/DL (ref 70–110)
POCT GLUCOSE: 93 MG/DL (ref 70–110)
POCT GLUCOSE: 94 MG/DL (ref 70–110)
POCT GLUCOSE: 95 MG/DL (ref 70–110)
POCT GLUCOSE: 97 MG/DL (ref 70–110)
POCT GLUCOSE: 98 MG/DL (ref 70–110)
POCT GLUCOSE: 98 MG/DL (ref 70–110)
POCT GLUCOSE: <20 MG/DL (ref 70–110)
POIKILOCYTOSIS BLD QL SMEAR: ABNORMAL
POLYCHROMASIA BLD QL SMEAR: ABNORMAL
POTASSIUM BLOOD GAS (OHS): 3 MMOL/L (ref 3.5–5)
POTASSIUM BLOOD GAS (OHS): 3.4 MMOL/L (ref 3.5–5)
POTASSIUM BLOOD GAS (OHS): 3.8 MMOL/L (ref 3.5–5)
POTASSIUM BLOOD GAS (OHS): 3.9 MMOL/L (ref 3.5–5)
POTASSIUM BLOOD GAS (OHS): 4 MMOL/L
POTASSIUM BLOOD GAS (OHS): 4 MMOL/L (ref 3.5–5)
POTASSIUM BLOOD GAS (OHS): 4.2 MMOL/L (ref 3.5–5)
POTASSIUM BLOOD GAS (OHS): 4.3 MMOL/L (ref 3.5–5)
POTASSIUM BLOOD GAS (OHS): 4.6 MMOL/L (ref 3.5–5)
POTASSIUM BLOOD GAS (OHS): 5.4 MMOL/L (ref 3.5–5)
POTASSIUM BLOOD GAS (OHS): 5.5 MMOL/L (ref 3.5–5)
POTASSIUM BLOOD GAS (OHS): 5.8 MMOL/L (ref 3.5–5)
POTASSIUM BLOOD GAS (OHS): 5.8 MMOL/L (ref 3.5–5)
POTASSIUM BLOOD GAS (OHS): 6.3 MMOL/L (ref 3.5–5)
POTASSIUM SERPL-SCNC: 3 MMOL/L (ref 3.5–5.1)
POTASSIUM SERPL-SCNC: 3.4 MMOL/L (ref 3.5–5.1)
POTASSIUM SERPL-SCNC: 3.5 MMOL/L (ref 3.5–5.1)
POTASSIUM SERPL-SCNC: 3.7 MMOL/L (ref 3.5–5.1)
POTASSIUM SERPL-SCNC: 3.8 MMOL/L (ref 3.5–5.1)
POTASSIUM SERPL-SCNC: 4 MMOL/L (ref 3.5–5.1)
POTASSIUM SERPL-SCNC: 4 MMOL/L (ref 3.5–5.1)
POTASSIUM SERPL-SCNC: 4.1 MMOL/L (ref 3.5–5.1)
POTASSIUM SERPL-SCNC: 4.3 MMOL/L (ref 3.5–5.1)
POTASSIUM SERPL-SCNC: 4.3 MMOL/L (ref 3.5–5.1)
POTASSIUM SERPL-SCNC: 4.5 MMOL/L (ref 3.5–5.1)
POTASSIUM SERPL-SCNC: 4.6 MMOL/L (ref 3.5–5.1)
POTASSIUM SERPL-SCNC: 4.6 MMOL/L (ref 3.5–5.1)
POTASSIUM SERPL-SCNC: 4.8 MMOL/L (ref 3.5–5.1)
POTASSIUM SERPL-SCNC: 4.9 MMOL/L (ref 3.5–5.1)
POTASSIUM SERPL-SCNC: 5.1 MMOL/L (ref 3.5–5.1)
POTASSIUM SERPL-SCNC: 5.2 MMOL/L (ref 3.5–5.1)
POTASSIUM SERPL-SCNC: 5.4 MMOL/L (ref 3.5–5.1)
POTASSIUM SERPL-SCNC: 5.4 MMOL/L (ref 3.5–5.1)
POTASSIUM SERPL-SCNC: 5.5 MMOL/L (ref 3.5–5.1)
POTASSIUM SERPL-SCNC: 5.5 MMOL/L (ref 3.5–5.1)
POTASSIUM SERPL-SCNC: 5.6 MMOL/L (ref 3.5–5.1)
POTASSIUM SERPL-SCNC: 5.7 MMOL/L (ref 3.5–5.1)
POTASSIUM SERPL-SCNC: 5.7 MMOL/L (ref 3.5–5.1)
POTASSIUM SERPL-SCNC: 5.8 MMOL/L (ref 3.5–5.1)
POTASSIUM SERPL-SCNC: 5.8 MMOL/L (ref 3.5–5.1)
POTASSIUM SERPL-SCNC: 5.9 MMOL/L (ref 3.5–5.1)
POTASSIUM SERPL-SCNC: 6 MMOL/L (ref 3.5–5.1)
POTASSIUM SERPL-SCNC: 6.1 MMOL/L (ref 3.5–5.1)
POTASSIUM SERPL-SCNC: 6.9 MMOL/L (ref 3.5–5.1)
POTASSIUM SERPL-SCNC: 6.9 MMOL/L (ref 3.5–5.1)
PROMYELOCYTES # BLD MANUAL: 1 %
PROMYELOCYTES # BLD MANUAL: 3 %
PROT SERPL-MCNC: 4.3 GM/DL (ref 6.4–8.3)
PROT SERPL-MCNC: 5 GM/DL (ref 6.4–8.3)
PROT SERPL-MCNC: 5.1 GM/DL (ref 6.4–8.3)
PROT SERPL-MCNC: 5.2 GM/DL (ref 6.4–8.3)
PROT SERPL-MCNC: 5.2 GM/DL (ref 6.4–8.3)
PROT SERPL-MCNC: 5.3 GM/DL (ref 6.4–8.3)
PROT SERPL-MCNC: 5.6 GM/DL (ref 6.4–8.3)
PROT SERPL-MCNC: 5.9 GM/DL (ref 6.4–8.3)
PROT SERPL-MCNC: 5.9 GM/DL (ref 6.4–8.3)
PROT SERPL-MCNC: 6 GM/DL (ref 6.4–8.3)
PROT SERPL-MCNC: 6.1 GM/DL (ref 6.4–8.3)
PROT SERPL-MCNC: 6.1 GM/DL (ref 6.4–8.3)
PROT SERPL-MCNC: 6.2 GM/DL (ref 6.4–8.3)
PROT SERPL-MCNC: 6.4 GM/DL (ref 6.4–8.3)
PROT SERPL-MCNC: 6.7 GM/DL (ref 6.4–8.3)
PROT SERPL-MCNC: 6.7 GM/DL (ref 6.4–8.3)
PROT SERPL-MCNC: 7.4 GM/DL (ref 6.4–8.3)
PROTEUS SPP. (OHS): NOT DETECTED
PROTEUS SPP. (OHS): NOT DETECTED
PROTHROMBIN TIME: 14.9 SECONDS (ref 12.5–14.5)
PROTHROMBIN TIME: 34.1 SECONDS (ref 12.5–14.5)
PSEUDOMONAS AERUGINOSA (OHS): NOT DETECTED
PSEUDOMONAS AERUGINOSA (OHS): NOT DETECTED
PTH-INTACT SERPL-MCNC: 1707.9 PG/ML (ref 8.7–77)
PV PEAK GRADIENT: 2 MMHG
PV PEAK GRADIENT: 3 MMHG
PV PEAK VELOCITY: 0.7 M/S
PV PEAK VELOCITY: 0.84 M/S
RA PRESSURE ESTIMATED: 15 MMHG
RBC # BLD AUTO: 2.89 X10(6)/MCL (ref 4.7–6.1)
RBC # BLD AUTO: 2.99 X10(6)/MCL (ref 4.7–6.1)
RBC # BLD AUTO: 3.07 X10(6)/MCL (ref 4.7–6.1)
RBC # BLD AUTO: 3.14 X10(6)/MCL (ref 4.7–6.1)
RBC # BLD AUTO: 3.19 X10(6)/MCL (ref 4.7–6.1)
RBC # BLD AUTO: 3.23 X10(6)/MCL (ref 4.7–6.1)
RBC # BLD AUTO: 3.27 X10(6)/MCL (ref 4.7–6.1)
RBC # BLD AUTO: 3.34 X10(6)/MCL (ref 4.7–6.1)
RBC # BLD AUTO: 3.39 X10(6)/MCL (ref 4.7–6.1)
RBC # BLD AUTO: 3.41 X10(6)/MCL (ref 4.7–6.1)
RBC # BLD AUTO: 3.63 X10(6)/MCL (ref 4.7–6.1)
RBC # BLD AUTO: 3.77 X10(6)/MCL (ref 4.7–6.1)
RBC # BLD AUTO: 3.8 X10(6)/MCL (ref 4.7–6.1)
RBC # BLD AUTO: 3.84 X10(6)/MCL (ref 4.7–6.1)
RBC # BLD AUTO: 3.85 X10(6)/MCL (ref 4.7–6.1)
RBC # BLD AUTO: 3.85 X10(6)/MCL (ref 4.7–6.1)
RBC # BLD AUTO: 3.88 X10(6)/MCL (ref 4.7–6.1)
RBC # BLD AUTO: 3.91 X10(6)/MCL (ref 4.7–6.1)
RBC # BLD AUTO: 3.98 X10(6)/MCL (ref 4.7–6.1)
RBC # BLD AUTO: 3.98 X10(6)/MCL (ref 4.7–6.1)
RBC # BLD AUTO: 4 X10(6)/MCL (ref 4.7–6.1)
RBC # BLD AUTO: 4 X10(6)/MCL (ref 4.7–6.1)
RBC # BLD AUTO: 4.24 X10(6)/MCL (ref 4.7–6.1)
RBC MORPH BLD: ABNORMAL
RIGHT VENTRICULAR END-DIASTOLIC DIMENSION: 6.1 CM
RV TB RVSP: 18 MMHG
S ENT+BONG DNA STL QL NAA+NON-PROBE: NOT DETECTED
S ENT+BONG DNA STL QL NAA+NON-PROBE: NOT DETECTED
S PNEUM DNA CSF QL NAA+NON-PROBE: NOT DETECTED
S PNEUM DNA CSF QL NAA+NON-PROBE: NOT DETECTED
SAMPLE SITE BLOOD GAS (OHS): ABNORMAL
SAO2 % BLDA: 100 %
SAO2 % BLDA: 100 %
SAO2 % BLDA: 58.3 %
SAO2 % BLDA: 89 %
SAO2 % BLDA: 91 %
SAO2 % BLDA: 91.7 %
SAO2 % BLDA: 92 %
SAO2 % BLDA: 93 %
SAO2 % BLDA: 94.9 %
SAO2 % BLDA: 95 %
SAO2 % BLDA: 95.1 %
SAO2 % BLDA: 95.7 %
SAO2 % BLDA: 96 %
SAO2 % BLDA: 96.8 %
SAO2 % BLDA: 97.5 %
SAO2 % BLDA: 98 %
SAO2 % BLDA: 99 %
SAO2 % BLDA: 99 %
SARS-COV-2 RNA RESP QL NAA+PROBE: NOT DETECTED
SERRATIA MARCESCENS (OHS): NOT DETECTED
SERRATIA MARCESCENS (OHS): NOT DETECTED
SODIUM BLOOD GAS (OHS): 117 MMOL/L (ref 137–145)
SODIUM BLOOD GAS (OHS): 121 MMOL/L (ref 137–145)
SODIUM BLOOD GAS (OHS): 121 MMOL/L (ref 137–145)
SODIUM BLOOD GAS (OHS): 122 MMOL/L (ref 137–145)
SODIUM BLOOD GAS (OHS): 123 MMOL/L (ref 137–145)
SODIUM BLOOD GAS (OHS): 124 MMOL/L (ref 137–145)
SODIUM BLOOD GAS (OHS): 125 MMOL/L
SODIUM BLOOD GAS (OHS): 125 MMOL/L (ref 137–145)
SODIUM BLOOD GAS (OHS): 126 MMOL/L (ref 137–145)
SODIUM BLOOD GAS (OHS): 126 MMOL/L (ref 137–145)
SODIUM BLOOD GAS (OHS): 127 MMOL/L (ref 137–145)
SODIUM BLOOD GAS (OHS): 127 MMOL/L (ref 137–145)
SODIUM BLOOD GAS (OHS): 128 MMOL/L (ref 137–145)
SODIUM BLOOD GAS (OHS): 130 MMOL/L (ref 137–145)
SODIUM BLOOD GAS (OHS): 131 MMOL/L (ref 137–145)
SODIUM BLOOD GAS (OHS): 133 MMOL/L (ref 137–145)
SODIUM SERPL-SCNC: 123 MMOL/L (ref 136–145)
SODIUM SERPL-SCNC: 128 MMOL/L (ref 136–145)
SODIUM SERPL-SCNC: 129 MMOL/L (ref 136–145)
SODIUM SERPL-SCNC: 130 MMOL/L (ref 136–145)
SODIUM SERPL-SCNC: 131 MMOL/L (ref 136–145)
SODIUM SERPL-SCNC: 132 MMOL/L (ref 136–145)
SODIUM SERPL-SCNC: 133 MMOL/L (ref 136–145)
SODIUM SERPL-SCNC: 134 MMOL/L (ref 136–145)
SODIUM SERPL-SCNC: 135 MMOL/L (ref 136–145)
SODIUM SERPL-SCNC: 136 MMOL/L (ref 136–145)
SODIUM SERPL-SCNC: 137 MMOL/L (ref 136–145)
SODIUM SERPL-SCNC: 139 MMOL/L (ref 136–145)
SODIUM SERPL-SCNC: 139 MMOL/L (ref 136–145)
SODIUM SERPL-SCNC: 140 MMOL/L (ref 136–145)
SODIUM SERPL-SCNC: 145 MMOL/L (ref 136–145)
SPONT+MECH VT ON VENT: 460 ML
SPONT+MECH VT ON VENT: 550 ML
STAPHYLOCOCCUS AUREUS (OHS): NOT DETECTED
STAPHYLOCOCCUS AUREUS (OHS): NOT DETECTED
STAPHYLOCOCCUS EPIDERMIDIS (OHS): DETECTED
STAPHYLOCOCCUS EPIDERMIDIS (OHS): NOT DETECTED
STAPHYLOCOCCUS LUGDUNENSIS (OHS): NOT DETECTED
STAPHYLOCOCCUS LUGDUNENSIS (OHS): NOT DETECTED
STAPHYLOCOCCUS SPP. (OHS): DETECTED
STAPHYLOCOCCUS SPP. (OHS): NOT DETECTED
STENOTROPHOMONAS MALTOPHILIA (OHS): NOT DETECTED
STENOTROPHOMONAS MALTOPHILIA (OHS): NOT DETECTED
STREPTOCOCCUS PYOGENES (GROUP A)(OHS): NOT DETECTED
STREPTOCOCCUS PYOGENES (GROUP A)(OHS): NOT DETECTED
STREPTOCOCCUS SPP. (OHS): NOT DETECTED
STREPTOCOCCUS SPP. (OHS): NOT DETECTED
TARGETS BLD QL SMEAR: ABNORMAL
TDI LATERAL: 0.09 M/S
TDI LATERAL: 0.09 M/S
TDI LATERAL: 0.12 M/S
TDI SEPTAL: 0.06 M/S
TDI SEPTAL: 0.06 M/S
TDI SEPTAL: 0.08 M/S
TDI: 0.08 M/S
TDI: 0.09 M/S
TDI: 0.09 M/S
TEAR DROP CELL (OLG): ABNORMAL
TEAR DROP CELL (OLG): ABNORMAL
TOXIC GRANULES BLD QL SMEAR: ABNORMAL
TR MAX PG: 34 MMHG
TR MAX PG: 34 MMHG
TR MAX PG: 36 MMHG
TRICUSPID ANNULAR PLANE SYSTOLIC EXCURSION: 0.62 CM
TRICUSPID ANNULAR PLANE SYSTOLIC EXCURSION: 0.81 CM
TRICUSPID ANNULAR PLANE SYSTOLIC EXCURSION: 1.16 CM
TRIGL SERPL-MCNC: 143 MG/DL (ref 34–140)
TROPONIN I SERPL-MCNC: 0.26 NG/ML (ref 0–0.04)
TROPONIN I SERPL-MCNC: 0.4 NG/ML (ref 0–0.04)
TROPONIN I SERPL-MCNC: 0.42 NG/ML (ref 0–0.04)
TROPONIN I SERPL-MCNC: 0.42 NG/ML (ref 0–0.04)
TROPONIN I SERPL-MCNC: 0.45 NG/ML (ref 0–0.04)
TROPONIN I SERPL-MCNC: 0.47 NG/ML (ref 0–0.04)
TROPONIN I SERPL-MCNC: 0.47 NG/ML (ref 0–0.04)
TROPONIN I SERPL-MCNC: 0.53 NG/ML (ref 0–0.04)
TROPONIN I SERPL-MCNC: 0.55 NG/ML (ref 0–0.04)
TV REST PULMONARY ARTERY PRESSURE: 49 MMHG
UPPER ARTERIAL RIGHT ARM AXILLARY SYS MAX: 50.5 CM/S
UPPER ARTERIAL RIGHT ARM BRACHIAL SYS MAX: 46.5 CM/S
UPPER ARTERIAL RIGHT ARM RADIAL SYS MAX: 0 CM/S
UPPER ARTERIAL RIGHT ARM SUBCLAVIAN SYS MAX: 61.5 CM/S
UPPER ARTERIAL RIGHT ARM ULNAR SYS MAX: 28.7 CM/S
VANA/B (OHS): ABNORMAL
VANA/B (OHS): NORMAL
VANCOMYCIN SERPL-MCNC: 13 UG/ML (ref 15–20)
VANCOMYCIN SERPL-MCNC: 15.8 UG/ML (ref 15–20)
VANCOMYCIN SERPL-MCNC: 16.4 UG/ML (ref 15–20)
VANCOMYCIN SERPL-MCNC: 18.3 UG/ML (ref 15–20)
VANCOMYCIN SERPL-MCNC: 18.4 UG/ML (ref 15–20)
VANCOMYCIN SERPL-MCNC: 19.1 UG/ML (ref 15–20)
VANCOMYCIN SERPL-MCNC: 20.3 UG/ML (ref 15–20)
VANCOMYCIN SERPL-MCNC: 21.5 UG/ML (ref 15–20)
VANCOMYCIN TROUGH SERPL-MCNC: 20.9 UG/ML (ref 15–20)
VIM (OHS): ABNORMAL
VIM (OHS): NORMAL
VLDLC SERPL CALC-MCNC: 29 MG/DL
WBC # BLD AUTO: 10.55 X10(3)/MCL (ref 4.5–11.5)
WBC # BLD AUTO: 10.64 X10(3)/MCL (ref 4.5–11.5)
WBC # BLD AUTO: 12.52 X10(3)/MCL (ref 4.5–11.5)
WBC # BLD AUTO: 13.02 X10(3)/MCL (ref 4.5–11.5)
WBC # BLD AUTO: 17.87 X10(3)/MCL (ref 4.5–11.5)
WBC # BLD AUTO: 19.14 X10(3)/MCL (ref 4.5–11.5)
WBC # BLD AUTO: 20.46 X10(3)/MCL (ref 4.5–11.5)
WBC # BLD AUTO: 20.51 X10(3)/MCL (ref 4.5–11.5)
WBC # BLD AUTO: 21.16 X10(3)/MCL (ref 4.5–11.5)
WBC # BLD AUTO: 22.19 X10(3)/MCL (ref 4.5–11.5)
WBC # BLD AUTO: 23.23 X10(3)/MCL (ref 4.5–11.5)
WBC # BLD AUTO: 24.71 X10(3)/MCL (ref 4.5–11.5)
WBC # BLD AUTO: 26.46 X10(3)/MCL (ref 4.5–11.5)
WBC # BLD AUTO: 27.04 X10(3)/MCL (ref 4.5–11.5)
WBC # BLD AUTO: 27.05 X10(3)/MCL (ref 4.5–11.5)
WBC # BLD AUTO: 27.51 X10(3)/MCL (ref 4.5–11.5)
WBC # BLD AUTO: 29.78 X10(3)/MCL (ref 4.5–11.5)
WBC # BLD AUTO: 3.73 X10(3)/MCL (ref 4.5–11.5)
WBC # BLD AUTO: 35.6 X10(3)/MCL (ref 4.5–11.5)
WBC # BLD AUTO: 35.8 X10(3)/MCL (ref 4.5–11.5)
WBC # BLD AUTO: 4.12 X10(3)/MCL (ref 4.5–11.5)
WBC # BLD AUTO: 5.31 X10(3)/MCL (ref 4.5–11.5)
WBC # BLD AUTO: 5.34 X10(3)/MCL (ref 4.5–11.5)
Z-SCORE OF LEFT VENTRICULAR DIMENSION IN END DIASTOLE: 1.87
Z-SCORE OF LEFT VENTRICULAR DIMENSION IN END DIASTOLE: 2.67
Z-SCORE OF LEFT VENTRICULAR DIMENSION IN END DIASTOLE: 3.03
Z-SCORE OF LEFT VENTRICULAR DIMENSION IN END SYSTOLE: 4.88
Z-SCORE OF LEFT VENTRICULAR DIMENSION IN END SYSTOLE: 4.96
Z-SCORE OF LEFT VENTRICULAR DIMENSION IN END SYSTOLE: 5.01

## 2024-01-01 PROCEDURE — 83605 ASSAY OF LACTIC ACID: CPT

## 2024-01-01 PROCEDURE — 37799 UNLISTED PX VASCULAR SURGERY: CPT

## 2024-01-01 PROCEDURE — 36415 COLL VENOUS BLD VENIPUNCTURE: CPT

## 2024-01-01 PROCEDURE — 94760 N-INVAS EAR/PLS OXIMETRY 1: CPT

## 2024-01-01 PROCEDURE — 21400001 HC TELEMETRY ROOM

## 2024-01-01 PROCEDURE — 80202 ASSAY OF VANCOMYCIN: CPT | Performed by: INTERNAL MEDICINE

## 2024-01-01 PROCEDURE — 99900031 HC PATIENT EDUCATION (STAT)

## 2024-01-01 PROCEDURE — 85027 COMPLETE CBC AUTOMATED: CPT | Performed by: INTERNAL MEDICINE

## 2024-01-01 PROCEDURE — 87154 CUL TYP ID BLD PTHGN 6+ TRGT: CPT | Performed by: INTERNAL MEDICINE

## 2024-01-01 PROCEDURE — 25000003 PHARM REV CODE 250: Performed by: INTERNAL MEDICINE

## 2024-01-01 PROCEDURE — 36600 WITHDRAWAL OF ARTERIAL BLOOD: CPT

## 2024-01-01 PROCEDURE — 25000003 PHARM REV CODE 250

## 2024-01-01 PROCEDURE — 83605 ASSAY OF LACTIC ACID: CPT | Performed by: STUDENT IN AN ORGANIZED HEALTH CARE EDUCATION/TRAINING PROGRAM

## 2024-01-01 PROCEDURE — 27100171 HC OXYGEN HIGH FLOW UP TO 24 HOURS

## 2024-01-01 PROCEDURE — 63600175 PHARM REV CODE 636 W HCPCS: Performed by: PHYSICIAN ASSISTANT

## 2024-01-01 PROCEDURE — 25000003 PHARM REV CODE 250: Performed by: PHYSICIAN ASSISTANT

## 2024-01-01 PROCEDURE — 25000003 PHARM REV CODE 250: Performed by: NURSE PRACTITIONER

## 2024-01-01 PROCEDURE — 87184 SC STD DISK METHOD PER PLATE: CPT | Performed by: INTERNAL MEDICINE

## 2024-01-01 PROCEDURE — 84100 ASSAY OF PHOSPHORUS: CPT

## 2024-01-01 PROCEDURE — C1751 CATH, INF, PER/CENT/MIDLINE: HCPCS

## 2024-01-01 PROCEDURE — 83735 ASSAY OF MAGNESIUM: CPT

## 2024-01-01 PROCEDURE — 94003 VENT MGMT INPAT SUBQ DAY: CPT

## 2024-01-01 PROCEDURE — 80100014 HC HEMODIALYSIS 1:1

## 2024-01-01 PROCEDURE — 85027 COMPLETE CBC AUTOMATED: CPT

## 2024-01-01 PROCEDURE — 27200966 HC CLOSED SUCTION SYSTEM

## 2024-01-01 PROCEDURE — 20000000 HC ICU ROOM

## 2024-01-01 PROCEDURE — 25000003 PHARM REV CODE 250: Mod: JZ,JG | Performed by: INTERNAL MEDICINE

## 2024-01-01 PROCEDURE — 82803 BLOOD GASES ANY COMBINATION: CPT

## 2024-01-01 PROCEDURE — 63600175 PHARM REV CODE 636 W HCPCS: Performed by: INTERNAL MEDICINE

## 2024-01-01 PROCEDURE — 94761 N-INVAS EAR/PLS OXIMETRY MLT: CPT | Mod: XB

## 2024-01-01 PROCEDURE — P9047 ALBUMIN (HUMAN), 25%, 50ML: HCPCS | Mod: JZ,JG | Performed by: INTERNAL MEDICINE

## 2024-01-01 PROCEDURE — 85025 COMPLETE CBC W/AUTO DIFF WBC: CPT

## 2024-01-01 PROCEDURE — 99223 1ST HOSP IP/OBS HIGH 75: CPT | Mod: FS,,, | Performed by: INTERNAL MEDICINE

## 2024-01-01 PROCEDURE — 93005 ELECTROCARDIOGRAM TRACING: CPT

## 2024-01-01 PROCEDURE — 99900035 HC TECH TIME PER 15 MIN (STAT)

## 2024-01-01 PROCEDURE — 85027 COMPLETE CBC AUTOMATED: CPT | Performed by: NURSE PRACTITIONER

## 2024-01-01 PROCEDURE — 96374 THER/PROPH/DIAG INJ IV PUSH: CPT

## 2024-01-01 PROCEDURE — 93010 ELECTROCARDIOGRAM REPORT: CPT | Mod: 59,,, | Performed by: INTERNAL MEDICINE

## 2024-01-01 PROCEDURE — 99900026 HC AIRWAY MAINTENANCE (STAT)

## 2024-01-01 PROCEDURE — 80202 ASSAY OF VANCOMYCIN: CPT | Performed by: STUDENT IN AN ORGANIZED HEALTH CARE EDUCATION/TRAINING PROGRAM

## 2024-01-01 PROCEDURE — 25000003 PHARM REV CODE 250: Performed by: STUDENT IN AN ORGANIZED HEALTH CARE EDUCATION/TRAINING PROGRAM

## 2024-01-01 PROCEDURE — 63600175 PHARM REV CODE 636 W HCPCS: Mod: JG | Performed by: STUDENT IN AN ORGANIZED HEALTH CARE EDUCATION/TRAINING PROGRAM

## 2024-01-01 PROCEDURE — 99285 EMERGENCY DEPT VISIT HI MDM: CPT | Mod: 25

## 2024-01-01 PROCEDURE — 36415 COLL VENOUS BLD VENIPUNCTURE: CPT | Performed by: STUDENT IN AN ORGANIZED HEALTH CARE EDUCATION/TRAINING PROGRAM

## 2024-01-01 PROCEDURE — 80170 ASSAY OF GENTAMICIN: CPT | Performed by: INTERNAL MEDICINE

## 2024-01-01 PROCEDURE — 0240U COVID/FLU A&B PCR: CPT | Performed by: EMERGENCY MEDICINE

## 2024-01-01 PROCEDURE — 99232 SBSQ HOSP IP/OBS MODERATE 35: CPT | Mod: ,,, | Performed by: INTERNAL MEDICINE

## 2024-01-01 PROCEDURE — 25000003 PHARM REV CODE 250: Mod: JZ,JG

## 2024-01-01 PROCEDURE — 63600175 PHARM REV CODE 636 W HCPCS

## 2024-01-01 PROCEDURE — 93454 CORONARY ARTERY ANGIO S&I: CPT | Performed by: STUDENT IN AN ORGANIZED HEALTH CARE EDUCATION/TRAINING PROGRAM

## 2024-01-01 PROCEDURE — 05HN33Z INSERTION OF INFUSION DEVICE INTO LEFT INTERNAL JUGULAR VEIN, PERCUTANEOUS APPROACH: ICD-10-PCS | Performed by: INTERNAL MEDICINE

## 2024-01-01 PROCEDURE — 84484 ASSAY OF TROPONIN QUANT: CPT

## 2024-01-01 PROCEDURE — 27000221 HC OXYGEN, UP TO 24 HOURS

## 2024-01-01 PROCEDURE — 85007 BL SMEAR W/DIFF WBC COUNT: CPT

## 2024-01-01 PROCEDURE — 94760 N-INVAS EAR/PLS OXIMETRY 1: CPT | Mod: XB

## 2024-01-01 PROCEDURE — 80053 COMPREHEN METABOLIC PANEL: CPT

## 2024-01-01 PROCEDURE — 80048 BASIC METABOLIC PNL TOTAL CA: CPT

## 2024-01-01 PROCEDURE — 63600175 PHARM REV CODE 636 W HCPCS: Performed by: STUDENT IN AN ORGANIZED HEALTH CARE EDUCATION/TRAINING PROGRAM

## 2024-01-01 PROCEDURE — 83735 ASSAY OF MAGNESIUM: CPT | Performed by: INTERNAL MEDICINE

## 2024-01-01 PROCEDURE — 96375 TX/PRO/DX INJ NEW DRUG ADDON: CPT

## 2024-01-01 PROCEDURE — 80053 COMPREHEN METABOLIC PANEL: CPT | Performed by: INTERNAL MEDICINE

## 2024-01-01 PROCEDURE — 93010 ELECTROCARDIOGRAM REPORT: CPT | Mod: ,,, | Performed by: INTERNAL MEDICINE

## 2024-01-01 PROCEDURE — 80053 COMPREHEN METABOLIC PANEL: CPT | Performed by: EMERGENCY MEDICINE

## 2024-01-01 PROCEDURE — 83605 ASSAY OF LACTIC ACID: CPT | Performed by: INTERNAL MEDICINE

## 2024-01-01 PROCEDURE — 84100 ASSAY OF PHOSPHORUS: CPT | Performed by: INTERNAL MEDICINE

## 2024-01-01 PROCEDURE — 63600175 PHARM REV CODE 636 W HCPCS: Mod: JB

## 2024-01-01 PROCEDURE — C1769 GUIDE WIRE: HCPCS | Performed by: STUDENT IN AN ORGANIZED HEALTH CARE EDUCATION/TRAINING PROGRAM

## 2024-01-01 PROCEDURE — 5A1955Z RESPIRATORY VENTILATION, GREATER THAN 96 CONSECUTIVE HOURS: ICD-10-PCS | Performed by: SURGERY

## 2024-01-01 PROCEDURE — 94644 CONT INHLJ TX 1ST HOUR: CPT

## 2024-01-01 PROCEDURE — 94660 CPAP INITIATION&MGMT: CPT

## 2024-01-01 PROCEDURE — 87077 CULTURE AEROBIC IDENTIFY: CPT | Performed by: INTERNAL MEDICINE

## 2024-01-01 PROCEDURE — 84100 ASSAY OF PHOSPHORUS: CPT | Performed by: NURSE PRACTITIONER

## 2024-01-01 PROCEDURE — 87040 BLOOD CULTURE FOR BACTERIA: CPT | Performed by: EMERGENCY MEDICINE

## 2024-01-01 PROCEDURE — 94761 N-INVAS EAR/PLS OXIMETRY MLT: CPT

## 2024-01-01 PROCEDURE — 85025 COMPLETE CBC W/AUTO DIFF WBC: CPT | Performed by: INTERNAL MEDICINE

## 2024-01-01 PROCEDURE — 36415 COLL VENOUS BLD VENIPUNCTURE: CPT | Performed by: INTERNAL MEDICINE

## 2024-01-01 PROCEDURE — 84484 ASSAY OF TROPONIN QUANT: CPT | Performed by: NURSE PRACTITIONER

## 2024-01-01 PROCEDURE — 80053 COMPREHEN METABOLIC PANEL: CPT | Performed by: NURSE PRACTITIONER

## 2024-01-01 PROCEDURE — 84484 ASSAY OF TROPONIN QUANT: CPT | Performed by: INTERNAL MEDICINE

## 2024-01-01 PROCEDURE — 80100016 HC MAINTENANCE HEMODIALYSIS

## 2024-01-01 PROCEDURE — 63600175 PHARM REV CODE 636 W HCPCS: Mod: JZ,JG | Performed by: INTERNAL MEDICINE

## 2024-01-01 PROCEDURE — 87040 BLOOD CULTURE FOR BACTERIA: CPT

## 2024-01-01 PROCEDURE — 5A1D70Z PERFORMANCE OF URINARY FILTRATION, INTERMITTENT, LESS THAN 6 HOURS PER DAY: ICD-10-PCS | Performed by: STUDENT IN AN ORGANIZED HEALTH CARE EDUCATION/TRAINING PROGRAM

## 2024-01-01 PROCEDURE — 36415 COLL VENOUS BLD VENIPUNCTURE: CPT | Performed by: NURSE PRACTITIONER

## 2024-01-01 PROCEDURE — 83970 ASSAY OF PARATHORMONE: CPT | Performed by: NURSE PRACTITIONER

## 2024-01-01 PROCEDURE — 90935 HEMODIALYSIS ONE EVALUATION: CPT

## 2024-01-01 PROCEDURE — B543ZZA ULTRASONOGRAPHY OF RIGHT JUGULAR VEINS, GUIDANCE: ICD-10-PCS | Performed by: STUDENT IN AN ORGANIZED HEALTH CARE EDUCATION/TRAINING PROGRAM

## 2024-01-01 PROCEDURE — 87070 CULTURE OTHR SPECIMN AEROBIC: CPT | Performed by: STUDENT IN AN ORGANIZED HEALTH CARE EDUCATION/TRAINING PROGRAM

## 2024-01-01 PROCEDURE — 80048 BASIC METABOLIC PNL TOTAL CA: CPT | Performed by: INTERNAL MEDICINE

## 2024-01-01 PROCEDURE — 87040 BLOOD CULTURE FOR BACTERIA: CPT | Performed by: INTERNAL MEDICINE

## 2024-01-01 PROCEDURE — 87340 HEPATITIS B SURFACE AG IA: CPT | Performed by: PHYSICIAN ASSISTANT

## 2024-01-01 PROCEDURE — 82010 KETONE BODYS QUAN: CPT

## 2024-01-01 PROCEDURE — 83605 ASSAY OF LACTIC ACID: CPT | Performed by: NURSE PRACTITIONER

## 2024-01-01 PROCEDURE — 25500020 PHARM REV CODE 255: Performed by: INTERNAL MEDICINE

## 2024-01-01 PROCEDURE — 85610 PROTHROMBIN TIME: CPT | Performed by: EMERGENCY MEDICINE

## 2024-01-01 PROCEDURE — 11000001 HC ACUTE MED/SURG PRIVATE ROOM

## 2024-01-01 PROCEDURE — 25500020 PHARM REV CODE 255: Performed by: EMERGENCY MEDICINE

## 2024-01-01 PROCEDURE — 85007 BL SMEAR W/DIFF WBC COUNT: CPT | Performed by: INTERNAL MEDICINE

## 2024-01-01 PROCEDURE — 83605 ASSAY OF LACTIC ACID: CPT | Performed by: EMERGENCY MEDICINE

## 2024-01-01 PROCEDURE — 83880 ASSAY OF NATRIURETIC PEPTIDE: CPT | Performed by: EMERGENCY MEDICINE

## 2024-01-01 PROCEDURE — C1894 INTRO/SHEATH, NON-LASER: HCPCS | Performed by: STUDENT IN AN ORGANIZED HEALTH CARE EDUCATION/TRAINING PROGRAM

## 2024-01-01 PROCEDURE — 25000003 PHARM REV CODE 250: Performed by: SURGERY

## 2024-01-01 PROCEDURE — 05H433Z INSERTION OF INFUSION DEVICE INTO LEFT INNOMINATE VEIN, PERCUTANEOUS APPROACH: ICD-10-PCS | Performed by: SURGERY

## 2024-01-01 PROCEDURE — 94002 VENT MGMT INPAT INIT DAY: CPT

## 2024-01-01 PROCEDURE — 85610 PROTHROMBIN TIME: CPT

## 2024-01-01 PROCEDURE — 25000242 PHARM REV CODE 250 ALT 637 W/ HCPCS: Performed by: INTERNAL MEDICINE

## 2024-01-01 PROCEDURE — 85025 COMPLETE CBC W/AUTO DIFF WBC: CPT | Performed by: EMERGENCY MEDICINE

## 2024-01-01 PROCEDURE — 85730 THROMBOPLASTIN TIME PARTIAL: CPT | Performed by: EMERGENCY MEDICINE

## 2024-01-01 PROCEDURE — 25500020 PHARM REV CODE 255: Performed by: STUDENT IN AN ORGANIZED HEALTH CARE EDUCATION/TRAINING PROGRAM

## 2024-01-01 PROCEDURE — 93010 ELECTROCARDIOGRAM REPORT: CPT | Mod: ,,, | Performed by: STUDENT IN AN ORGANIZED HEALTH CARE EDUCATION/TRAINING PROGRAM

## 2024-01-01 PROCEDURE — 99152 MOD SED SAME PHYS/QHP 5/>YRS: CPT | Performed by: STUDENT IN AN ORGANIZED HEALTH CARE EDUCATION/TRAINING PROGRAM

## 2024-01-01 PROCEDURE — 36415 COLL VENOUS BLD VENIPUNCTURE: CPT | Performed by: PHYSICIAN ASSISTANT

## 2024-01-01 PROCEDURE — 80053 COMPREHEN METABOLIC PANEL: CPT | Performed by: PHYSICIAN ASSISTANT

## 2024-01-01 PROCEDURE — 51798 US URINE CAPACITY MEASURE: CPT

## 2024-01-01 PROCEDURE — 27000190 HC CPAP FULL FACE MASK W/VALVE

## 2024-01-01 PROCEDURE — 0BH17EZ INSERTION OF ENDOTRACHEAL AIRWAY INTO TRACHEA, VIA NATURAL OR ARTIFICIAL OPENING: ICD-10-PCS | Performed by: STUDENT IN AN ORGANIZED HEALTH CARE EDUCATION/TRAINING PROGRAM

## 2024-01-01 PROCEDURE — 99231 SBSQ HOSP IP/OBS SF/LOW 25: CPT | Mod: ,,, | Performed by: INTERNAL MEDICINE

## 2024-01-01 PROCEDURE — C1887 CATHETER, GUIDING: HCPCS | Performed by: STUDENT IN AN ORGANIZED HEALTH CARE EDUCATION/TRAINING PROGRAM

## 2024-01-01 PROCEDURE — 80061 LIPID PANEL: CPT | Performed by: INTERNAL MEDICINE

## 2024-01-01 PROCEDURE — 84484 ASSAY OF TROPONIN QUANT: CPT | Performed by: EMERGENCY MEDICINE

## 2024-01-01 PROCEDURE — 99233 SBSQ HOSP IP/OBS HIGH 50: CPT | Mod: FS,,, | Performed by: INTERNAL MEDICINE

## 2024-01-01 PROCEDURE — 63600175 PHARM REV CODE 636 W HCPCS: Performed by: NURSE PRACTITIONER

## 2024-01-01 RX ORDER — INDOMETHACIN 25 MG/1
150 CAPSULE ORAL ONCE
Status: COMPLETED | OUTPATIENT
Start: 2024-01-01 | End: 2024-01-01

## 2024-01-01 RX ORDER — IOPAMIDOL 755 MG/ML
INJECTION, SOLUTION INTRAVASCULAR
Status: DISCONTINUED | OUTPATIENT
Start: 2024-01-01 | End: 2024-01-01 | Stop reason: HOSPADM

## 2024-01-01 RX ORDER — FENTANYL CITRATE 50 UG/ML
50 INJECTION, SOLUTION INTRAMUSCULAR; INTRAVENOUS
Status: DISCONTINUED | OUTPATIENT
Start: 2024-01-01 | End: 2024-01-01 | Stop reason: HOSPADM

## 2024-01-01 RX ORDER — FENTANYL CITRATE 50 UG/ML
INJECTION, SOLUTION INTRAMUSCULAR; INTRAVENOUS
Status: COMPLETED
Start: 2024-01-01 | End: 2024-01-01

## 2024-01-01 RX ORDER — NAPROXEN SODIUM 220 MG/1
81 TABLET, FILM COATED ORAL DAILY
Status: DISCONTINUED | OUTPATIENT
Start: 2024-01-01 | End: 2024-01-01 | Stop reason: HOSPADM

## 2024-01-01 RX ORDER — ASPIRIN 81 MG/1
81 TABLET ORAL DAILY
Status: DISCONTINUED | OUTPATIENT
Start: 2024-01-01 | End: 2024-01-01

## 2024-01-01 RX ORDER — FENTANYL CITRATE 50 UG/ML
INJECTION, SOLUTION INTRAMUSCULAR; INTRAVENOUS
Status: DISCONTINUED | OUTPATIENT
Start: 2024-01-01 | End: 2024-01-01 | Stop reason: HOSPADM

## 2024-01-01 RX ORDER — SODIUM CHLORIDE 9 MG/ML
INJECTION, SOLUTION INTRAVENOUS ONCE
Status: CANCELLED | OUTPATIENT
Start: 2024-01-01 | End: 2024-01-01

## 2024-01-01 RX ORDER — CALCIUM GLUCONATE 20 MG/ML
1 INJECTION, SOLUTION INTRAVENOUS ONCE
Status: COMPLETED | OUTPATIENT
Start: 2024-01-01 | End: 2024-01-01

## 2024-01-01 RX ORDER — CALCIUM GLUCONATE 20 MG/ML
1 INJECTION, SOLUTION INTRAVENOUS EVERY 10 MIN PRN
Status: DISCONTINUED | OUTPATIENT
Start: 2024-01-01 | End: 2024-01-01 | Stop reason: HOSPADM

## 2024-01-01 RX ORDER — POTASSIUM CHLORIDE 14.9 MG/ML
20 INJECTION INTRAVENOUS
Status: COMPLETED | OUTPATIENT
Start: 2024-01-01 | End: 2024-01-01

## 2024-01-01 RX ORDER — IBUPROFEN 200 MG
24 TABLET ORAL
Status: DISCONTINUED | OUTPATIENT
Start: 2024-01-01 | End: 2024-01-01 | Stop reason: HOSPADM

## 2024-01-01 RX ORDER — DEXTROSE MONOHYDRATE 100 MG/ML
INJECTION, SOLUTION INTRAVENOUS CONTINUOUS
Status: DISCONTINUED | OUTPATIENT
Start: 2024-01-01 | End: 2024-01-01 | Stop reason: HOSPADM

## 2024-01-01 RX ORDER — EPINEPHRINE 0.1 MG/ML
INJECTION INTRAVENOUS CODE/TRAUMA/SEDATION MEDICATION
Status: COMPLETED | OUTPATIENT
Start: 2024-01-01 | End: 2024-01-01

## 2024-01-01 RX ORDER — NOREPINEPHRINE BITARTRATE/D5W 8 MG/250ML
0-3 PLASTIC BAG, INJECTION (ML) INTRAVENOUS CONTINUOUS
Status: DISCONTINUED | OUTPATIENT
Start: 2024-01-01 | End: 2024-01-01

## 2024-01-01 RX ORDER — FENTANYL CITRATE 50 UG/ML
25 INJECTION, SOLUTION INTRAMUSCULAR; INTRAVENOUS ONCE
Status: COMPLETED | OUTPATIENT
Start: 2024-01-01 | End: 2024-01-01

## 2024-01-01 RX ORDER — AMIODARONE HYDROCHLORIDE 200 MG/1
400 TABLET ORAL 2 TIMES DAILY
Status: CANCELLED | OUTPATIENT
Start: 2024-01-01 | End: 2024-01-01

## 2024-01-01 RX ORDER — SODIUM BICARBONATE 1 MEQ/ML
VIAL (ML) INTRAVENOUS CONTINUOUS
Status: DISCONTINUED | OUTPATIENT
Start: 2024-01-01 | End: 2024-01-01 | Stop reason: HOSPADM

## 2024-01-01 RX ORDER — CALCIUM GLUCONATE 20 MG/ML
INJECTION, SOLUTION INTRAVENOUS
Status: COMPLETED
Start: 2024-01-01 | End: 2024-01-01

## 2024-01-01 RX ORDER — VERAPAMIL HYDROCHLORIDE 2.5 MG/ML
INJECTION, SOLUTION INTRAVENOUS
Status: DISCONTINUED | OUTPATIENT
Start: 2024-01-01 | End: 2024-01-01 | Stop reason: HOSPADM

## 2024-01-01 RX ORDER — ACETAMINOPHEN 325 MG/1
650 TABLET ORAL EVERY 8 HOURS PRN
Status: DISCONTINUED | OUTPATIENT
Start: 2024-01-01 | End: 2024-01-01 | Stop reason: HOSPADM

## 2024-01-01 RX ORDER — INDOMETHACIN 25 MG/1
100 CAPSULE ORAL ONCE
Status: COMPLETED | OUTPATIENT
Start: 2024-01-01 | End: 2024-01-01

## 2024-01-01 RX ORDER — CALCIUM CARBONATE 1250 MG/5ML
500 SUSPENSION ORAL
Status: DISCONTINUED | OUTPATIENT
Start: 2024-01-01 | End: 2024-01-01

## 2024-01-01 RX ORDER — FENTANYL CITRATE 50 UG/ML
50 INJECTION, SOLUTION INTRAMUSCULAR; INTRAVENOUS
Status: DISPENSED | OUTPATIENT
Start: 2024-01-01 | End: 2024-01-01

## 2024-01-01 RX ORDER — ALBUMIN HUMAN 250 G/1000ML
25 SOLUTION INTRAVENOUS ONCE
Status: COMPLETED | OUTPATIENT
Start: 2024-01-01 | End: 2024-01-01

## 2024-01-01 RX ORDER — ACETAMINOPHEN 325 MG/1
650 TABLET ORAL EVERY 4 HOURS PRN
Status: DISCONTINUED | OUTPATIENT
Start: 2024-01-01 | End: 2024-01-01 | Stop reason: HOSPADM

## 2024-01-01 RX ORDER — CALCITRIOL 0.25 UG/1
0.25 CAPSULE ORAL 2 TIMES DAILY
Status: DISCONTINUED | OUTPATIENT
Start: 2024-01-01 | End: 2024-01-01 | Stop reason: HOSPADM

## 2024-01-01 RX ORDER — AMIODARONE HYDROCHLORIDE 200 MG/1
400 TABLET ORAL 2 TIMES DAILY
Status: COMPLETED | OUTPATIENT
Start: 2024-01-01 | End: 2024-01-01

## 2024-01-01 RX ORDER — FAMOTIDINE 10 MG/ML
20 INJECTION INTRAVENOUS DAILY
Status: DISCONTINUED | OUTPATIENT
Start: 2024-01-01 | End: 2024-01-01

## 2024-01-01 RX ORDER — PANTOPRAZOLE SODIUM 40 MG/10ML
40 INJECTION, POWDER, LYOPHILIZED, FOR SOLUTION INTRAVENOUS 2 TIMES DAILY
Status: DISCONTINUED | OUTPATIENT
Start: 2024-01-01 | End: 2024-01-01

## 2024-01-01 RX ORDER — ROCURONIUM BROMIDE 10 MG/ML
INJECTION, SOLUTION INTRAVENOUS CODE/TRAUMA/SEDATION MEDICATION
Status: COMPLETED | OUTPATIENT
Start: 2024-01-01 | End: 2024-01-01

## 2024-01-01 RX ORDER — DAPAGLIFLOZIN 10 MG/1
10 TABLET, FILM COATED ORAL DAILY
Status: DISCONTINUED | OUTPATIENT
Start: 2024-01-01 | End: 2024-01-01

## 2024-01-01 RX ORDER — ONDANSETRON HYDROCHLORIDE 2 MG/ML
INJECTION, SOLUTION INTRAVENOUS
Status: COMPLETED
Start: 2024-01-01 | End: 2024-01-01

## 2024-01-01 RX ORDER — LIDOCAINE HYDROCHLORIDE 10 MG/ML
INJECTION, SOLUTION EPIDURAL; INFILTRATION; INTRACAUDAL; PERINEURAL
Status: DISCONTINUED | OUTPATIENT
Start: 2024-01-01 | End: 2024-01-01 | Stop reason: HOSPADM

## 2024-01-01 RX ORDER — MAGNESIUM SULFATE HEPTAHYDRATE 40 MG/ML
2 INJECTION, SOLUTION INTRAVENOUS ONCE
Status: COMPLETED | OUTPATIENT
Start: 2024-01-01 | End: 2024-01-01

## 2024-01-01 RX ORDER — FENTANYL CITRATE-0.9 % NACL/PF 10 MCG/ML
0-250 PLASTIC BAG, INJECTION (ML) INTRAVENOUS CONTINUOUS
Status: DISCONTINUED | OUTPATIENT
Start: 2024-01-01 | End: 2024-01-01 | Stop reason: HOSPADM

## 2024-01-01 RX ORDER — ENOXAPARIN SODIUM 100 MG/ML
30 INJECTION SUBCUTANEOUS EVERY 24 HOURS
Status: DISCONTINUED | OUTPATIENT
Start: 2024-01-01 | End: 2024-01-01 | Stop reason: HOSPADM

## 2024-01-01 RX ORDER — AMIODARONE HYDROCHLORIDE 200 MG/1
400 TABLET ORAL DAILY
Status: DISCONTINUED | OUTPATIENT
Start: 2024-01-01 | End: 2024-01-01

## 2024-01-01 RX ORDER — POLYETHYLENE GLYCOL 3350 17 G/17G
17 POWDER, FOR SOLUTION ORAL 2 TIMES DAILY
Status: DISCONTINUED | OUTPATIENT
Start: 2024-01-01 | End: 2024-01-01 | Stop reason: HOSPADM

## 2024-01-01 RX ORDER — POTASSIUM CHLORIDE 7.45 MG/ML
40 INJECTION INTRAVENOUS ONCE
Status: DISCONTINUED | OUTPATIENT
Start: 2024-01-01 | End: 2024-01-01

## 2024-01-01 RX ORDER — AMIODARONE HYDROCHLORIDE 200 MG/1
400 TABLET ORAL ONCE
Status: COMPLETED | OUTPATIENT
Start: 2024-01-01 | End: 2024-01-01

## 2024-01-01 RX ORDER — HYDROCODONE BITARTRATE AND ACETAMINOPHEN 7.5; 325 MG/1; MG/1
1 TABLET ORAL EVERY 4 HOURS PRN
Status: DISCONTINUED | OUTPATIENT
Start: 2024-01-01 | End: 2024-01-01 | Stop reason: HOSPADM

## 2024-01-01 RX ORDER — ACETAMINOPHEN 10 MG/ML
1000 INJECTION, SOLUTION INTRAVENOUS ONCE
Status: COMPLETED | OUTPATIENT
Start: 2024-01-01 | End: 2024-01-01

## 2024-01-01 RX ORDER — ONDANSETRON HYDROCHLORIDE 2 MG/ML
4 INJECTION, SOLUTION INTRAVENOUS ONCE
Status: COMPLETED | OUTPATIENT
Start: 2024-01-01 | End: 2024-01-01

## 2024-01-01 RX ORDER — AMIODARONE HYDROCHLORIDE 200 MG/1
200 TABLET ORAL 2 TIMES DAILY
Status: CANCELLED | OUTPATIENT
Start: 2024-01-01 | End: 2024-09-29

## 2024-01-01 RX ORDER — BUSPIRONE HYDROCHLORIDE 5 MG/1
5 TABLET ORAL 2 TIMES DAILY
Status: DISCONTINUED | OUTPATIENT
Start: 2024-01-01 | End: 2024-01-01 | Stop reason: HOSPADM

## 2024-01-01 RX ORDER — SIMETHICONE 80 MG
1 TABLET,CHEWABLE ORAL 3 TIMES DAILY PRN
Status: DISCONTINUED | OUTPATIENT
Start: 2024-01-01 | End: 2024-01-01 | Stop reason: HOSPADM

## 2024-01-01 RX ORDER — FENTANYL CITRATE 50 UG/ML
50 INJECTION, SOLUTION INTRAMUSCULAR; INTRAVENOUS
Status: COMPLETED | OUTPATIENT
Start: 2024-01-01 | End: 2024-01-01

## 2024-01-01 RX ORDER — IBUPROFEN 200 MG
16 TABLET ORAL
Status: DISCONTINUED | OUTPATIENT
Start: 2024-01-01 | End: 2024-01-01 | Stop reason: HOSPADM

## 2024-01-01 RX ORDER — ONDANSETRON HYDROCHLORIDE 2 MG/ML
4 INJECTION, SOLUTION INTRAVENOUS EVERY 4 HOURS PRN
Status: DISCONTINUED | OUTPATIENT
Start: 2024-01-01 | End: 2024-01-01 | Stop reason: HOSPADM

## 2024-01-01 RX ORDER — CALCIUM CARBONATE 200(500)MG
500 TABLET,CHEWABLE ORAL
Status: DISCONTINUED | OUTPATIENT
Start: 2024-01-01 | End: 2024-01-01 | Stop reason: HOSPADM

## 2024-01-01 RX ORDER — AMOXICILLIN 250 MG
2 CAPSULE ORAL 2 TIMES DAILY
Status: DISCONTINUED | OUTPATIENT
Start: 2024-01-01 | End: 2024-01-01 | Stop reason: HOSPADM

## 2024-01-01 RX ORDER — INDOMETHACIN 25 MG/1
50 CAPSULE ORAL ONCE
Status: DISCONTINUED | OUTPATIENT
Start: 2024-01-01 | End: 2024-01-01 | Stop reason: HOSPADM

## 2024-01-01 RX ORDER — SODIUM BICARBONATE 650 MG/1
1950 TABLET ORAL 3 TIMES DAILY
Status: DISCONTINUED | OUTPATIENT
Start: 2024-01-01 | End: 2024-01-01 | Stop reason: HOSPADM

## 2024-01-01 RX ORDER — NITROGLYCERIN 20 MG/100ML
INJECTION INTRAVENOUS
Status: DISCONTINUED | OUTPATIENT
Start: 2024-01-01 | End: 2024-01-01 | Stop reason: HOSPADM

## 2024-01-01 RX ORDER — SODIUM CHLORIDE 9 MG/ML
INJECTION, SOLUTION INTRAVENOUS
Status: CANCELLED | OUTPATIENT
Start: 2024-01-01

## 2024-01-01 RX ORDER — INDOMETHACIN 25 MG/1
50 CAPSULE ORAL ONCE
Status: COMPLETED | OUTPATIENT
Start: 2024-01-01 | End: 2024-01-01

## 2024-01-01 RX ORDER — MUPIROCIN 20 MG/G
OINTMENT TOPICAL 2 TIMES DAILY
Status: DISPENSED | OUTPATIENT
Start: 2024-01-01 | End: 2024-01-01

## 2024-01-01 RX ORDER — MUPIROCIN 20 MG/G
OINTMENT TOPICAL 2 TIMES DAILY
Status: CANCELLED | OUTPATIENT
Start: 2024-01-01 | End: 2024-01-01

## 2024-01-01 RX ORDER — HEPARIN SODIUM 1000 [USP'U]/ML
INJECTION, SOLUTION INTRAVENOUS; SUBCUTANEOUS
Status: DISCONTINUED | OUTPATIENT
Start: 2024-01-01 | End: 2024-01-01 | Stop reason: HOSPADM

## 2024-01-01 RX ORDER — SEVELAMER CARBONATE 800 MG/1
800 TABLET, FILM COATED ORAL
Status: DISCONTINUED | OUTPATIENT
Start: 2024-01-01 | End: 2024-01-01

## 2024-01-01 RX ORDER — AMIODARONE HYDROCHLORIDE 200 MG/1
200 TABLET ORAL DAILY
Status: CANCELLED | OUTPATIENT
Start: 2024-09-30

## 2024-01-01 RX ORDER — PANTOPRAZOLE SODIUM 40 MG/10ML
80 INJECTION, POWDER, LYOPHILIZED, FOR SOLUTION INTRAVENOUS ONCE
Status: COMPLETED | OUTPATIENT
Start: 2024-01-01 | End: 2024-01-01

## 2024-01-01 RX ORDER — AMIODARONE HYDROCHLORIDE 200 MG/1
200 TABLET ORAL DAILY
Status: DISCONTINUED | OUTPATIENT
Start: 2024-09-30 | End: 2024-01-01

## 2024-01-01 RX ORDER — DEXTROSE MONOHYDRATE 50 MG/ML
INJECTION, SOLUTION INTRAVENOUS CONTINUOUS
Status: DISCONTINUED | OUTPATIENT
Start: 2024-01-01 | End: 2024-01-01

## 2024-01-01 RX ORDER — METOPROLOL TARTRATE 1 MG/ML
5 INJECTION, SOLUTION INTRAVENOUS ONCE
Status: COMPLETED | OUTPATIENT
Start: 2024-01-01 | End: 2024-01-01

## 2024-01-01 RX ORDER — DEXTROSE MONOHYDRATE 100 MG/ML
INJECTION, SOLUTION INTRAVENOUS CONTINUOUS
Status: DISCONTINUED | OUTPATIENT
Start: 2024-01-01 | End: 2024-01-01

## 2024-01-01 RX ORDER — INDOMETHACIN 25 MG/1
CAPSULE ORAL
Status: DISPENSED
Start: 2024-01-01 | End: 2024-01-01

## 2024-01-01 RX ORDER — HYDROCODONE BITARTRATE AND ACETAMINOPHEN 5; 325 MG/1; MG/1
1 TABLET ORAL EVERY 6 HOURS PRN
Status: DISCONTINUED | OUTPATIENT
Start: 2024-01-01 | End: 2024-01-01

## 2024-01-01 RX ORDER — PROPOFOL 10 MG/ML
0-50 INJECTION, EMULSION INTRAVENOUS CONTINUOUS
Status: DISCONTINUED | OUTPATIENT
Start: 2024-01-01 | End: 2024-01-01 | Stop reason: HOSPADM

## 2024-01-01 RX ORDER — MIDAZOLAM HYDROCHLORIDE 1 MG/ML
INJECTION INTRAMUSCULAR; INTRAVENOUS
Status: DISCONTINUED | OUTPATIENT
Start: 2024-01-01 | End: 2024-01-01 | Stop reason: HOSPADM

## 2024-01-01 RX ORDER — METOPROLOL TARTRATE 1 MG/ML
5 INJECTION, SOLUTION INTRAVENOUS EVERY 5 MIN PRN
Status: DISCONTINUED | OUTPATIENT
Start: 2024-01-01 | End: 2024-01-01 | Stop reason: HOSPADM

## 2024-01-01 RX ORDER — ALBUTEROL SULFATE 0.83 MG/ML
10 SOLUTION RESPIRATORY (INHALATION) ONCE
Status: COMPLETED | OUTPATIENT
Start: 2024-01-01 | End: 2024-01-01

## 2024-01-01 RX ORDER — OCTREOTIDE ACETATE 50 UG/ML
50 INJECTION, SOLUTION INTRAVENOUS; SUBCUTANEOUS ONCE
Status: COMPLETED | OUTPATIENT
Start: 2024-01-01 | End: 2024-01-01

## 2024-01-01 RX ORDER — SODIUM CHLORIDE 0.9 % (FLUSH) 0.9 %
10 SYRINGE (ML) INJECTION EVERY 12 HOURS PRN
Status: DISCONTINUED | OUTPATIENT
Start: 2024-01-01 | End: 2024-01-01 | Stop reason: HOSPADM

## 2024-01-01 RX ORDER — AMIODARONE HYDROCHLORIDE 200 MG/1
200 TABLET ORAL 2 TIMES DAILY
Status: DISCONTINUED | OUTPATIENT
Start: 2024-01-01 | End: 2024-01-01

## 2024-01-01 RX ORDER — GLUCAGON 1 MG
1 KIT INJECTION
Status: DISCONTINUED | OUTPATIENT
Start: 2024-01-01 | End: 2024-01-01 | Stop reason: HOSPADM

## 2024-01-01 RX ORDER — ETOMIDATE 2 MG/ML
INJECTION INTRAVENOUS CODE/TRAUMA/SEDATION MEDICATION
Status: COMPLETED | OUTPATIENT
Start: 2024-01-01 | End: 2024-01-01

## 2024-01-01 RX ADMIN — AMIODARONE HYDROCHLORIDE 1 MG/MIN: 1.8 INJECTION, SOLUTION INTRAVENOUS at 05:09

## 2024-01-01 RX ADMIN — ACETAMINOPHEN 325MG 650 MG: 325 TABLET ORAL at 12:09

## 2024-01-01 RX ADMIN — VASOPRESSIN 0.04 UNITS/MIN: 20 INJECTION INTRAVENOUS at 05:09

## 2024-01-01 RX ADMIN — DEXTROSE MONOHYDRATE 500 ML: 100 INJECTION, SOLUTION INTRAVENOUS at 06:09

## 2024-01-01 RX ADMIN — NOREPINEPHRINE BITARTRATE 0.1 MCG/KG/MIN: 8 INJECTION, SOLUTION INTRAVENOUS at 06:09

## 2024-01-01 RX ADMIN — PROPOFOL 25 MCG/KG/MIN: 10 INJECTION, EMULSION INTRAVENOUS at 08:09

## 2024-01-01 RX ADMIN — VASOPRESSIN 0.04 UNITS/MIN: 20 INJECTION INTRAVENOUS at 09:09

## 2024-01-01 RX ADMIN — PROPOFOL 50 MCG/KG/MIN: 10 INJECTION, EMULSION INTRAVENOUS at 01:09

## 2024-01-01 RX ADMIN — MUPIROCIN: 20 OINTMENT TOPICAL at 12:09

## 2024-01-01 RX ADMIN — NOREPINEPHRINE BITARTRATE 0.6 MCG/KG/MIN: 8 INJECTION, SOLUTION INTRAVENOUS at 03:09

## 2024-01-01 RX ADMIN — VASOPRESSIN 0.04 UNITS/MIN: 20 INJECTION INTRAVENOUS at 07:09

## 2024-01-01 RX ADMIN — CALCITRIOL CAPSULES 0.25 MCG 0.25 MCG: 0.25 CAPSULE ORAL at 09:09

## 2024-01-01 RX ADMIN — POLYETHYLENE GLYCOL 3350 17 G: 17 POWDER, FOR SOLUTION ORAL at 08:09

## 2024-01-01 RX ADMIN — ACETAMINOPHEN 650 MG: 325 TABLET, FILM COATED ORAL at 09:09

## 2024-01-01 RX ADMIN — AMIODARONE HYDROCHLORIDE 400 MG: 200 TABLET ORAL at 08:09

## 2024-01-01 RX ADMIN — BUSPIRONE HYDROCHLORIDE 5 MG: 5 TABLET ORAL at 08:09

## 2024-01-01 RX ADMIN — HYDROCODONE BITARTRATE AND ACETAMINOPHEN 1 TABLET: 5; 325 TABLET ORAL at 07:09

## 2024-01-01 RX ADMIN — SEVELAMER CARBONATE 800 MG: 800 TABLET, FILM COATED ORAL at 08:09

## 2024-01-01 RX ADMIN — PROPOFOL 50 MCG/KG/MIN: 10 INJECTION, EMULSION INTRAVENOUS at 09:09

## 2024-01-01 RX ADMIN — ASPIRIN 81 MG: 81 TABLET, COATED ORAL at 08:09

## 2024-01-01 RX ADMIN — CALCIUM CARBONATE (ANTACID) CHEW TAB 500 MG 500 MG: 500 CHEW TAB at 05:09

## 2024-01-01 RX ADMIN — CALCITRIOL CAPSULES 0.25 MCG 0.25 MCG: 0.25 CAPSULE ORAL at 08:09

## 2024-01-01 RX ADMIN — CALCIUM GLUCONATE 1 G: 20 INJECTION, SOLUTION INTRAVENOUS at 09:09

## 2024-01-01 RX ADMIN — VASOPRESSIN 0.04 UNITS/MIN: 20 INJECTION INTRAVENOUS at 01:09

## 2024-01-01 RX ADMIN — SODIUM ZIRCONIUM CYCLOSILICATE 10 G: 10 POWDER, FOR SUSPENSION ORAL at 08:09

## 2024-01-01 RX ADMIN — VASOPRESSIN 0.04 UNITS/MIN: 20 INJECTION INTRAVENOUS at 12:09

## 2024-01-01 RX ADMIN — DEXTROSE MONOHYDRATE: 100 INJECTION, SOLUTION INTRAVENOUS at 12:09

## 2024-01-01 RX ADMIN — FAMOTIDINE 20 MG: 10 INJECTION, SOLUTION INTRAVENOUS at 08:09

## 2024-01-01 RX ADMIN — OCTREOTIDE ACETATE 50 MCG/HR: 500 INJECTION, SOLUTION INTRAVENOUS; SUBCUTANEOUS at 10:09

## 2024-01-01 RX ADMIN — AMIODARONE HYDROCHLORIDE 0.5 MG/MIN: 1.8 INJECTION, SOLUTION INTRAVENOUS at 06:09

## 2024-01-01 RX ADMIN — SODIUM ZIRCONIUM CYCLOSILICATE 10 G: 10 POWDER, FOR SUSPENSION ORAL at 09:09

## 2024-01-01 RX ADMIN — METOPROLOL TARTRATE 5 MG: 1 INJECTION, SOLUTION INTRAVENOUS at 01:09

## 2024-01-01 RX ADMIN — VASOPRESSIN 0.04 UNITS/MIN: 20 INJECTION INTRAVENOUS at 04:09

## 2024-01-01 RX ADMIN — FENTANYL CITRATE 25 MCG: 50 INJECTION, SOLUTION INTRAMUSCULAR; INTRAVENOUS at 02:09

## 2024-01-01 RX ADMIN — SODIUM CHLORIDE 8 MG/HR: 900 INJECTION INTRAVENOUS at 11:09

## 2024-01-01 RX ADMIN — SODIUM BICARBONATE 650 MG TABLET 1950 MG: at 08:09

## 2024-01-01 RX ADMIN — ENOXAPARIN SODIUM 30 MG: 30 INJECTION SUBCUTANEOUS at 04:09

## 2024-01-01 RX ADMIN — GENTAMICIN SULFATE 124 MG: 40 INJECTION, SOLUTION INTRAMUSCULAR; INTRAVENOUS at 12:09

## 2024-01-01 RX ADMIN — HUMAN INSULIN 6.19 UNITS: 100 INJECTION, SOLUTION SUBCUTANEOUS at 06:09

## 2024-01-01 RX ADMIN — CALCIUM GLUCONATE 1 G: 20 INJECTION, SOLUTION INTRAVENOUS at 10:09

## 2024-01-01 RX ADMIN — ALBUMIN (HUMAN) 25 G: 12.5 SOLUTION INTRAVENOUS at 11:09

## 2024-01-01 RX ADMIN — METOPROLOL TARTRATE 5 MG: 1 INJECTION, SOLUTION INTRAVENOUS at 05:09

## 2024-01-01 RX ADMIN — VASOPRESSIN 0.04 UNITS/MIN: 20 INJECTION INTRAVENOUS at 03:09

## 2024-01-01 RX ADMIN — SEVELAMER CARBONATE 800 MG: 800 TABLET, FILM COATED ORAL at 12:09

## 2024-01-01 RX ADMIN — FAMOTIDINE 20 MG: 10 INJECTION, SOLUTION INTRAVENOUS at 11:09

## 2024-01-01 RX ADMIN — SODIUM BICARBONATE 650 MG TABLET 1950 MG: at 02:09

## 2024-01-01 RX ADMIN — CALCIUM GLUCONATE 1 G: 20 INJECTION, SOLUTION INTRAVENOUS at 02:09

## 2024-01-01 RX ADMIN — HYDROCODONE BITARTRATE AND ACETAMINOPHEN 1 TABLET: 5; 325 TABLET ORAL at 01:09

## 2024-01-01 RX ADMIN — AMIODARONE HYDROCHLORIDE 0.5 MG/MIN: 1.8 INJECTION, SOLUTION INTRAVENOUS at 05:09

## 2024-01-01 RX ADMIN — IOHEXOL 100 ML: 350 INJECTION, SOLUTION INTRAVENOUS at 07:09

## 2024-01-01 RX ADMIN — FENTANYL CITRATE 50 MCG: 50 INJECTION, SOLUTION INTRAMUSCULAR; INTRAVENOUS at 06:09

## 2024-01-01 RX ADMIN — PROPOFOL 30 MCG/KG/MIN: 10 INJECTION, EMULSION INTRAVENOUS at 03:09

## 2024-01-01 RX ADMIN — CALCITRIOL CAPSULES 0.25 MCG 0.25 MCG: 0.25 CAPSULE ORAL at 11:09

## 2024-01-01 RX ADMIN — NOREPINEPHRINE BITARTRATE 0.4 MCG/KG/MIN: 1 INJECTION, SOLUTION, CONCENTRATE INTRAVENOUS at 04:09

## 2024-01-01 RX ADMIN — MUPIROCIN: 20 OINTMENT TOPICAL at 09:09

## 2024-01-01 RX ADMIN — VASOPRESSIN 0.04 UNITS/MIN: 20 INJECTION INTRAVENOUS at 02:09

## 2024-01-01 RX ADMIN — SODIUM CHLORIDE 500 ML: 9 INJECTION, SOLUTION INTRAVENOUS at 03:09

## 2024-01-01 RX ADMIN — ASPIRIN 81 MG CHEWABLE TABLET 81 MG: 81 TABLET CHEWABLE at 08:09

## 2024-01-01 RX ADMIN — METOPROLOL SUCCINATE 12.5 MG: 25 TABLET, EXTENDED RELEASE ORAL at 08:09

## 2024-01-01 RX ADMIN — NOREPINEPHRINE BITARTRATE 0.71 MCG/KG/MIN: 8 INJECTION, SOLUTION INTRAVENOUS at 07:09

## 2024-01-01 RX ADMIN — PIPERACILLIN AND TAZOBACTAM 4.5 G: 4; .5 INJECTION, POWDER, LYOPHILIZED, FOR SOLUTION INTRAVENOUS; PARENTERAL at 07:09

## 2024-01-01 RX ADMIN — SODIUM CHLORIDE, POTASSIUM CHLORIDE, SODIUM LACTATE AND CALCIUM CHLORIDE 500 ML: 600; 310; 30; 20 INJECTION, SOLUTION INTRAVENOUS at 11:09

## 2024-01-01 RX ADMIN — SEVELAMER CARBONATE 800 MG: 800 TABLET, FILM COATED ORAL at 01:09

## 2024-01-01 RX ADMIN — ONDANSETRON 4 MG: 2 INJECTION INTRAMUSCULAR; INTRAVENOUS at 09:09

## 2024-01-01 RX ADMIN — BUSPIRONE HYDROCHLORIDE 5 MG: 5 TABLET ORAL at 09:09

## 2024-01-01 RX ADMIN — NOREPINEPHRINE BITARTRATE 1.28 MCG/KG/MIN: 1 INJECTION, SOLUTION, CONCENTRATE INTRAVENOUS at 04:09

## 2024-01-01 RX ADMIN — BUSPIRONE HYDROCHLORIDE 5 MG: 5 TABLET ORAL at 10:09

## 2024-01-01 RX ADMIN — SENNOSIDES AND DOCUSATE SODIUM 2 TABLET: 50; 8.6 TABLET ORAL at 08:09

## 2024-01-01 RX ADMIN — AMIODARONE HYDROCHLORIDE 0.5 MG/MIN: 1.8 INJECTION, SOLUTION INTRAVENOUS at 09:09

## 2024-01-01 RX ADMIN — CALCITRIOL CAPSULES 0.25 MCG 0.25 MCG: 0.25 CAPSULE ORAL at 07:09

## 2024-01-01 RX ADMIN — ENOXAPARIN SODIUM 30 MG: 30 INJECTION SUBCUTANEOUS at 05:09

## 2024-01-01 RX ADMIN — SODIUM BICARBONATE: 84 INJECTION, SOLUTION INTRAVENOUS at 09:09

## 2024-01-01 RX ADMIN — DAPAGLIFLOZIN 10 MG: 10 TABLET, FILM COATED ORAL at 02:09

## 2024-01-01 RX ADMIN — NOREPINEPHRINE BITARTRATE 0.7 MCG/KG/MIN: 8 INJECTION, SOLUTION INTRAVENOUS at 12:09

## 2024-01-01 RX ADMIN — NOREPINEPHRINE BITARTRATE 0.65 MCG/KG/MIN: 8 INJECTION, SOLUTION INTRAVENOUS at 05:09

## 2024-01-01 RX ADMIN — NOREPINEPHRINE BITARTRATE 0.84 MCG/KG/MIN: 1 INJECTION, SOLUTION, CONCENTRATE INTRAVENOUS at 05:09

## 2024-01-01 RX ADMIN — MUPIROCIN: 20 OINTMENT TOPICAL at 10:09

## 2024-01-01 RX ADMIN — METOPROLOL TARTRATE 5 MG: 1 INJECTION, SOLUTION INTRAVENOUS at 10:09

## 2024-01-01 RX ADMIN — BUSPIRONE HYDROCHLORIDE 5 MG: 5 TABLET ORAL at 11:09

## 2024-01-01 RX ADMIN — SIMETHICONE 80 MG: 80 TABLET, CHEWABLE ORAL at 05:09

## 2024-01-01 RX ADMIN — HYDROCODONE BITARTRATE AND ACETAMINOPHEN 1 TABLET: 5; 325 TABLET ORAL at 06:09

## 2024-01-01 RX ADMIN — ENOXAPARIN SODIUM 30 MG: 30 INJECTION SUBCUTANEOUS at 10:09

## 2024-01-01 RX ADMIN — VASOPRESSIN 0.04 UNITS/MIN: 20 INJECTION INTRAVENOUS at 10:09

## 2024-01-01 RX ADMIN — CALCIUM CARBONATE (ANTACID) CHEW TAB 500 MG 500 MG: 500 CHEW TAB at 08:09

## 2024-01-01 RX ADMIN — DEXTROSE MONOHYDRATE 500 ML: 100 INJECTION, SOLUTION INTRAVENOUS at 08:09

## 2024-01-01 RX ADMIN — SODIUM CHLORIDE, POTASSIUM CHLORIDE, SODIUM LACTATE AND CALCIUM CHLORIDE 250 ML: 600; 310; 30; 20 INJECTION, SOLUTION INTRAVENOUS at 11:09

## 2024-01-01 RX ADMIN — BUSPIRONE HYDROCHLORIDE 5 MG: 5 TABLET ORAL at 07:09

## 2024-01-01 RX ADMIN — CALCIUM CARBONATE (ANTACID) CHEW TAB 500 MG 500 MG: 500 CHEW TAB at 01:09

## 2024-01-01 RX ADMIN — FENTANYL CITRATE 50 MCG: 50 INJECTION, SOLUTION INTRAMUSCULAR; INTRAVENOUS at 10:09

## 2024-01-01 RX ADMIN — VANCOMYCIN HYDROCHLORIDE 750 MG: 750 INJECTION, POWDER, LYOPHILIZED, FOR SOLUTION INTRAVENOUS at 04:09

## 2024-01-01 RX ADMIN — Medication 150 MCG/HR: at 08:09

## 2024-01-01 RX ADMIN — SODIUM CHLORIDE 8 MG/HR: 900 INJECTION INTRAVENOUS at 10:09

## 2024-01-01 RX ADMIN — POTASSIUM CHLORIDE 20 MEQ: 14.9 INJECTION, SOLUTION INTRAVENOUS at 11:09

## 2024-01-01 RX ADMIN — ALBUTEROL SULFATE 10 MG: 2.5 SOLUTION RESPIRATORY (INHALATION) at 10:09

## 2024-01-01 RX ADMIN — DEXTROSE MONOHYDRATE 250 ML: 100 INJECTION, SOLUTION INTRAVENOUS at 12:09

## 2024-01-01 RX ADMIN — SODIUM ZIRCONIUM CYCLOSILICATE 10 G: 10 POWDER, FOR SUSPENSION ORAL at 10:09

## 2024-01-01 RX ADMIN — VASOPRESSIN 0.04 UNITS/MIN: 20 INJECTION INTRAVENOUS at 06:09

## 2024-01-01 RX ADMIN — CALCIUM CARBONATE 500 MG: 1250 SUSPENSION ORAL at 05:09

## 2024-01-01 RX ADMIN — SODIUM BICARBONATE 650 MG TABLET 1950 MG: at 03:09

## 2024-01-01 RX ADMIN — DEXTROSE MONOHYDRATE 250 ML: 100 INJECTION, SOLUTION INTRAVENOUS at 09:09

## 2024-01-01 RX ADMIN — Medication 100 MCG/HR: at 01:09

## 2024-01-01 RX ADMIN — HUMAN INSULIN 6.19 UNITS: 100 INJECTION, SOLUTION SUBCUTANEOUS at 10:09

## 2024-01-01 RX ADMIN — SEVELAMER CARBONATE 800 MG: 800 TABLET, FILM COATED ORAL at 06:09

## 2024-01-01 RX ADMIN — ASPIRIN 81 MG: 81 TABLET, COATED ORAL at 09:09

## 2024-01-01 RX ADMIN — NOREPINEPHRINE BITARTRATE 0.45 MCG/KG/MIN: 8 INJECTION, SOLUTION INTRAVENOUS at 06:09

## 2024-01-01 RX ADMIN — CALCIUM CARBONATE (ANTACID) CHEW TAB 500 MG 500 MG: 500 CHEW TAB at 12:09

## 2024-01-01 RX ADMIN — HYDROCODONE BITARTRATE AND ACETAMINOPHEN 1 TABLET: 7.5; 325 TABLET ORAL at 07:09

## 2024-01-01 RX ADMIN — ETOMIDATE 20 MG: 2 INJECTION INTRAVENOUS at 09:09

## 2024-01-01 RX ADMIN — SEVELAMER CARBONATE 800 MG: 800 TABLET, FILM COATED ORAL at 10:09

## 2024-01-01 RX ADMIN — NOREPINEPHRINE BITARTRATE 0.62 MCG/KG/MIN: 1 INJECTION, SOLUTION, CONCENTRATE INTRAVENOUS at 07:09

## 2024-01-01 RX ADMIN — PHYTONADIONE 10 MG: 10 INJECTION, EMULSION INTRAMUSCULAR; INTRAVENOUS; SUBCUTANEOUS at 01:09

## 2024-01-01 RX ADMIN — PROPOFOL 30 MCG/KG/MIN: 10 INJECTION, EMULSION INTRAVENOUS at 08:09

## 2024-01-01 RX ADMIN — AMIODARONE HYDROCHLORIDE 0.5 MG/MIN: 1.8 INJECTION, SOLUTION INTRAVENOUS at 07:09

## 2024-01-01 RX ADMIN — NOREPINEPHRINE BITARTRATE 0.6 MCG/KG/MIN: 8 INJECTION, SOLUTION INTRAVENOUS at 06:09

## 2024-01-01 RX ADMIN — AMIODARONE HYDROCHLORIDE 400 MG: 200 TABLET ORAL at 04:09

## 2024-01-01 RX ADMIN — PROPOFOL 15 MCG/KG/MIN: 10 INJECTION, EMULSION INTRAVENOUS at 12:09

## 2024-01-01 RX ADMIN — VASOPRESSIN 0.04 UNITS/MIN: 20 INJECTION INTRAVENOUS at 11:09

## 2024-01-01 RX ADMIN — HUMAN INSULIN 6.19 UNITS: 100 INJECTION, SOLUTION SUBCUTANEOUS at 08:09

## 2024-01-01 RX ADMIN — MAGNESIUM SULFATE HEPTAHYDRATE 2 G: 40 INJECTION, SOLUTION INTRAVENOUS at 09:09

## 2024-01-01 RX ADMIN — ACETAMINOPHEN 325MG 650 MG: 325 TABLET ORAL at 05:09

## 2024-01-01 RX ADMIN — PANTOPRAZOLE SODIUM 40 MG: 40 INJECTION, POWDER, LYOPHILIZED, FOR SOLUTION INTRAVENOUS at 08:09

## 2024-01-01 RX ADMIN — ACETAMINOPHEN 650 MG: 325 TABLET, FILM COATED ORAL at 11:09

## 2024-01-01 RX ADMIN — NOREPINEPHRINE BITARTRATE 0.68 MCG/KG/MIN: 1 INJECTION, SOLUTION, CONCENTRATE INTRAVENOUS at 01:09

## 2024-01-01 RX ADMIN — ENOXAPARIN SODIUM 30 MG: 30 INJECTION SUBCUTANEOUS at 06:09

## 2024-01-01 RX ADMIN — NOREPINEPHRINE BITARTRATE 0.42 MCG/KG/MIN: 1 INJECTION, SOLUTION, CONCENTRATE INTRAVENOUS at 01:09

## 2024-01-01 RX ADMIN — ASPIRIN 81 MG: 81 TABLET, COATED ORAL at 02:09

## 2024-01-01 RX ADMIN — NOREPINEPHRINE BITARTRATE 0.65 MCG/KG/MIN: 8 INJECTION, SOLUTION INTRAVENOUS at 08:09

## 2024-01-01 RX ADMIN — AMIODARONE HYDROCHLORIDE 0.5 MG/MIN: 1.8 INJECTION, SOLUTION INTRAVENOUS at 12:09

## 2024-01-01 RX ADMIN — PROPOFOL 15 MCG/KG/MIN: 10 INJECTION, EMULSION INTRAVENOUS at 05:09

## 2024-01-01 RX ADMIN — VANCOMYCIN HYDROCHLORIDE 750 MG: 750 INJECTION, POWDER, LYOPHILIZED, FOR SOLUTION INTRAVENOUS at 07:09

## 2024-01-01 RX ADMIN — METOPROLOL SUCCINATE 12.5 MG: 25 TABLET, EXTENDED RELEASE ORAL at 09:09

## 2024-01-01 RX ADMIN — HYDROCODONE BITARTRATE AND ACETAMINOPHEN 1 TABLET: 7.5; 325 TABLET ORAL at 03:09

## 2024-01-01 RX ADMIN — VANCOMYCIN HYDROCHLORIDE 500 MG: 500 INJECTION, POWDER, LYOPHILIZED, FOR SOLUTION INTRAVENOUS at 09:09

## 2024-01-01 RX ADMIN — ONDANSETRON HYDROCHLORIDE 4 MG: 2 INJECTION, SOLUTION INTRAVENOUS at 06:09

## 2024-01-01 RX ADMIN — MUPIROCIN: 20 OINTMENT TOPICAL at 08:09

## 2024-01-01 RX ADMIN — ALBUMIN (HUMAN) 25 G: 12.5 SOLUTION INTRAVENOUS at 08:09

## 2024-01-01 RX ADMIN — HYDROCODONE BITARTRATE AND ACETAMINOPHEN 1 TABLET: 5; 325 TABLET ORAL at 03:09

## 2024-01-01 RX ADMIN — PROPOFOL 40 MCG/KG/MIN: 10 INJECTION, EMULSION INTRAVENOUS at 08:09

## 2024-01-01 RX ADMIN — PROPOFOL 30 MCG/KG/MIN: 10 INJECTION, EMULSION INTRAVENOUS at 12:09

## 2024-01-01 RX ADMIN — ROCURONIUM BROMIDE 50 MG: 10 INJECTION INTRAVENOUS at 09:09

## 2024-01-01 RX ADMIN — NOREPINEPHRINE BITARTRATE 0.63 MCG/KG/MIN: 1 INJECTION, SOLUTION, CONCENTRATE INTRAVENOUS at 11:09

## 2024-01-01 RX ADMIN — PROPOFOL 20 MCG/KG/MIN: 10 INJECTION, EMULSION INTRAVENOUS at 08:09

## 2024-01-01 RX ADMIN — SEVELAMER CARBONATE 800 MG: 800 TABLET, FILM COATED ORAL at 05:09

## 2024-01-01 RX ADMIN — AMIODARONE HYDROCHLORIDE 0.5 MG/MIN: 1.8 INJECTION, SOLUTION INTRAVENOUS at 03:09

## 2024-01-01 RX ADMIN — OCTREOTIDE ACETATE 50 MCG/HR: 500 INJECTION, SOLUTION INTRAVENOUS; SUBCUTANEOUS at 06:09

## 2024-01-01 RX ADMIN — PROPOFOL 20 MCG/KG/MIN: 10 INJECTION, EMULSION INTRAVENOUS at 06:09

## 2024-01-01 RX ADMIN — NOREPINEPHRINE BITARTRATE 0.02 MCG/KG/MIN: 8 INJECTION, SOLUTION INTRAVENOUS at 06:09

## 2024-01-01 RX ADMIN — ENOXAPARIN SODIUM 30 MG: 30 INJECTION SUBCUTANEOUS at 08:09

## 2024-01-01 RX ADMIN — NOREPINEPHRINE BITARTRATE 0.7 MCG/KG/MIN: 8 INJECTION, SOLUTION INTRAVENOUS at 03:09

## 2024-01-01 RX ADMIN — HYDROCODONE BITARTRATE AND ACETAMINOPHEN 1 TABLET: 5; 325 TABLET ORAL at 12:09

## 2024-01-01 RX ADMIN — HUMAN INSULIN 6 UNITS: 100 INJECTION, SOLUTION SUBCUTANEOUS at 03:09

## 2024-01-01 RX ADMIN — DAPAGLIFLOZIN 10 MG: 10 TABLET, FILM COATED ORAL at 09:09

## 2024-01-01 RX ADMIN — NOREPINEPHRINE BITARTRATE 0.3 MCG/KG/MIN: 8 INJECTION, SOLUTION INTRAVENOUS at 01:09

## 2024-01-01 RX ADMIN — NOREPINEPHRINE BITARTRATE 0.8 MCG/KG/MIN: 1 INJECTION, SOLUTION, CONCENTRATE INTRAVENOUS at 11:09

## 2024-01-01 RX ADMIN — SODIUM BICARBONATE 150 MEQ: 84 INJECTION, SOLUTION INTRAVENOUS at 09:09

## 2024-01-01 RX ADMIN — SODIUM BICARBONATE 100 MEQ: 84 INJECTION, SOLUTION INTRAVENOUS at 08:09

## 2024-01-01 RX ADMIN — NOREPINEPHRINE BITARTRATE 1.18 MCG/KG/MIN: 1 INJECTION, SOLUTION, CONCENTRATE INTRAVENOUS at 06:09

## 2024-01-01 RX ADMIN — NOREPINEPHRINE BITARTRATE 0.71 MCG/KG/MIN: 8 INJECTION, SOLUTION INTRAVENOUS at 04:09

## 2024-01-01 RX ADMIN — FAMOTIDINE 20 MG: 10 INJECTION, SOLUTION INTRAVENOUS at 09:09

## 2024-01-01 RX ADMIN — PROPOFOL 20 MCG/KG/MIN: 10 INJECTION, EMULSION INTRAVENOUS at 12:09

## 2024-01-01 RX ADMIN — POTASSIUM CHLORIDE 20 MEQ: 14.9 INJECTION, SOLUTION INTRAVENOUS at 01:09

## 2024-01-01 RX ADMIN — NOREPINEPHRINE BITARTRATE 0.7 MCG/KG/MIN: 8 INJECTION, SOLUTION INTRAVENOUS at 09:09

## 2024-01-01 RX ADMIN — NOREPINEPHRINE BITARTRATE 0.65 MCG/KG/MIN: 8 INJECTION, SOLUTION INTRAVENOUS at 01:09

## 2024-01-01 RX ADMIN — PROPOFOL 15 MCG/KG/MIN: 10 INJECTION, EMULSION INTRAVENOUS at 01:09

## 2024-01-01 RX ADMIN — SIMETHICONE 80 MG: 80 TABLET, CHEWABLE ORAL at 11:09

## 2024-01-01 RX ADMIN — PROPOFOL 15 MCG/KG/MIN: 10 INJECTION, EMULSION INTRAVENOUS at 06:09

## 2024-01-01 RX ADMIN — DEXTROSE MONOHYDRATE 125 ML: 100 INJECTION, SOLUTION INTRAVENOUS at 02:09

## 2024-01-01 RX ADMIN — NOREPINEPHRINE BITARTRATE 0.5 MCG/KG/MIN: 1 INJECTION, SOLUTION, CONCENTRATE INTRAVENOUS at 10:09

## 2024-01-01 RX ADMIN — OCTREOTIDE ACETATE 50 MCG: 50 INJECTION, SOLUTION INTRAVENOUS; SUBCUTANEOUS at 10:09

## 2024-01-01 RX ADMIN — ENOXAPARIN SODIUM 30 MG: 30 INJECTION SUBCUTANEOUS at 07:09

## 2024-01-01 RX ADMIN — DEXTROSE MONOHYDRATE 250 ML: 100 INJECTION, SOLUTION INTRAVENOUS at 04:09

## 2024-01-01 RX ADMIN — METOPROLOL SUCCINATE 12.5 MG: 25 TABLET, EXTENDED RELEASE ORAL at 02:09

## 2024-01-01 RX ADMIN — PROPOFOL 20 MCG/KG/MIN: 10 INJECTION, EMULSION INTRAVENOUS at 10:09

## 2024-01-01 RX ADMIN — NOREPINEPHRINE BITARTRATE 0.64 MCG/KG/MIN: 8 INJECTION, SOLUTION INTRAVENOUS at 11:09

## 2024-01-01 RX ADMIN — PROPOFOL 50 MCG/KG/MIN: 10 INJECTION, EMULSION INTRAVENOUS at 07:09

## 2024-01-01 RX ADMIN — NOREPINEPHRINE BITARTRATE 0.64 MCG/KG/MIN: 8 INJECTION, SOLUTION INTRAVENOUS at 07:09

## 2024-01-01 RX ADMIN — AMIODARONE HYDROCHLORIDE 150 MG: 1.5 INJECTION, SOLUTION INTRAVENOUS at 08:09

## 2024-01-01 RX ADMIN — EPINEPHRINE 1 MG: 0.1 INJECTION INTRAVENOUS at 11:09

## 2024-01-01 RX ADMIN — NOREPINEPHRINE BITARTRATE 0.44 MCG/KG/MIN: 1 INJECTION, SOLUTION, CONCENTRATE INTRAVENOUS at 05:09

## 2024-01-01 RX ADMIN — HYDROCODONE BITARTRATE AND ACETAMINOPHEN 1 TABLET: 7.5; 325 TABLET ORAL at 08:09

## 2024-01-01 RX ADMIN — Medication 200 MCG/HR: at 04:09

## 2024-01-01 RX ADMIN — DAPAGLIFLOZIN 10 MG: 10 TABLET, FILM COATED ORAL at 08:09

## 2024-01-01 RX ADMIN — DEXTROSE MONOHYDRATE: 100 INJECTION, SOLUTION INTRAVENOUS at 04:09

## 2024-01-01 RX ADMIN — PANTOPRAZOLE SODIUM 80 MG: 40 INJECTION, POWDER, LYOPHILIZED, FOR SOLUTION INTRAVENOUS at 10:09

## 2024-01-01 RX ADMIN — DEXTROSE MONOHYDRATE 500 ML: 100 INJECTION, SOLUTION INTRAVENOUS at 10:09

## 2024-01-01 RX ADMIN — VANCOMYCIN HYDROCHLORIDE 500 MG: 500 INJECTION, POWDER, LYOPHILIZED, FOR SOLUTION INTRAVENOUS at 05:09

## 2024-01-01 RX ADMIN — NOREPINEPHRINE BITARTRATE 0.62 MCG/KG/MIN: 8 INJECTION, SOLUTION INTRAVENOUS at 10:09

## 2024-01-01 RX ADMIN — EPINEPHRINE 1 MG: 0.1 INJECTION INTRAVENOUS at 12:09

## 2024-01-01 RX ADMIN — Medication 100 MCG/HR: at 12:09

## 2024-01-01 RX ADMIN — SODIUM CHLORIDE 8 MG/HR: 900 INJECTION INTRAVENOUS at 05:09

## 2024-01-01 RX ADMIN — SODIUM CHLORIDE 250 ML: 9 INJECTION, SOLUTION INTRAVENOUS at 08:09

## 2024-01-01 RX ADMIN — NOREPINEPHRINE BITARTRATE 0.84 MCG/KG/MIN: 1 INJECTION, SOLUTION, CONCENTRATE INTRAVENOUS at 09:09

## 2024-01-01 RX ADMIN — NOREPINEPHRINE BITARTRATE 0.7 MCG/KG/MIN: 8 INJECTION, SOLUTION INTRAVENOUS at 06:09

## 2024-01-01 RX ADMIN — PROPOFOL 25 MCG/KG/MIN: 10 INJECTION, EMULSION INTRAVENOUS at 03:09

## 2024-01-01 RX ADMIN — AMIODARONE HYDROCHLORIDE 0.5 MG/MIN: 1.8 INJECTION, SOLUTION INTRAVENOUS at 11:09

## 2024-01-01 RX ADMIN — ACETAMINOPHEN 650 MG: 325 TABLET, FILM COATED ORAL at 08:09

## 2024-01-01 RX ADMIN — SEVELAMER CARBONATE 800 MG: 800 TABLET, FILM COATED ORAL at 09:09

## 2024-01-01 RX ADMIN — DEXTROSE MONOHYDRATE 500 ML: 100 INJECTION, SOLUTION INTRAVENOUS at 03:09

## 2024-01-01 RX ADMIN — NOREPINEPHRINE BITARTRATE 0.4 MCG/KG/MIN: 8 INJECTION, SOLUTION INTRAVENOUS at 11:09

## 2024-01-01 RX ADMIN — ACETAMINOPHEN 325MG 650 MG: 325 TABLET ORAL at 01:09

## 2024-01-01 RX ADMIN — SEVELAMER CARBONATE 800 MG: 800 TABLET, FILM COATED ORAL at 07:09

## 2024-01-01 RX ADMIN — DEXTROSE MONOHYDRATE 125 ML: 100 INJECTION, SOLUTION INTRAVENOUS at 04:09

## 2024-01-01 RX ADMIN — FENTANYL CITRATE 50 MCG: 50 INJECTION, SOLUTION INTRAMUSCULAR; INTRAVENOUS at 07:09

## 2024-01-01 RX ADMIN — VANCOMYCIN HYDROCHLORIDE 1250 MG: 1.25 INJECTION, POWDER, LYOPHILIZED, FOR SOLUTION INTRAVENOUS at 01:09

## 2024-01-01 RX ADMIN — ASPIRIN 81 MG CHEWABLE TABLET 81 MG: 81 TABLET CHEWABLE at 09:09

## 2024-01-01 RX ADMIN — SODIUM BICARBONATE 650 MG TABLET 1950 MG: at 09:09

## 2024-01-01 RX ADMIN — SODIUM CHLORIDE 8 MG/HR: 900 INJECTION INTRAVENOUS at 03:09

## 2024-01-01 RX ADMIN — CALCIUM GLUCONATE 1 G: 20 INJECTION, SOLUTION INTRAVENOUS at 07:09

## 2024-01-01 RX ADMIN — PERFLUTREN 1.3 ML: 6.52 INJECTION, SUSPENSION INTRAVENOUS at 10:09

## 2024-01-01 RX ADMIN — Medication 150 MCG/HR: at 04:09

## 2024-01-01 RX ADMIN — SODIUM BICARBONATE: 84 INJECTION, SOLUTION INTRAVENOUS at 06:09

## 2024-01-01 RX ADMIN — NOREPINEPHRINE BITARTRATE 0.6 MCG/KG/MIN: 8 INJECTION, SOLUTION INTRAVENOUS at 04:09

## 2024-01-01 RX ADMIN — ONDANSETRON 4 MG: 2 INJECTION INTRAMUSCULAR; INTRAVENOUS at 06:09

## 2024-01-01 RX ADMIN — SODIUM BICARBONATE 100 MEQ: 84 INJECTION, SOLUTION INTRAVENOUS at 09:09

## 2024-01-01 RX ADMIN — Medication 100 MCG/HR: at 05:09

## 2024-01-01 RX ADMIN — NOREPINEPHRINE BITARTRATE 1 MCG/KG/MIN: 1 INJECTION, SOLUTION, CONCENTRATE INTRAVENOUS at 07:09

## 2024-01-01 RX ADMIN — GENTAMICIN SULFATE 92.8 MG: 40 INJECTION, SOLUTION INTRAMUSCULAR; INTRAVENOUS at 08:09

## 2024-01-01 RX ADMIN — SODIUM BICARBONATE 50 MEQ: 84 INJECTION, SOLUTION INTRAVENOUS at 09:09

## 2024-01-01 RX ADMIN — HYDROCODONE BITARTRATE AND ACETAMINOPHEN 1 TABLET: 7.5; 325 TABLET ORAL at 12:09

## 2024-01-01 RX ADMIN — SODIUM BICARBONATE 650 MG TABLET 1950 MG: at 11:09

## 2024-01-01 RX ADMIN — PIPERACILLIN AND TAZOBACTAM 4.5 G: 4; .5 INJECTION, POWDER, LYOPHILIZED, FOR SOLUTION INTRAVENOUS; PARENTERAL at 08:09

## 2024-01-01 RX ADMIN — ASPIRIN 81 MG: 81 TABLET, COATED ORAL at 07:09

## 2024-01-01 RX ADMIN — DEXTROSE MONOHYDRATE 250 ML: 100 INJECTION, SOLUTION INTRAVENOUS at 11:09

## 2024-01-01 RX ADMIN — PROPOFOL 50 MCG/KG/MIN: 10 INJECTION, EMULSION INTRAVENOUS at 03:09

## 2024-01-01 RX ADMIN — NOREPINEPHRINE BITARTRATE 0.65 MCG/KG/MIN: 8 INJECTION, SOLUTION INTRAVENOUS at 10:09

## 2024-01-01 RX ADMIN — PROPOFOL 25 MCG/KG/MIN: 10 INJECTION, EMULSION INTRAVENOUS at 06:09

## 2024-01-01 RX ADMIN — HYDROCODONE BITARTRATE AND ACETAMINOPHEN 1 TABLET: 5; 325 TABLET ORAL at 08:09

## 2024-01-01 RX ADMIN — NOREPINEPHRINE BITARTRATE 0.44 MCG/KG/MIN: 1 INJECTION, SOLUTION, CONCENTRATE INTRAVENOUS at 02:09

## 2024-01-01 RX ADMIN — HYDROCODONE BITARTRATE AND ACETAMINOPHEN 1 TABLET: 5; 325 TABLET ORAL at 11:09

## 2024-01-01 RX ADMIN — SODIUM BICARBONATE: 84 INJECTION, SOLUTION INTRAVENOUS at 05:09

## 2024-01-01 RX ADMIN — DEXTROSE MONOHYDRATE: 100 INJECTION, SOLUTION INTRAVENOUS at 06:09

## 2024-01-01 RX ADMIN — ACETAMINOPHEN 325MG 650 MG: 325 TABLET ORAL at 03:09

## 2024-01-01 RX ADMIN — SODIUM CHLORIDE 500 ML: 9 INJECTION, SOLUTION INTRAVENOUS at 04:09

## 2024-01-01 RX ADMIN — CALCITRIOL CAPSULES 0.25 MCG 0.25 MCG: 0.25 CAPSULE ORAL at 10:09

## 2024-01-01 RX ADMIN — PROPOFOL 30 MCG/KG/MIN: 10 INJECTION, EMULSION INTRAVENOUS at 05:09

## 2024-01-01 RX ADMIN — AMIODARONE HYDROCHLORIDE 400 MG: 200 TABLET ORAL at 09:09

## 2024-01-01 RX ADMIN — ACETAMINOPHEN 1000 MG: 10 INJECTION, SOLUTION INTRAVENOUS at 09:09

## 2024-01-01 RX ADMIN — ONDANSETRON 4 MG: 2 INJECTION INTRAMUSCULAR; INTRAVENOUS at 02:09

## 2024-09-10 NOTE — CONSULTS
Inpatient consult to Cardiology  Consult performed by: Viviane Mejía FNP  Consult ordered by: Jeanie Milan MD  Reason for consult: NSTEMI      [facility]    Cardiology  Consult Note    Patient Name: Prem Saldana  MRN: [id number]  Admission Date: 9/10/2024  Hospital Length of Stay: 0 days  Code Status: No Order   Attending Provider: Jeanie Milan MD   Consulting Provider: CATRINA Malave  Primary Care Physician: Tank Saenz MD  Principal Problem:<principal problem not specified>    Patient information was obtained from patient, past medical records, ER records, and primary team.     Subjective:     Chief Complaint/Reason for Consult: NSTEMI    HPI: Mr. Saldana is a 50 y/o male with a history CHF, HTN, ESRD on HD, who is unknown to [facility]. He presented to ER on 9.10.24 with complaints of shortness of breath. He reports he has a fall on 9.9.24 in which he hit his right side. He reports SOB started during the night. EMS reported he was tachypneic (breathing 55/min). He was given FD ASA and Sl Nitro x1. Significant labs include H&H 11.7/36.8, BUN/Creat 70.3/8.07, ALP 1061, Albumin 3.2, BNP 14.761, Troponin 0.554. CTA Chest negative for PE, but cardiomegaly with a trace left effusion, bibasilar atelectasis, mild interstitial edema and Chronic appearing compression deformities in the midthoracic spine. CXR shows poor inspiratory effort accentuates the pulmonary vascular markings, cardiomegaly, increased left retrocardiac density and silhouetting of the left hemidiaphragm, and calcified densities below the left hemidiaphragm. EKG shows sinus tachycardia with Left axis deviation, and LVH. [facility] has been consulted for NSTEMI management.       PMH: CHF, HTN, ESRD on HD, Anemia, Secondary Hyperparathyroidism   PSH: AV Fistula   Family History: Maternal Grandmother - Cancer, Heart Disease, MI; Mother - DM II  Social History: Current Smoker (2-3 Cigarette per Day). Reports  occasionally alcohol. Reports past use of Marijuana.     Previous Cardiac Diagnostics:   ECHO (2.19.24):  A complete two-dimensional transthoracic echocardiogram was performed (2D, M-mode, Doppler and color flow Doppler). The left ventricle is severely dilated.   Left ventricular systolic function is severely reduced. Estimated Ejection Fraction = <20%. The right ventricle is mild to moderately dilated. The left atrium is severely dilated. The right atrium is mild to moderately dilated. The mitral valve leaflets appear thickened, but open well. There is moderate to severe mitral annular calcification. There is mild mitral regurgitation. There is mild tricuspid insufficiency noted. Mild aortic regurgitation. Dialated IVC 3.8 cm. The lack of respiratory variation in the inferior vena cava diameter is noted. There is no pericardial effusion. Large left pleural effusion.There is moderate concentric left ventricular hypertrophy.     SPECT (2.19.18):  The perfusion scan is free of evidence for myocardial ischemia or injury. Resting wall motion is physiologic. There is resting LV dysfunction with a reduced ejection fraction of 40 %. The ventricular volumes are normal at rest and stress. The extracardiac distribution of radioactivity is normal.     Dobutamine Stress Test (7.27.16);  Moderate left atrial enlargement. Concentric hypertrophy. Normal left ventricular systolic function (EF 60-65%). Left ventricular diastolic dysfunction. Normal right ventricular systolic function . The estimated PA systolic pressure is greater than 26 mmHg.  Mild mitral regurgitation. Small pericardial effusion.     Review of patient's allergies indicates:   Allergen Reactions    Hydralazine Palpitations and Other (See Comments)     Other Reaction(s): feels like he is running    Palpitations    Amlodipine     Levofloxacin      Current Facility-Administered Medications on File Prior to Encounter   Medication    leuprolide (6 month) injection 45  mg    leuprolide (6 month) SyKt 45 mg     Current Outpatient Medications on File Prior to Encounter   Medication Sig    calcitRIOL (ROCALTROL) 0.25 MCG Cap Take 0.25 mcg by mouth 2 (two) times daily.    labetalol (NORMODYNE) 300 MG tablet     minoxidil (LONITEN) 10 MG Tab Take 2.5 mg by mouth 2 (two) times daily.    vacuum erection device system Kit 1 Units by Misc.(Non-Drug; Combo Route) route as needed.     Review of Systems   Respiratory:  Positive for shortness of breath. Negative for chest tightness.    Cardiovascular:  Negative for chest pain, palpitations and leg swelling.   All other systems reviewed and are negative.      Objective:     Vital Signs (Most Recent):  Temp: 99 °F (37.2 °C) (09/10/24 0529)  Pulse: 98 (09/10/24 0659)  Resp: (!) 35 (09/10/24 0659)  BP: 118/80 (09/10/24 0659)  SpO2: 97 % (09/10/24 0659) Vital Signs (24h Range):  Temp:  [99 °F (37.2 °C)] 99 °F (37.2 °C)  Pulse:  [] 98  Resp:  [30-36] 35  SpO2:  [97 %-99 %] 97 %  BP: (114-118)/(80-82) 118/80   Weight: 72.6 kg (160 lb)  Body mass index is 21.7 kg/m².  SpO2: 97 %     No intake or output data in the 24 hours ending 09/10/24 0757  Lines/Drains/Airways       Peripheral Intravenous Line  Duration                  Peripheral IV - Single Lumen 09/10/24 0600 18 G Anterior;Proximal;Right Forearm <1 day                  Significant Labs:   Chemistries:   Recent Labs   Lab 09/10/24  0551      K 4.5      CO2 24   BUN 70.3*   CREATININE 8.07*   CALCIUM 9.6   BILITOT 0.6   ALKPHOS 1,061*   ALT 8   AST 18   GLUCOSE 98   TROPONINI 0.554*        CBC/Anemia Labs: Coags:    Recent Labs   Lab 09/10/24  0551   WBC 5.31   HGB 11.7*   HCT 36.8*      MCV 95.8*   RDW 20.1*    Recent Labs   Lab 09/10/24  0551   INR 1.2   APTT 32.1        Significant Imaging:  Imaging Results              CTA Chest Non-Coronary (PE Studies) (Final result)  Result time 09/10/24 07:55:09      Final result by Tank Spence MD (09/10/24 07:55:09)                 Initial Result:     Impression:      1. No evidence of pulmonary embolism through the level of the subsegmental pulmonary arteries.  2. Cardiomegaly with a trace left effusion, bibasilar atelectasis, and mild interstitial edema.  3. Chronic appearing compression deformities in the midthoracic spine.      Electronically signed by: Tank Spence MD  Date:    09/10/2024  Time:    07:55               Narrative:    EXAMINATION:  CTA CHEST NON CORONARY (PE STUDIES)    CLINICAL HISTORY:  shortness of breath;    TECHNIQUE:  Axial images of the chest were obtained with IV contrast administration.  Coronal and sagittal reconstructions were provided.  Three dimensional and MIP images were obtained and evaluated.  Total DLP was 550 mGy-cm. Dose lowering technique and automated exposure control were utilized for this exam.    COMPARISON:  Chest radiograph 09/10/2024.    FINDINGS:  There is no filling defect within the pulmonary arteries through the level of the subsegmental pulmonary arteries.  There is no CT evidence of right heart strain.    The airway is widely patent.  There is no mediastinal or hilar lymphadenopathy.  The heart is enlarged.    There is a trace left pleural effusion with bilateral lower lobe atelectasis.  There is mild interstitial pulmonary edema.  There is no pneumothorax.  There is no pulmonary nodule or mass.    The upper abdomen demonstrates no acute abnormality.  There is a peripherally calcified lesion in the spleen.  There is no lytic or blastic osseous lesion.  There are multiple chronic appearing compression deformities of the thoracic spine.                                       X-Ray Chest AP Portable (Final result)  Result time 09/10/24 06:14:28      Final result by Star Oates MD (09/10/24 06:14:28)                   Impression:      Poor inspiratory effort accentuates the pulmonary vascular markings, however, a degree of congestion cannot be excluded.    Cardiomegaly.    Increased  left retrocardiac density and silhouetting of the left hemidiaphragm as above.    Calcified densities below the left hemidiaphragm with differential as above      Electronically signed by: Star Oates  Date:    09/10/2024  Time:    06:14               Narrative:    EXAMINATION:  XR CHEST AP PORTABLE    CLINICAL HISTORY:  Shortness of breath    TECHNIQUE:  Single frontal view of the chest was performed.    COMPARISON:  March 22, 2018    FINDINGS:  Examination reveals mediastinal silhouette to be within normal limits cardiac silhouette is enlarged there is some increase in interstitial and pulmonary vascular markings some of which might be related to poor inspiratory effort or with some of which might reflect some changes of pulmonary vascular congestion and cardiac decompensation.    There might be increased left retrocardiac density and silhouetting of the left hemidiaphragm which might represent an infiltrate/atelectasis.    Calcified densities are seen projecting over the left hemiabdomen exact nature of these is difficult to be ascertained by this exam might be vascular in nature and or represent calcified splenic cyst other imaging modalities might prove helpful for further evaluation                                    EKG:       Telemetry:  SR    Physical Exam  Vitals and nursing note reviewed.   HENT:      Head: Normocephalic.      Mouth/Throat:      Mouth: Mucous membranes are moist.   Eyes:      Pupils: Pupils are equal, round, and reactive to light.   Cardiovascular:      Rate and Rhythm: Normal rate.   Pulmonary:      Effort: Pulmonary effort is normal.      Comments: Bilateral Diminished Lung Sounds  On BiPAP  Musculoskeletal:      Cervical back: Normal range of motion.      Right lower leg: Edema present.      Left lower leg: Edema present.   Neurological:      Mental Status: He is alert and oriented to person, place, and time.   Psychiatric:         Mood and Affect: Mood normal.         Behavior:  Behavior normal.         Home Medications:   Current Facility-Administered Medications on File Prior to Encounter   Medication Dose Route Frequency Provider Last Rate Last Admin    leuprolide (6 month) injection 45 mg  45 mg Intramuscular Q6 Months Hanny Cox NP   45 mg at 07/30/19 1357    leuprolide (6 month) SyKt 45 mg  45 mg Intramuscular Q6 Months Satish Au MD   45 mg at 01/29/19 1402     Current Outpatient Medications on File Prior to Encounter   Medication Sig Dispense Refill    calcitRIOL (ROCALTROL) 0.25 MCG Cap Take 0.25 mcg by mouth 2 (two) times daily.      labetalol (NORMODYNE) 300 MG tablet       minoxidil (LONITEN) 10 MG Tab Take 2.5 mg by mouth 2 (two) times daily.      vacuum erection device system Kit 1 Units by Misc.(Non-Drug; Combo Route) route as needed. 1 kit prn     Current Schedule Inpatient Medications:   leuprolide acetate (6 month)  45 mg Intramuscular Q6 Months    leuprolide acetate (6 month)  45 mg Intramuscular Q6 Months     Continuous Infusions:    Assessment:   NSTEMI, unspecified    - Troponin 0.554  Acute on Chronic Systolic Heart Failure  Acute Respiratory Failure requiring Oxygenation - Currently on BiPAP  VHD    - Moderate to Severe Mitral Annular Calcification    - Mild MR/TR/AR  Trace Left Pleural Effusion   NICMO    - EF 20: on ECHO (2.19.24)    - Normal SPECT (2.19.18)  HTN/HHD  ESRD     - on HD T,Thu,Sat   Anemia  Secondary Hyperparathyroidism   No known history of GI Bleed     Plan:   ECHO Today  Trend Troponin until Peak  Fluid Management/HD per Nephrology Team  Plan for [facility] at some point  Accurate I&O/Daily Weight  Labs in AM: CBC, BMP, and Mag     Thank you for your consult.     CATRINA Malave  Cardiology  [facility] - Emergency Dept  09/10/2024

## 2024-09-10 NOTE — H&P
[facility] Hospital Medicine History & Physical Examination       Patient Name: Prem Saldana  MRN: [id number]  Patient Class: IP- Inpatient   Admission Date: 9/10/2024   Admitting Physician: Holly Dudley MD   Length of Stay: 0  Attending Physician: Holly Dudley MD   Primary Care Provider: Tank Saenz MD  Face-to-Face encounter date: 09/10/2024  Code Status: Full Code    Chief Complaint: Shortness of Breath (Arrives [facility] unit [facility] from home hx dialysis t/th/sat scheduled for this morning at 0600 also hx chf, reports fall yesterday hit r side on table, started w/ sob last night, aasi reports initially breathing 55/min has improved to 30/min en route, aasi gave 324mg asa and 1x nitro refused iv and cpap w/ aasi)        Patient information was obtained from patient, patient's family, past medical records and ER records.     HISTORY OF PRESENT ILLNESS:   Prem Saldana is a 49 y.o. Black or  male with a past medical history of hypertension, congestive heart failure, COPD on 2-3 L of oxygen at home, ESRD on HD TTS and prostate cancer status post radiation. Patient is from [place] and recently moved to [facility]. The patient presented to [facility] on 9/10/2024 with a primary complaint of shortness of breath and chest pain.  Patient reports dyspnea has been going on for several weeks.  He had a glass table a paroxysmally 3 ft in which fell on his left mid back.  He denies head injury or loss of consciousness.  Patient was complaining of lateral left chest pain, constant since table fell on him, no radiation described as sharp, stabbing, dull and achy and rated at 10/10 on exam.  His last full session of dialysis was on 09/07/2024 in [facility].  Patient reports he travels frequently.    Upon presentation to the ED, temperature 99° F, heart rate 113, blood pressure 114/82, respiratory rate 86 and SpO2 98% on 3 L nasal cannula. Patient was placed on  BiPAP. Labs with H&H 11.7/36.8, MCV 95.8, BUN/creatinine 70.3/08.07, alkaline phosphatase 1061, total bilirubin 14,761, troponin 0.554.  Influenza A/B and SARS-COV-2 PCR negative.  EKG sinus tachycardia with ventricular rate of 112, occasional premature ventricular complexes.  Chest x-ray poor inspiratory effort accentuates the pulmonary vascular markings however there is a degree of congestive unable to be excluded, cardiomegaly, increased left retrocardiac density and silhouetting of the left hemidiaphragm and calcified densities in the left hemidiaphragm.  CTA of the chest PE study without evidence of pulmonary embolism but cardiomegaly, trace left effusion, bibasilar atelectasis and mild interstitial edema, chronic appearing compression deformity of the midthoracic spine.  In ED patient received fentanyl and Zofran.  He is admitted to hospital medicine services for further medical management.    PAST MEDICAL HISTORY:     Past Medical History:   Diagnosis Date    Anemia of renal disease     ESRD on dialysis since 4/3/15     Essential hypertension     Secondary hyperparathyroidism of renal origin        PAST SURGICAL HISTORY:     Past Surgical History:   Procedure Laterality Date    AV FISTULA PLACEMENT Left 07/2015    Peritoneal Dialysis Catheter Placement  01/2016    Permcath Placement          ALLERGIES:   Hydralazine, Amlodipine, and Levofloxacin    FAMILY HISTORY:   Reviewed and negative    SOCIAL HISTORY:     Social History     Tobacco Use    Smoking status: Former     Current packs/day: 1.00     Average packs/day: 1 pack/day for 15.0 years (15.0 ttl pk-yrs)     Types: Cigarettes    Smokeless tobacco: Never    Tobacco comments:     currently smokes 2-3 cigs/week; has been smoking for >20 years; at the most smoked 1 ppd   Substance Use Topics    Alcohol use: No     Comment: previously social drinker      Denies tobacco, alcohol and illicit drug use    Screening for Social Drivers for health:  Patient screened  for food insecurity, housing instability, transportation needs, utility difficulties, and interpersonal safety (select all that apply as identified as concern)  []Housing or Food  []Transportation Needs  []Utility Difficulties  []Interpersonal safety  [x]None    HOME MEDICATIONS:     Prior to Admission medications    Medication Sig Start Date End Date Taking? Authorizing Provider   calcitRIOL (ROCALTROL) 0.25 MCG Cap Take 0.25 mcg by mouth 2 (two) times daily.    Provider, Historical   labetalol (NORMODYNE) 300 MG tablet  3/28/17   Provider, Historical   minoxidil (LONITEN) 10 MG Tab Take 2.5 mg by mouth 2 (two) times daily.    Provider, Historical   vacuum erection device system Kit 1 Units by Misc.(Non-Drug; Combo Route) route as needed. 8/7/18   Hanny Cox, NP       REVIEW OF SYSTEMS:   Except as documented, all other systems reviewed and negative     PHYSICAL EXAM:     VITAL SIGNS: 24 HRS MIN & MAX LAST   Temp  Min: 99 °F (37.2 °C)  Max: 99 °F (37.2 °C) 99 °F (37.2 °C)   BP  Min: 104/74  Max: 121/83 113/85   Pulse  Min: 80  Max: 113  84   Resp  Min: 24  Max: 36 (!) 28   SpO2  Min: 97 %  Max: 100 % 100 %       General appearance: Well-developed, well-nourished male in no apparent distress.  No family at bedside.  Sitting in bed eating.  HEENT: Atraumatic head. Moist mucous membranes of oral cavity.  Lungs:  Diminished to auscultation bilaterally.  On nasal cannula at 3 L. mild labored respirations with talking.  Heart: Regular rate and rhythm.  2+ pitting edema bilateral lower extremities.  Av fistula to left forearm with palpable and audible thrill.  Abdomen: Soft, non-distended.  Extremities: No cyanosis, clubbing. No deformities.  Skin: No Rash. Warm and dry.  Neuro: Awake, alert and oriented. Motor and sensory exams grossly intact.  Psych/mental status: Appropriate mood and affect. Cooperative. Responds appropriately to questions.       LABS AND IMAGING:     Recent Labs   Lab 09/10/24  0551   WBC 5.31    RBC 3.84*   HGB 11.7*   HCT 36.8*   MCV 95.8*   MCH 30.5   MCHC 31.8*   RDW 20.1*      MPV 11.6*       Recent Labs   Lab 09/10/24  0551      K 4.5      CO2 24   BUN 70.3*   CREATININE 8.07*   CALCIUM 9.6   ALBUMIN 3.2*   ALKPHOS 1,061*   ALT 8   AST 18   BILITOT 0.6       Microbiology Results (last 7 days)       Procedure Component Value Units Date/Time    Blood Culture #2 **CANNOT BE ORDERED STAT** [[id number]] Collected: 09/10/24 0654    Order Status: Resulted Specimen: Blood Updated: 09/10/24 0740    Blood Culture #1 **CANNOT BE ORDERED STAT** [[id number]] Collected: 09/10/24 0654    Order Status: Resulted Specimen: Blood Updated: 09/10/24 0740             CTA Chest Non-Coronary (PE Studies)  Narrative: EXAMINATION:  CTA CHEST NON CORONARY (PE STUDIES)    CLINICAL HISTORY:  shortness of breath;    TECHNIQUE:  Axial images of the chest were obtained with IV contrast administration.  Coronal and sagittal reconstructions were provided.  Three dimensional and MIP images were obtained and evaluated.  Total DLP was 550 mGy-cm. Dose lowering technique and automated exposure control were utilized for this exam.    COMPARISON:  Chest radiograph 09/10/2024.    FINDINGS:  There is no filling defect within the pulmonary arteries through the level of the subsegmental pulmonary arteries.  There is no CT evidence of right heart strain.    The airway is widely patent.  There is no mediastinal or hilar lymphadenopathy.  The heart is enlarged.    There is a trace left pleural effusion with bilateral lower lobe atelectasis.  There is mild interstitial pulmonary edema.  There is no pneumothorax.  There is no pulmonary nodule or mass.    The upper abdomen demonstrates no acute abnormality.  There is a peripherally calcified lesion in the spleen.  There is no lytic or blastic osseous lesion.  There are multiple chronic appearing compression deformities of the thoracic spine.  Impression: 1. No evidence of  pulmonary embolism through the level of the subsegmental pulmonary arteries.  2. Cardiomegaly with a trace left effusion, bibasilar atelectasis, and mild interstitial edema.  3. Chronic appearing compression deformities in the midthoracic spine.    Electronically signed by: Tank Spence MD  Date:    09/10/2024  Time:    07:55  X-Ray Chest AP Portable  Narrative: EXAMINATION:  XR CHEST AP PORTABLE    CLINICAL HISTORY:  Shortness of breath    TECHNIQUE:  Single frontal view of the chest was performed.    COMPARISON:  March 22, 2018    FINDINGS:  Examination reveals mediastinal silhouette to be within normal limits cardiac silhouette is enlarged there is some increase in interstitial and pulmonary vascular markings some of which might be related to poor inspiratory effort or with some of which might reflect some changes of pulmonary vascular congestion and cardiac decompensation.    There might be increased left retrocardiac density and silhouetting of the left hemidiaphragm which might represent an infiltrate/atelectasis.    Calcified densities are seen projecting over the left hemiabdomen exact nature of these is difficult to be ascertained by this exam might be vascular in nature and or represent calcified splenic cyst other imaging modalities might prove helpful for further evaluation  Impression: Poor inspiratory effort accentuates the pulmonary vascular markings, however, a degree of congestion cannot be excluded.    Cardiomegaly.    Increased left retrocardiac density and silhouetting of the left hemidiaphragm as above.    Calcified densities below the left hemidiaphragm with differential as above    Electronically signed by: Star Oates  Date:    09/10/2024  Time:    06:14        ASSESSMENT & PLAN:   Assessment:  Volume overload secondary to ESRD on HD and acute on chronic congestive heart failure exacerbation  Trace left pleural effusion and moderate interstitial edema secondary to above  NSTEMI, ? type 2  due to demand ischemia  Multiple chronic compression deformities of thoracic spine  ESRD on HD needing dialysis   Elevated alkaline phosphatase       Plan:  - Nephrology consulted and dialysis performed today  - Cardiology consulted for NSTEMI.  Recommended echo today with plans of heart heart catheterization at some point  - Trend troponin   - Telemetry monitoring   - Resume appropriate home medications when deemed necessary   - Labs in AM      VTE Prophylaxis: will be placed on Lovenox for DVT prophylaxis and will be advised to be as mobile as possible and sit in a chair as tolerated      __________________________________________________________________________  INPATIENT LIST OF MEDICATIONS     Current Facility-Administered Medications:     acetaminophen tablet 650 mg, 650 mg, Oral, Q8H PRN, Mayr Carter, PA-PAUL    acetaminophen tablet 650 mg, 650 mg, Oral, Q4H PRN, Mary Carter PA-C, 650 mg at 09/10/24 1314    dextrose 10% bolus 125 mL 125 mL, 12.5 g, Intravenous, PRN, Mary Carter, PA-C    dextrose 10% bolus 250 mL 250 mL, 25 g, Intravenous, PRN, Mary Carter, PA-PAUL    enoxaparin injection 30 mg, 30 mg, Subcutaneous, Daily, Mary Carter, PA-PAUL    glucagon (human recombinant) injection 1 mg, 1 mg, Intramuscular, PRN, Mary Carter, PA-PAUL    glucose chewable tablet 16 g, 16 g, Oral, PRN, Mary Carter, PA-PAUL    glucose chewable tablet 24 g, 24 g, Oral, PRN, Mary Carter, PA-C    leuprolide (6 month) injection 45 mg, 45 mg, Intramuscular, Q6 Months, Hanny Cox NP, 45 mg at 07/30/19 1357    leuprolide (6 month) SyKt 45 mg, 45 mg, Intramuscular, Q6 Months, Satish Au MD, 45 mg at 01/29/19 1402    [START ON 9/11/2024] mupirocin 2 % ointment, , Nasal, BID, Holly Dudley MD    ondansetron injection 4 mg, 4 mg, Intravenous, Q4H PRN, Mary Carter PA-C    sodium chloride 0.9% flush 10 mL, 10 mL, Intravenous, Q12H PRN, Mary Carter PA-C    Current  Outpatient Medications:     calcitRIOL (ROCALTROL) 0.25 MCG Cap, Take 0.25 mcg by mouth 2 (two) times daily., Disp: , Rfl:     labetalol (NORMODYNE) 300 MG tablet, , Disp: , Rfl:     minoxidil (LONITEN) 10 MG Tab, Take 2.5 mg by mouth 2 (two) times daily., Disp: , Rfl:     vacuum erection device system Kit, 1 Units by Misc.(Non-Drug; Combo Route) route as needed., Disp: 1 kit, Rfl: prn      Scheduled Meds:   enoxparin  30 mg Subcutaneous Daily    leuprolide acetate (6 month)  45 mg Intramuscular Q6 Months    leuprolide acetate (6 month)  45 mg Intramuscular Q6 Months    [START ON 9/11/2024] mupirocin   Nasal BID     Continuous Infusions:  PRN Meds:.  Current Facility-Administered Medications:     acetaminophen, 650 mg, Oral, Q8H PRN    acetaminophen, 650 mg, Oral, Q4H PRN    dextrose 10%, 12.5 g, Intravenous, PRN    dextrose 10%, 25 g, Intravenous, PRN    glucagon (human recombinant), 1 mg, Intramuscular, PRN    glucose, 16 g, Oral, PRN    glucose, 24 g, Oral, PRN    ondansetron, 4 mg, Intravenous, Q4H PRN    sodium chloride 0.9%, 10 mL, Intravenous, Q12H PRN      Discharge Planning and Disposition: Anticipated discharge to be determined.    IMary PA, have reviewed and discussed the case with Dr. Holly Arreguin MD    Please see the following addendum for further assessment and plan from there attending MD.      Portion of this note is dictated using EMR integrated voice recognition software and may be subjected to voice recognition errors not corrected with proofreading. Please  for clarification if needed.       Mary Carter PA-C  09/10/2024      ANGÉLICA arreguin MD  Have seen and examined the patient agree with the above       49-year-old  male with past medical history of hypertension, congestive heart failure with EF of 20%, COPD on home oxygen, end-stage renal disease on hemodialysis, prostate cancer status post radiation who recently moved from [place] to  [place] presented to ER with complaints of chest pain and shortness of breaths.  Patient reports a table fell on him and he has been having left-sided chest pain since then chest x-ray showed poor inspiratory effort accentuated pulmonary vascular markings also concern about congestive changes, CTA of the chest was negative for PE did show left-sided effusion bibasilar atelectasis and mild interstitial edema patient was put on 3 L of nasal cannula then was put on BiPAP and now has been weaned down to 4 L patient was significantly tachypneic in the ER and has been admitted to hospitalist service for further management care troponins were found to be elevated to   General alert awake oriented   Chest clear to auscultate   Abdomen soft      Nephrology's consulted for urgent dialysis   Cardiology consulted for NSTEMI 2D echo ordered, we will trend troponins  Blood cultures x2 ordered CTA chest negative for PE did show chronic appearing midthoracic deformity  Continue with oxygen support    Will resume home medications

## 2024-09-10 NOTE — Clinical Note
The catheter was inserted into the and was removed from the ostium   left main. An angiography was performed of the left coronary arteries. The angiography was performed via power injection.  The catheter was unable to engage the area. and The result was suboptimal..

## 2024-09-10 NOTE — NURSING
09/10/24 1300   Post-Hemodialysis Assessment   Blood Volume Processed (Liters) 62.3 L   Dialyzer Clearance Clear   Duration of Treatment 180 minutes   Total UF (mL) 3000 mL   Patient Response to Treatment pt tolerated tx well   Post-Hemodialysis Comments NAD noted, VSS, Net UF 3,000 mL, pt requested to take off BiPAP mask, curreintly satting 100% on 3L NC. Last BP is 110/76 and HR is 67.

## 2024-09-10 NOTE — Clinical Note
The catheter was removed from the and insertion attempt was made into the ostium   left main. The catheter was unable to engage the area..

## 2024-09-10 NOTE — CONSULTS
Nephrology Consult    Reason for Consult:     ESRD on HD, acute volume     HPI:   Prem Saldana is a 49 y.o. male from [place], with pmhx of ESRD TTS via L AVF, CHF, EF <20%, COPD, hx of prostate cancer, presenting to the ED today with respiratory failure requiring BiPAP.     History is very limited due to his condition but he was able to answer a few questions on bipap. He states that he last dialyzed in [place], [place] Saturday 9/7. He tells me he has a 6am chair time TTS set up at the dialysis unit in [place]? Will have to call the unit to verify.     CTA chest revealing cardiomegaly with trace left effusion, atelectasis and mild interstitial edema. CXR with congestion. He has 1+ edema to BLE. His electrolytes are normal., BNP is >14,000 on admission, troponin 0.55 and he states that his chest hurts because he fell and had trauma to the chest 2 days ago. Bedside echo pending in the ED. Cardiology following and trending troponins until peak. ED physician paged nephrology for HD today.     PCP:  Tank Saenz MD    Review of patient's allergies indicates:   Allergen Reactions    Hydralazine Palpitations and Other (See Comments)     Other Reaction(s): feels like he is running    Palpitations    Amlodipine     Levofloxacin        Current Facility-Administered Medications   Medication Dose Route Frequency Provider Last Rate Last Admin    leuprolide (6 month) injection 45 mg  45 mg Intramuscular Q6 Months Hanny Cox NP   45 mg at 07/30/19 1357    leuprolide (6 month) SyKt 45 mg  45 mg Intramuscular Q6 Months Satish Au MD   45 mg at 01/29/19 1402     Current Outpatient Medications   Medication Sig Dispense Refill    calcitRIOL (ROCALTROL) 0.25 MCG Cap Take 0.25 mcg by mouth 2 (two) times daily.      labetalol (NORMODYNE) 300 MG tablet       minoxidil (LONITEN) 10 MG Tab Take 2.5 mg by mouth 2 (two) times daily.      vacuum erection device system Kit 1 Units by Misc.(Non-Drug; Combo Route) route  as needed. 1 kit prn     (Not in a hospital admission)      Past Medical History:  Past Medical History:   Diagnosis Date    Anemia of renal disease     ESRD on dialysis since 4/3/15     Essential hypertension     Secondary hyperparathyroidism of renal origin        Past Surgical History:  Past Surgical History:   Procedure Laterality Date    AV FISTULA PLACEMENT Left 07/2015    Peritoneal Dialysis Catheter Placement  01/2016    Permcath Placement          Family History:  Family History   Problem Relation Name Age of Onset    Diabetes Mother      Cancer Maternal Grandmother      Hypertension Maternal Grandfather      Heart attack Maternal Grandfather          MI in his 60s or 70s    Kidney disease Neg Hx         Social History:  Social History     Tobacco Use    Smoking status: Former     Current packs/day: 1.00     Average packs/day: 1 pack/day for 15.0 years (15.0 ttl pk-yrs)     Types: Cigarettes    Smokeless tobacco: Never    Tobacco comments:     currently smokes 2-3 cigs/week; has been smoking for >20 years; at the most smoked 1 ppd   Substance Use Topics    Alcohol use: No     Comment: previously social drinker          Review of Systems:    Review of Systems   Unable to perform ROS: Acuity of condition   Respiratory:  Positive for shortness of breath.    Cardiovascular:  Positive for chest pain.       Objective:    VITAL SIGNS: 24 HR MIN & MAX LAST  Temp  Min: 99 °F (37.2 °C)  Max: 99 °F (37.2 °C) 99 °F (37.2 °C)  BP  Min: 114/82  Max: 118/80 118/80  Pulse  Min: 94  Max: 113 94  Resp  Min: 24  Max: 36 (!) 24  SpO2  Min: 97 %  Max: 99 % 98 %    No intake or output data in the 24 hours ending 09/10/24 0838    Physical Exam  Vitals and nursing note reviewed.   Constitutional:       Appearance: He is ill-appearing.   HENT:      Head: Normocephalic and atraumatic.   Eyes:      General: No scleral icterus.     Extraocular Movements: Extraocular movements intact.   Cardiovascular:      Rate and Rhythm: Normal rate  and regular rhythm.      Heart sounds: Normal heart sounds.   Pulmonary:      Comments: On bipap, breath sounds diminished. No wheezing. Some crackles noted to bases   Abdominal:      General: Abdomen is flat. Bowel sounds are normal. There is no distension.      Palpations: Abdomen is soft.      Tenderness: There is no abdominal tenderness.   Musculoskeletal:         General: No swelling or deformity. Normal range of motion.      Right lower leg: No edema.      Left lower leg: No edema.      Comments: BLE edema 1+   Skin:     General: Skin is warm and dry.   Neurological:      Mental Status: He is alert.         Recent Results (from the past 48 hour(s))   Comprehensive metabolic panel    Collection Time: 09/10/24  5:51 AM   Result Value Ref Range    Sodium 145 136 - 145 mmol/L    Potassium 4.5 3.5 - 5.1 mmol/L    Chloride 101 98 - 107 mmol/L    CO2 24 22 - 29 mmol/L    Glucose 98 74 - 100 mg/dL    Blood Urea Nitrogen 70.3 (H) 8.9 - 20.6 mg/dL    Creatinine 8.07 (H) 0.73 - 1.18 mg/dL    Calcium 9.6 8.4 - 10.2 mg/dL    Protein Total 6.7 6.4 - 8.3 gm/dL    Albumin 3.2 (L) 3.5 - 5.0 g/dL    Globulin 3.5 2.4 - 3.5 gm/dL    Albumin/Globulin Ratio 0.9 (L) 1.1 - 2.0 ratio    Bilirubin Total 0.6 <=1.5 mg/dL    ALP 1,061 (H) 40 - 150 unit/L    ALT 8 0 - 55 unit/L    AST 18 5 - 34 unit/L    eGFR 8 mL/min/1.73/m2    Anion Gap 20.0 mEq/L    BUN/Creatinine Ratio 9    APTT    Collection Time: 09/10/24  5:51 AM   Result Value Ref Range    PTT 32.1 23.2 - 33.7 seconds   Protime-INR    Collection Time: 09/10/24  5:51 AM   Result Value Ref Range    PT 14.9 (H) 12.5 - 14.5 seconds    INR 1.2 <=1.3   Troponin I    Collection Time: 09/10/24  5:51 AM   Result Value Ref Range    Troponin-I 0.554 (H) 0.000 - 0.045 ng/mL   Brain natriuretic peptide    Collection Time: 09/10/24  5:51 AM   Result Value Ref Range    Natriuretic Peptide 14,761.0 (H) <=100.0 pg/mL   CBC with Differential    Collection Time: 09/10/24  5:51 AM   Result Value Ref  Range    WBC 5.31 4.50 - 11.50 x10(3)/mcL    RBC 3.84 (L) 4.70 - 6.10 x10(6)/mcL    Hgb 11.7 (L) 14.0 - 18.0 g/dL    Hct 36.8 (L) 42.0 - 52.0 %    MCV 95.8 (H) 80.0 - 94.0 fL    MCH 30.5 27.0 - 31.0 pg    MCHC 31.8 (L) 33.0 - 36.0 g/dL    RDW 20.1 (H) 11.5 - 17.0 %    Platelet 193 130 - 400 x10(3)/mcL    MPV 11.6 (H) 7.4 - 10.4 fL    Neut % 70.3 %    Lymph % 15.4 %    Mono % 12.2 %    Eos % 1.1 %    Basophil % 0.4 %    Lymph # 0.82 0.6 - 4.6 x10(3)/mcL    Neut # 3.73 2.1 - 9.2 x10(3)/mcL    Mono # 0.65 0.1 - 1.3 x10(3)/mcL    Eos # 0.06 0 - 0.9 x10(3)/mcL    Baso # 0.02 <=0.2 x10(3)/mcL    IG# 0.03 0 - 0.04 x10(3)/mcL    IG% 0.6 %    NRBC% 0.0 %   Lactic acid, plasma    Collection Time: 09/10/24  6:54 AM   Result Value Ref Range    Lactic Acid Level 2.1 0.5 - 2.2 mmol/L       CTA Chest Non-Coronary (PE Studies)    Result Date: 9/10/2024  EXAMINATION: CTA CHEST NON CORONARY (PE STUDIES) CLINICAL HISTORY: shortness of breath; TECHNIQUE: Axial images of the chest were obtained with IV contrast administration.  Coronal and sagittal reconstructions were provided.  Three dimensional and MIP images were obtained and evaluated.  Total DLP was 550 mGy-cm. Dose lowering technique and automated exposure control were utilized for this exam. COMPARISON: Chest radiograph 09/10/2024. FINDINGS: There is no filling defect within the pulmonary arteries through the level of the subsegmental pulmonary arteries.  There is no CT evidence of right heart strain. The airway is widely patent.  There is no mediastinal or hilar lymphadenopathy.  The heart is enlarged. There is a trace left pleural effusion with bilateral lower lobe atelectasis.  There is mild interstitial pulmonary edema.  There is no pneumothorax.  There is no pulmonary nodule or mass. The upper abdomen demonstrates no acute abnormality.  There is a peripherally calcified lesion in the spleen.  There is no lytic or blastic osseous lesion.  There are multiple chronic appearing  compression deformities of the thoracic spine.     1. No evidence of pulmonary embolism through the level of the subsegmental pulmonary arteries. 2. Cardiomegaly with a trace left effusion, bibasilar atelectasis, and mild interstitial edema. 3. Chronic appearing compression deformities in the midthoracic spine. Electronically signed by: Tank Spence MD Date:  09/10/2024 Time:  07:55    X-Ray Chest AP Portable    Result Date: 9/10/2024  EXAMINATION: XR CHEST AP PORTABLE CLINICAL HISTORY: Shortness of breath TECHNIQUE: Single frontal view of the chest was performed. COMPARISON: March 22, 2018 FINDINGS: Examination reveals mediastinal silhouette to be within normal limits cardiac silhouette is enlarged there is some increase in interstitial and pulmonary vascular markings some of which might be related to poor inspiratory effort or with some of which might reflect some changes of pulmonary vascular congestion and cardiac decompensation. There might be increased left retrocardiac density and silhouetting of the left hemidiaphragm which might represent an infiltrate/atelectasis. Calcified densities are seen projecting over the left hemiabdomen exact nature of these is difficult to be ascertained by this exam might be vascular in nature and or represent calcified splenic cyst other imaging modalities might prove helpful for further evaluation     Poor inspiratory effort accentuates the pulmonary vascular markings, however, a degree of congestion cannot be excluded. Cardiomegaly. Increased left retrocardiac density and silhouetting of the left hemidiaphragm as above. Calcified densities below the left hemidiaphragm with differential as above Electronically signed by: Star Oates Date:09/10/2024 Time:06:14      Assessment & Plan:    ESRD on HD   - from [place], unknown to us     2. CHF - EF 20%  3. NSTEMI   - cardiology evaluating and following    Recommendations:    - plan for dialysis today with fluid removal and  continue TTS schedule while inpatient      Thank you for allowing us to participate in this patient's care.    Lexis Ramirez PA-C  [facility]

## 2024-09-10 NOTE — ED PROVIDER NOTES
Encounter Date: 9/10/2024       History     Chief Complaint   Patient presents with    Shortness of Breath     Arrives [facility] unit [facility] from home hx dialysis t/th/sat scheduled for this morning at 0600 also hx chf, reports fall yesterday hit r side on table, started w/ sob last night, aasi reports initially breathing 55/min has improved to 30/min en route, aasi gave 324mg asa and 1x nitro refused iv and cpap w/ aasi     49-year-old male with history of ESRD, on dialysis TThS, CHFrEF presenting for evaluation of dyspnea, left-sided chest pain. He states he tripped over a table, fell, then the table fell on his left chest wall. He has had pain since. He attempted to go to sleep but woke up in more pain and SOB, thus he presents today. EMS noted him to be in distress on arrival, gave him NTG and attempted to place an IV and place the patient on BiPAP, he refused. NTG did not help his chest pain. He states he can be SOB the day of dialysis, but never like this. He is feeling anxious, took Buspar prior to arrival. He denies fever, cough, but feels like his legs are more swollen than usual. He does not make urine. No history of VTE, not on anticoagulation.     Notably, he has been driving from [place] to [place]. He is moving back here. He has had multiple hospitalizations on the trip here.     The history is provided by the patient, the EMS personnel and medical records.     Review of patient's allergies indicates:   Allergen Reactions    Hydralazine Palpitations and Other (See Comments)     Other Reaction(s): feels like he is running    Palpitations    Amlodipine     Levofloxacin      Past Medical History:   Diagnosis Date    Anemia of renal disease     ESRD on dialysis since 4/3/15     Essential hypertension     Secondary hyperparathyroidism of renal origin      Past Surgical History:   Procedure Laterality Date    AV FISTULA PLACEMENT Left 07/2015    Peritoneal Dialysis Catheter Placement  01/2016    Permcath Placement         Family History   Problem Relation Name Age of Onset    Diabetes Mother      Cancer Maternal Grandmother      Hypertension Maternal Grandfather      Heart attack Maternal Grandfather          MI in his 60s or 70s    Kidney disease Neg Hx       Social History     Tobacco Use    Smoking status: Former     Current packs/day: 1.00     Average packs/day: 1 pack/day for 15.0 years (15.0 ttl pk-yrs)     Types: Cigarettes    Smokeless tobacco: Never    Tobacco comments:     currently smokes 2-3 cigs/week; has been smoking for >20 years; at the most smoked 1 ppd   Substance Use Topics    Alcohol use: No     Comment: previously social drinker    Drug use: No     Comment: remote use of marijuana >20 years ago     Review of Systems   Constitutional:  Negative for fever.   HENT:  Negative for sinus pressure and sinus pain.    Respiratory:  Positive for shortness of breath. Negative for cough.    Cardiovascular:  Positive for chest pain and leg swelling.   Gastrointestinal:  Negative for abdominal pain, diarrhea, nausea and vomiting.   Skin:  Negative for rash.   Neurological:  Negative for syncope and light-headedness.       Physical Exam     Initial Vitals [09/10/24 0529]   BP Pulse Resp Temp SpO2   114/82 (!) 113 (!) 36 99 °F (37.2 °C) 98 %      MAP       --         Physical Exam    Nursing note and vitals reviewed.  Constitutional: He appears well-developed and well-nourished. He is not diaphoretic.   Patient is tachypneic, in respiratory distress   HENT:   Head: Normocephalic and atraumatic.   Eyes: Conjunctivae and EOM are normal.   Neck: Neck supple. No tracheal deviation present.   Cardiovascular:  Regular rhythm and intact distal pulses.           tachycardic   Pulmonary/Chest: He is in respiratory distress. He has wheezes. He has no rhonchi. He has no rales. He exhibits tenderness (left-sided, no flail chest appreciated).   Equal air movement with coarse expiratory wheezing  Labored respirations with accessory muscle  use, retractions  Tachypneic  Speaking in 4-6 word phrases   Abdominal: Abdomen is soft. He exhibits no distension. There is no abdominal tenderness.   Musculoskeletal:         General: Edema (BLE) present. No tenderness. Normal range of motion.      Cervical back: Neck supple.     Neurological: He is alert and oriented to person, place, and time.   Moving all extremities   Skin: Skin is warm and dry. Capillary refill takes less than 2 seconds. No pallor.   Psychiatric:   Labile, anxious         ED Course   Critical Care    Date/Time: 9/10/2024 7:26 AM    Performed by: Jeanie Milan MD  Authorized by: Jeanie Milan MD  Direct patient critical care time: 10 minutes  Additional history critical care time: 10 minutes  Ordering / reviewing critical care time: 7 minutes  Documentation critical care time: 7 minutes  Consulting other physicians critical care time: 10 minutes  Consult with family critical care time: 5 minutes  Total critical care time (exclusive of procedural time) : 49 minutes  Critical care time was exclusive of separately billable procedures and treating other patients.  Critical care was necessary to treat or prevent imminent or life-threatening deterioration of the following conditions: cardiac failure, renal failure and respiratory failure.  Critical care was time spent personally by me on the following activities: blood draw for specimens, development of treatment plan with patient or surrogate, discussions with consultants, interpretation of cardiac output measurements, evaluation of patient's response to treatment, examination of patient, obtaining history from patient or surrogate, ordering and performing treatments and interventions, ordering and review of laboratory studies, ordering and review of radiographic studies, pulse oximetry, re-evaluation of patient's condition, review of old charts and ventilator management.        Labs Reviewed   COMPREHENSIVE METABOLIC PANEL - Abnormal        Result Value    Sodium 145      Potassium 4.5      Chloride 101      CO2 24      Glucose 98      Blood Urea Nitrogen 70.3 (*)     Creatinine 8.07 (*)     Calcium 9.6      Protein Total 6.7      Albumin 3.2 (*)     Globulin 3.5      Albumin/Globulin Ratio 0.9 (*)     Bilirubin Total 0.6      ALP 1,061 (*)     ALT 8      AST 18      eGFR 8      Anion Gap 20.0      BUN/Creatinine Ratio 9     PROTIME-INR - Abnormal    PT 14.9 (*)     INR 1.2     TROPONIN I - Abnormal    Troponin-I 0.554 (*)    B-TYPE NATRIURETIC PEPTIDE - Abnormal    Natriuretic Peptide 14,761.0 (*)    CBC WITH DIFFERENTIAL - Abnormal    WBC 5.31      RBC 3.84 (*)     Hgb 11.7 (*)     Hct 36.8 (*)     MCV 95.8 (*)     MCH 30.5      MCHC 31.8 (*)     RDW 20.1 (*)     Platelet 193      MPV 11.6 (*)     Neut % 70.3      Lymph % 15.4      Mono % 12.2      Eos % 1.1      Basophil % 0.4      Lymph # 0.82      Neut # 3.73      Mono # 0.65      Eos # 0.06      Baso # 0.02      IG# 0.03      IG% 0.6      NRBC% 0.0     TROPONIN I - Abnormal    Troponin-I 0.533 (*)    APTT - Normal    PTT 32.1     COVID/FLU A&B PCR - Normal    Influenza A PCR Not Detected      Influenza B PCR Not Detected      SARS-CoV-2 PCR Not Detected      Narrative:     The Xpert Xpress SARS-CoV-2/FLU/RSV plus is a rapid, multiplexed real-time PCR test intended for the simultaneous qualitative detection and differentiation of SARS-CoV-2, Influenza A, Influenza B, and respiratory syncytial virus (RSV) viral RNA in either nasopharyngeal swab or nasal swab specimens.         LACTIC ACID, PLASMA - Normal    Lactic Acid Level 2.1     LACTIC ACID, PLASMA - Normal    Lactic Acid Level 1.7     HEPATITIS B SURFACE ANTIGEN - Normal    Hep BsAg Interp Nonreactive     BLOOD CULTURE OLG   BLOOD CULTURE OLG   CBC W/ AUTO DIFFERENTIAL    Narrative:     The following orders were created for panel order CBC auto differential.  Procedure                               Abnormality         Status                      ---------                               -----------         ------                     CBC with Differential[[id number]]       Abnormal            Final result                 Please view results for these tests on the individual orders.     EKG Readings: (Independently Interpreted)   Initial Reading: No STEMI. Rhythm: Sinus Tachycardia. Heart Rate: 112. Ectopy: PVCs.   0539     ECG Results              EKG 12-lead (Final result)        Collection Time Result Time QRS Duration OHS QTC Calculation    09/10/24 05:39:19 09/10/24 13:29:38 112 477                     Final result by Interface, [facility] (09/10/24 13:29:44)                   Narrative:    Test Reason : R06.02,    Vent. Rate : 112 BPM     Atrial Rate : 112 BPM     P-R Int : 160 ms          QRS Dur : 112 ms      QT Int : 350 ms       P-R-T Axes : 027 -39 102 degrees     QTc Int : 477 ms    Sinus tachycardia with occasional Premature ventricular complexes  Left axis deviation  LVH with repolarization abnormality ( Bad Axe product )  Abnormal ECG  When compared with ECG of 19-FEB-2018 12:45,  Previous ECG has undetermined rhythm, needs review  Questionable change in QRS duration  Criteria for Septal infarct are no longer Present  Confirmed by Danny Patel MD ([id number]) on 9/10/2024 1:29:33 PM    Referred By: [email or web address]   SELF           Confirmed By:Danny Patel MD                                  Imaging Results              CTA Chest Non-Coronary (PE Studies) (Final result)  Result time 09/10/24 07:55:08      Final result by Tank Spence MD (09/10/24 07:55:08)                   Impression:      1. No evidence of pulmonary embolism through the level of the subsegmental pulmonary arteries.  2. Cardiomegaly with a trace left effusion, bibasilar atelectasis, and mild interstitial edema.  3. Chronic appearing compression deformities in the midthoracic spine.      Electronically signed by: Tank Spence MD  Date:    09/10/2024  Time:    07:55                Narrative:    EXAMINATION:  CTA CHEST NON CORONARY (PE STUDIES)    CLINICAL HISTORY:  shortness of breath;    TECHNIQUE:  Axial images of the chest were obtained with IV contrast administration.  Coronal and sagittal reconstructions were provided.  Three dimensional and MIP images were obtained and evaluated.  Total DLP was 550 mGy-cm. Dose lowering technique and automated exposure control were utilized for this exam.    COMPARISON:  Chest radiograph 09/10/2024.    FINDINGS:  There is no filling defect within the pulmonary arteries through the level of the subsegmental pulmonary arteries.  There is no CT evidence of right heart strain.    The airway is widely patent.  There is no mediastinal or hilar lymphadenopathy.  The heart is enlarged.    There is a trace left pleural effusion with bilateral lower lobe atelectasis.  There is mild interstitial pulmonary edema.  There is no pneumothorax.  There is no pulmonary nodule or mass.    The upper abdomen demonstrates no acute abnormality.  There is a peripherally calcified lesion in the spleen.  There is no lytic or blastic osseous lesion.  There are multiple chronic appearing compression deformities of the thoracic spine.                                       X-Ray Chest AP Portable (Final result)  Result time 09/10/24 06:14:28      Final result by Star Oates MD (09/10/24 06:14:28)                   Impression:      Poor inspiratory effort accentuates the pulmonary vascular markings, however, a degree of congestion cannot be excluded.    Cardiomegaly.    Increased left retrocardiac density and silhouetting of the left hemidiaphragm as above.    Calcified densities below the left hemidiaphragm with differential as above      Electronically signed by: Star Oates  Date:    09/10/2024  Time:    06:14               Narrative:    EXAMINATION:  XR CHEST AP PORTABLE    CLINICAL HISTORY:  Shortness of breath    TECHNIQUE:  Single frontal view of the chest was  performed.    COMPARISON:  March 22, 2018    FINDINGS:  Examination reveals mediastinal silhouette to be within normal limits cardiac silhouette is enlarged there is some increase in interstitial and pulmonary vascular markings some of which might be related to poor inspiratory effort or with some of which might reflect some changes of pulmonary vascular congestion and cardiac decompensation.    There might be increased left retrocardiac density and silhouetting of the left hemidiaphragm which might represent an infiltrate/atelectasis.    Calcified densities are seen projecting over the left hemiabdomen exact nature of these is difficult to be ascertained by this exam might be vascular in nature and or represent calcified splenic cyst other imaging modalities might prove helpful for further evaluation                                    X-Rays:   Independently Interpreted Readings:   Chest X-Ray: Poor inspiratory effort, increased interstitial markings     Medications   sodium chloride 0.9% flush 10 mL (has no administration in time range)   ondansetron injection 4 mg (has no administration in time range)   acetaminophen tablet 650 mg (has no administration in time range)   acetaminophen tablet 650 mg (650 mg Oral Given 9/10/24 1314)   glucose chewable tablet 16 g (has no administration in time range)   glucose chewable tablet 24 g (has no administration in time range)   glucagon (human recombinant) injection 1 mg (has no administration in time range)   enoxaparin injection 30 mg (30 mg Subcutaneous Given 9/10/24 1731)   dextrose 10% bolus 125 mL 125 mL (has no administration in time range)   dextrose 10% bolus 250 mL 250 mL (has no administration in time range)   mupirocin 2 % ointment (has no administration in time range)   HYDROcodone-acetaminophen 5-325 mg per tablet 1 tablet (1 tablet Oral Given 9/10/24 2331)   calcitRIOL capsule 0.25 mcg (0.25 mcg Oral Given 9/10/24 2148)   simethicone chewable tablet 80 mg (80  mg Oral Given 9/11/24 0555)   fentaNYL injection 50 mcg (50 mcg Intravenous Given 9/10/24 0600)   ondansetron injection 4 mg (4 mg Intravenous Given 9/10/24 0600)   iohexoL (OMNIPAQUE 350) injection 100 mL (100 mLs Intravenous Given 9/10/24 0748)     Medical Decision Making  48 yo M presents in respiratory distress after a table fell on his chest wall last night. He is anxious with labored respirations, expiratory wheezing on exam but with decent bilateral air movement and symmetric BS. Bedside US with positive pleural sliding bilaterally, low suspicion for pneumo/hemothorax. He initially refused BiPAP but eventually did agree to a trial of NIPPV. Pain medication and labs with CXR and CTA chest will be obtained.     I was able to review the patient's medical chart from [place], admitted for respiratory failure in February ultimately requiring ICU care. He has a history of CHFrEF (10-15%) with AICD, ESRD on dialysis, metastatic prostate cancer with lung nodules, treated for pneumonia at that time, had a pericardial effusion requiring pericardial window, and COPD.  There is also mention of AAA and possible mitral valve replacement/repair?    Differential diagnosis includes but is not limited to rib fracture, pneumothorax, ACS, CHF, volume overload, pericardial effusion, PE, worsening malignancy, COPD and others.     Problems Addressed:  Chest pain, unspecified type: acute illness or injury that poses a threat to life or bodily functions  Congestive heart failure, unspecified HF chronicity, unspecified heart failure type: acute illness or injury  Elevated troponin: acute illness or injury  ESRD on dialysis: chronic illness or injury with exacerbation, progression, or side effects of treatment  Shortness of breath: acute illness or injury that poses a threat to life or bodily functions    Amount and/or Complexity of Data Reviewed  Independent Historian: EMS  External Data Reviewed: labs, radiology and notes.  Labs:  ordered. Decision-making details documented in ED Course.  Radiology: ordered and independent interpretation performed. Decision-making details documented in ED Course.  ECG/medicine tests: ordered and independent interpretation performed. Decision-making details documented in ED Course.    Risk  Decision regarding hospitalization.    Critical Care  Total time providing critical care: 49 minutes               ED Course as of 09/11/24 0642   Tue Sep 10, 2024   0604 Improving on BiPAP. [RB]   0651 CXR with evidence of volume overload. Paged Nephrology. Will dialyze [RB]   0820 Reexamine. Spoke to [facility] regarding cardiac enzymes and history. They will follow. CTA negative for PE, no evidence of pneumonic infiltrate, no traumatic injury identified. Cultures pending, will hold antibiotics. Hospital medicine consulted for admission. [RB]      ED Course User Index  [RB] Jeanie Milan MD                           Clinical Impression:  Final diagnoses:  [R06.02] Shortness of breath  [I50.9] Congestive heart failure, unspecified HF chronicity, unspecified heart failure type (Primary)  [R79.89] Elevated troponin  [N18.6, Z99.2] ESRD on dialysis  [R07.9] Chest pain, unspecified type          ED Disposition Condition    Admit Stable                Jeanie Milan MD  09/11/24 0642

## 2024-09-11 NOTE — PLAN OF CARE
09/11/24 0941   Discharge Assessment   Assessment Type Discharge Planning Assessment   Confirmed/corrected address, phone number and insurance Yes   Confirmed Demographics Correct on Facesheet   Source of Information patient   Communicated ZAIRE with patient/caregiver Date not available/Unable to determine   Reason For Admission Chief Complaint: Shortness of Breath (Arrives [facility] unit [facility] from home hx dialysis t/th/sat scheduled for this morning at 0600 also hx chf, reports fall yesterday hit r side on table, started w/ sob last night, aasi reports initially breathing 55/min has improved to 30/min en route, aasi gave 324mg asa and 1x nitro refused iv and cpap w/ aasi)   People in Home alone   Facility Arrived From: Private residence.   Do you expect to return to your current living situation? Yes   Do you have help at home or someone to help you manage your care at home? Yes   Who are your caregiver(s) and their phone number(s)? Comfort Medina (Significant other)  [phone] (Home Phone)   Prior to hospitilization cognitive status: Alert/Oriented   Current cognitive status: Alert/Oriented   Walking or Climbing Stairs Difficulty yes   Walking or Climbing Stairs ambulation difficulty, requires equipment   Mobility Management The patient uses a rolling walker for mobility concerns PRN.   Dressing/Bathing Difficulty yes   Dressing/Bathing bathing difficulty, requires equipment   Dressing/Bathing Management The patient has grab bars in his bathroom re; safety concerns PRN.   Home Accessibility wheelchair accessible  (The patient's home is built on a slab.)   Home Layout Able to live on 1st floor   Equipment Currently Used at Home grab bar;walker, rolling;oxygen;shower chair  (The patient has home O2 (Equipment/Supplies)  through: [facility]   Readmission within 30 days? No   Patient currently being followed by outpatient case management? No   Do you currently have service(s) that help you manage your care at home? No   Do  you take prescription medications? Yes   Do you have prescription coverage? Yes   Coverage Financial Class: Self-Pay   Do you have any problems affording any of your prescribed medications? No   Is the patient taking medications as prescribed? yes   Who is going to help you get home at discharge? Comfort Medina (Significant other)  [phone] (Home Phone)   How do you get to doctors appointments? family or friend will provide   Are you on dialysis? Yes   Dialysis Name and Scheduled days The patient receives dialysis treatments on [facility] at [facility] ([place]).   Do you take coumadin? No   Discharge Plan A Home   Discharge Plan B Home with family   DME Needed Upon Discharge  none   Discharge Plan discussed with: Patient   Transition of Care Barriers None   Physical Activity   On average, how many days per week do you engage in moderate to strenuous exercise (like a brisk walk)? 5 days   On average, how many minutes do you engage in exercise at this level? 30 min   Financial Resource Strain   How hard is it for you to pay for the very basics like food, housing, medical care, and heating? Not very   Housing Stability   In the last 12 months, was there a time when you were not able to pay the mortgage or rent on time? N   At any time in the past 12 months, were you homeless or living in a shelter (including now)? N   Transportation Needs   Has the lack of transportation kept you from medical appointments, meetings, work or from getting things needed for daily living? No   Food Insecurity   Within the past 12 months, you worried that your food would run out before you got the money to buy more. Never true   Within the past 12 months, the food you bought just didn't last and you didn't have money to get more. Never true   Stress   Do you feel stress - tense, restless, nervous, or anxious, or unable to sleep at night because your mind is troubled all the time - these days? Only a littl   Social Isolation   How often do you  feel lonely or isolated from those around you?  Rarely   Alcohol Use   Q1: How often do you have a drink containing alcohol? Never   Q2: How many drinks containing alcohol do you have on a typical day when you are drinking? None   Q3: How often do you have six or more drinks on one occasion? Never   Utilities   In the past 12 months has the electric, gas, oil, or water company threatened to shut off services in your home? No   Health Literacy   How often do you need to have someone help you when you read instructions, pamphlets, or other written material from your doctor or pharmacy? Never     The patient received dialysis treatment at [facility] ([place]) on: TTHS.

## 2024-09-11 NOTE — PROGRESS NOTES
[facility] - [facility] Medical Telemetry    Cardiology  Progress Note    Patient Name: Prem Saldana  MRN: [id number]  Admission Date: 9/10/2024  Hospital Length of Stay: 1 days  Code Status: Full Code   Attending Physician: Holly Dudley MD   Primary Care Physician: Tank Saenz MD  Expected Discharge Date:   Principal Problem:<principal problem not specified>    Subjective:     Brief HPI/Hospital Course: Mr. Saldana is a 48 y/o male with a history CHF, HTN, ESRD on HD, who is unknown to [facility]. He presented to ER on 9.10.24 with complaints of shortness of breath. He reports he has a fall on 9.9.24 in which he hit his right side. He reports SOB started during the night. EMS reported he was tachypneic (breathing 55/min). He was given FD ASA and Sl Nitro x1. Significant labs include H&H 11.7/36.8, BUN/Creat 70.3/8.07, ALP 1061, Albumin 3.2, BNP 14.761, Troponin 0.554. CTA Chest negative for PE, but cardiomegaly with a trace left effusion, bibasilar atelectasis, mild interstitial edema and Chronic appearing compression deformities in the midthoracic spine. CXR shows poor inspiratory effort accentuates the pulmonary vascular markings, cardiomegaly, increased left retrocardiac density and silhouetting of the left hemidiaphragm, and calcified densities below the left hemidiaphragm. EKG shows sinus tachycardia with Left axis deviation, and LVH. [facility] has been consulted for NSTEMI management.       9.11.24: NAD. VSS. No complaints of Palps/SOB. ST on telemetry. Reports chest pain from desk falling on him. Reports back pain. H&H 10.3/32.7, Creat 6.10      PMH: CHF, HTN, ESRD on HD, Anemia, Secondary Hyperparathyroidism   PSH: AV Fistula   Family History: Maternal Grandmother - Cancer, Heart Disease, MI; Mother - DM II  Social History: Current Smoker (2-3 Cigarette per Day). Reports occasionally alcohol. Reports past use of Marijuana.      Previous Cardiac Diagnostics:   ECHO (9.10.24):  Left  Ventricle: The left ventricle is dilated. Increased wall thickness. There is concentric remodeling. Severe global hypokinesis present. There is severely reduced systolic function with a visually estimated ejection fraction of 15 - 20%. Unable to assess diastolic function due to atrial fibrillation. Elevated left ventricular filling pressure. Right Ventricle: Severe right ventricular enlargement. Systolic function is severely reduced.  Left Atrium: Left atrium is dilated. Right Atrium: Right atrium is dilated. Aortic Valve: The aortic valve is a trileaflet valve. Mildly calcified cusps. Mildly restricted motion. Aortic valve peak velocity is 1.85 m/s. Mean gradient is 8 mmHg. There is moderate aortic regurgitation with an eccentrically directed jet. Mitral Valve: Moderately thickened leaflets. There is moderate mitral annular calcification present. There is mild to moderate regurgitation. Tricuspid Valve: There is moderate regurgitation. The estimated PA systolic pressure is at least 34 mmHg. Pulmonic Valve: There is mild to moderate regurgitation. IVC/SVC: Elevated venous pressure at 15 mmHg. Pericardium: There is a small effusion. No indication of cardiac tamponade.    ECHO (2.19.24):  A complete two-dimensional transthoracic echocardiogram was performed (2D, M-mode, Doppler and color flow Doppler). The left ventricle is severely dilated.   Left ventricular systolic function is severely reduced. Estimated Ejection Fraction = <20%. The right ventricle is mild to moderately dilated. The left atrium is severely dilated. The right atrium is mild to moderately dilated. The mitral valve leaflets appear thickened, but open well. There is moderate to severe mitral annular calcification. There is mild mitral regurgitation. There is mild tricuspid insufficiency noted. Mild aortic regurgitation. Dialated IVC 3.8 cm. The lack of respiratory variation in the inferior vena cava diameter is noted. There is no pericardial  effusion. Large left pleural effusion.There is moderate concentric left ventricular hypertrophy.      SPECT (2.19.18):  The perfusion scan is free of evidence for myocardial ischemia or injury. Resting wall motion is physiologic. There is resting LV dysfunction with a reduced ejection fraction of 40 %. The ventricular volumes are normal at rest and stress. The extracardiac distribution of radioactivity is normal.      Dobutamine Stress Test (7.27.16);  Moderate left atrial enlargement. Concentric hypertrophy. Normal left ventricular systolic function (EF 60-65%). Left ventricular diastolic dysfunction. Normal right ventricular systolic function . The estimated PA systolic pressure is greater than 26 mmHg.  Mild mitral regurgitation. Small pericardial effusion.        Review of Systems   Cardiovascular:  Negative for chest pain, dyspnea on exertion, leg swelling and palpitations.   Respiratory:  Negative for shortness of breath.    All other systems reviewed and are negative.    Objective:     Vital Signs (Most Recent):  Temp: 97.6 °F (36.4 °C) (09/11/24 0712)  Pulse: 105 (09/11/24 0712)  Resp: 18 (09/11/24 0712)  BP: 135/63 (09/11/24 0712)  SpO2: 100 % (09/11/24 0751) Vital Signs (24h Range):  Temp:  [96.7 °F (35.9 °C)-99 °F (37.2 °C)] 97.6 °F (36.4 °C)  Pulse:  [104-107] 105  Resp:  [18-25] 18  SpO2:  [98 %-100 %] 100 %  BP: ()/(57-89) 135/63   Weight: 73 kg (160 lb 15 oz)  Body mass index is 22.28 kg/m².  SpO2: 100 %       Intake/Output Summary (Last 24 hours) at 9/11/2024 1000  Last data filed at 9/10/2024 2019  Gross per 24 hour   Intake --   Output 3001 ml   Net -3001 ml     Lines/Drains/Airways       Peripheral Intravenous Line  Duration                  Peripheral IV - Single Lumen 09/10/24 0600 18 G Anterior;Proximal;Right Forearm 1 day                    Significant Labs:   Chemistries:   Recent Labs   Lab 09/10/24  0551 09/10/24  1441 09/10/24  2039 09/11/24  0146     --   --  140   K 4.5  --    --  4.3     --   --  105   CO2 24  --   --  20*   BUN 70.3*  --   --  46.7*   CREATININE 8.07*  --   --  6.10*   CALCIUM 9.6  --   --  8.9   BILITOT 0.6  --   --  0.5   ALKPHOS 1,061*  --   --  841*   ALT 8  --   --  9   AST 18  --   --  15   GLUCOSE 98  --   --  122*   MG  --   --   --  1.80   TROPONINI 0.554* 0.533* 0.424* 0.417*        CBC/Anemia Labs: Coags:    Recent Labs   Lab 09/10/24  0551 09/11/24  0146   WBC 5.31 4.12*   HGB 11.7* 10.3*   HCT 36.8* 32.7*    134   MCV 95.8* 95.9*   RDW 20.1* 19.9*    Recent Labs   Lab 09/10/24  0551   INR 1.2   APTT 32.1        Telemetry:  ST    Physical Exam  Vitals and nursing note reviewed.   HENT:      Head: Normocephalic.      Nose: Nose normal.      Mouth/Throat:      Mouth: Mucous membranes are moist.   Eyes:      Pupils: Pupils are equal, round, and reactive to light.   Cardiovascular:      Rate and Rhythm: Regular rhythm. Tachycardia present.      Pulses: Normal pulses.      Heart sounds: Murmur heard.   Pulmonary:      Effort: Pulmonary effort is normal.      Comments: Diminished Lung Sounds     On NC 2 L     Abdominal:      Palpations: Abdomen is soft.   Musculoskeletal:         General: Normal range of motion.      Cervical back: Normal range of motion.      Right lower leg: No edema.      Left lower leg: No edema.      Comments: HOWARD AV Fistula    Skin:     General: Skin is warm.   Neurological:      Mental Status: He is alert and oriented to person, place, and time.   Psychiatric:         Mood and Affect: Mood normal.         Behavior: Behavior normal.       Current Schedule Inpatient Medications:   calcitRIOL  0.25 mcg Oral BID    enoxparin  30 mg Subcutaneous Daily    mupirocin   Nasal BID     Continuous Infusions:      Assessment:   NSTEMI, unspecified    - Troponin 0.554  Acute on Chronic Systolic Heart Failure    - EF 15-20% on ECHO (9.10.24)  Acute Respiratory Failure requiring Oxygenation - Currently on BiPAP  VHD    - Moderate to Severe  Mitral Annular Calcification    - Mild MR/TR/AR  Trace Left Pleural Effusion   NICMO ?     - EF 20% on ECHO (2.19.24)    - Normal SPECT (2.19.18)  HTN/HHD  ESRD     - on HD T,Thu,Sat   Anemia  Secondary Hyperparathyroidism   No known history of GI Bleed     Plan:   Patient Refusing LHC initially; Reports he had one a few weeks ago when he was moving back to [place] - patient currently saying he wants a heart cath as now one explained it him after saying he had one completed; Dr. Scott discussed with patient on two occasions why he needs a heart cath.    NPO after MN  Possible LHC in AM on 9.12.24  Will Start GDMT for CMO  Start Metoprolol 12.5 mg oral Daily  Start ASA 81 mg oral Daily   Start Entresto LD if BP Allows and Cleared by Nephrology Team prior to Discharge   Start Farxiga 10 mg oral Daily   Fluid Management/HD per Nephrology Team   Consider LifeVest at discharge  Wean Oxygen as tolerated  Labs in AM: CBC, BMP, and Mag       CATRINA Malave  Cardiology  [facility] - [facility] Telemetry

## 2024-09-11 NOTE — NURSING
Nurses Note -- 4 Eyes      9/11/2024   5:59 AM      Skin assessed during: Q Shift Change      [x] No Altered Skin Integrity Present    []Prevention Measures Documented      [] Yes- Altered Skin Integrity Present or Discovered   [] LDA Added if Not in Epic (Describe Wound)   [] New Altered Skin Integrity was Present on Admit and Documented in LDA   [] Wound Image Taken    Wound Care Consulted? No    Attending Nurse:  Ravindra Boggs-DOYLE    Second RN/Staff Member:   Orquidea Palmer at [facility] report

## 2024-09-11 NOTE — NURSING
Nurses Note -- 4 Eyes      9/10/2024   7:24 PM      Skin assessed during: Admit      [x] No Altered Skin Integrity Present    []Prevention Measures Documented      [x] Yes- Altered Skin Integrity Present or Discovered   [] LDA Added if Not in Epic (Describe Wound)   [] New Altered Skin Integrity was Present on Admit and Documented in LDA   [x] Wound Image Taken    Wound Care Consulted? No    Attending Nurse:  Orquidea Sena RN/Staff Member:   Miguel
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
## 2024-09-11 NOTE — PROGRESS NOTES
[facility] Hospital Medicine Progress Note        Chief Complaint: Inpatient Follow-up for     HPI: 49-year-old  male with past medical history of hypertension, congestive heart failure with EF of 20%, COPD on home oxygen, end-stage renal disease on hemodialysis, prostate cancer status post radiation who recently moved from [place] to [place] presented to ER with complaints of chest pain and shortness of breaths. Patient reports a table fell on him and he has been having left-sided chest pain since then chest x-ray showed poor inspiratory effort accentuated pulmonary vascular markings also concern about congestive changes, CTA of the chest was negative for PE did show left-sided effusion bibasilar atelectasis and mild interstitial edema patient was put on 3 L of nasal cannula then was put on BiPAP and now has been weaned down to 4 L patient was significantly tachypneic in the ER and has been admitted to hospitalist service for further management care troponins were found to be elevated too.  Patient had 2D echo done that showed EF of less than 20% cardiology consulted and following plan for left heart catheterization    Interval Hx:   Patient seen and examined this morning was sleeping woke up to verbal stimuli on 4 L of nasal cannula saturating %   Case was discussed with patient's nurse and  on the floor.    Objective/physical exam:  General: In no acute distress, afebrile  Chest: Clear to auscultation bilaterally  Heart: RRR, +S1, S2, no appreciable murmur  Abdomen: Soft, nontender, BS +  MSK: Warm, no lower extremity edema, no clubbing or cyanosis  Neurologic: Alert and oriented x4,     VITAL SIGNS: 24 HRS MIN & MAX LAST   Temp  Min: 96.7 °F (35.9 °C)  Max: 99 °F (37.2 °C) 97.6 °F (36.4 °C)   BP  Min: 90/57  Max: 135/63 135/63   Pulse  Min: 84  Max: 107  105   Resp  Min: 18  Max: 28 18   SpO2  Min: 98 %  Max: 100 % 100 %     I have reviewed the  following labs:  Recent Labs   Lab 09/10/24  0551 09/11/24 0146   WBC 5.31 4.12*   RBC 3.84* 3.41*   HGB 11.7* 10.3*   HCT 36.8* 32.7*   MCV 95.8* 95.9*   MCH 30.5 30.2   MCHC 31.8* 31.5*   RDW 20.1* 19.9*    134   MPV 11.6* 10.8*     Recent Labs   Lab 09/10/24  0551 09/11/24  0146    140   K 4.5 4.3    105   CO2 24 20*   BUN 70.3* 46.7*   CREATININE 8.07* 6.10*   CALCIUM 9.6 8.9   MG  --  1.80   ALBUMIN 3.2* 2.7*   ALKPHOS 1,061* 841*   ALT 8 9   AST 18 15   BILITOT 0.6 0.5     Microbiology Results (last 7 days)       Procedure Component Value Units Date/Time    Blood Culture #1 **CANNOT BE ORDERED STAT** [[id number]]  (Normal) Collected: 09/10/24 0654    Order Status: Completed Specimen: Blood Updated: 09/11/24 0901     Blood Culture No Growth At 24 Hours    Blood Culture #2 **CANNOT BE ORDERED STAT** [[id number]]  (Normal) Collected: 09/10/24 0654    Order Status: Completed Specimen: Blood Updated: 09/11/24 0901     Blood Culture No Growth At 24 Hours             See below for Radiology    Assessment/Plan:  Acute hypoxemic respiratory failure was on BiPAP now on 4 L of nasal cannula acute on chronic Congestive heart failure exacerbation with EF of 20%   Volume overload  End-stage renal disease on hemodialysis   NSTEMI unknown type   Multiple chronic compression deformities of T-spine   Uncontrolled hypertension    Patient now is 4 L of nasal cannula  2D echo showed EF of 20% patient is on cardiac monitoring, dialysis as per Nephrology's Respiratory status better on 4 L  Plan for angiogram this hospital visit as per cardiology's note   CTA was negative for PE  Multiple chronic compression deformities of T-spine  Patient does have history of hypertension but yesterday patient was hypotensive blood pressure is better today but I will continue to hold off on blood pressure medication for 1 more day and if by tomorrow his blood pressure is going up then we will resume    Cardiology Nephrology's  following    VTE prophylaxis:  Lovenox    Patient condition:  Stable/Fair/Guarded/ Serious/ Critical    Anticipated discharge and Disposition:         All diagnosis and differential diagnosis have been reviewed; assessment and plan has been documented; I have personally reviewed the labs and test results that are presently available; I have reviewed the patients medication list; I have reviewed the consulting providers response and recommendations. I have reviewed or attempted to review medical records based upon their availability    All of the patient's questions have been  addressed and answered. Patient's is agreeable to the above stated plan. I will continue to monitor closely and make adjustments to medical management as needed.    Portions of this note dictated using EMR integrated voice recognition software, and may be subject to voice recognition errors not corrected at proofreading. Please contact writer for clarification if needed.   _____________________________________________________________________    Malnutrition Status:    Scheduled Med:   calcitRIOL  0.25 mcg Oral BID    enoxparin  30 mg Subcutaneous Daily    mupirocin   Nasal BID      Continuous Infusions:     PRN Meds:    Current Facility-Administered Medications:     acetaminophen, 650 mg, Oral, Q8H PRN    acetaminophen, 650 mg, Oral, Q4H PRN    dextrose 10%, 12.5 g, Intravenous, PRN    dextrose 10%, 25 g, Intravenous, PRN    glucagon (human recombinant), 1 mg, Intramuscular, PRN    glucose, 16 g, Oral, PRN    glucose, 24 g, Oral, PRN    HYDROcodone-acetaminophen, 1 tablet, Oral, Q6H PRN    ondansetron, 4 mg, Intravenous, Q4H PRN    simethicone, 1 tablet, Oral, TID PRN    sodium chloride 0.9%, 10 mL, Intravenous, Q12H PRN     Radiology:  I have personally reviewed the following imaging and agree with the radiologist.     X-Ray Chest PA And Lateral  Narrative: EXAMINATION:  XR CHEST PA AND LATERAL    CLINICAL HISTORY:  sob;    COMPARISON:  Chest  x-ray dated 09/10/2024    FINDINGS:  Right chest wall AICD is in place.  There is stable cardiomegaly.  There are similar bilateral interstitial airspace opacities.  There is no enlarging pleural effusion or visible pneumothorax.  Impression: Stable exam without significant interval change.    Electronically signed by: Shahana Xie  Date:    09/11/2024  Time:    08:22      Holly Dudley MD  Department of Hospital Medicine   [facility]   09/11/2024

## 2024-09-11 NOTE — NURSING
Nurses Note -- 4 Eyes      9/11/2024   7:00 AM      Skin assessed during: Q Shift Change      [x] No Altered Skin Integrity Present    []Prevention Measures Documented      [] Yes- Altered Skin Integrity Present or Discovered   [] LDA Added if Not in Epic (Describe Wound)   [] New Altered Skin Integrity was Present on Admit and Documented in LDA   [] Wound Image Taken    Wound Care Consulted? No    Attending Nurse:  DOYLE Berman    Second RN/Staff Member:   DOYLE Waite

## 2024-09-12 NOTE — PLAN OF CARE
09/12/24 1539   Discharge Reassessment   Assessment Type Discharge Planning Reassessment   Did the patient's condition or plan change since previous assessment? No   Discharge Plan discussed with: Patient   Communicated ZAIRE with patient/caregiver Date not available/Unable to determine   Discharge Plan A Home Health  (FOC: The patient was assigned HH services through: [facility] as per Medicaid rotation by Anushka ( ).)   Discharge Plan B Home Health   DME Needed Upon Discharge  none   Transition of Care Barriers None   Why the patient remains in the hospital Requires continued medical care   Post-Acute Status   Post-Acute Authorization Home Health   Home Health Status Referrals Sent   Coverage Payor: MEDICAID - PENDING MEDICAID -   Hospital Resources/Appts/Education Provided Appointments scheduled and added to AVS   Patient choice form signed by patient/caregiver List with quality metrics by geographic area provided   Discharge Delays None known at this time     The patient was assigned home health services through: [facility] as per Medicaid rotation by Anushka ( ). Clinical notes/updates and HH order and HH packet were uploaded and sent via [employer]. Possible D/C date on tomorrow (9/13/2024).

## 2024-09-12 NOTE — NURSING
Nurses Note -- 4 Eyes      9/12/2024   7:00 AM      Skin assessed during: Q Shift Change      [x] No Altered Skin Integrity Present    [x]Prevention Measures Documented      [] Yes- Altered Skin Integrity Present or Discovered   [] LDA Added if Not in Epic (Describe Wound)   [] New Altered Skin Integrity was Present on Admit and Documented in LDA   [] Wound Image Taken    Wound Care Consulted? No    Attending Nurse:  DOYLE Berman    Second RN/Staff Member:   DOYLE Gaming

## 2024-09-12 NOTE — PLAN OF CARE
Received a case management consult by CATRINA Malave re: scheduling an appointment at [facility] (Cardiology Clinic) for F/U within 1-2 weeks. Dx: Chronic Systolic Heart Failure. The referral was sent through: Epic.

## 2024-09-12 NOTE — NURSING
Nurses Note -- 4 Eyes      9/11/2024   7:10 PM      Skin assessed during: Q Shift Change      [x] No Altered Skin Integrity Present    [x]Prevention Measures Documented      [] Yes- Altered Skin Integrity Present or Discovered   [] LDA Added if Not in Epic (Describe Wound)   [] New Altered Skin Integrity was Present on Admit and Documented in LDA   [] Wound Image Taken    Wound Care Consulted? No    Attending Nurse:  Amberly Sena RN/Staff Member:   Antonella     No new skin issues.

## 2024-09-12 NOTE — NURSING
"Attempted the second time to put patient back in bed. The charge nurse and I educated him that it was for his safety however he yelled "I am comfortable like this, now leave me alone!"  "

## 2024-09-12 NOTE — PROGRESS NOTES
Inpatient Nutrition Evaluation    Admit Date: 9/10/2024   Total duration of encounter: 2 days    Nutrition Recommendation/Prescription     Resume oral diet as tolerated; goal diet Renal on Dialysis    Nutrition Assessment     Chart Review    Reason Seen: continuous nutrition monitoring    Malnutrition Screening Tool Results   Have you recently lost weight without trying?: No  Have you been eating poorly because of a decreased appetite?: No   MST Score: 0     Diagnosis:  Acute hypoxemic respiratory failure was on BiPAP now on 4 L of nasal cannula acute on chronic Congestive heart failure exacerbation with EF of 20%   Volume overload  End-stage renal disease on hemodialysis   NSTEMI unknown type   Multiple chronic compression deformities of T-spine   Uncontrolled hypertension    Relevant Medical History: hypertension, congestive heart failure with EF of 20%, COPD on home oxygen, end-stage renal disease on hemodialysis, prostate cancer status post radiation     Nutrition-Related Medications: Scheduled Medications:  aspirin, 81 mg, Daily  busPIRone, 5 mg, BID  calcitRIOL, 0.25 mcg, BID  dapagliflozin propanediol, 10 mg, Daily  enoxparin, 30 mg, Daily  metoprolol succinate, 12.5 mg, Daily  mupirocin, , BID    Continuous Infusions:   PRN Medications:    aspirin EC tablet 81 mg    busPIRone tablet 5 mg    calcitRIOL capsule 0.25 mcg    dapagliflozin propanediol (Farxiga) tablet 10 mg    enoxaparin injection 30 mg    metoprolol succinate (TOPROL-XL) 24 hr split tablet 12.5 mg    mupirocin 2 % ointment        Nutrition-Related Labs:  Recent Labs   Lab 09/10/24  0551 09/11/24  0146 09/12/24  0546    140 139   K 4.5 4.3 6.1*   CALCIUM 9.6 8.9 9.4   MG  --  1.80 1.90    105 101   CO2 24 20* 22   BUN 70.3* 46.7* 62.6*   CREATININE 8.07* 6.10* 8.02*   EGFRNORACEVR 8 11 8   GLUCOSE 98 122* 75   BILITOT 0.6 0.5  --    ALKPHOS 1,061* 841*  --    ALT 8 9  --    AST 18 15  --    ALBUMIN 3.2* 2.7*  --    WBC 5.31 4.12*  "3.73*   HGB 11.7* 10.3* 11.4*   HCT 36.8* 32.7* 35.6*        Diet Order: Diet NPO Except for: Sips with Medication  Oral Supplement Order: none  Appetite/Oral Intake: NPO/not applicable  Factors Affecting Nutritional Intake: none identified  Food/[employer]/Cultural Preferences: none reported  Food Allergies: no known food allergies       Wound(s):   none noted    Comments    9/12/24 pt NPO for procedure today, reports no appetite loss or unintentional weight loss prior to admit    Anthropometrics    Height: 5' 11.26" (181 cm) Height Method: Stated  Last Weight: 73 kg (160 lb 15 oz) (09/10/24 0750) Weight Method: Stated  BMI (Calculated): 22.3  BMI Classification: normal (BMI 18.5-24.9)        Ideal Body Weight (IBW), Male: 173.56 lb     % Ideal Body Weight, Male (lb): 92.73 %                          Usual Weight Provided By: patient denies unintentional weight loss    Wt Readings from Last 5 Encounters:   09/10/24 73 kg (160 lb 15 oz)   07/30/19 77 kg (169 lb 12.1 oz)   01/29/19 79.8 kg (176 lb)   07/26/18 81.6 kg (180 lb)   01/23/18 91 kg (200 lb 9.9 oz)     Weight Change(s) Since Admission:  Admit Weight: 72.6 kg (160 lb) (09/10/24 0529)      Patient Education    Not applicable.    Monitoring & Evaluation     Dietitian will monitor food and beverage intake.  Nutrition Risk/Follow-Up: low (follow-up in 5-7 days)  Patients assigned 'low nutrition risk' status do not qualify for a full nutritional assessment but will be monitored and re-evaluated in a 5-7 day time period. Please consult if re-evaluation needed sooner.   "

## 2024-09-12 NOTE — PROGRESS NOTES
Nephrology Progress Note      HPI:    Prem Saldana is a 49 y.o. male from [place], with pmhx of ESRD TTS via L AVF, CHF, EF <20%, COPD, hx of prostate cancer, presenting to the ED today with respiratory failure requiring BiPAP.      History is very limited due to his condition but he was able to answer a few questions on bipap. He states that he last dialyzed in [place], [place] Saturday 9/7. He tells me he has a 6am chair time TTS set up at the dialysis unit in [place]? Will have to call the unit to verify.      CTA chest revealing cardiomegaly with trace left effusion, atelectasis and mild interstitial edema. CXR with congestion. He has 1+ edema to BLE. His electrolytes are normal., BNP is >14,000 on admission, troponin 0.55 and he states that his chest hurts because he fell and had trauma to the chest 2 days ago. Bedside echo pending in the ED. Cardiology following and trending troponins until peak. ED physician paged nephrology for HD today.     Interval History:    9/11/24  Tolerated HD yesterday with 3L off and significant improvement in respiratory status, now on 4L NC. Vitals are stable. Labs reviewed, all relatively stable but bicarb is now 20. Serial troponins remain high. He continues to complain of chest pain from falling this weekend. Chest xr and CTA are without traumatic changes. He is now able to tell me that he was in [place] because his mom lives there, but he is moving to [place] and has a chair time at [facility].   He is usually on 2L O2 support for COPD and is currently requiring 4L.     09/12/2024   No acute changes overnight.  Potassium 6 on a.m. labs.  He is scheduled for his routine dialysis run today which will be done after left heart catheterization.  Voices no new complaints.    ROS:    Review of Systems   Constitutional:  Negative for fever, malaise/fatigue and weight loss.   Respiratory:  Positive for shortness of breath. Negative for cough.    Cardiovascular:  Positive for chest  "pain. Negative for palpitations, orthopnea and leg swelling.   Gastrointestinal:  Negative for diarrhea, nausea and vomiting.   Genitourinary:  Negative for dysuria, frequency, hematuria and urgency.   Musculoskeletal:  Negative for back pain and myalgias.   Skin:  Negative for rash.   Neurological:  Negative for speech change and focal weakness.   All other systems reviewed and are negative.      Vital Signs:  /75   Pulse 96   Temp 97.6 °F (36.4 °C) (Oral)   Resp 20   Ht 5' 11.26" (1.81 m)   Wt 73 kg (160 lb 15 oz)   SpO2 100%   BMI 22.28 kg/m²   Body mass index is 22.28 kg/m².    Physical Exam:    Physical Exam  Vitals and nursing note reviewed.   Constitutional:       Appearance: He is ill-appearing.   HENT:      Head: Normocephalic and atraumatic.   Eyes:      General: No scleral icterus.     Extraocular Movements: Extraocular movements intact.   Cardiovascular:      Rate and Rhythm: Normal rate and regular rhythm.      Heart sounds: Normal heart sounds.      Comments: L AVF  Pulmonary:      Effort: No respiratory distress.      Comments: Dyspneic, course breath sounds with crackles  Abdominal:      General: Abdomen is flat. Bowel sounds are normal. There is no distension.      Palpations: Abdomen is soft.      Tenderness: There is no abdominal tenderness.   Musculoskeletal:         General: No swelling or deformity. Normal range of motion.      Right lower leg: No edema.      Left lower leg: No edema.      Comments: No edema    Skin:     General: Skin is warm and dry.   Neurological:      General: No focal deficit present.      Mental Status: He is alert and oriented to person, place, and time.   Psychiatric:         Mood and Affect: Mood normal.         Behavior: Behavior normal.         Labs:  Recent Results (from the past 48 hour(s))   Troponin I    Collection Time: 09/10/24  2:41 PM   Result Value Ref Range    Troponin-I 0.533 (H) 0.000 - 0.045 ng/mL   Troponin I    Collection Time: 09/10/24  " 8:39 PM   Result Value Ref Range    Troponin-I 0.424 (H) 0.000 - 0.045 ng/mL   Comprehensive Metabolic Panel (CMP)    Collection Time: 09/11/24  1:46 AM   Result Value Ref Range    Sodium 140 136 - 145 mmol/L    Potassium 4.3 3.5 - 5.1 mmol/L    Chloride 105 98 - 107 mmol/L    CO2 20 (L) 22 - 29 mmol/L    Glucose 122 (H) 74 - 100 mg/dL    Blood Urea Nitrogen 46.7 (H) 8.9 - 20.6 mg/dL    Creatinine 6.10 (H) 0.73 - 1.18 mg/dL    Calcium 8.9 8.4 - 10.2 mg/dL    Protein Total 5.9 (L) 6.4 - 8.3 gm/dL    Albumin 2.7 (L) 3.5 - 5.0 g/dL    Globulin 3.2 2.4 - 3.5 gm/dL    Albumin/Globulin Ratio 0.8 (L) 1.1 - 2.0 ratio    Bilirubin Total 0.5 <=1.5 mg/dL     (H) 40 - 150 unit/L    ALT 9 0 - 55 unit/L    AST 15 5 - 34 unit/L    eGFR 11 mL/min/1.73/m2    Anion Gap 15.0 mEq/L    BUN/Creatinine Ratio 8    Troponin I    Collection Time: 09/11/24  1:46 AM   Result Value Ref Range    Troponin-I 0.417 (H) 0.000 - 0.045 ng/mL   Magnesium    Collection Time: 09/11/24  1:46 AM   Result Value Ref Range    Magnesium Level 1.80 1.60 - 2.60 mg/dL   CBC with Differential    Collection Time: 09/11/24  1:46 AM   Result Value Ref Range    WBC 4.12 (L) 4.50 - 11.50 x10(3)/mcL    RBC 3.41 (L) 4.70 - 6.10 x10(6)/mcL    Hgb 10.3 (L) 14.0 - 18.0 g/dL    Hct 32.7 (L) 42.0 - 52.0 %    MCV 95.9 (H) 80.0 - 94.0 fL    MCH 30.2 27.0 - 31.0 pg    MCHC 31.5 (L) 33.0 - 36.0 g/dL    RDW 19.9 (H) 11.5 - 17.0 %    Platelet 134 130 - 400 x10(3)/mcL    MPV 10.8 (H) 7.4 - 10.4 fL    Neut % 67.9 %    Lymph % 16.3 %    Mono % 12.6 %    Eos % 2.2 %    Basophil % 0.5 %    Lymph # 0.67 0.6 - 4.6 x10(3)/mcL    Neut # 2.80 2.1 - 9.2 x10(3)/mcL    Mono # 0.52 0.1 - 1.3 x10(3)/mcL    Eos # 0.09 0 - 0.9 x10(3)/mcL    Baso # 0.02 <=0.2 x10(3)/mcL    IG# 0.02 0 - 0.04 x10(3)/mcL    IG% 0.5 %    NRBC% 0.0 %    IPF 3.9 0.9 - 11.2 %   EKG 12-lead    Collection Time: 09/11/24  4:58 AM   Result Value Ref Range    QRS Duration 108 ms    [facility] QTC Calculation 467 ms   EKG  12-lead    Collection Time: 09/11/24  3:26 PM   Result Value Ref Range    QRS Duration 106 ms    [facility] QTC Calculation 462 ms   Basic Metabolic Panel    Collection Time: 09/12/24  5:46 AM   Result Value Ref Range    Sodium 139 136 - 145 mmol/L    Potassium 6.1 (H) 3.5 - 5.1 mmol/L    Chloride 101 98 - 107 mmol/L    CO2 22 22 - 29 mmol/L    Glucose 75 74 - 100 mg/dL    Blood Urea Nitrogen 62.6 (H) 8.9 - 20.6 mg/dL    Creatinine 8.02 (H) 0.73 - 1.18 mg/dL    BUN/Creatinine Ratio 8     Calcium 9.4 8.4 - 10.2 mg/dL    Anion Gap 16.0 mEq/L    eGFR 8 mL/min/1.73/m2   Magnesium    Collection Time: 09/12/24  5:46 AM   Result Value Ref Range    Magnesium Level 1.90 1.60 - 2.60 mg/dL   CBC with Differential    Collection Time: 09/12/24  5:46 AM   Result Value Ref Range    WBC 3.73 (L) 4.50 - 11.50 x10(3)/mcL    RBC 3.80 (L) 4.70 - 6.10 x10(6)/mcL    Hgb 11.4 (L) 14.0 - 18.0 g/dL    Hct 35.6 (L) 42.0 - 52.0 %    MCV 93.7 80.0 - 94.0 fL    MCH 30.0 27.0 - 31.0 pg    MCHC 32.0 (L) 33.0 - 36.0 g/dL    RDW 19.9 (H) 11.5 - 17.0 %    Platelet 225 130 - 400 x10(3)/mcL    MPV 11.7 (H) 7.4 - 10.4 fL    Neut % 72.1 %    Lymph % 18.2 %    Mono % 7.8 %    Eos % 1.1 %    Basophil % 0.3 %    Lymph # 0.68 0.6 - 4.6 x10(3)/mcL    Neut # 2.69 2.1 - 9.2 x10(3)/mcL    Mono # 0.29 0.1 - 1.3 x10(3)/mcL    Eos # 0.04 0 - 0.9 x10(3)/mcL    Baso # 0.01 <=0.2 x10(3)/mcL    IG# 0.02 0 - 0.04 x10(3)/mcL    IG% 0.5 %    NRBC% 0.0 %         Assessment/Plan:    ESRD on HD - [place], says he has a TTS chair time in [place] and has been on dialysis for 7 years. Left AVF  CHF - 15-20% EF on echo 9/10   NSTEMI - cardiology recommending cath at some point  COPD on 2L home O2--respiratory status is improving but still less than baseline   Prostate cancer s/p radiation  Acidosis - CO2 20, monitor.    Recommendations:  Hemodialysis on TTS schedule  Need for compliance to medication diet and dialysis stressed on the patient.  Consequences of  non compliance to dialysis and other medical management can cause multiorgan failure and death to which he expressed understanding.

## 2024-09-12 NOTE — NURSING
Pt started screaming help. I attempted to help the pt. He advised he is having problems with his stomach. I advised his already received pain medication and nausea medications per his request. I asked if there was something else I could do. He started yelling and screaming help. He later yanked the cord to the oxygen out of the wall and started screaming that he was out of his oxygen. The staff members heard him at the desk. A staff member went in the room to assist the pt he was all the way at the edge of the bed with his legs hanging out of the bed. He was educated of the important of getting in bed properly and the possibility of a fall. Pt refused. Charge nurse was notified . Staff members x2 were in the room with the pt at this time. Pt still refused. Charge nurse attempted to make it safe for pt and prevent a fall. I went in to give pt medications per his request. I also attempted to get pt to cooperate without success, continue to monitor.

## 2024-09-12 NOTE — PROGRESS NOTES
[facility] - [facility] Medical Telemetry    Cardiology  Progress Note    Patient Name: Prem Saldana  MRN: [id number]  Admission Date: 9/10/2024  Hospital Length of Stay: 2 days  Code Status: Full Code   Attending Physician: Holly Dudley MD   Primary Care Physician: Tank Saenz MD  Expected Discharge Date:   Principal Problem:<principal problem not specified>    Subjective:     Brief HPI/Hospital Course: Mr. Saldana is a 48 y/o male with a history CHF, HTN, ESRD on HD, who is unknown to [facility]. He presented to ER on 9.10.24 with complaints of shortness of breath. He reports he has a fall on 9.9.24 in which he hit his right side. He reports SOB started during the night. EMS reported he was tachypneic (breathing 55/min). He was given FD ASA and Sl Nitro x1. Significant labs include H&H 11.7/36.8, BUN/Creat 70.3/8.07, ALP 1061, Albumin 3.2, BNP 14.761, Troponin 0.554. CTA Chest negative for PE, but cardiomegaly with a trace left effusion, bibasilar atelectasis, mild interstitial edema and Chronic appearing compression deformities in the midthoracic spine. CXR shows poor inspiratory effort accentuates the pulmonary vascular markings, cardiomegaly, increased left retrocardiac density and silhouetting of the left hemidiaphragm, and calcified densities below the left hemidiaphragm. EKG shows sinus tachycardia with Left axis deviation, and LVH. [facility] has been consulted for NSTEMI management.       9.11.24: NAD. VSS. No complaints of Palps/SOB. ST on telemetry. Reports chest pain from desk falling on him. Reports back pain. H&H 10.3/32.7, Creat 6.10   9.12.24: NAD. VSS. Reports Chest Pain No complaints of SOB/Palps. H&H 11.4/35.6, K 6.1, Creat 8.02. In bed lying flat.      PMH: CHF, HTN, ESRD on HD, Anemia, Secondary Hyperparathyroidism   PSH: AV Fistula   Family History: Maternal Grandmother - Cancer, Heart Disease, MI; Mother - DM II  Social History: Current Smoker (2-3 Cigarette per Day). Reports  occasionally alcohol. Reports past use of Marijuana.      Previous Cardiac Diagnostics:   ECHO (9.10.24):  Left Ventricle: The left ventricle is dilated. Increased wall thickness. There is concentric remodeling. Severe global hypokinesis present. There is severely reduced systolic function with a visually estimated ejection fraction of 15 - 20%. Unable to assess diastolic function due to atrial fibrillation. Elevated left ventricular filling pressure. Right Ventricle: Severe right ventricular enlargement. Systolic function is severely reduced.  Left Atrium: Left atrium is dilated. Right Atrium: Right atrium is dilated. Aortic Valve: The aortic valve is a trileaflet valve. Mildly calcified cusps. Mildly restricted motion. Aortic valve peak velocity is 1.85 m/s. Mean gradient is 8 mmHg. There is moderate aortic regurgitation with an eccentrically directed jet. Mitral Valve: Moderately thickened leaflets. There is moderate mitral annular calcification present. There is mild to moderate regurgitation. Tricuspid Valve: There is moderate regurgitation. The estimated PA systolic pressure is at least 34 mmHg. Pulmonic Valve: There is mild to moderate regurgitation. IVC/SVC: Elevated venous pressure at 15 mmHg. Pericardium: There is a small effusion. No indication of cardiac tamponade.    ECHO (2.19.24):  A complete two-dimensional transthoracic echocardiogram was performed (2D, M-mode, Doppler and color flow Doppler). The left ventricle is severely dilated.   Left ventricular systolic function is severely reduced. Estimated Ejection Fraction = <20%. The right ventricle is mild to moderately dilated. The left atrium is severely dilated. The right atrium is mild to moderately dilated. The mitral valve leaflets appear thickened, but open well. There is moderate to severe mitral annular calcification. There is mild mitral regurgitation. There is mild tricuspid insufficiency noted. Mild aortic regurgitation. Dialated IVC 3.8 cm.  The lack of respiratory variation in the inferior vena cava diameter is noted. There is no pericardial effusion. Large left pleural effusion.There is moderate concentric left ventricular hypertrophy.      SPECT (2.19.18):  The perfusion scan is free of evidence for myocardial ischemia or injury. Resting wall motion is physiologic. There is resting LV dysfunction with a reduced ejection fraction of 40 %. The ventricular volumes are normal at rest and stress. The extracardiac distribution of radioactivity is normal.      Dobutamine Stress Test (7.27.16);  Moderate left atrial enlargement. Concentric hypertrophy. Normal left ventricular systolic function (EF 60-65%). Left ventricular diastolic dysfunction. Normal right ventricular systolic function . The estimated PA systolic pressure is greater than 26 mmHg.  Mild mitral regurgitation. Small pericardial effusion.        Review of Systems   Cardiovascular:  Positive for chest pain. Negative for dyspnea on exertion, leg swelling and palpitations.   Respiratory:  Negative for shortness of breath.    All other systems reviewed and are negative.    Objective:     Vital Signs (Most Recent):  Temp: 98.1 °F (36.7 °C) (09/12/24 0259)  Pulse: 96 (09/12/24 0259)  Resp: 18 (09/12/24 0349)  BP: 112/72 (09/12/24 0259)  SpO2: 97 % (09/12/24 0259) Vital Signs (24h Range):  Temp:  [97.9 °F (36.6 °C)-98.8 °F (37.1 °C)] 98.1 °F (36.7 °C)  Pulse:  [] 96  Resp:  [18] 18  SpO2:  [97 %-100 %] 97 %  BP: (101-117)/(67-87) 112/72   Weight: 73 kg (160 lb 15 oz)  Body mass index is 22.28 kg/m².  SpO2: 97 %     No intake or output data in the 24 hours ending 09/12/24 0736    Lines/Drains/Airways       Peripheral Intravenous Line  Duration                  Peripheral IV - Single Lumen 09/10/24 0600 18 G Anterior;Proximal;Right Forearm 2 days                    Significant Labs:   Chemistries:   Recent Labs   Lab 09/10/24  0551 09/10/24  1441 09/10/24  2039 09/11/24  0146 09/12/24  0546   NA  145  --   --  140 139   K 4.5  --   --  4.3 6.1*     --   --  105 101   CO2 24  --   --  20* 22   BUN 70.3*  --   --  46.7* 62.6*   CREATININE 8.07*  --   --  6.10* 8.02*   CALCIUM 9.6  --   --  8.9 9.4   BILITOT 0.6  --   --  0.5  --    ALKPHOS 1,061*  --   --  841*  --    ALT 8  --   --  9  --    AST 18  --   --  15  --    GLUCOSE 98  --   --  122* 75   MG  --   --   --  1.80 1.90   TROPONINI 0.554* 0.533* 0.424* 0.417*  --         CBC/Anemia Labs: Coags:    Recent Labs   Lab 09/10/24  0551 09/11/24  0146 09/12/24  0546   WBC 5.31 4.12* 3.73*   HGB 11.7* 10.3* 11.4*   HCT 36.8* 32.7* 35.6*    134 225   MCV 95.8* 95.9* 93.7   RDW 20.1* 19.9* 19.9*    Recent Labs   Lab 09/10/24  0551   INR 1.2   APTT 32.1        Telemetry:  SR    Physical Exam  Vitals and nursing note reviewed.   HENT:      Head: Normocephalic.      Nose: Nose normal.      Mouth/Throat:      Mouth: Mucous membranes are moist.   Eyes:      Pupils: Pupils are equal, round, and reactive to light.   Cardiovascular:      Rate and Rhythm: Normal rate and regular rhythm.      Pulses: Normal pulses.      Heart sounds: Murmur heard.   Pulmonary:      Effort: Pulmonary effort is normal.      Comments: Diminished Lung Sounds     On NC 2 L     Abdominal:      Palpations: Abdomen is soft.   Musculoskeletal:         General: Normal range of motion.      Cervical back: Normal range of motion.      Right lower leg: No edema.      Left lower leg: No edema.      Comments: HOWARD AV Fistula    Skin:     General: Skin is warm.   Neurological:      Mental Status: He is alert and oriented to person, place, and time.   Psychiatric:         Mood and Affect: Mood normal.         Behavior: Behavior normal.         Current Schedule Inpatient Medications:   aspirin  81 mg Oral Daily    busPIRone  5 mg Oral BID    calcitRIOL  0.25 mcg Oral BID    dapagliflozin propanediol  10 mg Oral Daily    enoxparin  30 mg Subcutaneous Daily    metoprolol succinate  12.5 mg Oral  Daily    mupirocin   Nasal BID     Continuous Infusions:      Assessment:   NSTEMI, unspecified    - Troponin 0.554  Acute on Chronic Systolic Heart Failure    - EF 15-20% on ECHO (9.10.24)  Acute Respiratory Failure requiring Oxygenation - Currently on BiPAP  VHD    - Moderate to Severe Mitral Annular Calcification    - Mild MR/TR/AR  Trace Left Pleural Effusion   Hyperkalemia   NICMO ?     - EF 20% on ECHO (2.19.24)    - Normal SPECT (2.19.18)  HTN/HHD  ESRD     - on HD T,Thu,Sat   Anemia  Secondary Hyperparathyroidism   No known history of GI Bleed     Plan:   Plan for  LHC Today   Risk, Benefits and Alternatives Reviewed and Discussed with the PT and their Family and they wish to proceed with above Procedure.  Consents Obtained (Placed in Chart)/Procedure Scheduled   Keep NPO  Continue Metoprolol 12.5 mg oral Daily, ASA 81 mg oral Daily, and Farxiga 10 mg oral Daily   Start Entresto LD if BP Allows and Cleared by Nephrology Team prior to Discharge   Fluid Management/HD per Nephrology Team; Nephrology will Dialyze patient after LHC Today   Consider LifeVest at discharge pending patient Home Status  Wean Oxygen as tolerated  Labs in AM: CBC, BMP, and Mag     CATRINA Malave  Cardiology  [facility] - [facility] Telemetry

## 2024-09-12 NOTE — PROGRESS NOTES
[facility] Hospital Medicine Progress Note        Chief Complaint: Inpatient Follow-up for     HPI:   49-year-old  male with past medical history of hypertension, congestive heart failure with EF of 20%, COPD on home oxygen, end-stage renal disease on hemodialysis, prostate cancer status post radiation who recently moved from [place] to [place] presented to ER with complaints of chest pain and shortness of breaths. Patient reports a table fell on him and he has been having left-sided chest pain since then chest x-ray showed poor inspiratory effort accentuated pulmonary vascular markings also concern about congestive changes, CTA of the chest was negative for PE did show left-sided effusion bibasilar atelectasis and mild interstitial edema patient was put on 3 L of nasal cannula then was put on BiPAP and now has been weaned down to 4 L patient was significantly tachypneic in the ER and has been admitted to hospitalist service for further management care troponins were found to be elevated too.  Patient had 2D echo done that showed EF of less than 20% cardiology consulted and following plan for left heart catheterization       Interval Hx:   Except for intermittent episodes of agitation there were no acute events overnight.  When seen at bedside he was alert, resting in the bed.  Doing well on 4 L nasal cannula oxygen.  Denies any chest pain.  Scheduled for angiogram today.  No family members at bedside.  He understands importance of medication compliance.        Objective/physical exam:  Vitals:    09/12/24 0349 09/12/24 0736 09/12/24 0855 09/12/24 1100   BP:  109/75 109/75 110/76   Pulse:  96 96 97   Resp: 18 20  18   Temp:  97.6 °F (36.4 °C)  97.5 °F (36.4 °C)   TempSrc:  Oral  Oral   SpO2:  100%  100%   Weight:       Height:         General: In no acute distress, afebrile  Respiratory: Clear to auscultation bilaterally  Cardiovascular: S1, S2, no appreciable  murmur  Abdomen: Soft, nontender, BS +  MSK: Warm, no lower extremity edema, no clubbing or cyanosis  Neurologic: Alert and oriented x4, moving all extremities with good strength     Lab Results   Component Value Date     09/12/2024    K 6.1 (H) 09/12/2024     09/12/2024    CO2 22 09/12/2024    BUN 62.6 (H) 09/12/2024    CREATININE 8.02 (H) 09/12/2024    CALCIUM 9.4 09/12/2024    ANIONGAP 14 06/02/2016    ESTGFRAFRICA 20.8 (A) 11/11/2016    EGFRNONAA 18.0 (A) 11/11/2016      Lab Results   Component Value Date    ALT 9 09/11/2024    AST 15 09/11/2024    ALKPHOS 841 (H) 09/11/2024    BILITOT 0.5 09/11/2024      Lab Results   Component Value Date    WBC 3.73 (L) 09/12/2024    HGB 11.4 (L) 09/12/2024    HCT 35.6 (L) 09/12/2024    MCV 93.7 09/12/2024     09/12/2024           Medications:   aspirin  81 mg Oral Daily    busPIRone  5 mg Oral BID    calcitRIOL  0.25 mcg Oral BID    dapagliflozin propanediol  10 mg Oral Daily    enoxparin  30 mg Subcutaneous Daily    metoprolol succinate  12.5 mg Oral Daily    mupirocin   Nasal BID        Current Facility-Administered Medications:     acetaminophen, 650 mg, Oral, Q8H PRN    acetaminophen, 650 mg, Oral, Q4H PRN    dextrose 10%, 12.5 g, Intravenous, PRN    dextrose 10%, 25 g, Intravenous, PRN    glucagon (human recombinant), 1 mg, Intramuscular, PRN    glucose, 16 g, Oral, PRN    glucose, 24 g, Oral, PRN    HYDROcodone-acetaminophen, 1 tablet, Oral, Q6H PRN    ondansetron, 4 mg, Intravenous, Q4H PRN    simethicone, 1 tablet, Oral, TID PRN    sodium chloride 0.9%, 10 mL, Intravenous, Q12H PRN     Assessment/Plan:    Acute hypoxemic respiratory failure   acute on chronic Congestive heart failure exacerbation with EF of 20%   NSTEMI-type 1 versus type 2  End-stage renal disease on hemodialysis   NSTEMI unknown type   Multiple chronic compression deformities of T-spine , chronic pain  Uncontrolled hypertension  Chronic anemia    Plan:   -continue, Farxiga,.   Patient is scheduled for angiogram today evaluate for CAD the setting of CHF.  Lipid profile for tomorrow..  Needs Entresto once cleared from Nephrology standpoint.  -stable from respiratory standpoint.  Currently on 4 L.  Wean off oxygen as tolerated.  May need O2 at discharge  -nephrology on board for HD needs.  Appreciate assistance.  -analgesics as needed.  Mobilize as tolerated.    Holden Mcmillan MD

## 2024-09-12 NOTE — NURSING
"Patient is half out of the bed, one leg is on the side. Patient is capable of changing positions on his own. Patient refuse to straighten out in the bed. Patient states "I won't fall out."   "

## 2024-09-12 NOTE — NURSING
Pt called to be changed. CNAs x2 went to assist pt. He was very rude and disrespectful to the CNAs. He began yelling and screaming at the CNAs and telling them he already cleaned himself up and to get out of his room.

## 2024-09-12 NOTE — PROGRESS NOTES
LHC cancelled at Potassium level is elevate today. Will need to undergo dialysis today and can attempt LHC on 9.13.24 if Potassium has normalized. Will place him NPO after MN Tonight.

## 2024-09-13 NOTE — NURSING
Nurses Note -- 4 Eyes      9/12/2024   10:32 PM      Skin assessed during: Q Shift Change      [x] No Altered Skin Integrity Present    []Prevention Measures Documented      [] Yes- Altered Skin Integrity Present or Discovered   [] LDA Added if Not in Epic (Describe Wound)   [] New Altered Skin Integrity was Present on Admit and Documented in LDA   [] Wound Image Taken    Wound Care Consulted? No    Attending Nurse:  Nieves Xie RN    Second RN/Staff Member: Antonella Waters RN

## 2024-09-13 NOTE — PROGRESS NOTES
[facility] Hospital Medicine Progress Note        Chief Complaint: Inpatient Follow-up for     HPI:   49-year-old  male with past medical history of hypertension, congestive heart failure with EF of 20%, COPD on home oxygen, end-stage renal disease on hemodialysis, prostate cancer status post radiation who recently moved from [place] to [place] presented to ER with complaints of chest pain and shortness of breaths. Patient reports a table fell on him and he has been having left-sided chest pain since then chest x-ray showed poor inspiratory effort accentuated pulmonary vascular markings also concern about congestive changes, CTA of the chest was negative for PE did show left-sided effusion bibasilar atelectasis and mild interstitial edema patient was put on 3 L of nasal cannula then was put on BiPAP and now has been weaned down to 4 L patient was significantly tachypneic in the ER and has been admitted to hospitalist service for further management care troponins were found to be elevated too.  Patient had 2D echo done that showed EF of less than 20% cardiology consulted and following plan for left heart catheterization       Interval Hx:   Seen and examined at bedside.  Complains of pain.  Scheduled for angiogram today.  Hyperkalemia continues.    Objective/physical exam:  Vitals:    09/13/24 0704 09/13/24 0945 09/13/24 1025 09/13/24 1121   BP:  106/67  105/71   Pulse:   (!) 130 (!) 130   Resp: 18  18 20   Temp:    98.8 °F (37.1 °C)   TempSrc:    Axillary   SpO2:   96%    Weight:       Height:         General: In no acute distress, afebrile  Respiratory: Clear to auscultation bilaterally  Cardiovascular: S1, S2, no appreciable murmur  Abdomen: Soft, nontender, BS +  MSK: Warm, no lower extremity edema, no clubbing or cyanosis  Neurologic: Alert and oriented x4, moving all extremities with good strength     Lab Results   Component Value Date     09/13/2024    K  5.6 (H) 09/13/2024     09/13/2024    CO2 20 (L) 09/13/2024    BUN 44.1 (H) 09/13/2024    CREATININE 6.46 (H) 09/13/2024    CALCIUM 9.1 09/13/2024    ANIONGAP 14 06/02/2016    ESTGFRAFRICA 20.8 (A) 11/11/2016    EGFRNONAA 18.0 (A) 11/11/2016      Lab Results   Component Value Date    ALT 6 09/13/2024    AST 14 09/13/2024    ALKPHOS 883 (H) 09/13/2024    BILITOT 1.0 09/13/2024      Lab Results   Component Value Date    WBC 5.34 09/13/2024    HGB 12.7 (L) 09/13/2024    HCT 39.8 (L) 09/13/2024    MCV 93.9 09/13/2024     09/13/2024           Medications:   aspirin  81 mg Oral Daily    busPIRone  5 mg Oral BID    calcitRIOL  0.25 mcg Oral BID    dapagliflozin propanediol  10 mg Oral Daily    dextrose 10%  50 g Intravenous Once    And    insulin regular  0.1 Units/kg Intravenous Once    enoxparin  30 mg Subcutaneous Daily    metoprolol succinate  12.5 mg Oral Daily    mupirocin   Nasal BID    sevelamer carbonate  800 mg Oral TID WM    sodium zirconium cyclosilicate  10 g Oral Every Mon, Wed, Fri        Current Facility-Administered Medications:     acetaminophen, 650 mg, Oral, Q8H PRN    acetaminophen, 650 mg, Oral, Q4H PRN    dextrose 10%, 12.5 g, Intravenous, PRN    dextrose 10%, 25 g, Intravenous, PRN    dextrose 10%, 50 g, Intravenous, Once **AND** dextrose 10%, 25 g, Intravenous, PRN **AND** insulin regular, 0.1 Units/kg, Intravenous, Once    glucagon (human recombinant), 1 mg, Intramuscular, PRN    glucose, 16 g, Oral, PRN    glucose, 24 g, Oral, PRN    HYDROcodone-acetaminophen, 1 tablet, Oral, Q4H PRN    ondansetron, 4 mg, Intravenous, Q4H PRN    simethicone, 1 tablet, Oral, TID PRN    sodium chloride 0.9%, 10 mL, Intravenous, Q12H PRN     Assessment/Plan:    Acute hypoxemic respiratory failure   acute on chronic Congestive heart failure exacerbation with EF of 20%   NSTEMI-type 1 versus type 2  End-stage renal disease on hemodialysis   NSTEMI unknown type   Multiple chronic compression deformities of  T-spine , chronic pain  Uncontrolled hypertension  Chronic anemia    Plan:   -nephrology managing hyperkalemia.  Continue HD as per schedule  -cardiology following.  Scheduled for angiogram today.  -stable from respiratory standpoint.  Currently on 4 L.  Wean off oxygen as tolerated.  May need O2 at discharge  -analgesics and antiemetics as needed   -DC to home once cleared from Nephrology and Cardiology standpoint    Holden Mcmillan MD

## 2024-09-13 NOTE — NURSING
09/12/24 1910   Post-Hemodialysis Assessment   Blood Volume Processed (Liters) 62.8 L   Dialyzer Clearance Lightly streaked   Duration of Treatment 180 minutes   Total UF (mL) 3000 mL   Patient Response to Treatment tolerated well

## 2024-09-13 NOTE — PROGRESS NOTES
Unable to Do LHC as patient is not being uncooperative. Consider Outpatient Stress Test. Patient appears to be on medications or detoxing. Will obtain UDS.

## 2024-09-13 NOTE — BRIEF OP NOTE
[facility] - Cath Lab Services  Brief Operative Note    SUMMARY     Surgery Date: 9/13/2024     Surgeons and Role:     * Tank Scott Jr., MD - Primary    Assisting Surgeon: None    Pre-op Diagnosis:  NSTEMI (non-ST elevated myocardial infarction) [I21.4]  Cardiomyopathy, unspecified type [I42.9]    Post-op Diagnosis:  Post-Op Diagnosis Codes:     * NSTEMI (non-ST elevated myocardial infarction) [I21.4]     * Cardiomyopathy, unspecified type [I42.9]    Procedure(s) (LRB):  Angiogram, Coronary, with Left Heart Cath (N/A)    Anesthesia: RN IV Sedation    Implants:  * No implants in log *    Operative Findings: unable to perform procedure from radial approach, and patient too agitated to safely do procedure through femoral approach. The procedure was aborted.    Estimated Blood Loss: * No values recorded between 9/13/2024  4:36 PM and 9/13/2024  4:55 PM *    Estimated Blood Loss has been documented.         Specimens:   Specimen (24h ago, onward)      None            [id number]

## 2024-09-13 NOTE — PROGRESS NOTES
[facility] Telemetry    Cardiology  Progress Note    Patient Name: Prem Saldana  MRN: [id number]  Admission Date: 9/10/2024  Hospital Length of Stay: 3 days  Code Status: Full Code   Attending Physician: Holly Dudley MD   Primary Care Physician: Tank Saenz MD  Expected Discharge Date:   Principal Problem:<principal problem not specified>    Subjective:     Brief HPI/Hospital Course: Mr. Saldana is a 50 y/o male with a history CHF, HTN, ESRD on HD, who is unknown to [facility]. He presented to ER on 9.10.24 with complaints of shortness of breath. He reports he has a fall on 9.9.24 in which he hit his right side. He reports SOB started during the night. EMS reported he was tachypneic (breathing 55/min). He was given FD ASA and Sl Nitro x1. Significant labs include H&H 11.7/36.8, BUN/Creat 70.3/8.07, ALP 1061, Albumin 3.2, BNP 14.761, Troponin 0.554. CTA Chest negative for PE, but cardiomegaly with a trace left effusion, bibasilar atelectasis, mild interstitial edema and Chronic appearing compression deformities in the midthoracic spine. CXR shows poor inspiratory effort accentuates the pulmonary vascular markings, cardiomegaly, increased left retrocardiac density and silhouetting of the left hemidiaphragm, and calcified densities below the left hemidiaphragm. EKG shows sinus tachycardia with Left axis deviation, and LVH. [facility] has been consulted for NSTEMI management.       9.11.24: NAD. VSS. No complaints of Palps/SOB. ST on telemetry. Reports chest pain from desk falling on him. Reports back pain. H&H 10.3/32.7, Creat 6.10   9.12.24: NAD. VSS. Reports Chest Pain No complaints of SOB/Palps. H&H 11.4/35.6, K 6.1, Creat 8.02. In bed lying flat.   9.13.24: NAD. VSS. ST on telemetry. Denies SOB/Palps. H&H 12.7/39.8, K 5.6, BUN/Creat 44.1/6.46,      PMH: CHF, HTN, ESRD on HD, Anemia, Secondary Hyperparathyroidism   PSH: AV Fistula   Family History: Maternal Grandmother -  Cancer, Heart Disease, MI; Mother - DM II  Social History: Current Smoker (2-3 Cigarette per Day). Reports occasionally alcohol. Reports past use of Marijuana.      Previous Cardiac Diagnostics:   ECHO (9.10.24):  Left Ventricle: The left ventricle is dilated. Increased wall thickness. There is concentric remodeling. Severe global hypokinesis present. There is severely reduced systolic function with a visually estimated ejection fraction of 15 - 20%. Unable to assess diastolic function due to atrial fibrillation. Elevated left ventricular filling pressure. Right Ventricle: Severe right ventricular enlargement. Systolic function is severely reduced.  Left Atrium: Left atrium is dilated. Right Atrium: Right atrium is dilated. Aortic Valve: The aortic valve is a trileaflet valve. Mildly calcified cusps. Mildly restricted motion. Aortic valve peak velocity is 1.85 m/s. Mean gradient is 8 mmHg. There is moderate aortic regurgitation with an eccentrically directed jet. Mitral Valve: Moderately thickened leaflets. There is moderate mitral annular calcification present. There is mild to moderate regurgitation. Tricuspid Valve: There is moderate regurgitation. The estimated PA systolic pressure is at least 34 mmHg. Pulmonic Valve: There is mild to moderate regurgitation. IVC/SVC: Elevated venous pressure at 15 mmHg. Pericardium: There is a small effusion. No indication of cardiac tamponade.    ECHO (2.19.24):  A complete two-dimensional transthoracic echocardiogram was performed (2D, M-mode, Doppler and color flow Doppler). The left ventricle is severely dilated.   Left ventricular systolic function is severely reduced. Estimated Ejection Fraction = <20%. The right ventricle is mild to moderately dilated. The left atrium is severely dilated. The right atrium is mild to moderately dilated. The mitral valve leaflets appear thickened, but open well. There is moderate to severe mitral annular calcification. There is mild mitral  regurgitation. There is mild tricuspid insufficiency noted. Mild aortic regurgitation. Dialated IVC 3.8 cm. The lack of respiratory variation in the inferior vena cava diameter is noted. There is no pericardial effusion. Large left pleural effusion.There is moderate concentric left ventricular hypertrophy.      SPECT (2.19.18):  The perfusion scan is free of evidence for myocardial ischemia or injury. Resting wall motion is physiologic. There is resting LV dysfunction with a reduced ejection fraction of 40 %. The ventricular volumes are normal at rest and stress. The extracardiac distribution of radioactivity is normal.      Dobutamine Stress Test (7.27.16);  Moderate left atrial enlargement. Concentric hypertrophy. Normal left ventricular systolic function (EF 60-65%). Left ventricular diastolic dysfunction. Normal right ventricular systolic function . The estimated PA systolic pressure is greater than 26 mmHg.  Mild mitral regurgitation. Small pericardial effusion.        Review of Systems   Cardiovascular:  Positive for chest pain. Negative for dyspnea on exertion, leg swelling and palpitations.   Respiratory:  Negative for shortness of breath.    All other systems reviewed and are negative.    Objective:     Vital Signs (Most Recent):  Temp: 97.9 °F (36.6 °C) (09/13/24 0325)  Pulse: (!) 128 (09/13/24 0445)  Resp: 18 (09/13/24 0704)  BP: 106/67 (09/13/24 0445)  SpO2: 98 % (09/12/24 2337) Vital Signs (24h Range):  Temp:  [97.5 °F (36.4 °C)-98.3 °F (36.8 °C)] 97.9 °F (36.6 °C)  Pulse:  [] 128  Resp:  [18] 18  SpO2:  [98 %-100 %] 98 %  BP: ()/(52-76) 106/67   Weight: 73 kg (160 lb 15 oz)  Body mass index is 22.28 kg/m².  SpO2: 98 %       Intake/Output Summary (Last 24 hours) at 9/13/2024 0745  Last data filed at 9/12/2024 1910  Gross per 24 hour   Intake --   Output 3000 ml   Net -3000 ml       Lines/Drains/Airways       Peripheral Intravenous Line  Duration                  Peripheral IV - Single Lumen  09/10/24 0600 18 G Anterior;Proximal;Right Forearm 3 days                    Significant Labs:   Chemistries:   Recent Labs   Lab 09/10/24  0551 09/10/24  1441 09/10/24  2039 09/11/24  0146 09/12/24  0546 09/13/24  0429     --   --  140 139 137   K 4.5  --   --  4.3 6.1* 5.6*     --   --  105 101 100   CO2 24  --   --  20* 22 20*   BUN 70.3*  --   --  46.7* 62.6* 44.1*   CREATININE 8.07*  --   --  6.10* 8.02* 6.46*   CALCIUM 9.6  --   --  8.9 9.4 9.1   BILITOT 0.6  --   --  0.5  --  1.0   ALKPHOS 1,061*  --   --  841*  --  883*   ALT 8  --   --  9  --  6   AST 18  --   --  15  --  14   GLUCOSE 98  --   --  122* 75 81   MG  --   --   --  1.80 1.90 1.70   PHOS  --   --   --   --   --  5.5*   TROPONINI 0.554* 0.533* 0.424* 0.417*  --   --         CBC/Anemia Labs: Coags:    Recent Labs   Lab 09/11/24 0146 09/12/24  0546 09/13/24  0429   WBC 4.12* 3.73* 5.34   HGB 10.3* 11.4* 12.7*   HCT 32.7* 35.6* 39.8*    225 174   MCV 95.9* 93.7 93.9   RDW 19.9* 19.9* 19.1*    Recent Labs   Lab 09/10/24  0551   INR 1.2   APTT 32.1        Telemetry:  SR    Physical Exam  Vitals and nursing note reviewed.   HENT:      Head: Normocephalic.      Nose: Nose normal.      Mouth/Throat:      Mouth: Mucous membranes are moist.   Eyes:      Pupils: Pupils are equal, round, and reactive to light.   Cardiovascular:      Rate and Rhythm: Normal rate and regular rhythm.      Pulses: Normal pulses.      Heart sounds: Murmur heard.   Pulmonary:      Effort: Pulmonary effort is normal.      Comments: Diminished Lung Sounds     On NC 2 L     Abdominal:      Palpations: Abdomen is soft.   Musculoskeletal:         General: Normal range of motion.      Cervical back: Normal range of motion.      Right lower leg: No edema.      Left lower leg: No edema.      Comments: HOWARD AV Fistula    Skin:     General: Skin is warm.   Neurological:      Mental Status: He is alert and oriented to person, place, and time.   Psychiatric:         Mood  and Affect: Mood normal.         Behavior: Behavior normal.       Current Schedule Inpatient Medications:   aspirin  81 mg Oral Daily    busPIRone  5 mg Oral BID    calcitRIOL  0.25 mcg Oral BID    dapagliflozin propanediol  10 mg Oral Daily    enoxparin  30 mg Subcutaneous Daily    metoprolol succinate  12.5 mg Oral Daily    mupirocin   Nasal BID     Continuous Infusions:      Assessment:   NSTEMI, unspecified    - Troponin 0.554  Acute on Chronic Systolic Heart Failure    - EF 15-20% on ECHO (9.10.24)  Acute Respiratory Failure requiring Oxygenation - Currently on BiPAP  VHD    - Moderate to Severe Mitral Annular Calcification    - Mild MR/TR/AR  Trace Left Pleural Effusion   Tachycardia   Hyperkalemia - Resolved  NICMO ?     - EF 20% on ECHO (2.19.24)    - Normal SPECT (2.19.18)  HTN/HHD  ESRD     - on HD T,Thu,Sat   Anemia  Secondary Hyperparathyroidism   No known history of GI Bleed     Plan:   Lopressor 5 mg IV once for Tachycardia   Plan for LHC once Potassium Level Closer to Normal Range     - Lokelma given this AM - BMP pending   Consents Obtained (Placed in Chart)/Procedure Scheduled   Continue Metoprolol 12.5 mg oral Daily, ASA 81 mg oral Daily, and Farxiga 10 mg oral Daily   Start Entresto LD if BP Allows and Cleared by Nephrology Team prior to Discharge   Fluid Management/HD per Nephrology Team  Consider LifeVest at discharge pending patient Home Status  Wean Oxygen as tolerated  Labs in AM: CBC, BMP, and Mag     CATRINA Malave  Cardiology  [facility] - [address] [place] [address]

## 2024-09-13 NOTE — NURSING
Nurses Note -- 4 Eyes      9/13/2024   8:20 AM      Skin assessed during: Q Shift Change      [x] No Altered Skin Integrity Present    []Prevention Measures Documented      [] Yes- Altered Skin Integrity Present or Discovered   [] LDA Added if Not in Epic (Describe Wound)   [] New Altered Skin Integrity was Present on Admit and Documented in LDA   [] Wound Image Taken    Wound Care Consulted? No    Attending Nurse:  Ed Sena RN/Staff Member:    Nieves

## 2024-09-13 NOTE — PLAN OF CARE
PCP appointment made with Dr. Donald Pabon on: 9/27/2024 at 12:30 PM ([facility]) to establish a (PCP) for home health services to be arranged. The patient is unable to give me a physical address as he states that he will not have this information until he is discharged and can rent a home. I explained that in order to have home health services a physical address would be required so that the nurse can locate and schedule to assess and evaluate his needs.

## 2024-09-13 NOTE — PROGRESS NOTES
Nephrology Progress Note      HPI:    Prem Saldana is a 49 y.o. male from [place], with pmhx of ESRD TTS via L AVF, CHF, EF <20%, COPD, hx of prostate cancer, presenting to the ED today with respiratory failure requiring BiPAP.      History is very limited due to his condition but he was able to answer a few questions on bipap. He states that he last dialyzed in [place], [place] Saturday 9/7. He tells me he has a 6am chair time TTS set up at the dialysis unit in [place]? Will have to call the unit to verify.      CTA chest revealing cardiomegaly with trace left effusion, atelectasis and mild interstitial edema. CXR with congestion. He has 1+ edema to BLE. His electrolytes are normal., BNP is >14,000 on admission, troponin 0.55 and he states that his chest hurts because he fell and had trauma to the chest 2 days ago. Bedside echo pending in the ED. Cardiology following and trending troponins until peak. ED physician paged nephrology for HD today.     Interval History:    9/11/24  Tolerated HD yesterday with 3L off and significant improvement in respiratory status, now on 4L NC. Vitals are stable. Labs reviewed, all relatively stable but bicarb is now 20. Serial troponins remain high. He continues to complain of chest pain from falling this weekend. Chest xr and CTA are without traumatic changes. He is now able to tell me that he was in [place] because his mom lives there, but he is moving to [place] and has a chair time at [facility].   He is usually on 2L O2 support for COPD and is currently requiring 4L.     09/12/2024   No acute changes overnight.  Potassium 6 on a.m. labs.  He is scheduled for his routine dialysis run today which will be done after left heart catheterization.  Voices no new complaints.    09/13/2024   Tolerated 3 L UF with HD yesterday.  Potassium remains elevated at 5.6 on a.m. labs.  Hemoglobin stable.  Shortness of breath improved following HD. no chest pain at present.    ROS:    Review of  "Systems   Constitutional:  Negative for fever, malaise/fatigue and weight loss.   Respiratory:  Negative for cough and shortness of breath.    Cardiovascular:  Negative for chest pain, palpitations, orthopnea and leg swelling.   Gastrointestinal:  Negative for diarrhea, nausea and vomiting.   Genitourinary:  Negative for dysuria, frequency, hematuria and urgency.   Musculoskeletal:  Negative for back pain and myalgias.   Skin:  Negative for rash.   Neurological:  Negative for speech change and focal weakness.   All other systems reviewed and are negative.      Vital Signs:  /67   Pulse (!) 130   Temp 97.9 °F (36.6 °C) (Oral)   Resp 18   Ht 5' 11.26" (1.81 m)   Wt 73 kg (160 lb 15 oz)   SpO2 96%   BMI 22.28 kg/m²   Body mass index is 22.28 kg/m².    Physical Exam:    Physical Exam  Vitals and nursing note reviewed.   Constitutional:       Appearance: He is ill-appearing.   HENT:      Head: Normocephalic and atraumatic.   Eyes:      General: No scleral icterus.     Extraocular Movements: Extraocular movements intact.   Cardiovascular:      Rate and Rhythm: Normal rate and regular rhythm.      Heart sounds: Normal heart sounds.      Comments: L AVF  Pulmonary:      Effort: No respiratory distress.      Breath sounds: Examination of the right-lower field reveals decreased breath sounds. Examination of the left-lower field reveals decreased breath sounds. Decreased breath sounds present.   Abdominal:      General: Abdomen is flat. Bowel sounds are normal. There is no distension.      Palpations: Abdomen is soft.      Tenderness: There is no abdominal tenderness.   Musculoskeletal:         General: No swelling or deformity. Normal range of motion.      Right lower leg: No edema.      Left lower leg: No edema.      Comments: No edema    Skin:     General: Skin is warm and dry.   Neurological:      General: No focal deficit present.      Mental Status: He is alert and oriented to person, place, and time. "   Psychiatric:         Mood and Affect: Mood normal.         Behavior: Behavior normal.         Labs:  Recent Results (from the past 48 hour(s))   EKG 12-lead    Collection Time: 09/11/24  3:26 PM   Result Value Ref Range    QRS Duration 106 ms    [facility] QTC Calculation 462 ms   Basic Metabolic Panel    Collection Time: 09/12/24  5:46 AM   Result Value Ref Range    Sodium 139 136 - 145 mmol/L    Potassium 6.1 (H) 3.5 - 5.1 mmol/L    Chloride 101 98 - 107 mmol/L    CO2 22 22 - 29 mmol/L    Glucose 75 74 - 100 mg/dL    Blood Urea Nitrogen 62.6 (H) 8.9 - 20.6 mg/dL    Creatinine 8.02 (H) 0.73 - 1.18 mg/dL    BUN/Creatinine Ratio 8     Calcium 9.4 8.4 - 10.2 mg/dL    Anion Gap 16.0 mEq/L    eGFR 8 mL/min/1.73/m2   Magnesium    Collection Time: 09/12/24  5:46 AM   Result Value Ref Range    Magnesium Level 1.90 1.60 - 2.60 mg/dL   CBC with Differential    Collection Time: 09/12/24  5:46 AM   Result Value Ref Range    WBC 3.73 (L) 4.50 - 11.50 x10(3)/mcL    RBC 3.80 (L) 4.70 - 6.10 x10(6)/mcL    Hgb 11.4 (L) 14.0 - 18.0 g/dL    Hct 35.6 (L) 42.0 - 52.0 %    MCV 93.7 80.0 - 94.0 fL    MCH 30.0 27.0 - 31.0 pg    MCHC 32.0 (L) 33.0 - 36.0 g/dL    RDW 19.9 (H) 11.5 - 17.0 %    Platelet 225 130 - 400 x10(3)/mcL    MPV 11.7 (H) 7.4 - 10.4 fL    Neut % 72.1 %    Lymph % 18.2 %    Mono % 7.8 %    Eos % 1.1 %    Basophil % 0.3 %    Lymph # 0.68 0.6 - 4.6 x10(3)/mcL    Neut # 2.69 2.1 - 9.2 x10(3)/mcL    Mono # 0.29 0.1 - 1.3 x10(3)/mcL    Eos # 0.04 0 - 0.9 x10(3)/mcL    Baso # 0.01 <=0.2 x10(3)/mcL    IG# 0.02 0 - 0.04 x10(3)/mcL    IG% 0.5 %    NRBC% 0.0 %   Magnesium    Collection Time: 09/13/24  4:29 AM   Result Value Ref Range    Magnesium Level 1.70 1.60 - 2.60 mg/dL   Phosphorus    Collection Time: 09/13/24  4:29 AM   Result Value Ref Range    Phosphorus Level 5.5 (H) 2.3 - 4.7 mg/dL   PTH, Intact    Collection Time: 09/13/24  4:29 AM   Result Value Ref Range    PARATHYROID HORMONE INTACT 1,707.9 (H) 8.7 - 77.0 pg/mL    Comprehensive Metabolic Panel    Collection Time: 09/13/24  4:29 AM   Result Value Ref Range    Sodium 137 136 - 145 mmol/L    Potassium 5.6 (H) 3.5 - 5.1 mmol/L    Chloride 100 98 - 107 mmol/L    CO2 20 (L) 22 - 29 mmol/L    Glucose 81 74 - 100 mg/dL    Blood Urea Nitrogen 44.1 (H) 8.9 - 20.6 mg/dL    Creatinine 6.46 (H) 0.73 - 1.18 mg/dL    Calcium 9.1 8.4 - 10.2 mg/dL    Protein Total 7.4 6.4 - 8.3 gm/dL    Albumin 3.2 (L) 3.5 - 5.0 g/dL    Globulin 4.2 (H) 2.4 - 3.5 gm/dL    Albumin/Globulin Ratio 0.8 (L) 1.1 - 2.0 ratio    Bilirubin Total 1.0 <=1.5 mg/dL     (H) 40 - 150 unit/L    ALT 6 0 - 55 unit/L    AST 14 5 - 34 unit/L    eGFR 10 mL/min/1.73/m2    Anion Gap 17.0 mEq/L    BUN/Creatinine Ratio 7    Lipid Panel    Collection Time: 09/13/24  4:29 AM   Result Value Ref Range    Cholesterol Total 156 <=200 mg/dL    HDL Cholesterol 34 (L) 35 - 60 mg/dL    Triglyceride 143 (H) 34 - 140 mg/dL    Cholesterol/HDL Ratio 5 0 - 5    Very Low Density Lipoprotein 29     LDL Cholesterol 93.00 50.00 - 140.00 mg/dL   CBC with Differential    Collection Time: 09/13/24  4:29 AM   Result Value Ref Range    WBC 5.34 4.50 - 11.50 x10(3)/mcL    RBC 4.24 (L) 4.70 - 6.10 x10(6)/mcL    Hgb 12.7 (L) 14.0 - 18.0 g/dL    Hct 39.8 (L) 42.0 - 52.0 %    MCV 93.9 80.0 - 94.0 fL    MCH 30.0 27.0 - 31.0 pg    MCHC 31.9 (L) 33.0 - 36.0 g/dL    RDW 19.1 (H) 11.5 - 17.0 %    Platelet 174 130 - 400 x10(3)/mcL    MPV 11.6 (H) 7.4 - 10.4 fL    Neut % 85.2 %    Lymph % 6.0 %    Mono % 6.7 %    Eos % 0.0 %    Basophil % 0.0 %    Lymph # 0.32 (L) 0.6 - 4.6 x10(3)/mcL    Neut # 4.55 2.1 - 9.2 x10(3)/mcL    Mono # 0.36 0.1 - 1.3 x10(3)/mcL    Eos # 0.00 0 - 0.9 x10(3)/mcL    Baso # 0.00 <=0.2 x10(3)/mcL    IG# 0.11 (H) 0 - 0.04 x10(3)/mcL    IG% 2.1 %    NRBC% 0.0 %   Troponin I    Collection Time: 09/13/24  8:08 AM   Result Value Ref Range    Troponin-I 0.396 (H) 0.000 - 0.045 ng/mL   POCT glucose    Collection Time: 09/13/24  9:53 AM   Result  Value Ref Range    POCT Glucose 79 70 - 110 mg/dL         Assessment/Plan:    ESRD on HD - [place], says he has a TTS chair time in [place] and has been on dialysis for 7 years. Left AVF  CHF - 15-20% EF on echo 9/10   NSTEMI - cardiology recommending cath at some point  COPD on 2L home O2--respiratory status is improving but still less than baseline   Prostate cancer s/p radiation  Acidosis - CO2 20, monitor.    Recommendations:  Continue Hemodialysis on TTS schedule  No indication for acute HD today  Start Lokelma 10gm on non-HD days  Start phos binder tid with meals  Reinforced need for compliance to medication diet and dialysis stressed on the patient.  Consequences of non compliance to dialysis and other medical management can cause multiorgan failure and death to which he expressed understanding.

## 2024-09-14 NOTE — PLAN OF CARE
Problem: Hemodialysis  Goal: Safe, Effective Therapy Delivery  9/14/2024 0649 by Juliann Fajardo RN  Outcome: Progressing  9/14/2024 0307 by Juliann Fajardo RN  Outcome: Progressing  Goal: Effective Tissue Perfusion  9/14/2024 0649 by Juliann Fajardo RN  Outcome: Progressing  9/14/2024 0307 by Juliann Fajardo RN  Outcome: Progressing  Goal: Absence of Infection Signs and Symptoms  9/14/2024 0649 by Juliann Fajardo RN  Outcome: Progressing  9/14/2024 0307 by Juliann Fajardo RN  Outcome: Progressing     Problem: Adult Inpatient Plan of Care  Goal: Plan of Care Review  9/14/2024 0649 by Juliann Fajardo RN  Outcome: Progressing  9/14/2024 0307 by Juliann Fajardo RN  Outcome: Progressing  Goal: Patient-Specific Goal (Individualized)  9/14/2024 0649 by Juliann Fajardo RN  Outcome: Progressing  9/14/2024 0307 by Juliann Fajardo RN  Outcome: Progressing  Goal: Absence of Hospital-Acquired Illness or Injury  9/14/2024 0649 by Juliann Fajardo RN  Outcome: Progressing  9/14/2024 0307 by Juliann Fajardo RN  Outcome: Progressing  Goal: Optimal Comfort and Wellbeing  9/14/2024 0649 by Juliann Fajardo RN  Outcome: Progressing  9/14/2024 0307 by Juliann Fajardo RN  Outcome: Progressing  Goal: Readiness for Transition of Care  9/14/2024 0649 by Juliann Fajardo RN  Outcome: Progressing  9/14/2024 0307 by Juliann Fajardo RN  Outcome: Progressing     Problem: Skin Injury Risk Increased  Goal: Skin Health and Integrity  9/14/2024 0649 by Juliann Fajardo RN  Outcome: Progressing  9/14/2024 0307 by Juliann Fajardo RN  Outcome: Progressing

## 2024-09-14 NOTE — AI DETERIORATION ALERT
Artificial Intelligence Notification  [facility]  [address]  [place] [address]  Phone: [phone]    This documentation was triggered by an Artificial Intelligence Notification:    Admit Date: 9/10/2024   LOS: 4  Code Status: Full Code  : 1975  Age: 49 y.o.  Weight:   Wt Readings from Last 1 Encounters:   09/10/24 73 kg (160 lb 15 oz)        Sex: male  Bed: [address] [place] [address] A  MRN: [id number]  Attending Physician: Inocencio Mcmillan MD     Date of Alert: 2024  Time AI Alert Received:             Vitals:    24 0349   BP: 98/66   Pulse: (!) 117   Resp: 18   Temp: 98.7 °F (37.1 °C)     SpO2: 100 %      Artificial Intelligence alert discussed with Provider:     Name: DOYLE Humphreys   Date/Time of Provider Notification: 2024      Patient Condition: Spoke with DOYLE Humphreys bedside. Patient , O2 88 on 6L oxymask, Patient is said to be noncompliant, went for a heart cath and declined procedure. PRN metoprolol to be given and sat prob adjusted with current sat if 92-93%. Patient declined to be readjusted as he was supine with labored breathing. ICU available for further deterioration.

## 2024-09-14 NOTE — PROGRESS NOTES
Nephrology Progress Note      HPI:    Prem Saldana is a 49 y.o. male from [place], with pmhx of ESRD TTS via L AVF, CHF, EF <20%, COPD, hx of prostate cancer, presenting to the ED today with respiratory failure requiring BiPAP.      History is very limited due to his condition but he was able to answer a few questions on bipap. He states that he last dialyzed in [place], [place] Saturday 9/7. He tells me he has a 6am chair time TTS set up at the dialysis unit in [place]? Will have to call the unit to verify.      CTA chest revealing cardiomegaly with trace left effusion, atelectasis and mild interstitial edema. CXR with congestion. He has 1+ edema to BLE. His electrolytes are normal., BNP is >14,000 on admission, troponin 0.55 and he states that his chest hurts because he fell and had trauma to the chest 2 days ago. Bedside echo pending in the ED. Cardiology following and trending troponins until peak. ED physician paged nephrology for HD today.     Interval History:    9/11/24  Tolerated HD yesterday with 3L off and significant improvement in respiratory status, now on 4L NC. Vitals are stable. Labs reviewed, all relatively stable but bicarb is now 20. Serial troponins remain high. He continues to complain of chest pain from falling this weekend. Chest xr and CTA are without traumatic changes. He is now able to tell me that he was in [place] because his mom lives there, but he is moving to [place] and has a chair time at [facility].   He is usually on 2L O2 support for COPD and is currently requiring 4L.     09/12/2024   No acute changes overnight.  Potassium 6 on a.m. labs.  He is scheduled for his routine dialysis run today which will be done after left heart catheterization.  Voices no new complaints.    09/13/2024   Tolerated 3 L UF with HD yesterday.  Potassium remains elevated at 5.6 on a.m. labs.  Hemoglobin stable.  Shortness of breath improved following HD. no chest pain at present.    09/14/2024  Patient  "seen in dialysis on HD machine.  Tolerating HD without difficulty.    ROS:    Review of Systems   Constitutional:  Negative for fever, malaise/fatigue and weight loss.   Respiratory:  Negative for cough and shortness of breath.    Cardiovascular:  Negative for chest pain, palpitations, orthopnea and leg swelling.   Gastrointestinal:  Negative for diarrhea, nausea and vomiting.   Genitourinary:  Negative for dysuria, frequency, hematuria and urgency.   Musculoskeletal:  Negative for back pain and myalgias.   Skin:  Negative for rash.   Neurological:  Negative for speech change and focal weakness.   All other systems reviewed and are negative.      Vital Signs:  /63   Pulse 109   Temp 98.8 °F (37.1 °C) (Oral)   Resp 20   Ht 5' 11.26" (1.81 m)   Wt 61.9 kg (136 lb 7.4 oz)   SpO2 100%   BMI 18.89 kg/m²   Body mass index is 18.89 kg/m².    Physical Exam:    Physical Exam  Vitals and nursing note reviewed.   Constitutional:       Appearance: He is ill-appearing.   HENT:      Head: Normocephalic and atraumatic.   Eyes:      General: No scleral icterus.     Extraocular Movements: Extraocular movements intact.   Cardiovascular:      Rate and Rhythm: Normal rate and regular rhythm.      Heart sounds: Normal heart sounds.      Comments: L AVF  Pulmonary:      Effort: No respiratory distress.      Breath sounds: Examination of the right-lower field reveals decreased breath sounds. Examination of the left-lower field reveals decreased breath sounds. Decreased breath sounds present.   Abdominal:      General: Abdomen is flat. Bowel sounds are normal. There is no distension.      Palpations: Abdomen is soft.      Tenderness: There is no abdominal tenderness.   Musculoskeletal:         General: No swelling or deformity. Normal range of motion.      Right lower leg: No edema.      Left lower leg: No edema.      Comments: No edema    Skin:     General: Skin is warm and dry.   Neurological:      General: No focal deficit " present.      Mental Status: He is alert and oriented to person, place, and time.   Psychiatric:         Mood and Affect: Mood normal.         Behavior: Behavior normal.         Labs:  Recent Results (from the past 48 hour(s))   Magnesium    Collection Time: 09/13/24  4:29 AM   Result Value Ref Range    Magnesium Level 1.70 1.60 - 2.60 mg/dL   Phosphorus    Collection Time: 09/13/24  4:29 AM   Result Value Ref Range    Phosphorus Level 5.5 (H) 2.3 - 4.7 mg/dL   PTH, Intact    Collection Time: 09/13/24  4:29 AM   Result Value Ref Range    PARATHYROID HORMONE INTACT 1,707.9 (H) 8.7 - 77.0 pg/mL   Comprehensive Metabolic Panel    Collection Time: 09/13/24  4:29 AM   Result Value Ref Range    Sodium 137 136 - 145 mmol/L    Potassium 5.6 (H) 3.5 - 5.1 mmol/L    Chloride 100 98 - 107 mmol/L    CO2 20 (L) 22 - 29 mmol/L    Glucose 81 74 - 100 mg/dL    Blood Urea Nitrogen 44.1 (H) 8.9 - 20.6 mg/dL    Creatinine 6.46 (H) 0.73 - 1.18 mg/dL    Calcium 9.1 8.4 - 10.2 mg/dL    Protein Total 7.4 6.4 - 8.3 gm/dL    Albumin 3.2 (L) 3.5 - 5.0 g/dL    Globulin 4.2 (H) 2.4 - 3.5 gm/dL    Albumin/Globulin Ratio 0.8 (L) 1.1 - 2.0 ratio    Bilirubin Total 1.0 <=1.5 mg/dL     (H) 40 - 150 unit/L    ALT 6 0 - 55 unit/L    AST 14 5 - 34 unit/L    eGFR 10 mL/min/1.73/m2    Anion Gap 17.0 mEq/L    BUN/Creatinine Ratio 7    Lipid Panel    Collection Time: 09/13/24  4:29 AM   Result Value Ref Range    Cholesterol Total 156 <=200 mg/dL    HDL Cholesterol 34 (L) 35 - 60 mg/dL    Triglyceride 143 (H) 34 - 140 mg/dL    Cholesterol/HDL Ratio 5 0 - 5    Very Low Density Lipoprotein 29     LDL Cholesterol 93.00 50.00 - 140.00 mg/dL   CBC with Differential    Collection Time: 09/13/24  4:29 AM   Result Value Ref Range    WBC 5.34 4.50 - 11.50 x10(3)/mcL    RBC 4.24 (L) 4.70 - 6.10 x10(6)/mcL    Hgb 12.7 (L) 14.0 - 18.0 g/dL    Hct 39.8 (L) 42.0 - 52.0 %    MCV 93.9 80.0 - 94.0 fL    MCH 30.0 27.0 - 31.0 pg    MCHC 31.9 (L) 33.0 - 36.0 g/dL     RDW 19.1 (H) 11.5 - 17.0 %    Platelet 174 130 - 400 x10(3)/mcL    MPV 11.6 (H) 7.4 - 10.4 fL    Neut % 85.2 %    Lymph % 6.0 %    Mono % 6.7 %    Eos % 0.0 %    Basophil % 0.0 %    Lymph # 0.32 (L) 0.6 - 4.6 x10(3)/mcL    Neut # 4.55 2.1 - 9.2 x10(3)/mcL    Mono # 0.36 0.1 - 1.3 x10(3)/mcL    Eos # 0.00 0 - 0.9 x10(3)/mcL    Baso # 0.00 <=0.2 x10(3)/mcL    IG# 0.11 (H) 0 - 0.04 x10(3)/mcL    IG% 2.1 %    NRBC% 0.0 %   EKG 12-lead    Collection Time: 09/13/24  7:56 AM   Result Value Ref Range    QRS Duration 116 ms    OHS QTC Calculation 469 ms   Troponin I    Collection Time: 09/13/24  8:08 AM   Result Value Ref Range    Troponin-I 0.396 (H) 0.000 - 0.045 ng/mL   POCT glucose    Collection Time: 09/13/24  9:53 AM   Result Value Ref Range    POCT Glucose 79 70 - 110 mg/dL   Basic metabolic panel    Collection Time: 09/13/24 12:18 PM   Result Value Ref Range    Sodium 139 136 - 145 mmol/L    Potassium 4.6 3.5 - 5.1 mmol/L    Chloride 99 98 - 107 mmol/L    CO2 19 (L) 22 - 29 mmol/L    Glucose 74 74 - 100 mg/dL    Blood Urea Nitrogen 46.8 (H) 8.9 - 20.6 mg/dL    Creatinine 6.94 (H) 0.73 - 1.18 mg/dL    BUN/Creatinine Ratio 7     Calcium 8.9 8.4 - 10.2 mg/dL    Anion Gap 21.0 mEq/L    eGFR 9 mL/min/1.73/m2   POCT glucose    Collection Time: 09/13/24  6:23 PM   Result Value Ref Range    POCT Glucose 136 (H) 70 - 110 mg/dL   Basic metabolic panel    Collection Time: 09/13/24  8:30 PM   Result Value Ref Range    Sodium 136 136 - 145 mmol/L    Potassium 6.0 (H) 3.5 - 5.1 mmol/L    Chloride 101 98 - 107 mmol/L    CO2 14 (L) 22 - 29 mmol/L    Glucose 59 (L) 74 - 100 mg/dL    Blood Urea Nitrogen 54.1 (H) 8.9 - 20.6 mg/dL    Creatinine 7.32 (H) 0.73 - 1.18 mg/dL    BUN/Creatinine Ratio 7     Calcium 8.4 8.4 - 10.2 mg/dL    Anion Gap 21.0 mEq/L    eGFR 8 mL/min/1.73/m2   Comprehensive Metabolic Panel    Collection Time: 09/14/24  1:27 PM   Result Value Ref Range    Sodium 136 136 - 145 mmol/L    Potassium 5.2 (H) 3.5 - 5.1  mmol/L    Chloride 101 98 - 107 mmol/L    CO2 18 (L) 22 - 29 mmol/L    Glucose 54 (L) 74 - 100 mg/dL    Blood Urea Nitrogen 69.7 (H) 8.9 - 20.6 mg/dL    Creatinine 8.06 (H) 0.73 - 1.18 mg/dL    Calcium 9.3 8.4 - 10.2 mg/dL    Protein Total 6.1 (L) 6.4 - 8.3 gm/dL    Albumin 2.5 (L) 3.5 - 5.0 g/dL    Globulin 3.6 (H) 2.4 - 3.5 gm/dL    Albumin/Globulin Ratio 0.7 (L) 1.1 - 2.0 ratio    Bilirubin Total 0.9 <=1.5 mg/dL     (H) 40 - 150 unit/L    ALT 9 0 - 55 unit/L    AST 19 5 - 34 unit/L    eGFR 8 mL/min/1.73/m2    Anion Gap 17.0 mEq/L    BUN/Creatinine Ratio 9    CBC with Differential    Collection Time: 09/14/24  1:27 PM   Result Value Ref Range    WBC 10.64 4.50 - 11.50 x10(3)/mcL    RBC 4.00 (L) 4.70 - 6.10 x10(6)/mcL    Hgb 12.0 (L) 14.0 - 18.0 g/dL    Hct 36.3 (L) 42.0 - 52.0 %    MCV 90.8 80.0 - 94.0 fL    MCH 30.0 27.0 - 31.0 pg    MCHC 33.1 33.0 - 36.0 g/dL    RDW 19.4 (H) 11.5 - 17.0 %    Platelet 184 130 - 400 x10(3)/mcL    MPV 11.1 (H) 7.4 - 10.4 fL    NRBC% 0.0 %   Manual Differential    Collection Time: 09/14/24  1:27 PM   Result Value Ref Range    WBC 10.64 x10(3)/mcL    Neutrophils % 86 %    Lymphs % 4 %    Monocytes % 10 %    Neutrophils Abs 9.1504 2.1 - 9.2 x10(3)/mcL    Lymphs Abs 0.4256 (L) 0.6 - 4.6 x10(3)/mcL    Monocytes Abs 1.064 0.1 - 1.3 x10(3)/mcL    Platelets Normal Normal, Adequate    RBC Morph Abnormal (A) Normal    Poikilocytosis 2+ (A) (none)    Anisocytosis 1+ (A) (none)    Macrocytosis 2+ (A) (none)    [place] Cells 1+ (A) (none)         Assessment/Plan:    ESRD on HD - [place], says he has a TTS chair time in [place] and has been on dialysis for 7 years. Left AVF  CHF - 15-20% EF on echo 9/10   NSTEMI - cardiology recommending cath at some point  COPD on 2L home O2--respiratory status is improving but still less than baseline   Prostate cancer s/p radiation  Acidosis - CO2 20, monitor.    Recommendations:  Continue Hemodialysis on TTS schedule  Start  Lokelma 10gm on non-HD days  Start phos binder tid with meals  Reinforced need for compliance to medication diet and dialysis stressed on the patient.  Consequences of non compliance to dialysis and other medical management can cause multiorgan failure and death to which he expressed understanding.    Anisha Warren NP

## 2024-09-14 NOTE — PLAN OF CARE
Problem: Hemodialysis  Goal: Safe, Effective Therapy Delivery  Outcome: Progressing  Goal: Effective Tissue Perfusion  Outcome: Progressing  Goal: Absence of Infection Signs and Symptoms  Outcome: Progressing     Problem: Adult Inpatient Plan of Care  Goal: Plan of Care Review  Outcome: Progressing  Goal: Patient-Specific Goal (Individualized)  Outcome: Progressing  Goal: Absence of Hospital-Acquired Illness or Injury  Outcome: Progressing  Goal: Optimal Comfort and Wellbeing  Outcome: Progressing  Goal: Readiness for Transition of Care  Outcome: Progressing     Problem: Skin Injury Risk Increased  Goal: Skin Health and Integrity  Outcome: Progressing

## 2024-09-14 NOTE — PROGRESS NOTES
Patient seen and examined during dialysis.  Reviewed chart.   Sleepy, wakes up but cannot answer questions much  Reviewed vital signs  Temp:  [98.7 °F (37.1 °C)-99.3 °F (37.4 °C)]   Pulse:  [109-248]   Resp:  [18-22]   BP: ()/(47-74)   SpO2:  [74 %-100 %]    NAD  NC/AT, MM moist  KAREN present, no w/r/c  S1S2 heard, no m/r/g  Soft, NT, BS present  No edema  Left UE AVF in use  Opens eyes to painful stimuli, does not follow commands    Recent Results (from the past 48 hour(s))   Magnesium    Collection Time: 09/13/24  4:29 AM   Result Value Ref Range    Magnesium Level 1.70 1.60 - 2.60 mg/dL   Phosphorus    Collection Time: 09/13/24  4:29 AM   Result Value Ref Range    Phosphorus Level 5.5 (H) 2.3 - 4.7 mg/dL   PTH, Intact    Collection Time: 09/13/24  4:29 AM   Result Value Ref Range    PARATHYROID HORMONE INTACT 1,707.9 (H) 8.7 - 77.0 pg/mL   Comprehensive Metabolic Panel    Collection Time: 09/13/24  4:29 AM   Result Value Ref Range    Sodium 137 136 - 145 mmol/L    Potassium 5.6 (H) 3.5 - 5.1 mmol/L    Chloride 100 98 - 107 mmol/L    CO2 20 (L) 22 - 29 mmol/L    Glucose 81 74 - 100 mg/dL    Blood Urea Nitrogen 44.1 (H) 8.9 - 20.6 mg/dL    Creatinine 6.46 (H) 0.73 - 1.18 mg/dL    Calcium 9.1 8.4 - 10.2 mg/dL    Protein Total 7.4 6.4 - 8.3 gm/dL    Albumin 3.2 (L) 3.5 - 5.0 g/dL    Globulin 4.2 (H) 2.4 - 3.5 gm/dL    Albumin/Globulin Ratio 0.8 (L) 1.1 - 2.0 ratio    Bilirubin Total 1.0 <=1.5 mg/dL     (H) 40 - 150 unit/L    ALT 6 0 - 55 unit/L    AST 14 5 - 34 unit/L    eGFR 10 mL/min/1.73/m2    Anion Gap 17.0 mEq/L    BUN/Creatinine Ratio 7    Lipid Panel    Collection Time: 09/13/24  4:29 AM   Result Value Ref Range    Cholesterol Total 156 <=200 mg/dL    HDL Cholesterol 34 (L) 35 - 60 mg/dL    Triglyceride 143 (H) 34 - 140 mg/dL    Cholesterol/HDL Ratio 5 0 - 5    Very Low Density Lipoprotein 29     LDL Cholesterol 93.00 50.00 - 140.00 mg/dL   CBC with Differential    Collection Time: 09/13/24  4:29  AM   Result Value Ref Range    WBC 5.34 4.50 - 11.50 x10(3)/mcL    RBC 4.24 (L) 4.70 - 6.10 x10(6)/mcL    Hgb 12.7 (L) 14.0 - 18.0 g/dL    Hct 39.8 (L) 42.0 - 52.0 %    MCV 93.9 80.0 - 94.0 fL    MCH 30.0 27.0 - 31.0 pg    MCHC 31.9 (L) 33.0 - 36.0 g/dL    RDW 19.1 (H) 11.5 - 17.0 %    Platelet 174 130 - 400 x10(3)/mcL    MPV 11.6 (H) 7.4 - 10.4 fL    Neut % 85.2 %    Lymph % 6.0 %    Mono % 6.7 %    Eos % 0.0 %    Basophil % 0.0 %    Lymph # 0.32 (L) 0.6 - 4.6 x10(3)/mcL    Neut # 4.55 2.1 - 9.2 x10(3)/mcL    Mono # 0.36 0.1 - 1.3 x10(3)/mcL    Eos # 0.00 0 - 0.9 x10(3)/mcL    Baso # 0.00 <=0.2 x10(3)/mcL    IG# 0.11 (H) 0 - 0.04 x10(3)/mcL    IG% 2.1 %    NRBC% 0.0 %   EKG 12-lead    Collection Time: 09/13/24  7:56 AM   Result Value Ref Range    QRS Duration 116 ms    OHS QTC Calculation 469 ms   Troponin I    Collection Time: 09/13/24  8:08 AM   Result Value Ref Range    Troponin-I 0.396 (H) 0.000 - 0.045 ng/mL   POCT glucose    Collection Time: 09/13/24  9:53 AM   Result Value Ref Range    POCT Glucose 79 70 - 110 mg/dL   Basic metabolic panel    Collection Time: 09/13/24 12:18 PM   Result Value Ref Range    Sodium 139 136 - 145 mmol/L    Potassium 4.6 3.5 - 5.1 mmol/L    Chloride 99 98 - 107 mmol/L    CO2 19 (L) 22 - 29 mmol/L    Glucose 74 74 - 100 mg/dL    Blood Urea Nitrogen 46.8 (H) 8.9 - 20.6 mg/dL    Creatinine 6.94 (H) 0.73 - 1.18 mg/dL    BUN/Creatinine Ratio 7     Calcium 8.9 8.4 - 10.2 mg/dL    Anion Gap 21.0 mEq/L    eGFR 9 mL/min/1.73/m2   POCT glucose    Collection Time: 09/13/24  6:23 PM   Result Value Ref Range    POCT Glucose 136 (H) 70 - 110 mg/dL   Basic metabolic panel    Collection Time: 09/13/24  8:30 PM   Result Value Ref Range    Sodium 136 136 - 145 mmol/L    Potassium 6.0 (H) 3.5 - 5.1 mmol/L    Chloride 101 98 - 107 mmol/L    CO2 14 (L) 22 - 29 mmol/L    Glucose 59 (L) 74 - 100 mg/dL    Blood Urea Nitrogen 54.1 (H) 8.9 - 20.6 mg/dL    Creatinine 7.32 (H) 0.73 - 1.18 mg/dL     BUN/Creatinine Ratio 7     Calcium 8.4 8.4 - 10.2 mg/dL    Anion Gap 21.0 mEq/L    eGFR 8 mL/min/1.73/m2   Comprehensive Metabolic Panel    Collection Time: 09/14/24  1:27 PM   Result Value Ref Range    Sodium 136 136 - 145 mmol/L    Potassium 5.2 (H) 3.5 - 5.1 mmol/L    Chloride 101 98 - 107 mmol/L    CO2 18 (L) 22 - 29 mmol/L    Glucose 54 (L) 74 - 100 mg/dL    Blood Urea Nitrogen 69.7 (H) 8.9 - 20.6 mg/dL    Creatinine 8.06 (H) 0.73 - 1.18 mg/dL    Calcium 9.3 8.4 - 10.2 mg/dL    Protein Total 6.1 (L) 6.4 - 8.3 gm/dL    Albumin 2.5 (L) 3.5 - 5.0 g/dL    Globulin 3.6 (H) 2.4 - 3.5 gm/dL    Albumin/Globulin Ratio 0.7 (L) 1.1 - 2.0 ratio    Bilirubin Total 0.9 <=1.5 mg/dL     (H) 40 - 150 unit/L    ALT 9 0 - 55 unit/L    AST 19 5 - 34 unit/L    eGFR 8 mL/min/1.73/m2    Anion Gap 17.0 mEq/L    BUN/Creatinine Ratio 9    CBC with Differential    Collection Time: 09/14/24  1:27 PM   Result Value Ref Range    WBC 10.64 4.50 - 11.50 x10(3)/mcL    RBC 4.00 (L) 4.70 - 6.10 x10(6)/mcL    Hgb 12.0 (L) 14.0 - 18.0 g/dL    Hct 36.3 (L) 42.0 - 52.0 %    MCV 90.8 80.0 - 94.0 fL    MCH 30.0 27.0 - 31.0 pg    MCHC 33.1 33.0 - 36.0 g/dL    RDW 19.4 (H) 11.5 - 17.0 %    Platelet 184 130 - 400 x10(3)/mcL    MPV 11.1 (H) 7.4 - 10.4 fL    NRBC% 0.0 %   Manual Differential    Collection Time: 09/14/24  1:27 PM   Result Value Ref Range    WBC 10.64 x10(3)/mcL    Neutrophils % 86 %    Lymphs % 4 %    Monocytes % 10 %    Neutrophils Abs 9.1504 2.1 - 9.2 x10(3)/mcL    Lymphs Abs 0.4256 (L) 0.6 - 4.6 x10(3)/mcL    Monocytes Abs 1.064 0.1 - 1.3 x10(3)/mcL    Platelets Normal Normal, Adequate    RBC Morph Abnormal (A) Normal    Poikilocytosis 2+ (A) (none)    Anisocytosis 1+ (A) (none)    Macrocytosis 2+ (A) (none)    [place] Cells 1+ (A) (none)   End stage renal disease on HD TTS    Undergoing dialysis now  Reviewed dialysis prescription  With blood pressure 90s SBP- goal adjusted to 0.5 litre net UF  Changes discussed with nursing

## 2024-09-14 NOTE — PROGRESS NOTES
[facility] Tele[facility]    Cardiology  Progress Note    Patient Name: Prem Saldana  MRN: [id number]  Admission Date: 9/10/2024  Hospital Length of Stay: 4 days  Code Status: Full Code   Attending Physician: Inocencio Mcmillan MD   Primary Care Physician: Tank Saenz MD  Expected Discharge Date:   Principal Problem:<principal problem not specified>    Subjective:     Brief HPI/Hospital Course: Mr. Saldana is a 48 y/o male with a history CHF, HTN, ESRD on HD, who is unknown to [facility]. He presented to ER on 9.10.24 with complaints of shortness of breath. He reports he has a fall on 9.9.24 in which he hit his right side. He reports SOB started during the night. EMS reported he was tachypneic (breathing 55/min). He was given FD ASA and Sl Nitro x1. Significant labs include H&H 11.7/36.8, BUN/Creat 70.3/8.07, ALP 1061, Albumin 3.2, BNP 14.761, Troponin 0.554. CTA Chest negative for PE, but cardiomegaly with a trace left effusion, bibasilar atelectasis, mild interstitial edema and Chronic appearing compression deformities in the midthoracic spine. CXR shows poor inspiratory effort accentuates the pulmonary vascular markings, cardiomegaly, increased left retrocardiac density and silhouetting of the left hemidiaphragm, and calcified densities below the left hemidiaphragm. EKG shows sinus tachycardia with Left axis deviation, and LVH. [facility] has been consulted for NSTEMI management.       9.11.24: NAD. VSS. No complaints of Palps/SOB. ST on telemetry. Reports chest pain from desk falling on him. Reports back pain. H&H 10.3/32.7, Creat 6.10   9.12.24: NAD. VSS. Reports Chest Pain No complaints of SOB/Palps. H&H 11.4/35.6, K 6.1, Creat 8.02. In bed lying flat.   9.13.24: NAD. VSS. ST on telemetry. Denies SOB/Palps. H&H 12.7/39.8, K 5.6, BUN/Creat 44.1/6.46,   9.14.24: LHC cancelled due to patient uncooperative.   He is being treated for hyperkalemia. Labs pending. Currently denies  pain       PMH: CHF, HTN, ESRD on HD, Anemia, Secondary Hyperparathyroidism   PSH: AV Fistula   Family History: Maternal Grandmother - Cancer, Heart Disease, MI; Mother - DM II  Social History: Current Smoker (2-3 Cigarette per Day). Reports occasionally alcohol. Reports past use of Marijuana.      Previous Cardiac Diagnostics:   ECHO (9.10.24):  Left Ventricle: The left ventricle is dilated. Increased wall thickness. There is concentric remodeling. Severe global hypokinesis present. There is severely reduced systolic function with a visually estimated ejection fraction of 15 - 20%. Unable to assess diastolic function due to atrial fibrillation. Elevated left ventricular filling pressure. Right Ventricle: Severe right ventricular enlargement. Systolic function is severely reduced.  Left Atrium: Left atrium is dilated. Right Atrium: Right atrium is dilated. Aortic Valve: The aortic valve is a trileaflet valve. Mildly calcified cusps. Mildly restricted motion. Aortic valve peak velocity is 1.85 m/s. Mean gradient is 8 mmHg. There is moderate aortic regurgitation with an eccentrically directed jet. Mitral Valve: Moderately thickened leaflets. There is moderate mitral annular calcification present. There is mild to moderate regurgitation. Tricuspid Valve: There is moderate regurgitation. The estimated PA systolic pressure is at least 34 mmHg. Pulmonic Valve: There is mild to moderate regurgitation. IVC/SVC: Elevated venous pressure at 15 mmHg. Pericardium: There is a small effusion. No indication of cardiac tamponade.    ECHO (2.19.24):  A complete two-dimensional transthoracic echocardiogram was performed (2D, M-mode, Doppler and color flow Doppler). The left ventricle is severely dilated.   Left ventricular systolic function is severely reduced. Estimated Ejection Fraction = <20%. The right ventricle is mild to moderately dilated. The left atrium is severely dilated. The right atrium is mild to moderately dilated. The  mitral valve leaflets appear thickened, but open well. There is moderate to severe mitral annular calcification. There is mild mitral regurgitation. There is mild tricuspid insufficiency noted. Mild aortic regurgitation. Dialated IVC 3.8 cm. The lack of respiratory variation in the inferior vena cava diameter is noted. There is no pericardial effusion. Large left pleural effusion.There is moderate concentric left ventricular hypertrophy.      SPECT (2.19.18):  The perfusion scan is free of evidence for myocardial ischemia or injury. Resting wall motion is physiologic. There is resting LV dysfunction with a reduced ejection fraction of 40 %. The ventricular volumes are normal at rest and stress. The extracardiac distribution of radioactivity is normal.      Dobutamine Stress Test (7.27.16);  Moderate left atrial enlargement. Concentric hypertrophy. Normal left ventricular systolic function (EF 60-65%). Left ventricular diastolic dysfunction. Normal right ventricular systolic function . The estimated PA systolic pressure is greater than 26 mmHg.  Mild mitral regurgitation. Small pericardial effusion.        Review of Systems   Cardiovascular:  Positive for chest pain. Negative for dyspnea on exertion, leg swelling and palpitations.   Respiratory:  Negative for shortness of breath.    All other systems reviewed and are negative.    Objective:     Vital Signs (Most Recent):  Temp: 98.8 °F (37.1 °C) (09/14/24 1130)  Pulse: 109 (09/14/24 1130)  Resp: 20 (09/14/24 1130)  BP: 101/63 (09/14/24 1130)  SpO2: 100 % (09/14/24 1130) Vital Signs (24h Range):  Temp:  [98.7 °F (37.1 °C)-99.3 °F (37.4 °C)] 98.8 °F (37.1 °C)  Pulse:  [109-248] 109  Resp:  [18-22] 20  SpO2:  [74 %-100 %] 100 %  BP: ()/(47-74) 101/63   Weight: 61.9 kg (136 lb 7.4 oz)  Body mass index is 18.89 kg/m².  SpO2: 100 %     No intake or output data in the 24 hours ending 09/14/24 1255      Lines/Drains/Airways       Peripheral Intravenous Line  Duration                   Peripheral IV - Single Lumen 09/10/24 0600 18 G Anterior;Proximal;Right Forearm 4 days                    Significant Labs:   Chemistries:   Recent Labs   Lab 09/10/24  0551 09/10/24  1441 09/10/24  2039 09/11/24  0146 09/12/24  0546 09/13/24  0429 09/13/24  0808 09/13/24  1218 09/13/24  2030     --   --  140 139 137  --  139 136   K 4.5  --   --  4.3 6.1* 5.6*  --  4.6 6.0*     --   --  105 101 100  --  99 101   CO2 24  --   --  20* 22 20*  --  19* 14*   BUN 70.3*  --   --  46.7* 62.6* 44.1*  --  46.8* 54.1*   CREATININE 8.07*  --   --  6.10* 8.02* 6.46*  --  6.94* 7.32*   CALCIUM 9.6  --   --  8.9 9.4 9.1  --  8.9 8.4   BILITOT 0.6  --   --  0.5  --  1.0  --   --   --    ALKPHOS 1,061*  --   --  841*  --  883*  --   --   --    ALT 8  --   --  9  --  6  --   --   --    AST 18  --   --  15  --  14  --   --   --    GLUCOSE 98  --   --  122* 75 81  --  74 59*   MG  --   --   --  1.80 1.90 1.70  --   --   --    PHOS  --   --   --   --   --  5.5*  --   --   --    TROPONINI 0.554* 0.533* 0.424* 0.417*  --   --  0.396*  --   --         CBC/Anemia Labs: Coags:    Recent Labs   Lab 09/11/24  0146 09/12/24  0546 09/13/24  0429   WBC 4.12* 3.73* 5.34   HGB 10.3* 11.4* 12.7*   HCT 32.7* 35.6* 39.8*    225 174   MCV 95.9* 93.7 93.9   RDW 19.9* 19.9* 19.1*    Recent Labs   Lab 09/10/24  0551   INR 1.2   APTT 32.1        Telemetry:  SR    Physical Exam  Vitals and nursing note reviewed.   HENT:      Head: Normocephalic.      Nose: Nose normal.      Mouth/Throat:      Mouth: Mucous membranes are moist.   Eyes:      Pupils: Pupils are equal, round, and reactive to light.   Cardiovascular:      Rate and Rhythm: Normal rate and regular rhythm.      Pulses: Normal pulses.      Heart sounds: Murmur heard.   Pulmonary:      Effort: Pulmonary effort is normal.      Comments: Diminished Lung Sounds     On NC 2 L     Abdominal:      Palpations: Abdomen is soft.   Musculoskeletal:         General: Normal  range of motion.      Cervical back: Normal range of motion.      Right lower leg: No edema.      Left lower leg: No edema.      Comments: HOWARD AV Fistula    Skin:     General: Skin is warm.   Neurological:      Mental Status: He is alert and oriented to person, place, and time.   Psychiatric:         Mood and Affect: Affect is flat.       Current Schedule Inpatient Medications:   aspirin  81 mg Oral Daily    busPIRone  5 mg Oral BID    calcitRIOL  0.25 mcg Oral BID    dapagliflozin propanediol  10 mg Oral Daily    dextrose 10%  50 g Intravenous Once    And    insulin regular  0.1 Units/kg Intravenous Once    enoxparin  30 mg Subcutaneous Daily    metoprolol succinate  12.5 mg Oral Daily    mupirocin   Nasal BID    sevelamer carbonate  800 mg Oral TID WM    sodium zirconium cyclosilicate  10 g Oral Every Mon, Wed, Fri     Continuous Infusions:      Assessment:   NSTEMI, unspecified    - Troponin 0.554  Acute on Chronic Systolic Heart Failure    - EF 15-20% on ECHO (9.10.24)  Acute Respiratory Failure requiring Oxygenation - Currently on BiPAP  VHD    - Moderate to Severe Mitral Annular Calcification    - Mild MR/TR/AR  Trace Left Pleural Effusion   Tachycardia   Hyperkalemia -   NICMO ?     - EF 20% on ECHO (2.19.24)    - Normal SPECT (2.19.18)  HTN/HHD  ESRD     - on HD T,Thu,Sat   Anemia  Secondary Hyperparathyroidism   No known history of GI Bleed     Plan:   Continue Metoprolol 12.5 mg oral Daily, ASA 81 mg oral Daily, and Farxiga 10 mg oral Daily   Defer ACE/ARB/ARNI due to hyperkalemia  Fluid Management/HD per Nephrology Team  Consider LifeVest at discharge pending patient Home Status  Wean Oxygen as tolerated  Consider outpatient MPI if patient is amenable  Labs in AM: CBC, BMP, and Mag     CATRINA Melendez  Cardiology  [facility] Tele[facility]

## 2024-09-14 NOTE — NURSING
09/14/24 1803   Post-Hemodialysis Assessment   Blood Volume Processed (Liters) 60.8 L   Dialyzer Clearance Clear   Duration of Treatment 180 minutes   Total UF (mL) 500 mL   Patient Response to Treatment Pt tolerated HD tx well; NAD/VSS. Total tx length 3hrs; resulting in only 500ml net UF goal per MD orders.   Post-Hemodialysis Comments   (Pt deaccessed per P and P)

## 2024-09-14 NOTE — AI DETERIORATION ALERT
Artificial Intelligence Notification  [facility]  [address]  [place] [address]  Phone: [phone]    This documentation was triggered by an Artificial Intelligence Notification:    Admit Date: 9/10/2024   LOS: 4  Code Status: Full Code  : 1975  Age: 49 y.o.  Weight:   Wt Readings from Last 1 Encounters:   24 61.9 kg (136 lb 7.4 oz)        Sex: male  Bed: [address] [place] [address] A  MRN: [id number]  Attending Physician: Inocencio Mcmillan MD     Date of Alert: 2024  Time AI Alert Received: 0845             Vitals:    24 0758   BP: 92/64   Pulse: 109   Resp: (!) 22   Temp: 98.7 °F (37.1 °C)     SpO2: 100 %      Artificial Intelligence alert discussed with Provider:     Name: Damir Dennis     Date/Time of Provider Notification: 2024 @ 0856      Patient Condition: Spoke with the nurse, no acute issues; patient removed O2 and desatted to 75% but O2 was replaced and patient recovered to 99%. No concerns at this time. Please contact if issue arises.

## 2024-09-14 NOTE — NURSING
Nurses Note -- 4 Eyes      9/13/2024   8:04 PM      Skin assessed during: Q Shift Change      [x] No Altered Skin Integrity Present    []Prevention Measures Documented      [] Yes- Altered Skin Integrity Present or Discovered   [] LDA Added if Not in Epic (Describe Wound)   [] New Altered Skin Integrity was Present on Admit and Documented in LDA   [] Wound Image Taken    Wound Care Consulted? No    Attending Nurse:  Juliann Sena RN/Staff Member:  Ed

## 2024-09-14 NOTE — PROGRESS NOTES
[facility] Hospital Medicine Progress Note        Chief Complaint: Inpatient Follow-up for     HPI:   49-year-old  male with past medical history of hypertension, congestive heart failure with EF of 20%, COPD on home oxygen, end-stage renal disease on hemodialysis, prostate cancer status post radiation who recently moved from [place] to [place] presented to ER with complaints of chest pain and shortness of breaths. Patient reports a table fell on him and he has been having left-sided chest pain since then chest x-ray showed poor inspiratory effort accentuated pulmonary vascular markings also concern about congestive changes, CTA of the chest was negative for PE did show left-sided effusion bibasilar atelectasis and mild interstitial edema patient was put on 3 L of nasal cannula then was put on BiPAP and now has been weaned down to 4 L patient was significantly tachypneic in the ER and has been admitted to hospitalist service for further management care troponins were found to be elevated too.  Patient had 2D echo done that showed EF of less than 20% cardiology consulted and following plan for left heart catheterization       Interval Hx:     LHC was not done yesterday as pt was no cooperative. HE was alert, resting in guillermo and c/o pain.     Objective/physical exam:  Vitals:    09/14/24 0635 09/14/24 0758 09/14/24 0925 09/14/24 0929   BP:  92/64 92/64 93/65   Pulse:  109  (!) 118   Resp:  (!) 22 (P) 18    Temp:  98.7 °F (37.1 °C)     TempSrc:  Oral     SpO2:  100%     Weight: 61.9 kg (136 lb 7.4 oz)      Height:         General: In no acute distress, afebrile  Respiratory: Clear to auscultation bilaterally  Cardiovascular: S1, S2, no appreciable murmur  Abdomen: Soft, nontender, BS +  MSK: Warm, no lower extremity edema, no clubbing or cyanosis  Neurologic: Alert and oriented x4, moving all extremities with good strength     Lab Results   Component Value Date      09/13/2024    K 6.0 (H) 09/13/2024     09/13/2024    CO2 14 (L) 09/13/2024    BUN 54.1 (H) 09/13/2024    CREATININE 7.32 (H) 09/13/2024    CALCIUM 8.4 09/13/2024    ANIONGAP 14 06/02/2016    ESTGFRAFRICA 20.8 (A) 11/11/2016    EGFRNONAA 18.0 (A) 11/11/2016      Lab Results   Component Value Date    ALT 6 09/13/2024    AST 14 09/13/2024    ALKPHOS 883 (H) 09/13/2024    BILITOT 1.0 09/13/2024      Lab Results   Component Value Date    WBC 5.34 09/13/2024    HGB 12.7 (L) 09/13/2024    HCT 39.8 (L) 09/13/2024    MCV 93.9 09/13/2024     09/13/2024           Medications:   aspirin  81 mg Oral Daily    busPIRone  5 mg Oral BID    calcitRIOL  0.25 mcg Oral BID    dapagliflozin propanediol  10 mg Oral Daily    dextrose 10%  50 g Intravenous Once    And    insulin regular  0.1 Units/kg Intravenous Once    enoxparin  30 mg Subcutaneous Daily    metoprolol succinate  12.5 mg Oral Daily    mupirocin   Nasal BID    sevelamer carbonate  800 mg Oral TID WM    sodium zirconium cyclosilicate  10 g Oral Every Mon, Wed, Fri        Current Facility-Administered Medications:     acetaminophen, 650 mg, Oral, Q8H PRN    acetaminophen, 650 mg, Oral, Q4H PRN    dextrose 10%, 12.5 g, Intravenous, PRN    dextrose 10%, 25 g, Intravenous, PRN    glucagon (human recombinant), 1 mg, Intramuscular, PRN    glucose, 16 g, Oral, PRN    glucose, 24 g, Oral, PRN    HYDROcodone-acetaminophen, 1 tablet, Oral, Q4H PRN    metoprolol, 5 mg, Intravenous, Q5 Min PRN    ondansetron, 4 mg, Intravenous, Q4H PRN    simethicone, 1 tablet, Oral, TID PRN    sodium chloride 0.9%, 10 mL, Intravenous, Q12H PRN     Assessment/Plan:    Acute hypoxemic respiratory failure   acute on chronic Congestive heart failure exacerbation with EF of 20%   NSTEMI-type 1 versus type 2  End-stage renal disease on hemodialysis   NSTEMI unknown type   Multiple chronic compression deformities of T-spine , chronic pain  Uncontrolled hypertension  Chronic anemia    Plan:    -nephrology managing hyperkalemia.  Continue HD as per schedule  -cardiology following.  Scheduled for angiogram when apt  -stable from respiratory standpoint.  Currently on 4 L.  Wean off oxygen as tolerated.  May need O2 at discharge  -analgesics and antiemetics as needed   -DC to home once cleared from Nephrology and Cardiology standpoint     Holden Mcmillan MD

## 2024-09-15 NOTE — AI DETERIORATION ALERT
Artificial Intelligence Notification  [facility]  [address]  [place] [address]  Phone: [phone]    This documentation was triggered by an Artificial Intelligence Notification:    Admit Date: 9/10/2024   LOS: 5  Code Status: Full Code  : 1975  Age: 49 y.o.  Weight:   Wt Readings from Last 1 Encounters:   24 61.9 kg (136 lb 7.4 oz)        Sex: male  Bed: [address] [place] [address] A  MRN: [id number]  Attending Physician: Inocencio Mcmillan MD     Date of Alert: 09/15/2024  Time AI Alert Received: 1615            Vitals:    09/15/24 1639   BP: (!) 88/50   Pulse: (!) 114   Resp:    Temp:      SpO2: 100 %      Artificial Intelligence alert discussed with Provider:     Name: DOYLE Cruz   Date/Time of Provider Notification: 1755 9/15/24       Patient Condition: Spoke with patient's nurse, patient had an acute drop in BP with a run of Vtaanushka; MD notified and bolus/EKG/labs ordered. Awaiting results; blood pressure improved to 88/50. Currently no need for ICU intervention but will follow up in for lab results; available if needed.

## 2024-09-15 NOTE — NURSING
Nurses Note -- 4 Eyes      9/14/2024   7:43 PM      Skin assessed during: Q Shift Change      [x] No Altered Skin Integrity Present    []Prevention Measures Documented      [] Yes- Altered Skin Integrity Present or Discovered   [] LDA Added if Not in Epic (Describe Wound)   [] New Altered Skin Integrity was Present on Admit and Documented in LDA   [] Wound Image Taken    Wound Care Consulted? No    Attending Nurse:  Akiko Sena RN/Staff Member:   Juliann

## 2024-09-15 NOTE — PROGRESS NOTES
[facility]  Hospital Medicine Progress Note        Chief Complaint: Inpatient Follow-up for     HPI:   49-year-old  male with past medical history of hypertension, congestive heart failure with EF of 20%, COPD on home oxygen, end-stage renal disease on hemodialysis, prostate cancer status post radiation who recently moved from [place] to [place] presented to ER with complaints of chest pain and shortness of breaths. Patient reports a table fell on him and he has been having left-sided chest pain since then chest x-ray showed poor inspiratory effort accentuated pulmonary vascular markings also concern about congestive changes, CTA of the chest was negative for PE did show left-sided effusion bibasilar atelectasis and mild interstitial edema patient was put on 3 L of nasal cannula then was put on BiPAP and now has been weaned down to 4 L patient was significantly tachypneic in the ER and has been admitted to hospitalist service for further management care troponins were found to be elevated too.  Patient had 2D echo done that showed EF of less than 20% cardiology consulted and following plan for left heart catheterization.      Interval Hx:   Had temperature 101.4° last night.  With tachycardia.  When seen at bedside he was alert, resting in the bed.  Has no new complaints or concerns.  Except for pain white count and platelets within normal limits.  HGB stable.  Initial blood cultures negative    Objective/physical exam:  Vitals:    09/14/24 2153 09/14/24 2330 09/15/24 0120 09/15/24 0327   BP: 95/61 (!) 82/55 (!) 89/52 (!) 89/61   Pulse: (!) 120 (!) 119 (!) 114 (!) 121   Resp: 18 20  20   Temp: (!) 101.5 °F (38.6 °C) 98 °F (36.7 °C)  98 °F (36.7 °C)   TempSrc: Oral      SpO2: 100% 99%  99%   Weight:       Height:         General: In no acute distress, afebrile  Respiratory: Clear to auscultation bilaterally  Cardiovascular: S1, S2, no appreciable murmur  Abdomen: Soft,  nontender, BS +  MSK: Warm, no lower extremity edema, no clubbing or cyanosis  Neurologic: Alert and oriented x4, moving all extremities with good strength     Lab Results   Component Value Date     09/15/2024    K 4.8 09/15/2024    CL 97 (L) 09/15/2024    CO2 23 09/15/2024    BUN 47.0 (H) 09/15/2024    CREATININE 5.63 (H) 09/15/2024    CALCIUM 8.2 (L) 09/15/2024    ANIONGAP 14 06/02/2016    ESTGFRAFRICA 20.8 (A) 11/11/2016    EGFRNONAA 18.0 (A) 11/11/2016      Lab Results   Component Value Date    ALT 8 09/15/2024    AST 18 09/15/2024    ALKPHOS 521 (H) 09/15/2024    BILITOT 1.0 09/15/2024      Lab Results   Component Value Date    WBC 10.55 09/15/2024    HGB 11.2 (L) 09/15/2024    HCT 33.9 (L) 09/15/2024    MCV 89.9 09/15/2024     09/15/2024           Medications:   aspirin  81 mg Oral Daily    busPIRone  5 mg Oral BID    calcitRIOL  0.25 mcg Oral BID    dapagliflozin propanediol  10 mg Oral Daily    dextrose 10%  50 g Intravenous Once    And    insulin regular  0.1 Units/kg Intravenous Once    enoxparin  30 mg Subcutaneous Daily    metoprolol succinate  12.5 mg Oral Daily    mupirocin   Nasal BID    sevelamer carbonate  800 mg Oral TID WM    sodium zirconium cyclosilicate  10 g Oral Every Mon, Wed, Fri        Current Facility-Administered Medications:     acetaminophen, 650 mg, Oral, Q8H PRN    acetaminophen, 650 mg, Oral, Q4H PRN    dextrose 10%, 12.5 g, Intravenous, PRN    dextrose 10%, 25 g, Intravenous, PRN    glucagon (human recombinant), 1 mg, Intramuscular, PRN    glucose, 16 g, Oral, PRN    glucose, 24 g, Oral, PRN    HYDROcodone-acetaminophen, 1 tablet, Oral, Q4H PRN    metoprolol, 5 mg, Intravenous, Q5 Min PRN    ondansetron, 4 mg, Intravenous, Q4H PRN    simethicone, 1 tablet, Oral, TID PRN    sodium chloride 0.9%, 10 mL, Intravenous, Q12H PRN     Assessment/Plan:    Acute hypoxemic respiratory failure   acute on chronic Congestive heart failure exacerbation with EF of 20%   NSTEMI-type 1  versus type 2  End-stage renal disease on hemodialysis   NSTEMI unknown type   Multiple chronic compression deformities of T-spine , chronic pain  Uncontrolled hypertension  Chronic anemia    Plan:   --cardiology following.  Continue metoprolol, aspirin, Farxiga.  Needs LifeVest at discharge and cardiology recommended outpatient NPH scan.  Unable to do LHC as inpatient as patient was not cooperative  -nephrology on board  -Wean off oxygen as tolerated.  May need O2 at discharge  -analgesics and antiemetics as needed        Holden Mcmillan MD

## 2024-09-15 NOTE — PROGRESS NOTES
[facility] Tele[facility]    Cardiology  Progress Note    Patient Name: Prem Saldana  MRN: [id number]  Admission Date: 9/10/2024  Hospital Length of Stay: 5 days  Code Status: Full Code   Attending Physician: Inocencio Mcmillan MD   Primary Care Physician: Tank Saenz MD  Expected Discharge Date:   Principal Problem:<principal problem not specified>    Subjective:     Brief HPI/Hospital Course: Mr. Saldana is a 50 y/o male with a history CHF, HTN, ESRD on HD, who is unknown to [facility]. He presented to ER on 9.10.24 with complaints of shortness of breath. He reports he has a fall on 9.9.24 in which he hit his right side. He reports SOB started during the night. EMS reported he was tachypneic (breathing 55/min). He was given FD ASA and Sl Nitro x1. Significant labs include H&H 11.7/36.8, BUN/Creat 70.3/8.07, ALP 1061, Albumin 3.2, BNP 14.761, Troponin 0.554. CTA Chest negative for PE, but cardiomegaly with a trace left effusion, bibasilar atelectasis, mild interstitial edema and Chronic appearing compression deformities in the midthoracic spine. CXR shows poor inspiratory effort accentuates the pulmonary vascular markings, cardiomegaly, increased left retrocardiac density and silhouetting of the left hemidiaphragm, and calcified densities below the left hemidiaphragm. EKG shows sinus tachycardia with Left axis deviation, and LVH. [facility] has been consulted for NSTEMI management.       9.11.24: NAD. VSS. No complaints of Palps/SOB. ST on telemetry. Reports chest pain from desk falling on him. Reports back pain. H&H 10.3/32.7, Creat 6.10   9.12.24: NAD. VSS. Reports Chest Pain No complaints of SOB/Palps. H&H 11.4/35.6, K 6.1, Creat 8.02. In bed lying flat.   9.13.24: NAD. VSS. ST on telemetry. Denies SOB/Palps. H&H 12.7/39.8, K 5.6, BUN/Creat 44.1/6.46,   9.14.24: LHC cancelled due to patient uncooperative.   He is being treated for hyperkalemia. Labs pending. Currently denies  pain  9.15.24: NAD. VSS.        PMH: CHF, HTN, ESRD on HD, Anemia, Secondary Hyperparathyroidism   PSH: AV Fistula   Family History: Maternal Grandmother - Cancer, Heart Disease, MI; Mother - DM II  Social History: Current Smoker (2-3 Cigarette per Day). Reports occasionally alcohol. Reports past use of Marijuana.      Previous Cardiac Diagnostics:   ECHO (9.10.24):  Left Ventricle: The left ventricle is dilated. Increased wall thickness. There is concentric remodeling. Severe global hypokinesis present. There is severely reduced systolic function with a visually estimated ejection fraction of 15 - 20%. Unable to assess diastolic function due to atrial fibrillation. Elevated left ventricular filling pressure. Right Ventricle: Severe right ventricular enlargement. Systolic function is severely reduced.  Left Atrium: Left atrium is dilated. Right Atrium: Right atrium is dilated. Aortic Valve: The aortic valve is a trileaflet valve. Mildly calcified cusps. Mildly restricted motion. Aortic valve peak velocity is 1.85 m/s. Mean gradient is 8 mmHg. There is moderate aortic regurgitation with an eccentrically directed jet. Mitral Valve: Moderately thickened leaflets. There is moderate mitral annular calcification present. There is mild to moderate regurgitation. Tricuspid Valve: There is moderate regurgitation. The estimated PA systolic pressure is at least 34 mmHg. Pulmonic Valve: There is mild to moderate regurgitation. IVC/SVC: Elevated venous pressure at 15 mmHg. Pericardium: There is a small effusion. No indication of cardiac tamponade.    ECHO (2.19.24):  A complete two-dimensional transthoracic echocardiogram was performed (2D, M-mode, Doppler and color flow Doppler). The left ventricle is severely dilated.   Left ventricular systolic function is severely reduced. Estimated Ejection Fraction = <20%. The right ventricle is mild to moderately dilated. The left atrium is severely dilated. The right atrium is mild to  moderately dilated. The mitral valve leaflets appear thickened, but open well. There is moderate to severe mitral annular calcification. There is mild mitral regurgitation. There is mild tricuspid insufficiency noted. Mild aortic regurgitation. Dialated IVC 3.8 cm. The lack of respiratory variation in the inferior vena cava diameter is noted. There is no pericardial effusion. Large left pleural effusion.There is moderate concentric left ventricular hypertrophy.      SPECT (2.19.18):  The perfusion scan is free of evidence for myocardial ischemia or injury. Resting wall motion is physiologic. There is resting LV dysfunction with a reduced ejection fraction of 40 %. The ventricular volumes are normal at rest and stress. The extracardiac distribution of radioactivity is normal.      Dobutamine Stress Test (7.27.16);  Moderate left atrial enlargement. Concentric hypertrophy. Normal left ventricular systolic function (EF 60-65%). Left ventricular diastolic dysfunction. Normal right ventricular systolic function . The estimated PA systolic pressure is greater than 26 mmHg.  Mild mitral regurgitation. Small pericardial effusion.        Review of Systems   Cardiovascular:  Negative for chest pain, dyspnea on exertion, leg swelling and palpitations.   Respiratory:  Negative for shortness of breath.    All other systems reviewed and are negative.    Objective:     Vital Signs (Most Recent):  Temp: 98.8 °F (37.1 °C) (09/15/24 1130)  Pulse: (!) 111 (09/15/24 1130)  Resp: 18 (09/15/24 1130)  BP: 95/64 (09/15/24 1130)  SpO2: 98 % (09/15/24 1130) Vital Signs (24h Range):  Temp:  [98 °F (36.7 °C)-101.5 °F (38.6 °C)] 98.8 °F (37.1 °C)  Pulse:  [111-121] 111  Resp:  [18-20] 18  SpO2:  [98 %-100 %] 98 %  BP: (82-95)/(52-64) 95/64   Weight: 61.9 kg (136 lb 7.4 oz)  Body mass index is 18.89 kg/m².  SpO2: 98 %       Intake/Output Summary (Last 24 hours) at 9/15/2024 1323  Last data filed at 9/14/2024 1803  Gross per 24 hour   Intake --    Output 500 ml   Net -500 ml         Lines/Drains/Airways       Peripheral Intravenous Line  Duration                  Peripheral IV - Single Lumen 09/10/24 0600 18 G Anterior;Proximal;Right Forearm 5 days                    Significant Labs:   Chemistries:   Recent Labs   Lab 09/10/24  0551 09/10/24  1441 09/10/24  2039 09/11/24  0146 09/12/24  0546 09/13/24  0429 09/13/24  0808 09/13/24  1218 09/13/24  2030 09/14/24  1327 09/15/24  0342     --   --  140 139 137  --  139 136 136 137   K 4.5  --   --  4.3 6.1* 5.6*  --  4.6 6.0* 5.2* 4.8     --   --  105 101 100  --  99 101 101 97*   CO2 24  --   --  20* 22 20*  --  19* 14* 18* 23   BUN 70.3*  --   --  46.7* 62.6* 44.1*  --  46.8* 54.1* 69.7* 47.0*   CREATININE 8.07*  --   --  6.10* 8.02* 6.46*  --  6.94* 7.32* 8.06* 5.63*   CALCIUM 9.6  --   --  8.9 9.4 9.1  --  8.9 8.4 9.3 8.2*   BILITOT 0.6  --   --  0.5  --  1.0  --   --   --  0.9 1.0   ALKPHOS 1,061*  --   --  841*  --  883*  --   --   --  575* 521*   ALT 8  --   --  9  --  6  --   --   --  9 8   AST 18  --   --  15  --  14  --   --   --  19 18   GLUCOSE 98  --   --  122* 75 81  --  74 59* 54* 63*   MG  --   --   --  1.80 1.90 1.70  --   --   --   --   --    PHOS  --   --   --   --   --  5.5*  --   --   --   --   --    TROPONINI 0.554* 0.533* 0.424* 0.417*  --   --  0.396*  --   --   --   --         CBC/Anemia Labs: Coags:    Recent Labs   Lab 09/13/24  0429 09/14/24  1327 09/15/24  0342   WBC 5.34 10.64  10.64 10.55  10.55   HGB 12.7* 12.0* 11.2*   HCT 39.8* 36.3* 33.9*    184 186   MCV 93.9 90.8 89.9   RDW 19.1* 19.4* 19.0*    Recent Labs   Lab 09/10/24  0551   INR 1.2   APTT 32.1        Telemetry:  SR    Physical Exam  Vitals and nursing note reviewed.   HENT:      Head: Normocephalic.      Nose: Nose normal.      Mouth/Throat:      Mouth: Mucous membranes are moist.   Eyes:      Pupils: Pupils are equal, round, and reactive to light.   Cardiovascular:      Rate and Rhythm: Normal rate  and regular rhythm.      Pulses: Normal pulses.      Heart sounds: Murmur heard.   Pulmonary:      Effort: Pulmonary effort is normal.      Comments: Diminished Lung Sounds     On NC 2 L     Abdominal:      Palpations: Abdomen is soft.   Musculoskeletal:         General: Normal range of motion.      Cervical back: Normal range of motion.      Right lower leg: No edema.      Left lower leg: No edema.      Comments: HOWARD AV Fistula    Skin:     General: Skin is warm.   Neurological:      Mental Status: He is alert and oriented to person, place, and time.   Psychiatric:         Mood and Affect: Affect is flat.       Current Schedule Inpatient Medications:   aspirin  81 mg Oral Daily    busPIRone  5 mg Oral BID    calcitRIOL  0.25 mcg Oral BID    dapagliflozin propanediol  10 mg Oral Daily    dextrose 10%  50 g Intravenous Once    And    insulin regular  0.1 Units/kg Intravenous Once    enoxparin  30 mg Subcutaneous Daily    metoprolol succinate  12.5 mg Oral Daily    mupirocin   Nasal BID    sevelamer carbonate  800 mg Oral TID WM    sodium zirconium cyclosilicate  10 g Oral Every Mon, Wed, Fri     Continuous Infusions:      Assessment:   NSTEMI, unspecified    - Troponin 0.554  Acute on Chronic Systolic Heart Failure    - EF 15-20% on ECHO (9.10.24)  Acute Respiratory Failure requiring Oxygenation - Currently on BiPAP  VHD    - Moderate to Severe Mitral Annular Calcification    - Mild MR/TR/AR  Trace Left Pleural Effusion   Tachycardia   Hyperkalemia -   NICMO ?     - EF 20% on ECHO (2.19.24)    - Normal SPECT (2.19.18)  HTN/HHD  ESRD     - on HD T,Thu,Sat   Anemia  Secondary Hyperparathyroidism   No known history of GI Bleed     Plan:   Continue Metoprolol 12.5 mg oral Daily, ASA 81 mg oral Daily, and Farxiga 10 mg oral Daily   Defer ACE/ARB/ARNI due to hyperkalemia  Fluid Management/HD per Nephrology Team  Consider LifeVest at discharge pending patient Home Status  Wean Oxygen as tolerated  Consider outpatient MPI if  patient is amenable  Labs in AM: CBC, BMP, and Mag     CATRINA Melendez  Cardiology  [facility]     Secondary Intention Text (Leave Blank If You Do Not Want): The defect will heal with secondary intention.

## 2024-09-15 NOTE — NURSING
Nurses Note -- 4 Eyes      9/15/2024   12:19 PM      Skin assessed during: Q Shift Change      [x] No Altered Skin Integrity Present    []Prevention Measures Documented      [] Yes- Altered Skin Integrity Present or Discovered   [] LDA Added if Not in Epic (Describe Wound)   [] New Altered Skin Integrity was Present on Admit and Documented in LDA   [] Wound Image Taken    Wound Care Consulted? No    Attending Nurse:  DOYLE Esteban    Second RN/Staff Member:   DEANDRA Mcdaniel

## 2024-09-15 NOTE — AI DETERIORATION ALERT
Artificial Intelligence Notification  [facility]  [address] [place] [address]  Phone: [phone]    This documentation was triggered by an Artificial Intelligence Notification:    Admit Date: 9/10/2024   LOS: 4  Code Status: Full Code  : 1975  Age: 49 y.o.  Weight:   Wt Readings from Last 1 Encounters:   24 61.9 kg (136 lb 7.4 oz)        Sex: male  Bed: [address] [place] [address] A  MRN: [id number]  Attending Physician: Inocencio Mcmillan MD     Date of Alert: 2024  Time AI Alert Received:             Vitals:    24   BP: 95/61   Pulse: (!) 120   Resp: 18   Temp: (!) 101.5 °F (38.6 °C)     SpO2: 100 %      Artificial Intelligence alert discussed with Provider:     Name: Primary nurse DOYLE Joyner   Date/Time of Provider Notification:       Patient Condition: Elevated HR and temp noted on pt's vitals. Spoke with nurse who administered tylenol. Otherwise no complaints. C unable to be completed due to patient being uncopperative. Per nurse, pt refusing many medical interventions, often requesting pain meds. Labs WNL, pt received dialysis this evening. Sugar noted to be low last few days. Suggested RN check sugar and treat if needed. Told to call with any decline. No ICU interventions at this time.

## 2024-09-16 NOTE — PROGRESS NOTES
Pharmacokinetic Initial Assessment: IV Vancomycin    Assessment/Plan:    Initiate intravenous vancomycin with loading dose of 1250 mg once with subsequent doses when random concentrations are less than 20 mcg/mL  Dosing with HD TTS  Desired empiric serum trough concentration is 15 to 20 mcg/mL  Draw vancomycin random level on 9/17 at 0100.  Pharmacy will continue to follow and monitor vancomycin.      Please contact pharmacy at extension [phone] with any questions regarding this assessment.     Thank you for the consult,   Page Ruvalcabaard       Patient brief summary:  Prem Saldana is a 49 y.o. male initiated on antimicrobial therapy with IV Vancomycin for treatment of suspected sepsis    Drug Allergies:   Review of patient's allergies indicates:   Allergen Reactions    Hydralazine Palpitations and Other (See Comments)     Other Reaction(s): feels like he is running    Palpitations    Amlodipine     Levofloxacin        Actual Body Weight:   61.9 kg    Renal Function:   Estimated Creatinine Clearance: 11.8 mL/min (A) (based on SCr of 6.65 mg/dL (H)).,     Dialysis Method (if applicable):  intermittent HD TTS    CBC (last 72 hours):  Recent Labs   Lab Result Units 09/14/24  1327 09/15/24  0342 09/16/24  0534   WBC x10(3)/mcL 10.64  10.64 10.55  10.55 12.52*   Hgb g/dL 12.0* 11.2* 11.5*   Hct % 36.3* 33.9* 34.1*   Platelet x10(3)/mcL 184 186 181   Mono % %  --   --  8.9   Monocytes % % 10 10  --    Eos % %  --   --  1.1   Eosinophils % %  --  1  --    Basophil % %  --   --  0.6       Metabolic Panel (last 72 hours):  Recent Labs   Lab Result Units 09/13/24  1218 09/13/24 2030 09/14/24  1327 09/15/24  0342 09/15/24  1749 09/16/24  0534   Sodium mmol/L 139 136 136 137 136 133*   Potassium mmol/L 4.6 6.0* 5.2* 4.8 5.1 5.4*   Chloride mmol/L 99 101 101 97* 97* 96*   CO2 mmol/L 19* 14* 18* 23 19* 22   Glucose mg/dL 74 59* 54* 63* 75 68*   Blood Urea Nitrogen mg/dL 46.8* 54.1* 69.7* 47.0* 55.6* 67.2*   Creatinine  "mg/dL 6.94* 7.32* 8.06* 5.63* 6.13* 6.65*   Albumin g/dL  --   --  2.5* 2.3*  --  2.4*   Bilirubin Total mg/dL  --   --  0.9 1.0  --  1.3   ALP unit/L  --   --  575* 521*  --  493*   AST unit/L  --   --  19 18  --  16   ALT unit/L  --   --  9 8  --  7   Magnesium Level mg/dL  --   --   --   --  1.70 2.20   Phosphorus Level mg/dL  --   --   --   --   --  5.6*       Drug levels (last 3 results):  No results for input(s): "VANCOMYCINRA", "VANCORANDOM", "VANCOMYCINPE", "VANCOPEAK", "VANCOMYCINTR", "VANCOTROUGH" in the last 72 hours.    Microbiologic Results:  Microbiology Results (last 7 days)       Procedure Component Value Units Date/Time    Blood Culture #1 **CANNOT BE ORDERED STAT** [[id number]]  (Normal) Collected: 09/10/24 0654    Order Status: Completed Specimen: Blood Updated: 09/15/24 0901     Blood Culture No Growth at 5 days    Blood Culture #2 **CANNOT BE ORDERED STAT** [[id number]]  (Normal) Collected: 09/10/24 0654    Order Status: Completed Specimen: Blood Updated: 09/15/24 0901     Blood Culture No Growth at 5 days            "

## 2024-09-16 NOTE — PROGRESS NOTES
[facility] - [facility] Telemetry    Cardiology  Progress Note    Patient Name: Prem Saldana  MRN: [id number]  Admission Date: 9/10/2024  Hospital Length of Stay: 6 days  Code Status: Full Code   Attending Physician: Sin Gonsalez Jr., MD,*   Primary Care Physician: Tank Saenz MD  Expected Discharge Date:   Principal Problem:<principal problem not specified>    Subjective:   Brief HPI/Hospital Course:   Mr. Saldana is a 50 y/o male with a history CHF, HTN, ESRD on HD, who is unknown to [facility]. He presented to ER on 9.10.24 with complaints of shortness of breath. He reports he has a fall on 9.9.24 in which he hit his right side. He reports SOB started during the night. EMS reported he was tachypneic (breathing 55/min). He was given FD ASA and Sl Nitro x1. Significant labs include H&H 11.7/36.8, BUN/Creat 70.3/8.07, ALP 1061, Albumin 3.2, BNP 14.761, Troponin 0.554. CTA Chest negative for PE, but cardiomegaly with a trace left effusion, bibasilar atelectasis, mild interstitial edema and Chronic appearing compression deformities in the midthoracic spine. CXR shows poor inspiratory effort accentuates the pulmonary vascular markings, cardiomegaly, increased left retrocardiac density and silhouetting of the left hemidiaphragm, and calcified densities below the left hemidiaphragm. EKG shows sinus tachycardia with Left axis deviation, and LVH. [facility] has been consulted for NSTEMI management.       Hospital Course:  9.11.24: NAD. VSS. No complaints of Palps/SOB. ST on telemetry. Reports chest pain from desk falling on him. Reports back pain. H&H 10.3/32.7, Creat 6.10   9.12.24: NAD. VSS. Reports Chest Pain No complaints of SOB/Palps. H&H 11.4/35.6, K 6.1, Creat 8.02. In bed lying flat.   9.13.24: NAD. VSS. ST on telemetry. Denies SOB/Palps. H&H 12.7/39.8, K 5.6, BUN/Creat 44.1/6.46,   9.14.24: LHC cancelled due to patient uncooperative.   He is being treated for hyperkalemia. Labs  pending. Currently denies pain  9.15.24: NAD. VSS.   9.16.24: NAD Noted. Sinus Tach on Tele. On Levophed support. Primary complaint noted to be leg pain. Legs are warm dry, dressing on LLE. Respiratory status stable. Had Runs of NSVT Yesterday afternoon, seemed to have resolved.    PMH: CHF, HTN, ESRD on HD, Anemia, Secondary Hyperparathyroidism   PSH: AV Fistula   Family History: Maternal Grandmother - Cancer, Heart Disease, MI; Mother - DM II  Social History: Current Smoker (2-3 Cigarette per Day). Reports occasionally alcohol. Reports past use of Marijuana.      Previous Cardiac Diagnostics:   ECHO (9.10.24):  Left Ventricle: The left ventricle is dilated. Increased wall thickness. There is concentric remodeling. Severe global hypokinesis present. There is severely reduced systolic function with a visually estimated ejection fraction of 15 - 20%. Unable to assess diastolic function due to atrial fibrillation. Elevated left ventricular filling pressure. Right Ventricle: Severe right ventricular enlargement. Systolic function is severely reduced.  Left Atrium: Left atrium is dilated. Right Atrium: Right atrium is dilated. Aortic Valve: The aortic valve is a trileaflet valve. Mildly calcified cusps. Mildly restricted motion. Aortic valve peak velocity is 1.85 m/s. Mean gradient is 8 mmHg. There is moderate aortic regurgitation with an eccentrically directed jet. Mitral Valve: Moderately thickened leaflets. There is moderate mitral annular calcification present. There is mild to moderate regurgitation. Tricuspid Valve: There is moderate regurgitation. The estimated PA systolic pressure is at least 34 mmHg. Pulmonic Valve: There is mild to moderate regurgitation. IVC/SVC: Elevated venous pressure at 15 mmHg. Pericardium: There is a small effusion. No indication of cardiac tamponade.    ECHO (2.19.24):  A complete two-dimensional transthoracic echocardiogram was performed (2D, M-mode, Doppler and color flow Doppler).  The left ventricle is severely dilated.   Left ventricular systolic function is severely reduced. Estimated Ejection Fraction = <20%. The right ventricle is mild to moderately dilated. The left atrium is severely dilated. The right atrium is mild to moderately dilated. The mitral valve leaflets appear thickened, but open well. There is moderate to severe mitral annular calcification. There is mild mitral regurgitation. There is mild tricuspid insufficiency noted. Mild aortic regurgitation. Dialated IVC 3.8 cm. The lack of respiratory variation in the inferior vena cava diameter is noted. There is no pericardial effusion. Large left pleural effusion.There is moderate concentric left ventricular hypertrophy.      SPECT (2.19.18):  The perfusion scan is free of evidence for myocardial ischemia or injury. Resting wall motion is physiologic. There is resting LV dysfunction with a reduced ejection fraction of 40 %. The ventricular volumes are normal at rest and stress. The extracardiac distribution of radioactivity is normal.      Dobutamine Stress Test (7.27.16);  Moderate left atrial enlargement. Concentric hypertrophy. Normal left ventricular systolic function (EF 60-65%). Left ventricular diastolic dysfunction. Normal right ventricular systolic function . The estimated PA systolic pressure is greater than 26 mmHg.  Mild mitral regurgitation. Small pericardial effusion.      Review of Systems   Cardiovascular:  Negative for chest pain, dyspnea on exertion and orthopnea.   Respiratory:  Negative for shortness of breath.      Objective:     Vital Signs (Most Recent):  Temp: 98.9 °F (37.2 °C) (09/16/24 0400)  Pulse: (!) 125 (09/16/24 0802)  Resp: (!) 22 (09/16/24 0839)  BP: 102/61 (09/16/24 0802)  SpO2: 100 % (09/16/24 0515) Vital Signs (24h Range):  Temp:  [98.8 °F (37.1 °C)-99.4 °F (37.4 °C)] 98.9 °F (37.2 °C)  Pulse:  [] 125  Resp:  [13-36] 22  SpO2:  [83 %-100 %] 100 %  BP: ()/(32-73) 102/61   Weight: 61.9  kg (136 lb 7.4 oz)  Body mass index is 18.89 kg/m².  SpO2: 100 %       Intake/Output Summary (Last 24 hours) at 9/16/2024 1106  Last data filed at 9/16/2024 1040  Gross per 24 hour   Intake 953.55 ml   Output --   Net 953.55 ml         Lines/Drains/Airways       Peripheral Intravenous Line  Duration                  Peripheral IV - Single Lumen 09/10/24 0600 18 G Anterior;Proximal;Right Forearm 6 days         Peripheral IV - Single Lumen 09/15/24 1937 20 G Anterior;Right Forearm <1 day                    Significant Labs:   Chemistries:   Recent Labs   Lab 09/11/24  0146 09/12/24  0546 09/13/24  0429 09/13/24  0808 09/13/24  1218 09/13/24  2030 09/14/24  1327 09/15/24  0342 09/15/24  1749 09/15/24  2258 09/16/24  0534      < > 137  --    < > 136 136 137 136  --  133*   K 4.3   < > 5.6*  --    < > 6.0* 5.2* 4.8 5.1  --  5.4*      < > 100  --    < > 101 101 97* 97*  --  96*   CO2 20*   < > 20*  --    < > 14* 18* 23 19*  --  22   BUN 46.7*   < > 44.1*  --    < > 54.1* 69.7* 47.0* 55.6*  --  67.2*   CREATININE 6.10*   < > 6.46*  --    < > 7.32* 8.06* 5.63* 6.13*  --  6.65*   CALCIUM 8.9   < > 9.1  --    < > 8.4 9.3 8.2* 8.2*  --  8.4   BILITOT 0.5  --  1.0  --   --   --  0.9 1.0  --   --  1.3   ALKPHOS 841*  --  883*  --   --   --  575* 521*  --   --  493*   ALT 9  --  6  --   --   --  9 8  --   --  7   AST 15  --  14  --   --   --  19 18  --   --  16   GLUCOSE 122*   < > 81  --    < > 59* 54* 63* 75  --  68*   MG 1.80   < > 1.70  --   --   --   --   --  1.70  --  2.20   PHOS  --   --  5.5*  --   --   --   --   --   --   --  5.6*   TROPONINI 0.417*  --   --  0.396*  --   --   --   --  0.445* 0.404* 0.465*    < > = values in this interval not displayed.        CBC/Anemia Labs: Coags:    Recent Labs   Lab 09/14/24  1327 09/15/24  0342 09/16/24  0534   WBC 10.64  10.64 10.55  10.55 12.52*   HGB 12.0* 11.2* 11.5*   HCT 36.3* 33.9* 34.1*    186 181   MCV 90.8 89.9 88.6   RDW 19.4* 19.0* 19.1*    Recent  Labs   Lab 09/10/24  0551   INR 1.2   APTT 32.1        EKG:      EKG:      Telemetry:  Sinus Tachycardia    Physical Exam  Vitals and nursing note reviewed.   Constitutional:       General: He is not in acute distress.  HENT:      Head: Normocephalic.      Mouth/Throat:      Mouth: Mucous membranes are moist.      Pharynx: Oropharynx is clear.   Cardiovascular:      Rate and Rhythm: Regular rhythm. Tachycardia present.   Pulmonary:      Effort: Pulmonary effort is normal. No respiratory distress.      Breath sounds: Normal breath sounds. No wheezing or rales.   Abdominal:      Palpations: Abdomen is soft.   Musculoskeletal:      Right lower leg: No edema.      Left lower leg: No edema.      Comments: BLE Warm. Left AVF    Skin:     Comments: LLE Dressing in Place.    Neurological:      Mental Status: He is alert. Mental status is at baseline.       Current Schedule Inpatient Medications:   aspirin  81 mg Oral Daily    busPIRone  5 mg Oral BID    calcitRIOL  0.25 mcg Oral BID    dextrose 10%  50 g Intravenous Once    And    insulin regular  0.1 Units/kg Intravenous Once    enoxparin  30 mg Subcutaneous Daily    metoprolol succinate  12.5 mg Oral Daily    mupirocin   Nasal BID    sevelamer carbonate  800 mg Oral TID WM    sodium zirconium cyclosilicate  10 g Oral Every Mon, Wed, Fri     Continuous Infusions:   amiodarone in dextrose 5%  0.5 mg/min Intravenous Continuous        NORepinephrine bitartrate-D5W  0-3 mcg/kg/min Intravenous Continuous 8.1 mL/hr at 09/16/24 1040 0.07 mcg/kg/min at 09/16/24 1040       Assessment:   NSTEMI- (Unspecified) in the Setting of Hypotensive Shock Requiring Vasopressor Support- Do not Suspect Cardiogenic Shock    - Troponin Trend Flat/No Overt Ischemia on EKG  Hypotensive Shock- ? Septic Etiology Requiring Vasopressor Support     - Lactates Normal    - History of Hypertension/Hypertensive Heart Disease  Acute on Chronic Systolic Heart Failure (Compensated)    - EF 15-20% on ECHO  (9.10.24)  History of NICMO    - Normal SPECT (2.19.18)    - LHC Aborted on 9.13.24 due to Ascending Aorta/root angle from RR Approach & Agitation preventing Femoral Approach  NSVT (Monomorphic)    - On Amiodarone Infusion  Acute Respiratory Failure requiring Oxygenation - Requiring NC Oxygen Support  VHD    - Moderate to Severe Mitral Annular Calcification    - Mild MR/TR/AR  Sinus Tachycardia  ESRD     - on HD T,Thu,Sat   COPD    - On Chronic Home Oxygen Support (2 L/Min)  Hyperkalemia (Treated per Nephrology)  History of Prostate Cancer    - Status Post Radiation  Lower Extremity Wound    - Febrile on  9.14.24  Anemia of Chronic Disease   Secondary Hyperparathyroidism   No known history of GI Bleed     Plan:   Check Troponin & Lactate this AM. Trend Lactates.  Hold Metoprolol for now given hypotension requiring Levophed Support.  Wean Vasopressor Support as Able to maintain MAP 65 or Greater.  Continue Amiodarone Infusion at 0.5 Mg/Min  Will add back/optimize guideline directed medical therapy for NICMO as BP Permits. Now Hypotensive, unable.  Defer ACE/ARB/ARNI due to hyperkalemia  Fluid Management/HD per Nephrology Team  Consider LifeVest at discharge pending patient Home Status.  Routine Labs in AM.  Of note, LHC attempted on 9.13.24, however procedure was aborted  from a right radial approach given angle of ascending aorta and aortic root. Femoral access not possible because of patient's agitation and respiratory status. Procedure was aborted.     CATRINA Decker  Cardiology  [facility] - [facility] Tele[facility]    Physician addendum:  I have seen and examined this patient as a split-shared visit with the LOR d/t complicated medical management of above problems written in assessment and high acuity requiring physician expertise in medical decision-making. I performed the substantive portion of the history and exam. Above medical decision-making is also formulated by me.    Cardiovascular  exam:  S1, S2  Lungs:  fine crackles at bases.  Extremities:  + edema bilaterally    Plan:  Medications as above.  Continue supportive therapy.     Faisal Oliver MD  Cardiologist

## 2024-09-16 NOTE — PROGRESS NOTES
Pharmacokinetic Initial Assessment: Gentamicin    Assessment:  Weight utilized for dose calculation: Actual Body Weight  Dosing method utilized: conventional dosing (not a candidate for extended interval due to severe renal impairment (CrCl <30 mL/min) and HD TTS)    Plan: Gentamicin 2 mg/kg loading dose  Draw Peak level 1 hour after infusion is complete (Goal 8-10)  Draw Trough level 2 hours after completion of next HD session (Goal < 1)    Pharmacy will continue to monitor.    Please contact pharmacy at extension [phone] with any questions regarding this assessment.    Thank you for the consult,    Page Yeh       Patient brief summary:  Prem Saldana is a 49 y.o. male initiated on aminoglycoside therapy for treatment of suspected sepsis    Drug Allergies:   Review of patient's allergies indicates:   Allergen Reactions    Hydralazine Palpitations and Other (See Comments)     Other Reaction(s): feels like he is running    Palpitations    Amlodipine     Levofloxacin        Actual Body Weight:   61.9 kg    Adjust Body Weight:   61.9 kg    Ideal Body Weight:  75.9 kg    Renal Function:   Estimated Creatinine Clearance: 11.8 mL/min (A) (based on SCr of 6.65 mg/dL (H)).,     Dialysis Method (if applicable):  intermittent HD TTS    CBC (last 72 hours):  Recent Labs   Lab Result Units 09/14/24  1327 09/15/24  0342 09/16/24  0534   WBC x10(3)/mcL 10.64  10.64 10.55  10.55 12.52*   Hgb g/dL 12.0* 11.2* 11.5*   Hct % 36.3* 33.9* 34.1*   Platelet x10(3)/mcL 184 186 181   Mono % %  --   --  8.9   Monocytes % % 10 10  --    Eos % %  --   --  1.1   Eosinophils % %  --  1  --    Basophil % %  --   --  0.6       Metabolic Panel (last 72 hours):  Recent Labs   Lab Result Units 09/13/24  1218 09/13/24  2030 09/14/24  1327 09/15/24  0342 09/15/24  1749 09/16/24  0534   Sodium mmol/L 139 136 136 137 136 133*   Potassium mmol/L 4.6 6.0* 5.2* 4.8 5.1 5.4*   Chloride mmol/L 99 101 101 97* 97* 96*   CO2 mmol/L 19* 14* 18* 23  19* 22   Glucose mg/dL 74 59* 54* 63* 75 68*   Blood Urea Nitrogen mg/dL 46.8* 54.1* 69.7* 47.0* 55.6* 67.2*   Creatinine mg/dL 6.94* 7.32* 8.06* 5.63* 6.13* 6.65*   Albumin g/dL  --   --  2.5* 2.3*  --  2.4*   Bilirubin Total mg/dL  --   --  0.9 1.0  --  1.3   ALP unit/L  --   --  575* 521*  --  493*   AST unit/L  --   --  19 18  --  16   ALT unit/L  --   --  9 8  --  7   Magnesium Level mg/dL  --   --   --   --  1.70 2.20   Phosphorus Level mg/dL  --   --   --   --   --  5.6*       Microbiologic Results:  Microbiology Results (last 7 days)       Procedure Component Value Units Date/Time    Blood Culture #1 **CANNOT BE ORDERED STAT** [[id number]]  (Normal) Collected: 09/10/24 0654    Order Status: Completed Specimen: Blood Updated: 09/15/24 0901     Blood Culture No Growth at 5 days    Blood Culture #2 **CANNOT BE ORDERED STAT** [[id number]]  (Normal) Collected: 09/10/24 0654    Order Status: Completed Specimen: Blood Updated: 09/15/24 0901     Blood Culture No Growth at 5 days

## 2024-09-16 NOTE — PROGRESS NOTES
Nephrology Progress Note      HPI:    Prem Saldana is a 49 y.o. male from [place], with pmhx of ESRD TTS via L AVF, CHF, EF <20%, COPD, hx of prostate cancer, presenting to the ED today with respiratory failure requiring BiPAP.      History is very limited due to his condition but he was able to answer a few questions on bipap. He states that he last dialyzed in [place], [place] Saturday 9/7. He tells me he has a 6am chair time TTS set up at the dialysis unit in [place]? Will have to call the unit to verify.      CTA chest revealing cardiomegaly with trace left effusion, atelectasis and mild interstitial edema. CXR with congestion. He has 1+ edema to BLE. His electrolytes are normal., BNP is >14,000 on admission, troponin 0.55 and he states that his chest hurts because he fell and had trauma to the chest 2 days ago. Bedside echo pending in the ED. Cardiology following and trending troponins until peak. ED physician paged nephrology for HD today.     Interval History:    9/11/24  Tolerated HD yesterday with 3L off and significant improvement in respiratory status, now on 4L NC. Vitals are stable. Labs reviewed, all relatively stable but bicarb is now 20. Serial troponins remain high. He continues to complain of chest pain from falling this weekend. Chest xr and CTA are without traumatic changes. He is now able to tell me that he was in [place] because his mom lives there, but he is moving to [place] and has a chair time at [facility].   He is usually on 2L O2 support for COPD and is currently requiring 4L.     09/12/2024   No acute changes overnight.  Potassium 6 on a.m. labs.  He is scheduled for his routine dialysis run today which will be done after left heart catheterization.  Voices no new complaints.    09/13/2024   Tolerated 3 L UF with HD yesterday.  Potassium remains elevated at 5.6 on a.m. labs.  Hemoglobin stable.  Shortness of breath improved following HD. no chest pain at present.    09/16/2024   Awake  "alert and oriented.  Looks little dyspneic.  Complains of pain and asking for lot of pain medicines.  Not sure about the oral fluid intake or nutrition intake.      ROS:    Review of Systems   Constitutional:  Negative for fever, malaise/fatigue and weight loss.   Respiratory:  Negative for cough and shortness of breath.    Cardiovascular:  Negative for chest pain, palpitations, orthopnea and leg swelling.   Gastrointestinal:  Negative for diarrhea, nausea and vomiting.   Genitourinary:  Negative for dysuria, frequency, hematuria and urgency.   Musculoskeletal:  Negative for back pain and myalgias.   Skin:  Negative for rash.   Neurological:  Negative for speech change and focal weakness.   All other systems reviewed and are negative.      Vital Signs:  /61   Pulse (!) 125   Temp 98.9 °F (37.2 °C) (Oral)   Resp (!) 22   Ht 5' 11.26" (1.81 m)   Wt 61.9 kg (136 lb 7.4 oz)   SpO2 100%   BMI 18.89 kg/m²   Body mass index is 18.89 kg/m².    Physical Exam:    Physical Exam  Vitals and nursing note reviewed.   Constitutional:       Appearance: He is ill-appearing.   HENT:      Head: Normocephalic and atraumatic.   Eyes:      General: No scleral icterus.     Extraocular Movements: Extraocular movements intact.   Cardiovascular:      Rate and Rhythm: Normal rate and regular rhythm.      Heart sounds: Normal heart sounds.      Comments: L AVF  Pulmonary:      Breath sounds: Examination of the right-lower field reveals decreased breath sounds. Examination of the left-lower field reveals decreased breath sounds. Decreased breath sounds present.   Abdominal:      General: Abdomen is flat. Bowel sounds are normal. There is no distension.      Palpations: Abdomen is soft.      Tenderness: There is no abdominal tenderness.   Musculoskeletal:         General: No swelling or deformity. Normal range of motion.      Right lower leg: No edema.      Left lower leg: No edema.      Comments: No edema    Skin:     General: Skin " is warm and dry.   Neurological:      General: No focal deficit present.      Mental Status: He is alert and oriented to person, place, and time.   Psychiatric:         Mood and Affect: Mood normal.         Behavior: Behavior normal.         Labs:  Recent Results (from the past 48 hour(s))   Comprehensive Metabolic Panel    Collection Time: 09/14/24  1:27 PM   Result Value Ref Range    Sodium 136 136 - 145 mmol/L    Potassium 5.2 (H) 3.5 - 5.1 mmol/L    Chloride 101 98 - 107 mmol/L    CO2 18 (L) 22 - 29 mmol/L    Glucose 54 (L) 74 - 100 mg/dL    Blood Urea Nitrogen 69.7 (H) 8.9 - 20.6 mg/dL    Creatinine 8.06 (H) 0.73 - 1.18 mg/dL    Calcium 9.3 8.4 - 10.2 mg/dL    Protein Total 6.1 (L) 6.4 - 8.3 gm/dL    Albumin 2.5 (L) 3.5 - 5.0 g/dL    Globulin 3.6 (H) 2.4 - 3.5 gm/dL    Albumin/Globulin Ratio 0.7 (L) 1.1 - 2.0 ratio    Bilirubin Total 0.9 <=1.5 mg/dL     (H) 40 - 150 unit/L    ALT 9 0 - 55 unit/L    AST 19 5 - 34 unit/L    eGFR 8 mL/min/1.73/m2    Anion Gap 17.0 mEq/L    BUN/Creatinine Ratio 9    CBC with Differential    Collection Time: 09/14/24  1:27 PM   Result Value Ref Range    WBC 10.64 4.50 - 11.50 x10(3)/mcL    RBC 4.00 (L) 4.70 - 6.10 x10(6)/mcL    Hgb 12.0 (L) 14.0 - 18.0 g/dL    Hct 36.3 (L) 42.0 - 52.0 %    MCV 90.8 80.0 - 94.0 fL    MCH 30.0 27.0 - 31.0 pg    MCHC 33.1 33.0 - 36.0 g/dL    RDW 19.4 (H) 11.5 - 17.0 %    Platelet 184 130 - 400 x10(3)/mcL    MPV 11.1 (H) 7.4 - 10.4 fL    NRBC% 0.0 %   Manual Differential    Collection Time: 09/14/24  1:27 PM   Result Value Ref Range    WBC 10.64 x10(3)/mcL    Neutrophils % 86 %    Lymphs % 4 %    Monocytes % 10 %    Neutrophils Abs 9.1504 2.1 - 9.2 x10(3)/mcL    Lymphs Abs 0.4256 (L) 0.6 - 4.6 x10(3)/mcL    Monocytes Abs 1.064 0.1 - 1.3 x10(3)/mcL    Platelets Normal Normal, Adequate    RBC Morph Abnormal (A) Normal    Poikilocytosis 2+ (A) (none)    Anisocytosis 1+ (A) (none)    Macrocytosis 2+ (A) (none)    [place] Cells 1+ (A) (none)   POCT  glucose    Collection Time: 09/14/24 10:37 PM   Result Value Ref Range    POCT Glucose 56 (L) 70 - 110 mg/dL   POCT glucose    Collection Time: 09/15/24  2:56 AM   Result Value Ref Range    POCT Glucose 79 70 - 110 mg/dL   Comprehensive Metabolic Panel    Collection Time: 09/15/24  3:42 AM   Result Value Ref Range    Sodium 137 136 - 145 mmol/L    Potassium 4.8 3.5 - 5.1 mmol/L    Chloride 97 (L) 98 - 107 mmol/L    CO2 23 22 - 29 mmol/L    Glucose 63 (L) 74 - 100 mg/dL    Blood Urea Nitrogen 47.0 (H) 8.9 - 20.6 mg/dL    Creatinine 5.63 (H) 0.73 - 1.18 mg/dL    Calcium 8.2 (L) 8.4 - 10.2 mg/dL    Protein Total 5.1 (L) 6.4 - 8.3 gm/dL    Albumin 2.3 (L) 3.5 - 5.0 g/dL    Globulin 2.8 2.4 - 3.5 gm/dL    Albumin/Globulin Ratio 0.8 (L) 1.1 - 2.0 ratio    Bilirubin Total 1.0 <=1.5 mg/dL     (H) 40 - 150 unit/L    ALT 8 0 - 55 unit/L    AST 18 5 - 34 unit/L    eGFR 12 mL/min/1.73/m2    Anion Gap 17.0 mEq/L    BUN/Creatinine Ratio 8    CBC with Differential    Collection Time: 09/15/24  3:42 AM   Result Value Ref Range    WBC 10.55 4.50 - 11.50 x10(3)/mcL    RBC 3.77 (L) 4.70 - 6.10 x10(6)/mcL    Hgb 11.2 (L) 14.0 - 18.0 g/dL    Hct 33.9 (L) 42.0 - 52.0 %    MCV 89.9 80.0 - 94.0 fL    MCH 29.7 27.0 - 31.0 pg    MCHC 33.0 33.0 - 36.0 g/dL    RDW 19.0 (H) 11.5 - 17.0 %    Platelet 186 130 - 400 x10(3)/mcL    MPV 12.4 (H) 7.4 - 10.4 fL    NRBC% 0.0 %   Manual Differential    Collection Time: 09/15/24  3:42 AM   Result Value Ref Range    WBC 10.55 x10(3)/mcL    Neutrophils % 88 %    Lymphs % 1 %    Monocytes % 10 %    Eosinophils % 1 %    Neutrophils Abs 9.284 (H) 2.1 - 9.2 x10(3)/mcL    Lymphs Abs 0.1055 (L) 0.6 - 4.6 x10(3)/mcL    Monocytes Abs 1.055 0.1 - 1.3 x10(3)/mcL    Eosinophils Abs 0.1055 0 - 0.9 x10(3)/mcL    Platelets Normal Normal, Adequate    RBC Morph Abnormal (A) Normal    Poikilocytosis 1+ (A) (none)    Anisocytosis 1+ (A) (none)    Macrocytosis 1+ (A) (none)    [place] Cells 1+ (A) (none)   POCT glucose     Collection Time: 09/15/24 12:40 PM   Result Value Ref Range    POCT Glucose 86 70 - 110 mg/dL   POCT glucose    Collection Time: 09/15/24  5:35 PM   Result Value Ref Range    POCT Glucose 98 70 - 110 mg/dL   Basic Metabolic Panel    Collection Time: 09/15/24  5:49 PM   Result Value Ref Range    Sodium 136 136 - 145 mmol/L    Potassium 5.1 3.5 - 5.1 mmol/L    Chloride 97 (L) 98 - 107 mmol/L    CO2 19 (L) 22 - 29 mmol/L    Glucose 75 74 - 100 mg/dL    Blood Urea Nitrogen 55.6 (H) 8.9 - 20.6 mg/dL    Creatinine 6.13 (H) 0.73 - 1.18 mg/dL    BUN/Creatinine Ratio 9     Calcium 8.2 (L) 8.4 - 10.2 mg/dL    Anion Gap 20.0 mEq/L    eGFR 10 mL/min/1.73/m2   Magnesium    Collection Time: 09/15/24  5:49 PM   Result Value Ref Range    Magnesium Level 1.70 1.60 - 2.60 mg/dL   Troponin I    Collection Time: 09/15/24  5:49 PM   Result Value Ref Range    Troponin-I 0.445 (H) 0.000 - 0.045 ng/mL   Troponin I    Collection Time: 09/15/24 10:58 PM   Result Value Ref Range    Troponin-I 0.404 (H) 0.000 - 0.045 ng/mL   Comprehensive Metabolic Panel    Collection Time: 09/16/24  5:34 AM   Result Value Ref Range    Sodium 133 (L) 136 - 145 mmol/L    Potassium 5.4 (H) 3.5 - 5.1 mmol/L    Chloride 96 (L) 98 - 107 mmol/L    CO2 22 22 - 29 mmol/L    Glucose 68 (L) 74 - 100 mg/dL    Blood Urea Nitrogen 67.2 (H) 8.9 - 20.6 mg/dL    Creatinine 6.65 (H) 0.73 - 1.18 mg/dL    Calcium 8.4 8.4 - 10.2 mg/dL    Protein Total 5.6 (L) 6.4 - 8.3 gm/dL    Albumin 2.4 (L) 3.5 - 5.0 g/dL    Globulin 3.2 2.4 - 3.5 gm/dL    Albumin/Globulin Ratio 0.8 (L) 1.1 - 2.0 ratio    Bilirubin Total 1.3 <=1.5 mg/dL     (H) 40 - 150 unit/L    ALT 7 0 - 55 unit/L    AST 16 5 - 34 unit/L    eGFR 9 mL/min/1.73/m2    Anion Gap 15.0 mEq/L    BUN/Creatinine Ratio 10    Magnesium    Collection Time: 09/16/24  5:34 AM   Result Value Ref Range    Magnesium Level 2.20 1.60 - 2.60 mg/dL   Phosphorus    Collection Time: 09/16/24  5:34 AM   Result Value Ref Range    Phosphorus  Level 5.6 (H) 2.3 - 4.7 mg/dL   Troponin I    Collection Time: 09/16/24  5:34 AM   Result Value Ref Range    Troponin-I 0.465 (H) 0.000 - 0.045 ng/mL   CBC with Differential    Collection Time: 09/16/24  5:34 AM   Result Value Ref Range    WBC 12.52 (H) 4.50 - 11.50 x10(3)/mcL    RBC 3.85 (L) 4.70 - 6.10 x10(6)/mcL    Hgb 11.5 (L) 14.0 - 18.0 g/dL    Hct 34.1 (L) 42.0 - 52.0 %    MCV 88.6 80.0 - 94.0 fL    MCH 29.9 27.0 - 31.0 pg    MCHC 33.7 33.0 - 36.0 g/dL    RDW 19.1 (H) 11.5 - 17.0 %    Platelet 181 130 - 400 x10(3)/mcL    MPV 13.1 (H) 7.4 - 10.4 fL    Neut % 82.9 %    Lymph % 4.9 %    Mono % 8.9 %    Eos % 1.1 %    Basophil % 0.6 %    Lymph # 0.61 0.6 - 4.6 x10(3)/mcL    Neut # 10.38 (H) 2.1 - 9.2 x10(3)/mcL    Mono # 1.11 0.1 - 1.3 x10(3)/mcL    Eos # 0.14 0 - 0.9 x10(3)/mcL    Baso # 0.08 <=0.2 x10(3)/mcL    IG# 0.20 (H) 0 - 0.04 x10(3)/mcL    IG% 1.6 %    NRBC% 0.0 %    IPF 8.6 0.9 - 11.2 %         Assessment/Plan:    ESRD on HD - [place], says he has a TTS chair time in [place] and has been on dialysis for 7 years. Left AVF  CHF - 15-20% EF on echo 9/10   NSTEMI - cardiology recommending cath at some point  COPD on 2L home O2--respiratory status is improving but still less than baseline   Prostate cancer s/p radiation  Anemia of chronic kidney disease      Recommendations:  Continue Hemodialysis on TTS schedule  No indication for acute HD today  Start Lokelma 10gm on non-HD days

## 2024-09-17 NOTE — PROGRESS NOTES
[facility] - [facility] East ICU  Pulmonary Critical Care Note    Patient Name: Prem Saldana  MRN: [id number]  Admission Date: 9/10/2024  Hospital Length of Stay: 7 days  Code Status: Full Code  Attending Provider: Sin Gonsalez Jr., MD,*  Primary Care Provider: Tank Saenz MD     Subjective:     HPI: Prem Saldana is a 49 year old  male with a past medical history for tobacco use disorder, COPD on home oxygen, hypertension, hyperlipidemia, ESRD on HD, heart failure with reduced ejection fraction (EF 15-20%), prostate cancer status post radiation, who initially presented to [facility] ED (09/10/2024) due to chest pain and shortness of breath. Patient reported shortness of breath had been ongoing for several weeks however chest pain began after patient fell on a glass table resulting in persistent, severe (10/10), sharp, stabbing left lateral chest pain without radiation to neck, arm, or back. Upon presentation to the ED, temperature 99° F, heart rate 113, blood pressure 114/82, respiratory rate 86 and SpO2 98% on 3 L nasal cannula. Patient was placed on BiPAP. Labs with H&H 11.7/36.8, MCV 95.8, BUN/creatinine 70.3/08.07, alkaline phosphatase 1061, total bilirubin 14,761, troponin 0.554.  Influenza A/B and SARS-COV-2 PCR negative.  EKG shows sinus tachycardia with Left axis deviation, and LVH. Chest x-ray poor inspiratory effort accentuates the pulmonary vascular markings however there is a degree of congestive unable to be excluded, cardiomegaly, increased left retrocardiac density and silhouetting of the left hemidiaphragm and calcified densities in the left hemidiaphragm.  CTA of the chest PE study without evidence of pulmonary embolism but cardiomegaly, trace left effusion, bibasilar atelectasis and mild interstitial edema, chronic appearing compression deformity of the midthoracic spine. [facility] has consulted for NSTEMI management. Attempted to perform LHC (09/14/2024) but was  canceled prior to initiation of procedure due to patient becoming uncooperative per reports. Today patient developed worsening hypotension with SBP dropping from 100-110 to 80-90. Telemetry showed 14 beat run of Vtach. CIS consulted by floor who recommended patient be upgraded to ICU for amiodarone gtt and vasopressor support. Discussed case with attending who agreed. Patient upgraded to ICU at that time for possible cardiogenic shock.    Hospital Course/Significant events:      24 Hour Interval History:  Patient remains lethargic but arousable, tachycardic, and on Levophed.  Patient has been started on empiric broad-spectrum antimicrobials in case there is a sepsis component.  Blood cultures have been ordered.    Past Medical History:   Diagnosis Date    Anemia of renal disease     ESRD on dialysis since 4/3/15     Essential hypertension     Secondary hyperparathyroidism of renal origin        Past Surgical History:   Procedure Laterality Date    ANGIOGRAM, CORONARY, WITH LEFT HEART CATHETERIZATION N/A 9/13/2024    Procedure: Angiogram, Coronary, with Left Heart Cath;  Surgeon: Tank Scott Jr., MD;  Location: [facility] CATH LAB;  Service: Cardiology;  Laterality: N/A;    AV FISTULA PLACEMENT Left 07/2015    Peritoneal Dialysis Catheter Placement  01/2016    Permcath Placement                    Current Outpatient Medications   Medication Instructions    calcitRIOL (ROCALTROL) 0.25 mcg, Oral, 2 times daily    labetalol (NORMODYNE) 300 MG tablet No dose, route, or frequency recorded.    minoxidiL (LONITEN) 2.5 mg, Oral, 2 times daily    vacuum erection device system Kit 1 Units, Misc.(Non-Drug; Combo Route), As needed (PRN)       Current Inpatient Medications   aspirin  81 mg Oral Daily    busPIRone  5 mg Oral BID    calcitRIOL  0.25 mcg Oral BID    dextrose 10%  50 g Intravenous Once    And    insulin regular  0.1 Units/kg Intravenous Once    enoxparin  30 mg Subcutaneous Daily    sevelamer carbonate  800 mg Oral  TID WM    sodium zirconium cyclosilicate  10 g Oral Every Mon, Wed, Fri    vancomycin (VANCOCIN) IV (PEDS and ADULTS)  500 mg Intravenous Once       Current Intravenous Infusions   amiodarone in dextrose 5%  0.5 mg/min Intravenous Continuous        NORepinephrine bitartrate-D5W  0-3 mcg/kg/min Intravenous Continuous 11.6 mL/hr at 09/17/24 0701 0.1 mcg/kg/min at 09/17/24 0701                Objective:       Intake/Output Summary (Last 24 hours) at 9/17/2024 1118  Last data filed at 9/17/2024 0701  Gross per 24 hour   Intake 578.78 ml   Output --   Net 578.78 ml         Vital Signs (Most Recent):  Temp: 98.6 °F (37 °C) (09/17/24 0701)  Pulse: (!) 118 (09/17/24 0735)  Resp: (!) 22 (09/17/24 0735)  BP: (!) 88/63 (09/17/24 0735)  SpO2: (!) 93 % (09/17/24 0735)  Body mass index is 18.89 kg/m².  Weight: 61.9 kg (136 lb 7.4 oz) Vital Signs (24h Range):  Temp:  [98.2 °F (36.8 °C)-98.9 °F (37.2 °C)] 98.6 °F (37 °C)  Pulse:  [] 118  Resp:  [16-38] 22  SpO2:  [74 %-100 %] 93 %  BP: ()/(41-70) 88/63     Physical Exam  Constitutional:       General: He is not in acute distress.     Appearance: Normal appearance. He is normal weight. He is ill-appearing.      Comments: His work of breathing is increased   HENT:      Head: Normocephalic and atraumatic.   Eyes:      General: No scleral icterus.        Right eye: No discharge.         Left eye: No discharge.      Extraocular Movements: Extraocular movements intact.      Conjunctiva/sclera: Conjunctivae normal.      Pupils: Pupils are equal, round, and reactive to light.   Cardiovascular:      Rate and Rhythm: Regular rhythm. Tachycardia present.      Pulses: Normal pulses.      Heart sounds: Normal heart sounds. No murmur heard.     No friction rub. No gallop.   Pulmonary:      Effort: Pulmonary effort is normal. No respiratory distress.      Breath sounds: No stridor. No wheezing, rhonchi or rales.   Abdominal:      General: Abdomen is flat. Bowel sounds are normal.  "There is no distension.      Palpations: Abdomen is soft.      Tenderness: There is no abdominal tenderness. There is no guarding.   Musculoskeletal:         General: No swelling. Normal range of motion.      Right lower leg: No edema.      Left lower leg: No edema.   Skin:     General: Skin is dry.      Coloration: Skin is not jaundiced or pale.      Findings: No bruising, erythema, lesion or rash.   Neurological:      Mental Status: He is alert.      Comments: Patient is lethargic           Lines/Drains/Airways       Peripheral Intravenous Line  Duration                  Peripheral IV - Single Lumen 09/10/24 0600 18 G Anterior;Proximal;Right Forearm 7 days         Peripheral IV - Single Lumen 09/15/24 1937 20 G Anterior;Right Forearm 1 day                    Significant Labs:    Lab Results   Component Value Date    WBC 13.02 (H) 09/17/2024    WBC 13.02 09/17/2024    HGB 10.7 (L) 09/17/2024    HCT 31.4 (L) 09/17/2024    MCV 86.5 09/17/2024     09/17/2024           BMP  Lab Results   Component Value Date     (L) 09/17/2024    K 5.5 (H) 09/17/2024    CO2 20 (L) 09/17/2024    BUN 78.8 (H) 09/17/2024    CREATININE 7.55 (H) 09/17/2024    CALCIUM 9.1 09/17/2024    AGAP 16.0 09/17/2024    ESTGFRAFRICA 20.8 (A) 11/11/2016    EGFRNONAA 18.0 (A) 11/11/2016         ABG  No results for input(s): "PH", "PO2", "PCO2", "HCO3", "POCBASEDEF" in the last 168 hours.    Mechanical Ventilation Support:  Oxygen Concentration (%): 35 (09/10/24 0750)      Significant Imaging:  I have reviewed the pertinent imaging within the past 24 hours.        Assessment/Plan:     Assessment  Shock, cardiogenic versus septic  Heart failure with reduced ejection fraction (EF 15-20%)  ESRD on HD  Hyperlipidemia  COPD    Plan  Continue vasopressors and wean as tolerated  Continue antimicrobials  Follow up on cultures  Dialysis as per Nephrology  Overall prognosis appears guarded at best due to multiple comorbidities    DVT Prophylaxis: " Lovenox  GI Prophylaxis: None     31 minutes of critical care was time spent personally by me on the following activities: development of treatment plan with patient or surrogate and bedside caregivers, discussions with consultants, evaluation of patient's response to treatment, examination of patient, ordering and performing treatments and interventions, ordering and review of laboratory studies, ordering and review of radiographic studies, pulse oximetry, re-evaluation of patient's condition.  This critical care time did not overlap with that of any other provider or involve time for any procedures.     Ephraim Yeh MD  Pulmonary Critical Care Medicine  [facility] - [facility] ICU  DOS: 09/17/2024

## 2024-09-17 NOTE — CONSULTS
"   Acute Care Surgery   Consults    Patient Name: Prem Saldana  YOB: 1975  Date: 09/17/2024 5:49 PM  Date of Admission: 9/10/2024  HD#7  POD#4 Days Post-Op    PRESENTING HISTORY   Chief Complaint/Reason for Admission: <principal problem not specified>    History of Present Illness:  Prem Saldana, 50 yo male, with complex past medical history admitted for cardiogenic shock. General surgery consulted for "blister to LLE deroofing per wound care."     Review of Systems:  12 point ROS negative except as stated in HPI    PAST HISTORY:   Past medical history:  Past Medical History:   Diagnosis Date    Anemia of renal disease     ESRD on dialysis since 4/3/15     Essential hypertension     Secondary hyperparathyroidism of renal origin        Past surgical history:  Past Surgical History:   Procedure Laterality Date    ANGIOGRAM, CORONARY, WITH LEFT HEART CATHETERIZATION N/A 9/13/2024    Procedure: Angiogram, Coronary, with Left Heart Cath;  Surgeon: Tank Scott Jr., MD;  Location: [facility] CATH LAB;  Service: Cardiology;  Laterality: N/A;    AV FISTULA PLACEMENT Left 07/2015    Peritoneal Dialysis Catheter Placement  01/2016    Permcath Placement          Family history:  Family History   Problem Relation Name Age of Onset    Diabetes Mother      Cancer Maternal Grandmother      Hypertension Maternal Grandfather      Heart attack Maternal Grandfather          MI in his 60s or 70s    Kidney disease Neg Hx         Social history:  Social History     Socioeconomic History    Marital status: Single   Tobacco Use    Smoking status: Former     Current packs/day: 1.00     Average packs/day: 1 pack/day for 15.0 years (15.0 ttl pk-yrs)     Types: Cigarettes    Smokeless tobacco: Never    Tobacco comments:     currently smokes 2-3 cigs/week; has been smoking for >20 years; at the most smoked 1 ppd   Substance and Sexual Activity    Alcohol use: No     Comment: previously social drinker    Drug use: No     " Comment: remote use of marijuana >20 years ago    Sexual activity: Never   Social History Narrative    Lives with fiance and two dogs     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)     Social Determinants of Health     Financial Resource Strain: Low Risk  (9/11/2024)    Overall Financial Resource Strain (CARDIA)     Difficulty of Paying Living Expenses: Not very hard   Food Insecurity: No Food Insecurity (9/11/2024)    Hunger Vital Sign     Worried About Running Out of Food in the Last Year: Never true     Ran Out of Food in the Last Year: Never true   Transportation Needs: No Transportation Needs (9/11/2024)    TRANSPORTATION NEEDS     Transportation : No   Physical Activity: Sufficiently Active (9/11/2024)    Exercise Vital Sign     Days of Exercise per Week: 5 days     Minutes of Exercise per Session: 30 min   Stress: No Stress Concern Present (9/11/2024)    [employer] - Occupational Stress Questionnaire     Feeling of Stress : Only a little   Housing Stability: Low Risk  (9/11/2024)    Housing Stability Vital Sign     Unable to Pay for Housing in the Last Year: No     Homeless in the Last Year: No     Social History     Tobacco Use   Smoking Status Former    Current packs/day: 1.00    Average packs/day: 1 pack/day for 15.0 years (15.0 ttl pk-yrs)    Types: Cigarettes   Smokeless Tobacco Never   Tobacco Comments    currently smokes 2-3 cigs/week; has been smoking for >20 years; at the most smoked 1 ppd      Social History     Substance and Sexual Activity   Alcohol Use No    Comment: previously social drinker        MEDICATIONS & ALLERGIES:     No current facility-administered medications on file prior to encounter.     Current Outpatient Medications on File Prior to Encounter   Medication Sig    calcitRIOL (ROCALTROL) 0.25 MCG Cap Take 0.25 mcg by mouth 2 (two) times daily.    labetalol (NORMODYNE) 300 MG tablet     minoxidil (LONITEN)  10 MG Tab Take 2.5 mg by mouth 2 (two) times daily.    vacuum erection device system Kit 1 Units by Misc.(Non-Drug; Combo Route) route as needed.       Allergies:   Review of patient's allergies indicates:   Allergen Reactions    Hydralazine Palpitations and Other (See Comments)     Other Reaction(s): feels like he is running    Palpitations    Amlodipine     Levofloxacin        Scheduled Meds:   aspirin  81 mg Oral Daily    busPIRone  5 mg Oral BID    calcitRIOL  0.25 mcg Oral BID    dextrose 10%  50 g Intravenous Once    And    insulin regular  0.1 Units/kg Intravenous Once    enoxparin  30 mg Subcutaneous Daily    sevelamer carbonate  800 mg Oral TID WM    sodium zirconium cyclosilicate  10 g Oral Every Mon, Wed, Fri    vancomycin (VANCOCIN) IV (PEDS and ADULTS)  500 mg Intravenous Once       Continuous Infusions:   amiodarone in dextrose 5%  0.5 mg/min Intravenous Continuous        NORepinephrine bitartrate-D5W  0-3 mcg/kg/min Intravenous Continuous 46.4 mL/hr at 09/17/24 1600 0.4 mcg/kg/min at 09/17/24 1600       PRN Meds:  Current Facility-Administered Medications:     acetaminophen, 650 mg, Oral, Q8H PRN    acetaminophen, 650 mg, Oral, Q4H PRN    dextrose 10%, 12.5 g, Intravenous, PRN    dextrose 10%, 25 g, Intravenous, PRN    Pharmacy to dose Aminoglycosides consult, , , Once **AND** gentamicin - pharmacy to dose, , Intravenous, pharmacy to manage frequency    glucagon (human recombinant), 1 mg, Intramuscular, PRN    glucose, 16 g, Oral, PRN    glucose, 24 g, Oral, PRN    HYDROcodone-acetaminophen, 1 tablet, Oral, Q4H PRN    metoprolol, 5 mg, Intravenous, Q5 Min PRN    ondansetron, 4 mg, Intravenous, Q4H PRN    simethicone, 1 tablet, Oral, TID PRN    sodium chloride 0.9%, 10 mL, Intravenous, Q12H PRN    Pharmacy to dose Vancomycin consult, , , Once **AND** vancomycin - pharmacy to dose, , Intravenous, pharmacy to manage frequency    OBJECTIVE:   Vital Signs:  VITAL SIGNS: 24 HR MIN & MAX LAST   Temp  Min:  "98.2 °F (36.8 °C)  Max: 99.7 °F (37.6 °C)  98.3 °F (36.8 °C)   BP  Min: 61/41  Max: 160/136  108/69    Pulse  Min: 61  Max: 134  (!) 134    Resp  Min: 4  Max: 38  (!) 30    SpO2  Min: 61 %  Max: 100 %  99 %      HT: 5' 11.26" (181 cm)  WT: 61.9 kg (136 lb 7.4 oz)  BMI: 18.9     Intake/output:  Intake/Output - Last 3 Shifts         09/15 0700 09/16 0659 09/16 0700 09/17 0659 09/17 0700 09/18 0659    P.O. 480 450 480    I.V. (mL/kg)  227.4 (3.7) 268.7 (4.3)    IV Piggyback  363.3 116.6    Total Intake(mL/kg) 480 (7.8) 1040.7 (16.8) 865.3 (14)    Other   1500    Stool       Total Output   1500    Net +480 +1040.7 -634.8           Stool Occurrence 3 x              Intake/Output Summary (Last 24 hours) at 9/17/2024 1749  Last data filed at 9/17/2024 1712  Gross per 24 hour   Intake 865.25 ml   Output 1500 ml   Net -634.75 ml         Physical Exam:  General: ill-appearing  HEENT: Normocephalic, atraumatic, PERRL  CV: RR  Resp: NWOB  GI:  Abdomen soft, non-tender, non-distended, no guarding, no rebound, normoactive bowel sounds, no masses  :  Deferred  MSK: No muscle atrophy, cyanosis, peripheral edema, moving all extremities spontaneously  Skin/wounds:  bullae to LLE  Neuro:  CNII-XII grossly intact, alert and oriented to person, place, and time    Labs:  Troponin:  Recent Labs     09/15/24  1749 09/15/24  2258 09/16/24  0534 09/16/24  1048 09/16/24  1714   TROPONINI 0.445* 0.404* 0.465* 0.400* 0.474*     CBC:  Recent Labs     09/15/24  0342 09/16/24  0534 09/17/24  0108   WBC 10.55  10.55 12.52* 13.02  13.02*   RBC 3.77* 3.85* 3.63*   HGB 11.2* 11.5* 10.7*   HCT 33.9* 34.1* 31.4*    181 216   MCV 89.9 88.6 86.5   MCH 29.7 29.9 29.5   MCHC 33.0 33.7 34.1     CMP:  Recent Labs     09/15/24  0342 09/15/24  1749 09/16/24  0534 09/17/24  0108   CALCIUM 8.2*   < > 8.4 9.1   ALBUMIN 2.3*  --  2.4* 2.2*      < > 133* 131*   K 4.8   < > 5.4* 5.5*   CO2 23   < > 22 20*   CL 97*   < > 96* 95*   BUN 47.0*   < > " "67.2* 78.8*   CREATININE 5.63*   < > 6.65* 7.55*   ALKPHOS 521*  --  493* 439*   ALT 8  --  7 7   AST 18  --  16 11   BILITOT 1.0  --  1.3 1.4    < > = values in this interval not displayed.     Lactic Acid:  Recent Labs     09/16/24  0849 09/16/24  1536 09/17/24  0346   LACTATE 1.2 1.4 1.3     ETOH:  No results for input(s): "ETHANOL" in the last 72 hours.   Urine Drug Screen:  No results for input(s): "COCAINE", "OPIATE", "BARBITURATE", "AMPHETAMINE", "FENTANYL", "CANNABINOIDS", "MDMA" in the last 72 hours.    Invalid input(s): "BENZODIAZEPINE", "PHENCYCLIDINE"   ABG:  No results for input(s): "PH", "PO2", "PCO2", "HCO3", "BE" in the last 168 hours.     Diagnostic Results:  CT Thoracic Spine Without Contrast   Final Result      X-Ray Chest PA And Lateral   Final Result      Stable exam without significant interval change.         Electronically signed by: Shahana Xie   Date:    09/11/2024   Time:    08:22      CTA Chest Non-Coronary (PE Studies)   Final Result      1. No evidence of pulmonary embolism through the level of the subsegmental pulmonary arteries.   2. Cardiomegaly with a trace left effusion, bibasilar atelectasis, and mild interstitial edema.   3. Chronic appearing compression deformities in the midthoracic spine.         Electronically signed by: Tank Spence MD   Date:    09/10/2024   Time:    07:55      X-Ray Chest AP Portable   Final Result      Poor inspiratory effort accentuates the pulmonary vascular markings, however, a degree of congestion cannot be excluded.      Cardiomegaly.      Increased left retrocardiac density and silhouetting of the left hemidiaphragm as above.      Calcified densities below the left hemidiaphragm with differential as above         Electronically signed by: Star Oates   Date:    09/10/2024   Time:    06:14      Anesthesia US Guide Vascular Access    (Results Pending)       ASSESSMENT & PLAN:    Prem Les, 50 yo male, with complex past medical history " "admitted for cardiogenic shock. General surgery consulted for "blister to LLE deroofing per wound care."     - no acute surgical intervention warranted  - bullae does not appear to be infected, no indication for drainage/de-mary grace  - please call with any questions or concerns  - remainder of care per primary     Kimberly Mauricio MD  [employer] General Surgery PGY-1  09/17/2024      "

## 2024-09-17 NOTE — NURSING
09/17/24 1712   Post-Hemodialysis Assessment   Duration of Treatment 180 minutes   Total UF (mL) 1500 mL   Post-Hemodialysis Comments 3 hr.  1500 ml net UF.  BP low despite levaphed.    VSS.  no bleeding on departure.

## 2024-09-17 NOTE — PROGRESS NOTES
[facility]    Cardiology  Progress Note    Patient Name: Prem Saldana  MRN: [id number]  Admission Date: 9/10/2024  Hospital Length of Stay: 7 days  Code Status: Full Code   Attending Physician: Sin Gonsalez Jr., MD,*   Primary Care Physician: Tank Saenz MD  Expected Discharge Date:   Principal Problem:<principal problem not specified>    Subjective:   Brief HPI/Hospital Course:   Mr. Saldana is a 48 y/o male with a history CHF, HTN, ESRD on HD, who is unknown to [facility]. He presented to ER on 9.10.24 with complaints of shortness of breath. He reports he has a fall on 9.9.24 in which he hit his right side. He reports SOB started during the night. EMS reported he was tachypneic (breathing 55/min). He was given FD ASA and Sl Nitro x1. Significant labs include H&H 11.7/36.8, BUN/Creat 70.3/8.07, ALP 1061, Albumin 3.2, BNP 14.761, Troponin 0.554. CTA Chest negative for PE, but cardiomegaly with a trace left effusion, bibasilar atelectasis, mild interstitial edema and Chronic appearing compression deformities in the midthoracic spine. CXR shows poor inspiratory effort accentuates the pulmonary vascular markings, cardiomegaly, increased left retrocardiac density and silhouetting of the left hemidiaphragm, and calcified densities below the left hemidiaphragm. EKG shows sinus tachycardia with Left axis deviation, and LVH. [facility] has been consulted for NSTEMI management.       Hospital Course:  9.11.24: NAD. VSS. No complaints of Palps/SOB. ST on telemetry. Reports chest pain from desk falling on him. Reports back pain. H&H 10.3/32.7, Creat 6.10   9.12.24: NAD. VSS. Reports Chest Pain No complaints of SOB/Palps. H&H 11.4/35.6, K 6.1, Creat 8.02. In bed lying flat.   9.13.24: NAD. VSS. ST on telemetry. Denies SOB/Palps. H&H 12.7/39.8, K 5.6, BUN/Creat 44.1/6.46,   9.14.24: LHC cancelled due to patient uncooperative.   He is being treated for hyperkalemia. Labs pending. Currently denies  "pain  9.15.24: NAD. VSS.   9.16.24: NAD Noted. Sinus Tach on Tele. On Levophed support. Primary complaint noted to be leg pain. Legs are warm dry, dressing on LLE. Respiratory status stable. Had Runs of NSVT Yesterday afternoon, seemed to have resolved.  9.17.24: NAD. "I am good." No Further Runs of NSVT. ST. Denies CP, SOB and Palps. Levophed 0.4mcg/kg/min    PMH: CHF, HTN, ESRD on HD, Anemia, Secondary Hyperparathyroidism   PSH: AV Fistula   Family History: Maternal Grandmother - Cancer, Heart Disease, MI; Mother - DM II  Social History: Current Smoker (2-3 Cigarette per Day). Reports occasionally alcohol. Reports past use of Marijuana.      Previous Cardiac Diagnostics:   ECHO (9.10.24):  Left Ventricle: The left ventricle is dilated. Increased wall thickness. There is concentric remodeling. Severe global hypokinesis present. There is severely reduced systolic function with a visually estimated ejection fraction of 15 - 20%. Unable to assess diastolic function due to atrial fibrillation. Elevated left ventricular filling pressure. Right Ventricle: Severe right ventricular enlargement. Systolic function is severely reduced.  Left Atrium: Left atrium is dilated. Right Atrium: Right atrium is dilated. Aortic Valve: The aortic valve is a trileaflet valve. Mildly calcified cusps. Mildly restricted motion. Aortic valve peak velocity is 1.85 m/s. Mean gradient is 8 mmHg. There is moderate aortic regurgitation with an eccentrically directed jet. Mitral Valve: Moderately thickened leaflets. There is moderate mitral annular calcification present. There is mild to moderate regurgitation. Tricuspid Valve: There is moderate regurgitation. The estimated PA systolic pressure is at least 34 mmHg. Pulmonic Valve: There is mild to moderate regurgitation. IVC/SVC: Elevated venous pressure at 15 mmHg. Pericardium: There is a small effusion. No indication of cardiac tamponade.    ECHO (2.19.24):  A complete two-dimensional " transthoracic echocardiogram was performed (2D, M-mode, Doppler and color flow Doppler). The left ventricle is severely dilated.   Left ventricular systolic function is severely reduced. Estimated Ejection Fraction = <20%. The right ventricle is mild to moderately dilated. The left atrium is severely dilated. The right atrium is mild to moderately dilated. The mitral valve leaflets appear thickened, but open well. There is moderate to severe mitral annular calcification. There is mild mitral regurgitation. There is mild tricuspid insufficiency noted. Mild aortic regurgitation. Dialated IVC 3.8 cm. The lack of respiratory variation in the inferior vena cava diameter is noted. There is no pericardial effusion. Large left pleural effusion.There is moderate concentric left ventricular hypertrophy.      SPECT (2.19.18):  The perfusion scan is free of evidence for myocardial ischemia or injury. Resting wall motion is physiologic. There is resting LV dysfunction with a reduced ejection fraction of 40 %. The ventricular volumes are normal at rest and stress. The extracardiac distribution of radioactivity is normal.      Dobutamine Stress Test (7.27.16);  Moderate left atrial enlargement. Concentric hypertrophy. Normal left ventricular systolic function (EF 60-65%). Left ventricular diastolic dysfunction. Normal right ventricular systolic function . The estimated PA systolic pressure is greater than 26 mmHg.  Mild mitral regurgitation. Small pericardial effusion.      Review of Systems   Constitutional: Positive for malaise/fatigue.   Cardiovascular:  Negative for chest pain, dyspnea on exertion and orthopnea.   Respiratory:  Negative for shortness of breath.    All other systems reviewed and are negative.    Objective:     Vital Signs (Most Recent):  Temp: 98.3 °F (36.8 °C) (09/17/24 1600)  Pulse: (!) 134 (09/17/24 1600)  Resp: (!) 30 (09/17/24 1600)  BP: 108/69 (09/17/24 1600)  SpO2: 99 % (09/17/24 1600) Vital Signs (24h  Range):  Temp:  [98.2 °F (36.8 °C)-99.7 °F (37.6 °C)] 98.3 °F (36.8 °C)  Pulse:  [] 134  Resp:  [4-38] 30  SpO2:  [61 %-100 %] 99 %  BP: ()/() 108/69   Weight: 61.9 kg (136 lb 7.4 oz)  Body mass index is 18.89 kg/m².  SpO2: 99 %       Intake/Output Summary (Last 24 hours) at 9/17/2024 1621  Last data filed at 9/17/2024 1600  Gross per 24 hour   Intake 876.28 ml   Output --   Net 876.28 ml     Lines/Drains/Airways       Peripheral Intravenous Line  Duration                  Peripheral IV - Single Lumen 09/10/24 0600 18 G Anterior;Proximal;Right Forearm 7 days         Peripheral IV - Single Lumen 09/15/24 1937 20 G Anterior;Right Forearm 1 day                  Significant Labs:   Chemistries:   Recent Labs   Lab 09/13/24  0429 09/13/24  0808 09/14/24  1327 09/15/24  0342 09/15/24  1749 09/15/24  2258 09/16/24  0534 09/16/24  1048 09/16/24  1714 09/17/24  0108      < > 136 137 136  --  133*  --   --  131*   K 5.6*   < > 5.2* 4.8 5.1  --  5.4*  --   --  5.5*      < > 101 97* 97*  --  96*  --   --  95*   CO2 20*   < > 18* 23 19*  --  22  --   --  20*   BUN 44.1*   < > 69.7* 47.0* 55.6*  --  67.2*  --   --  78.8*   CREATININE 6.46*   < > 8.06* 5.63* 6.13*  --  6.65*  --   --  7.55*   CALCIUM 9.1   < > 9.3 8.2* 8.2*  --  8.4  --   --  9.1   BILITOT 1.0  --  0.9 1.0  --   --  1.3  --   --  1.4   ALKPHOS 883*  --  575* 521*  --   --  493*  --   --  439*   ALT 6  --  9 8  --   --  7  --   --  7   AST 14  --  19 18  --   --  16  --   --  11   GLUCOSE 81   < > 54* 63* 75  --  68*  --   --  68*   MG 1.70  --   --   --  1.70  --  2.20  --   --  2.00   PHOS 5.5*  --   --   --   --   --  5.6*  --   --  5.6*   TROPONINI  --    < >  --   --  0.445* 0.404* 0.465* 0.400* 0.474*  --     < > = values in this interval not displayed.        CBC/Anemia Labs: Coags:    Recent Labs   Lab 09/15/24  0342 09/16/24  0534 09/17/24  0108   WBC 10.55  10.55 12.52* 13.02  13.02*   HGB 11.2* 11.5* 10.7*   HCT 33.9*  "34.1* 31.4*    181 216   MCV 89.9 88.6 86.5   RDW 19.0* 19.1* 19.0*    No results for input(s): "PT", "INR", "APTT" in the last 168 hours.       Telemetry: SR/ST    Physical Exam  Vitals and nursing note reviewed.   Constitutional:       General: He is not in acute distress.  HENT:      Head: Normocephalic.      Mouth/Throat:      Mouth: Mucous membranes are dry.   Cardiovascular:      Rate and Rhythm: Regular rhythm. Tachycardia present.   Pulmonary:      Effort: Pulmonary effort is normal. No respiratory distress.      Breath sounds: Normal breath sounds. No wheezing or rales.   Abdominal:      Palpations: Abdomen is soft.   Musculoskeletal:      Right lower leg: No edema.      Left lower leg: No edema.      Comments: BLE Warm. Left AVF    Skin:     Comments: LLE Dressing in Place.    Neurological:      General: No focal deficit present.      Mental Status: He is alert. Mental status is at baseline.       Current Schedule Inpatient Medications:   aspirin  81 mg Oral Daily    busPIRone  5 mg Oral BID    calcitRIOL  0.25 mcg Oral BID    dextrose 10%  50 g Intravenous Once    And    insulin regular  0.1 Units/kg Intravenous Once    enoxparin  30 mg Subcutaneous Daily    sevelamer carbonate  800 mg Oral TID WM    sodium zirconium cyclosilicate  10 g Oral Every Mon, Wed, Fri    vancomycin (VANCOCIN) IV (PEDS and ADULTS)  500 mg Intravenous Once     Continuous Infusions:   amiodarone in dextrose 5%  0.5 mg/min Intravenous Continuous        NORepinephrine bitartrate-D5W  0-3 mcg/kg/min Intravenous Continuous 46.4 mL/hr at 09/17/24 1600 0.4 mcg/kg/min at 09/17/24 1600       Assessment:   NSTEMI- (Unspecified) in the Setting of Hypotensive Shock Requiring Vasopressor Support- Do not Suspect Cardiogenic Shock    - Troponin Trend Flat/No Overt Ischemia on EKG  Hypotensive Shock- ? Septic Etiology Requiring Vasopressor Support     - Lactates Normal    - History of Hypertension/Hypertensive Heart Disease  Acute on " Chronic Systolic Heart Failure (Compensated)    - EF 15-20% on ECHO (9.10.24)  History of NICMO    - Normal SPECT (2.19.18)    - LHC Aborted on 9.13.24 due to Ascending Aorta/root angle from RR Approach & Agitation preventing Femoral Approach  NSVT (Monomorphic)    - On Amiodarone Infusion  Acute Respiratory Failure requiring Oxygenation - Requiring NC Oxygen Support  VHD    - Moderate to Severe Mitral Annular Calcification    - Mild MR/TR/AR  Sinus Tachycardia  ESRD     - on HD T,Thu,Sat   COPD    - On Chronic Home Oxygen Support (2 L/Min)  Hyperkalemia (Treated per Nephrology)  History of Prostate Cancer    - Status Post Radiation  Lower Extremity Wound    - Febrile on  9.14.24  Anemia of Chronic Disease   Secondary Hyperparathyroidism   No known history of GI Bleed     Plan:   Wean Pressors for MAP > 65mmHg  Add GDMT as BP/HR Allows  Defer ACE/ARB/ARNI due to Hyperkalemia/Hypotension requiring Pressors   Fluid Management/HD per Nephrology Team  PT refusing Diagnostic Angiogram and Inotropes  Will Continue to Follow  Labs in AM: CBC, CMP and Mg     Of note, LHC attempted on 9.13.24, however procedure was aborted  from a right radial approach given angle of ascending aorta and aortic root. Femoral access not possible because of patient's agitation and respiratory status. Procedure was aborted.     Mian Carter, TORRIE  Cardiology  [facility]

## 2024-09-17 NOTE — PLAN OF CARE
"  Problem: Hemodialysis  Goal: Safe, Effective Therapy Delivery  Outcome: Progressing  Goal: Effective Tissue Perfusion  Outcome: Progressing     Problem: Adult Inpatient Plan of Care  Goal: Plan of Care Review  Outcome: Progressing  Flowsheets (Taken 9/17/2024 1222)  Plan of Care Reviewed With: patient  Goal: Absence of Hospital-Acquired Illness or Injury  Outcome: Progressing  Goal: Optimal Comfort and Wellbeing  Outcome: Progressing  Goal: Readiness for Transition of Care  Outcome: Progressing     Problem: Skin Injury Risk Increased  Goal: Skin Health and Integrity  Outcome: Progressing     Problem: Adult Inpatient Plan of Care  Goal: Patient-Specific Goal (Individualized)  Flowsheets (Taken 9/17/2024 1222)  Patient/Family-Specific Goals (Include Timeframe): "I want to get out of here"     "

## 2024-09-17 NOTE — PROGRESS NOTES
Pharmacokinetic Assessment Follow Up: IV Vancomycin    Vancomycin serum concentration assessment(s):    The random level was drawn correctly and can be used to guide therapy at this time. The measurement is within the desired definitive target range of 15 to 20 mcg/mL.    Vancomycin Regimen Plan:    Give vancomycin 500 mg IV x 1 after dialysis  Re-dose when the random level is less than 25 mcg/mL, next level to be drawn at 0430 on 09/19 with morning labs prior to next dialysis session    Drug levels (last 3 results):  Recent Labs   Lab Result Units 09/17/24  0108   Vancomycin Random ug/ml 19.1       Pharmacy will continue to follow and monitor vancomycin.    Please contact pharmacy at extension [phone] for questions regarding this assessment.    Thank you for the consult,   Briana Nunez       Patient brief summary:  Prem Saldana is a 49 y.o. male initiated on antimicrobial therapy with IV Vancomycin for treatment of sepsis      Drug Allergies:   Review of patient's allergies indicates:   Allergen Reactions    Hydralazine Palpitations and Other (See Comments)     Other Reaction(s): feels like he is running    Palpitations    Amlodipine     Levofloxacin        Actual Body Weight:   61.9 kg    Renal Function:   Estimated Creatinine Clearance: 10.4 mL/min (A) (based on SCr of 7.55 mg/dL (H)).,     Dialysis Method (if applicable):  intermittent HD - TTS    CBC (last 72 hours):  Recent Labs   Lab Result Units 09/14/24  1327 09/15/24  0342 09/16/24  0534 09/17/24  0108   WBC x10(3)/mcL 10.64  10.64 10.55  10.55 12.52* 13.02  13.02*   Hgb g/dL 12.0* 11.2* 11.5* 10.7*   Hct % 36.3* 33.9* 34.1* 31.4*   Platelet x10(3)/mcL 184 186 181 216   Mono % %  --   --  8.9  --    Monocytes % % 10 10  --  10   Eos % %  --   --  1.1  --    Eosinophils % %  --  1  --   --    Basophil % %  --   --  0.6  --        Metabolic Panel (last 72 hours):  Recent Labs   Lab Result Units 09/14/24  1327 09/15/24  0342 09/15/24  1749  09/16/24  0534 09/17/24  0108   Sodium mmol/L 136 137 136 133* 131*   Potassium mmol/L 5.2* 4.8 5.1 5.4* 5.5*   Chloride mmol/L 101 97* 97* 96* 95*   CO2 mmol/L 18* 23 19* 22 20*   Glucose mg/dL 54* 63* 75 68* 68*   Blood Urea Nitrogen mg/dL 69.7* 47.0* 55.6* 67.2* 78.8*   Creatinine mg/dL 8.06* 5.63* 6.13* 6.65* 7.55*   Albumin g/dL 2.5* 2.3*  --  2.4* 2.2*   Bilirubin Total mg/dL 0.9 1.0  --  1.3 1.4   ALP unit/L 575* 521*  --  493* 439*   AST unit/L 19 18  --  16 11   ALT unit/L 9 8  --  7 7   Magnesium Level mg/dL  --   --  1.70 2.20 2.00   Phosphorus Level mg/dL  --   --   --  5.6* 5.6*       Vancomycin Administrations:  vancomycin given in the last 96 hours                     vancomycin 1.25 g in dextrose 5% 250 mL IVPB (ready to mix) (mg) 1,250 mg New Bag 09/16/24 1323                    Microbiologic Results:  Microbiology Results (last 7 days)       Procedure Component Value Units Date/Time    Blood Culture #1 **CANNOT BE ORDERED STAT** [[id number]]  (Normal) Collected: 09/10/24 0654    Order Status: Completed Specimen: Blood Updated: 09/15/24 0901     Blood Culture No Growth at 5 days    Blood Culture #2 **CANNOT BE ORDERED STAT** [[id number]]  (Normal) Collected: 09/10/24 0654    Order Status: Completed Specimen: Blood Updated: 09/15/24 0901     Blood Culture No Growth at 5 days

## 2024-09-17 NOTE — PROGRESS NOTES
Nephrology Progress Note      HPI:    Prem Saldana is a 49 y.o. male from [place], with pmhx of ESRD TTS via L AVF, CHF, EF <20%, COPD, hx of prostate cancer, presenting to the ED today with respiratory failure requiring BiPAP.      History is very limited due to his condition but he was able to answer a few questions on bipap. He states that he last dialyzed in [place], [place] Saturday 9/7. He tells me he has a 6am chair time TTS set up at the dialysis unit in [place]? Will have to call the unit to verify.      CTA chest revealing cardiomegaly with trace left effusion, atelectasis and mild interstitial edema. CXR with congestion. He has 1+ edema to BLE. His electrolytes are normal., BNP is >14,000 on admission, troponin 0.55 and he states that his chest hurts because he fell and had trauma to the chest 2 days ago. Bedside echo pending in the ED. Cardiology following and trending troponins until peak. ED physician paged nephrology for HD today.     Interval History:    9/11/24  Tolerated HD yesterday with 3L off and significant improvement in respiratory status, now on 4L NC. Vitals are stable. Labs reviewed, all relatively stable but bicarb is now 20. Serial troponins remain high. He continues to complain of chest pain from falling this weekend. Chest xr and CTA are without traumatic changes. He is now able to tell me that he was in [place] because his mom lives there, but he is moving to [place] and has a chair time at [facility].   He is usually on 2L O2 support for COPD and is currently requiring 4L.     09/12/2024   No acute changes overnight.  Potassium 6 on a.m. labs.  He is scheduled for his routine dialysis run today which will be done after left heart catheterization.  Voices no new complaints.    09/13/2024   Tolerated 3 L UF with HD yesterday.  Potassium remains elevated at 5.6 on a.m. labs.  Hemoglobin stable.  Shortness of breath improved following HD. no chest pain at present.    09/16/2024   Awake  "alert and oriented.  Looks little dyspneic.  Complains of pain and asking for lot of pain medicines.  Not sure about the oral fluid intake or nutrition intake.    09/17/2024  No acute events overnight.  No new complaints.  No chest pain, shortness of breath, abdominal pain, nausea, vomiting, or lower extremity edema.  He remains on vasopressors.    Vital Signs:  BP (!) 88/63   Pulse (!) 118   Temp 98.6 °F (37 °C) (Oral)   Resp (!) 22   Ht 5' 11.26" (1.81 m)   Wt 61.9 kg (136 lb 7.4 oz)   SpO2 (!) 93%   BMI 18.89 kg/m²   Body mass index is 18.89 kg/m².    Physical Exam:    GEN: Chronically ill appearing AA male in NAD  CV: RRR +S1,S2 without murmur  PULM: CTAB, unlabored  ABD: Soft, NT/ND abdomen with NABS  EXT: No cyanosis or edema  SKIN: Warm and dry  PSYCH: Awake, alert and slowly conversant  Dialysis access:  LUE AV fistula      Labs:  Recent Results (from the past 48 hour(s))   POCT glucose    Collection Time: 09/15/24 12:40 PM   Result Value Ref Range    POCT Glucose 86 70 - 110 mg/dL   EKG 12-lead    Collection Time: 09/15/24  4:53 PM   Result Value Ref Range    QRS Duration 122 ms    [facility] QTC Calculation 488 ms   POCT glucose    Collection Time: 09/15/24  5:35 PM   Result Value Ref Range    POCT Glucose 98 70 - 110 mg/dL   Basic Metabolic Panel    Collection Time: 09/15/24  5:49 PM   Result Value Ref Range    Sodium 136 136 - 145 mmol/L    Potassium 5.1 3.5 - 5.1 mmol/L    Chloride 97 (L) 98 - 107 mmol/L    CO2 19 (L) 22 - 29 mmol/L    Glucose 75 74 - 100 mg/dL    Blood Urea Nitrogen 55.6 (H) 8.9 - 20.6 mg/dL    Creatinine 6.13 (H) 0.73 - 1.18 mg/dL    BUN/Creatinine Ratio 9     Calcium 8.2 (L) 8.4 - 10.2 mg/dL    Anion Gap 20.0 mEq/L    eGFR 10 mL/min/1.73/m2   Magnesium    Collection Time: 09/15/24  5:49 PM   Result Value Ref Range    Magnesium Level 1.70 1.60 - 2.60 mg/dL   Troponin I    Collection Time: 09/15/24  5:49 PM   Result Value Ref Range    Troponin-I 0.445 (H) 0.000 - 0.045 ng/mL   EKG " 12-lead    Collection Time: 09/15/24  8:04 PM   Result Value Ref Range    QRS Duration 116 ms    [facility] QTC Calculation 474 ms   Troponin I    Collection Time: 09/15/24 10:58 PM   Result Value Ref Range    Troponin-I 0.404 (H) 0.000 - 0.045 ng/mL   EKG 12-lead    Collection Time: 09/16/24  2:00 AM   Result Value Ref Range    QRS Duration 126 ms    [facility] QTC Calculation 479 ms   Comprehensive Metabolic Panel    Collection Time: 09/16/24  5:34 AM   Result Value Ref Range    Sodium 133 (L) 136 - 145 mmol/L    Potassium 5.4 (H) 3.5 - 5.1 mmol/L    Chloride 96 (L) 98 - 107 mmol/L    CO2 22 22 - 29 mmol/L    Glucose 68 (L) 74 - 100 mg/dL    Blood Urea Nitrogen 67.2 (H) 8.9 - 20.6 mg/dL    Creatinine 6.65 (H) 0.73 - 1.18 mg/dL    Calcium 8.4 8.4 - 10.2 mg/dL    Protein Total 5.6 (L) 6.4 - 8.3 gm/dL    Albumin 2.4 (L) 3.5 - 5.0 g/dL    Globulin 3.2 2.4 - 3.5 gm/dL    Albumin/Globulin Ratio 0.8 (L) 1.1 - 2.0 ratio    Bilirubin Total 1.3 <=1.5 mg/dL     (H) 40 - 150 unit/L    ALT 7 0 - 55 unit/L    AST 16 5 - 34 unit/L    eGFR 9 mL/min/1.73/m2    Anion Gap 15.0 mEq/L    BUN/Creatinine Ratio 10    Magnesium    Collection Time: 09/16/24  5:34 AM   Result Value Ref Range    Magnesium Level 2.20 1.60 - 2.60 mg/dL   Phosphorus    Collection Time: 09/16/24  5:34 AM   Result Value Ref Range    Phosphorus Level 5.6 (H) 2.3 - 4.7 mg/dL   Troponin I    Collection Time: 09/16/24  5:34 AM   Result Value Ref Range    Troponin-I 0.465 (H) 0.000 - 0.045 ng/mL   CBC with Differential    Collection Time: 09/16/24  5:34 AM   Result Value Ref Range    WBC 12.52 (H) 4.50 - 11.50 x10(3)/mcL    RBC 3.85 (L) 4.70 - 6.10 x10(6)/mcL    Hgb 11.5 (L) 14.0 - 18.0 g/dL    Hct 34.1 (L) 42.0 - 52.0 %    MCV 88.6 80.0 - 94.0 fL    MCH 29.9 27.0 - 31.0 pg    MCHC 33.7 33.0 - 36.0 g/dL    RDW 19.1 (H) 11.5 - 17.0 %    Platelet 181 130 - 400 x10(3)/mcL    MPV 13.1 (H) 7.4 - 10.4 fL    Neut % 82.9 %    Lymph % 4.9 %    Mono % 8.9 %    Eos % 1.1 %    Basophil  % 0.6 %    Lymph # 0.61 0.6 - 4.6 x10(3)/mcL    Neut # 10.38 (H) 2.1 - 9.2 x10(3)/mcL    Mono # 1.11 0.1 - 1.3 x10(3)/mcL    Eos # 0.14 0 - 0.9 x10(3)/mcL    Baso # 0.08 <=0.2 x10(3)/mcL    IG# 0.20 (H) 0 - 0.04 x10(3)/mcL    IG% 1.6 %    NRBC% 0.0 %    IPF 8.6 0.9 - 11.2 %   EKG 12-lead    Collection Time: 09/16/24  7:57 AM   Result Value Ref Range    QRS Duration 114 ms    [facility] QTC Calculation 443 ms   Lactic Acid, Plasma    Collection Time: 09/16/24  8:49 AM   Result Value Ref Range    Lactic Acid Level 1.2 0.5 - 2.2 mmol/L   Troponin I    Collection Time: 09/16/24 10:48 AM   Result Value Ref Range    Troponin-I 0.400 (H) 0.000 - 0.045 ng/mL   POCT glucose    Collection Time: 09/16/24 12:03 PM   Result Value Ref Range    POCT Glucose 90 70 - 110 mg/dL   Lactic Acid, Plasma    Collection Time: 09/16/24  3:36 PM   Result Value Ref Range    Lactic Acid Level 1.4 0.5 - 2.2 mmol/L   Troponin I    Collection Time: 09/16/24  5:14 PM   Result Value Ref Range    Troponin-I 0.474 (H) 0.000 - 0.045 ng/mL   Vancomycin, Random    Collection Time: 09/17/24  1:08 AM   Result Value Ref Range    Vancomycin Random 19.1 15.0 - 20.0 ug/ml   Comprehensive Metabolic Panel    Collection Time: 09/17/24  1:08 AM   Result Value Ref Range    Sodium 131 (L) 136 - 145 mmol/L    Potassium 5.5 (H) 3.5 - 5.1 mmol/L    Chloride 95 (L) 98 - 107 mmol/L    CO2 20 (L) 22 - 29 mmol/L    Glucose 68 (L) 74 - 100 mg/dL    Blood Urea Nitrogen 78.8 (H) 8.9 - 20.6 mg/dL    Creatinine 7.55 (H) 0.73 - 1.18 mg/dL    Calcium 9.1 8.4 - 10.2 mg/dL    Protein Total 6.0 (L) 6.4 - 8.3 gm/dL    Albumin 2.2 (L) 3.5 - 5.0 g/dL    Globulin 3.8 (H) 2.4 - 3.5 gm/dL    Albumin/Globulin Ratio 0.6 (L) 1.1 - 2.0 ratio    Bilirubin Total 1.4 <=1.5 mg/dL     (H) 40 - 150 unit/L    ALT 7 0 - 55 unit/L    AST 11 5 - 34 unit/L    eGFR 8 mL/min/1.73/m2    Anion Gap 16.0 mEq/L    BUN/Creatinine Ratio 10    Magnesium    Collection Time: 09/17/24  1:08 AM   Result Value Ref  Range    Magnesium Level 2.00 1.60 - 2.60 mg/dL   Phosphorus    Collection Time: 09/17/24  1:08 AM   Result Value Ref Range    Phosphorus Level 5.6 (H) 2.3 - 4.7 mg/dL   CBC with Differential    Collection Time: 09/17/24  1:08 AM   Result Value Ref Range    WBC 13.02 (H) 4.50 - 11.50 x10(3)/mcL    RBC 3.63 (L) 4.70 - 6.10 x10(6)/mcL    Hgb 10.7 (L) 14.0 - 18.0 g/dL    Hct 31.4 (L) 42.0 - 52.0 %    MCV 86.5 80.0 - 94.0 fL    MCH 29.5 27.0 - 31.0 pg    MCHC 34.1 33.0 - 36.0 g/dL    RDW 19.0 (H) 11.5 - 17.0 %    Platelet 216 130 - 400 x10(3)/mcL    MPV 12.0 (H) 7.4 - 10.4 fL    NRBC% 0.0 %   Manual Differential    Collection Time: 09/17/24  1:08 AM   Result Value Ref Range    WBC 13.02 x10(3)/mcL    Neutrophils % 84 %    Lymphs % 5 %    Monocytes % 10 %    Metamyelocytes % 1 (H) <=0 %    Neutrophils Abs 10.9368 (H) 2.1 - 9.2 x10(3)/mcL    Lymphs Abs 0.651 0.6 - 4.6 x10(3)/mcL    Monocytes Abs 1.302 (H) 0.1 - 1.3 x10(3)/mcL    Platelets Normal Normal, Adequate    RBC Morph Abnormal (A) Normal    Poikilocytosis 2+ (A) (none)    Anisocytosis 1+ (A) (none)    Macrocytosis 1+ (A) (none)    Crockett Cells 2+ (A) (none)   Lactic Acid, Plasma    Collection Time: 09/17/24  3:46 AM   Result Value Ref Range    Lactic Acid Level 1.3 0.5 - 2.2 mmol/L   POCT glucose    Collection Time: 09/17/24  7:50 AM   Result Value Ref Range    POCT Glucose 53 (L) 70 - 110 mg/dL         Assessment/Plan:    ESRD on HD - [place], says he has a TTS chair time in [place] and has been on dialysis for 7 years. Left AVF  CHF - 15-20% EF on echo 9/10   NSTEMI - cardiology recommending cath at some point  COPD on 2L home O2--respiratory status is improving but still less than baseline   Prostate cancer s/p radiation  Anemia of chronic kidney disease      Recommendations:  Plan for hemodialysis today on regular TTS schedule.  3 L UF as tolerated.

## 2024-09-17 NOTE — NURSING
Nurses Note -- 4 Eyes      9/17/2024   3:56 AM      Skin assessed during: Q Shift Change      [] No Altered Skin Integrity Present    []Prevention Measures Documented      [x] Yes- Altered Skin Integrity Present or Discovered   [] LDA Added if Not in Epic (Describe Wound)   [x] New Altered Skin Integrity was Present on Admit and Documented in LDA   [x] Wound Image Taken    Wound Care Consulted? No    Attending Nurse:  DOYLE Hemphill    Second RN/Staff Member:   RAND Natarajan

## 2024-09-18 PROBLEM — E44.0 MODERATE MALNUTRITION: Chronic | Status: ACTIVE | Noted: 2024-01-01

## 2024-09-18 NOTE — PROCEDURES
"Prem Saldana is a 49 y.o. male patient.    Temp: 98.3 °F (36.8 °C) (24 1600)  Pulse: (!) 146 (24)  Resp: (!) 26 (24)  BP: 100/73 (24)  SpO2: 98 % (24)  Weight: 61.9 kg (136 lb 7.4 oz) (24 0635)  Height: 5' 11.26" (181 cm) (09/10/24 0750)  Mallampati Scale: Class III  ASA Classification: Class 3    Intubation    Date/Time: 2024 9:25 PM  Location procedure was performed: [facility]    Performed by: Breanne Munoz MD  Authorized by: Breanne Munoz MD  Assisting provider: Rodolfo Gutierrez MD  Pre-operative diagnosis: NSTEMI, cardiogenic shock  Post-operative diagnosis: NSTEMI, cardiogenic shock  Consent Done: Yes  Consent: Verbal consent obtained.  Risks and benefits: risks, benefits and alternatives were discussed  Consent given by: patient  Patient understanding: patient states understanding of the procedure being performed  Patient consent: the patient's understanding of the procedure matches consent given  Patient identity confirmed: , name, MRN and verbally with patient  Intubation method: video-assisted  Patient status: sedated  Preoxygenation: mask  Pretreatment meds: etomidate, LYN.  Sedatives: etomidate  Paralytic: rocuronium  Laryngoscope size: Mac 3  Tube size: 8.0 mm  Tube type: cuffed  Number of attempts: 1  Cords visualized: yes  Post-procedure assessment: chest rise  Breath sounds: equal  Cuff inflated: yes  Technical procedures used: Video assisted  Significant surgical tasks conducted by the assistant(s): supervision  Complications: No  Estimated blood loss (mL): 0  Specimens: No  Implants: No      BREANNE MUNOZ MD  Internal Medicine - PGY-1        2024    "

## 2024-09-18 NOTE — PROGRESS NOTES
Pharmacokinetic Follow Up: Gentamicin    Assessment of levels:     Ok to redose today    Regimen Plan:     Gentamicin 92.8 mg x 1.  Check level after next hemodialysis      Recent Labs   Lab Result Units 09/17/24 2238 09/18/24  1515   Gentamicin Level ug/ml 2.1 1.0       Patient brief summary:  Prem Saldana is a 49 y.o. male initiated on aminoglycoside therapy for treatment of sepsis    Drug Allergies:   Review of patient's allergies indicates:   Allergen Reactions    Hydralazine Palpitations and Other (See Comments)     Other Reaction(s): feels like he is running    Palpitations    Amlodipine     Levofloxacin      Renal Function:   Estimated Creatinine Clearance: 25.7 mL/min (A) (based on SCr of 3.05 mg/dL (H)).,     Dialysis Method (if applicable):  intermittent HD    CBC (last 72 hours):  Recent Labs   Lab Result Units 09/16/24  0534 09/17/24  0108 09/17/24 2043 09/18/24  0327   WBC x10(3)/mcL 12.52* 13.02  13.02* 17.87  17.87* 21.16  21.16*   Hgb g/dL 11.5* 10.7* 11.9* 11.2*   Hct % 34.1* 31.4* 34.6* 33.0*   Platelet x10(3)/mcL 181 216 255 293   Mono % % 8.9  --   --   --    Monocytes % %  --  10 6 10   Eos % % 1.1  --   --   --    Basophil % % 0.6  --   --   --        Metabolic Panel (last 72 hours):  Recent Labs   Lab Result Units 09/16/24  0534 09/17/24  0108 09/17/24 2043 09/17/24  2255 09/18/24  0243 09/18/24  0933 09/18/24  1140 09/18/24  1515   Sodium mmol/L 133* 131* 132*  --  132* 129* 128* 135*   Sodium, Blood Gas mmol/L  --   --   --  127*  --   --   --   --    Potassium mmol/L 5.4* 5.5* 4.8  --  5.8* 6.9* 4.9 3.4*   Potassium, Blood Gas mmol/L  --   --   --  4.6  --   --   --   --    Chloride mmol/L 96* 95* 94*  --  96* 96* 93* 96*   CO2 mmol/L 22 20* 20*  --  18* 14* 19* 23   Glucose mg/dL 68* 68* 82  --  102* 89 108* 108*   Blood Urea Nitrogen mg/dL 67.2* 78.8* 50.6*  --  49.6* 50.3* 51.4* 24.3*   Creatinine mg/dL 6.65* 7.55* 5.58*  --  5.65* 5.62* 5.85* 3.05*   Albumin g/dL 2.4*  2.2* 2.5*  --  2.2*  --   --  2.3*   Bilirubin Total mg/dL 1.3 1.4 1.7*  --  1.3  --   --  1.5   ALP unit/L 493* 439* 489*  --  458*  --   --  371*   AST unit/L 16 11 14  --  20  --   --  13   ALT unit/L 7 7 9  --  8  --   --  <5   Magnesium Level mg/dL 2.20 2.00 2.10  --  2.20  --   --  1.80   Phosphorus Level mg/dL 5.6* 5.6* 5.1*  --  5.9*  --   --  3.8       Aminoglycoside Administrations:  aminoglycosides given in last 96 hours                     gentamicin (GARAMYCIN) 124 mg in 0.9% NaCl 100 mL IVPB (mg) 124 mg New Bag 09/16/24 1244                    Microbiologic Results:  Microbiology Results (last 7 days)       Procedure Component Value Units Date/Time    Blood Culture [[id number]] Collected: 09/17/24 2001    Order Status: Resulted Specimen: Blood, Venous Updated: 09/17/24 2003    Blood Culture [[id number]] Collected: 09/17/24 2001    Order Status: Resulted Specimen: Blood, Venous Updated: 09/17/24 2003    Blood Culture [[id number]]     Order Status: Canceled Specimen: Blood     Blood Culture [[id number]]     Order Status: Canceled Specimen: Blood     Blood Culture #1 **CANNOT BE ORDERED STAT** [[id number]]  (Normal) Collected: 09/10/24 0654    Order Status: Completed Specimen: Blood Updated: 09/15/24 0901     Blood Culture No Growth at 5 days    Blood Culture #2 **CANNOT BE ORDERED STAT** [[id number]]  (Normal) Collected: 09/10/24 0654    Order Status: Completed Specimen: Blood Updated: 09/15/24 0901     Blood Culture No Growth at 5 days

## 2024-09-18 NOTE — PLAN OF CARE
Problem: Hemodialysis  Goal: Safe, Effective Therapy Delivery  Outcome: Progressing  Goal: Effective Tissue Perfusion  Outcome: Progressing  Goal: Absence of Infection Signs and Symptoms  Outcome: Progressing     Problem: Adult Inpatient Plan of Care  Goal: Plan of Care Review  Outcome: Progressing  Goal: Patient-Specific Goal (Individualized)  Outcome: Progressing  Goal: Absence of Hospital-Acquired Illness or Injury  Outcome: Progressing  Goal: Optimal Comfort and Wellbeing  Outcome: Progressing  Goal: Readiness for Transition of Care  Outcome: Progressing     Problem: Skin Injury Risk Increased  Goal: Skin Health and Integrity  Outcome: Progressing     Problem: Infection  Goal: Absence of Infection Signs and Symptoms  Outcome: Progressing

## 2024-09-18 NOTE — PROGRESS NOTES
[facility]    Cardiology  Progress Note    Patient Name: Prem Saldana  MRN: [id number]  Admission Date: 9/10/2024  Hospital Length of Stay: 8 days  Code Status: Full Code   Attending Physician: Sin Gonsalez Jr., MD,*   Primary Care Physician: Tank Saenz MD  Expected Discharge Date:   Principal Problem:<principal problem not specified>    Subjective:   Brief HPI/Hospital Course:   Mr. Saldana is a 48 y/o male with a history CHF, HTN, ESRD on HD, who is unknown to [facility]. He presented to ER on 9.10.24 with complaints of shortness of breath. He reports he has a fall on 9.9.24 in which he hit his right side. He reports SOB started during the night. EMS reported he was tachypneic (breathing 55/min). He was given FD ASA and Sl Nitro x1. Significant labs include H&H 11.7/36.8, BUN/Creat 70.3/8.07, ALP 1061, Albumin 3.2, BNP 14.761, Troponin 0.554. CTA Chest negative for PE, but cardiomegaly with a trace left effusion, bibasilar atelectasis, mild interstitial edema and Chronic appearing compression deformities in the midthoracic spine. CXR shows poor inspiratory effort accentuates the pulmonary vascular markings, cardiomegaly, increased left retrocardiac density and silhouetting of the left hemidiaphragm, and calcified densities below the left hemidiaphragm. EKG shows sinus tachycardia with Left axis deviation, and LVH. [facility] has been consulted for NSTEMI management.       Hospital Course:  9.11.24: NAD. VSS. No complaints of Palps/SOB. ST on telemetry. Reports chest pain from desk falling on him. Reports back pain. H&H 10.3/32.7, Creat 6.10   9.12.24: NAD. VSS. Reports Chest Pain No complaints of SOB/Palps. H&H 11.4/35.6, K 6.1, Creat 8.02. In bed lying flat.   9.13.24: NAD. VSS. ST on telemetry. Denies SOB/Palps. H&H 12.7/39.8, K 5.6, BUN/Creat 44.1/6.46,   9.14.24: LHC cancelled due to patient uncooperative.   He is being treated for hyperkalemia. Labs pending. Currently denies  "pain  9.15.24: NAD. VSS.   9.16.24: NAD Noted. Sinus Tach on Tele. On Levophed support. Primary complaint noted to be leg pain. Legs are warm dry, dressing on LLE. Respiratory status stable. Had Runs of NSVT Yesterday afternoon, seemed to have resolved.  9.17.24: NAD. "I am good." No Further Runs of NSVT. ST. Denies CP, SOB and Palps. Levophed 0.4mcg/kg/min  9.18.24: NAD. Vented/Sedated. No Ectopy. Amiodarone 0.5mg/min. Levophed 0.07mcg/kg/min.     PMH: CHF, HTN, ESRD on HD, Anemia, Secondary Hyperparathyroidism   PSH: AV Fistula   Family History: Maternal Grandmother - Cancer, Heart Disease, MI; Mother - DM II  Social History: Current Smoker (2-3 Cigarette per Day). Reports occasionally alcohol. Reports past use of Marijuana.      Previous Cardiac Diagnostics:   ECHO (9.10.24):  Left Ventricle: The left ventricle is dilated. Increased wall thickness. There is concentric remodeling. Severe global hypokinesis present. There is severely reduced systolic function with a visually estimated ejection fraction of 15 - 20%. Unable to assess diastolic function due to atrial fibrillation. Elevated left ventricular filling pressure. Right Ventricle: Severe right ventricular enlargement. Systolic function is severely reduced.  Left Atrium: Left atrium is dilated. Right Atrium: Right atrium is dilated. Aortic Valve: The aortic valve is a trileaflet valve. Mildly calcified cusps. Mildly restricted motion. Aortic valve peak velocity is 1.85 m/s. Mean gradient is 8 mmHg. There is moderate aortic regurgitation with an eccentrically directed jet. Mitral Valve: Moderately thickened leaflets. There is moderate mitral annular calcification present. There is mild to moderate regurgitation. Tricuspid Valve: There is moderate regurgitation. The estimated PA systolic pressure is at least 34 mmHg. Pulmonic Valve: There is mild to moderate regurgitation. IVC/SVC: Elevated venous pressure at 15 mmHg. Pericardium: There is a small effusion. No " indication of cardiac tamponade.    ECHO (2.19.24):  A complete two-dimensional transthoracic echocardiogram was performed (2D, M-mode, Doppler and color flow Doppler). The left ventricle is severely dilated.   Left ventricular systolic function is severely reduced. Estimated Ejection Fraction = <20%. The right ventricle is mild to moderately dilated. The left atrium is severely dilated. The right atrium is mild to moderately dilated. The mitral valve leaflets appear thickened, but open well. There is moderate to severe mitral annular calcification. There is mild mitral regurgitation. There is mild tricuspid insufficiency noted. Mild aortic regurgitation. Dialated IVC 3.8 cm. The lack of respiratory variation in the inferior vena cava diameter is noted. There is no pericardial effusion. Large left pleural effusion.There is moderate concentric left ventricular hypertrophy.      SPECT (2.19.18):  The perfusion scan is free of evidence for myocardial ischemia or injury. Resting wall motion is physiologic. There is resting LV dysfunction with a reduced ejection fraction of 40 %. The ventricular volumes are normal at rest and stress. The extracardiac distribution of radioactivity is normal.      Dobutamine Stress Test (7.27.16);  Moderate left atrial enlargement. Concentric hypertrophy. Normal left ventricular systolic function (EF 60-65%). Left ventricular diastolic dysfunction. Normal right ventricular systolic function . The estimated PA systolic pressure is greater than 26 mmHg.  Mild mitral regurgitation. Small pericardial effusion.      Review of Systems   Unable to perform ROS: Intubated     Objective:     Vital Signs (Most Recent):  Temp: 99.1 °F (37.3 °C) (09/18/24 1200)  Pulse: (!) 116 (09/18/24 1415)  Resp: 14 (09/18/24 1415)  BP: 103/75 (09/18/24 1415)  SpO2: 100 % (09/18/24 1415) Vital Signs (24h Range):  Temp:  [97.7 °F (36.5 °C)-102.6 °F (39.2 °C)] 99.1 °F (37.3 °C)  Pulse:  [107-147] 116  Resp:  [14-36]  14  SpO2:  [68 %-100 %] 100 %  BP: ()/() 103/75   Weight: 61.9 kg (136 lb 7.4 oz)  Body mass index is 18.89 kg/m².  SpO2: 100 %       Intake/Output Summary (Last 24 hours) at 9/18/2024 1502  Last data filed at 9/18/2024 1448  Gross per 24 hour   Intake 3257.23 ml   Output 1500 ml   Net 1757.23 ml     Lines/Drains/Airways       Central Venous Catheter Line  Duration             Percutaneous Central Line - Triple Lumen  09/17/24 2030 Internal Jugular Left <1 day              Drain  Duration                  NG/OG Tube 09/17/24 2120 Center mouth <1 day              Airway  Duration                  Airway - Non-Surgical 09/17/24 2117 Endotracheal Tube <1 day              Peripheral Intravenous Line  Duration                  Hemodialysis AV Fistula 09/17/24 0701 Left forearm 1 day         Hemodialysis AV Fistula 09/17/24 0701 Left upper arm 1 day                  Significant Labs:   Chemistries:   Recent Labs   Lab 09/15/24  1749 09/15/24  2258 09/16/24  0534 09/16/24  1048 09/16/24  1714 09/17/24  0108 09/17/24  2043 09/18/24  0243 09/18/24  0933 09/18/24  1140     --  133*  --   --  131* 132* 132* 129* 128*   K 5.1  --  5.4*  --   --  5.5* 4.8 5.8* 6.9* 4.9   CL 97*  --  96*  --   --  95* 94* 96* 96* 93*   CO2 19*  --  22  --   --  20* 20* 18* 14* 19*   BUN 55.6*  --  67.2*  --   --  78.8* 50.6* 49.6* 50.3* 51.4*   CREATININE 6.13*  --  6.65*  --   --  7.55* 5.58* 5.65* 5.62* 5.85*   CALCIUM 8.2*  --  8.4  --   --  9.1 9.3 9.0 9.1 9.3   BILITOT  --   --  1.3  --   --  1.4 1.7* 1.3  --   --    ALKPHOS  --   --  493*  --   --  439* 489* 458*  --   --    ALT  --   --  7  --   --  7 9 8  --   --    AST  --   --  16  --   --  11 14 20  --   --    GLUCOSE 75  --  68*  --   --  68* 82 102* 89 108*   MG 1.70  --  2.20  --   --  2.00 2.10 2.20  --   --    PHOS  --   --  5.6*  --   --  5.6* 5.1* 5.9*  --   --    TROPONINI 0.445* 0.404* 0.465* 0.400* 0.474*  --   --   --   --   --         CBC/Anemia Labs:  "Coags:    Recent Labs   Lab 09/17/24  0108 09/17/24  2043 09/18/24  0327   WBC 13.02  13.02* 17.87  17.87* 21.16  21.16*   HGB 10.7* 11.9* 11.2*   HCT 31.4* 34.6* 33.0*    255 293   MCV 86.5 86.5 85.7   RDW 19.0* 19.3* 19.6*    No results for input(s): "PT", "INR", "APTT" in the last 168 hours.       Telemetry: ST    Physical Exam  Vitals reviewed.   Constitutional:       General: He is not in acute distress.     Appearance: He is ill-appearing.      Comments: Vented/Sedated   HENT:      Head: Normocephalic.      Mouth/Throat:      Mouth: Mucous membranes are dry.   Cardiovascular:      Rate and Rhythm: Regular rhythm. Tachycardia present.   Pulmonary:      Effort: Pulmonary effort is normal. No respiratory distress.      Comments: Ventilator Associated Breath Sounds  Vent Mode: A/C  Oxygen Concentration (%):  [] 30  Resp Rate Total:  [20 br/min-23 br/min] 22 br/min  Vt Set:  [550 mL] 550 mL  PEEP/CPAP:  [8 cmH20] 8 cmH20  Mean Airway Pressure:  [11 rtK68-53 cmH20] 13 cmH20  Abdominal:      Palpations: Abdomen is soft.   Musculoskeletal:      Right lower leg: No edema.      Left lower leg: No edema.      Comments: BLE Warm. Left AVF    Skin:     Comments: LLE Dressing in Place.    Neurological:      Comments: Vented/Sedated       Current Schedule Inpatient Medications:   aspirin  81 mg Oral Daily    busPIRone  5 mg Oral BID    calcitRIOL  0.25 mcg Oral BID    dextrose 10%  50 g Intravenous Once    And    insulin regular  0.1 Units/kg Intravenous Once    enoxparin  30 mg Subcutaneous Daily    famotidine (PF)  20 mg Intravenous Daily    sevelamer carbonate  800 mg Oral TID WM    sodium zirconium cyclosilicate  10 g Oral Every Mon, Wed, Fri     Continuous Infusions:   amiodarone in dextrose 5%  0.5 mg/min Intravenous Continuous 16.7 mL/hr at 09/18/24 1400 0.5 mg/min at 09/18/24 1400    NORepinephrine bitartrate-D5W  0-3 mcg/kg/min Intravenous Continuous 81.2 mL/hr at 09/18/24 1400 0.7 mcg/kg/min at " 09/18/24 1400    propofoL  0-50 mcg/kg/min Intravenous Continuous 9.3 mL/hr at 09/18/24 1400 25 mcg/kg/min at 09/18/24 1400       Assessment:   NSTEMI -  (Unspecified) in the Setting of Hypotensive Shock Requiring Vasopressor Support- Do not Suspect Cardiogenic Shock    - Troponin Trend Flat/No Overt Ischemia on EKG  Hypotensive Shock - ? 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%    - Normal SPECT (2.19.18)    - LHC Aborted on 9.13.24 due to Ascending Aorta/root angle from RR Approach & Agitation preventing Femoral Approach  NSVT (Monomorphic)    - On Amiodarone Infusion  Acute Respiratory Failure requiring Intubation/Ventilation   VHD    - Moderate to Severe Mitral Annular Calcification    - Mild MR/TR/AR  Sinus Tachycardia - Persistent  ESRD/HD     - on HD T,Thu,Sat   COPD    - On Chronic Home Oxygen Support (2 L/Min)  Hyperkalemia (Treated per Nephrology)  History of Prostate Cancer    - Status Post Radiation  Lower Extremity Wound    - Febrile on  9.14.24  Anemia of Chronic Disease   Secondary Hyperparathyroidism   No known history of GI Bleed     Plan:   Wean Pressors for MAP > 65mmHg  Add GDMT as BP/HR Allows  Defer ACE/ARB/ARNI due to Hyperkalemia/Hypotension requiring Pressors   Fluid Management/HD per Nephrology Team  PT refusing Diagnostic Angiogram and Inotropes (Prior to being Intubated/Ventilated)  Will Continue to Follow  Labs in AM: CBC, CMP and Mg     Of note, LHC attempted on 9.13.24, however procedure was aborted  from a right radial approach given angle of ascending aorta and aortic root. Femoral access not possible because of patient's agitation and respiratory status. Procedure was aborted.     Mian Carter, ANP  Cardiology  [facility]

## 2024-09-18 NOTE — NURSING
09/18/24 1448   Post-Hemodialysis Assessment   Blood Volume Processed (Liters) 62.8 L   Dialyzer Clearance Lightly streaked   Duration of Treatment 180 minutes   Total UF (mL) 0 mL   Patient Response to Treatment tolerated well with 25 gm albumin given in first 30 minutes of treatment with hypotension

## 2024-09-18 NOTE — LOPA/MORA/SWTA/AOC/AEB
[facility] ([facility])  Notification of Referral  [facility] Contact # [phone]        Thank you for the referral of this patient to determine suitability for organ, tissue, and eye donation.  A chart review has been conducted (date):2024 at (time) 0900 AM.    ? Potential candidate for organ donation - [facility] following patient. Any changes in patients condition, discussion of withdrawing the vent or brain death exams, or family mention of donation immediately call [phone]. Refer all organ referrals within 1 hour of meeting the clinical triger of a patient with a neurological, anoxic, or life threatening injury and ONE of the following:    * GCS </= 8    * Loss of 2 or more brain stem reflexes    * Hypothermic Protocol Initiated    * Withdrawal of support discussion regardless of GCS    * Family mention of Donation    ? Potential for candidate for tissue and eye donation- call [facility] at [phone] within 2 hours of death for screening as a potential tissue and/or eye donor.      ? Potential candidate for eye donation - call [facility] at [phone] within 2 hours of death for screening as a potential eye donor.    ? NOT a candidate for organ/tissue/eye donation- call [facility] at [phone] within 2 hours of death to report the time of death.    ? Potential candidate for donation/ Referral Closed- Any changes in patients condition, GCS of 5 or less, discussion of withdrawing the vent, brain death exams, or family mention of donation immediately call [phone].      Screened by: EDWARD Jimenez    [facility] Referral Number:  [id number]     Suitable for:  [x]Organ  [x] Tissue  [x] Eye  []Not suitable for Donation    Rule out Reason: N/A    Patient Name: Prem Saldana                   49 y.o. male  Patient MRN: [id number]  : 1975  DOD:  TOD:  Cause of Death: N/A    [x]Vented Patient  [facility] representative to approach family if appropriate  []DNR status obtained Referral of critical care  patients when family initiates Do Not Resuscitate  []Cardiac Death   Clinical Support Center to approach family via telephone if appropriate  []Donor Registry  Patient is listed in the Donor Registry    Completed by: Jodee Jean

## 2024-09-18 NOTE — PROGRESS NOTES
Nephrology Progress Note      HPI:    Prem Saldana is a 49 y.o. male from [place], with pmhx of ESRD TTS via L AVF, CHF, EF <20%, COPD, hx of prostate cancer, presenting to the ED today with respiratory failure requiring BiPAP.      History is very limited due to his condition but he was able to answer a few questions on bipap. He states that he last dialyzed in [place], [place] Saturday 9/7. He tells me he has a 6am chair time TTS set up at the dialysis unit in [place]? Will have to call the unit to verify.      CTA chest revealing cardiomegaly with trace left effusion, atelectasis and mild interstitial edema. CXR with congestion. He has 1+ edema to BLE. His electrolytes are normal., BNP is >14,000 on admission, troponin 0.55 and he states that his chest hurts because he fell and had trauma to the chest 2 days ago. Bedside echo pending in the ED. Cardiology following and trending troponins until peak. ED physician paged nephrology for HD today.     Interval History:    9/11/24  Tolerated HD yesterday with 3L off and significant improvement in respiratory status, now on 4L NC. Vitals are stable. Labs reviewed, all relatively stable but bicarb is now 20. Serial troponins remain high. He continues to complain of chest pain from falling this weekend. Chest xr and CTA are without traumatic changes. He is now able to tell me that he was in [place] because his mom lives there, but he is moving to [place] and has a chair time at [facility].   He is usually on 2L O2 support for COPD and is currently requiring 4L.     09/12/2024   No acute changes overnight.  Potassium 6 on a.m. labs.  He is scheduled for his routine dialysis run today which will be done after left heart catheterization.  Voices no new complaints.    09/13/2024   Tolerated 3 L UF with HD yesterday.  Potassium remains elevated at 5.6 on a.m. labs.  Hemoglobin stable.  Shortness of breath improved following HD. no chest pain at present.    09/16/2024   Awake  "alert and oriented.  Looks little dyspneic.  Complains of pain and asking for lot of pain medicines.  Not sure about the oral fluid intake or nutrition intake.    09/17/2024  No acute events overnight.  No new complaints.  No chest pain, shortness of breath, abdominal pain, nausea, vomiting, or lower extremity edema.  He remains on vasopressors.    09/18/2024   Overnight events noted and now patient is intubated and on the ventilator and sedated with a propofol and on high dose of Levophed also.  Also on amiodarone.      Vital Signs:  /67   Pulse (!) 124   Temp 98.6 °F (37 °C) (Oral)   Resp (!) 22   Ht 5' 11.26" (1.81 m)   Wt 61.9 kg (136 lb 7.4 oz)   SpO2 96%   BMI 18.89 kg/m²   Body mass index is 18.89 kg/m².    Physical Exam:    GEN: Chronically ill appearing AA male in NAD, sedated with propofol.  Requires Levophed for low blood pressure and amiodarone for atrial fibrillation.  CV:  Still in atrial fibrillation with rapid ventricular response  PULM: CTAB, unlabored  ABD: Soft, NT/ND abdomen with NABS  EXT: No cyanosis or edema  SKIN: Warm and dry  PSYCH:  Sedated on the ventilator  Dialysis access:  LUE AV fistula      Labs:  Recent Results (from the past 48 hour(s))   Troponin I    Collection Time: 09/16/24 10:48 AM   Result Value Ref Range    Troponin-I 0.400 (H) 0.000 - 0.045 ng/mL   POCT glucose    Collection Time: 09/16/24 12:03 PM   Result Value Ref Range    POCT Glucose 90 70 - 110 mg/dL   Lactic Acid, Plasma    Collection Time: 09/16/24  3:36 PM   Result Value Ref Range    Lactic Acid Level 1.4 0.5 - 2.2 mmol/L   Troponin I    Collection Time: 09/16/24  5:14 PM   Result Value Ref Range    Troponin-I 0.474 (H) 0.000 - 0.045 ng/mL   Vancomycin, Random    Collection Time: 09/17/24  1:08 AM   Result Value Ref Range    Vancomycin Random 19.1 15.0 - 20.0 ug/ml   Comprehensive Metabolic Panel    Collection Time: 09/17/24  1:08 AM   Result Value Ref Range    Sodium 131 (L) 136 - 145 mmol/L    " Potassium 5.5 (H) 3.5 - 5.1 mmol/L    Chloride 95 (L) 98 - 107 mmol/L    CO2 20 (L) 22 - 29 mmol/L    Glucose 68 (L) 74 - 100 mg/dL    Blood Urea Nitrogen 78.8 (H) 8.9 - 20.6 mg/dL    Creatinine 7.55 (H) 0.73 - 1.18 mg/dL    Calcium 9.1 8.4 - 10.2 mg/dL    Protein Total 6.0 (L) 6.4 - 8.3 gm/dL    Albumin 2.2 (L) 3.5 - 5.0 g/dL    Globulin 3.8 (H) 2.4 - 3.5 gm/dL    Albumin/Globulin Ratio 0.6 (L) 1.1 - 2.0 ratio    Bilirubin Total 1.4 <=1.5 mg/dL     (H) 40 - 150 unit/L    ALT 7 0 - 55 unit/L    AST 11 5 - 34 unit/L    eGFR 8 mL/min/1.73/m2    Anion Gap 16.0 mEq/L    BUN/Creatinine Ratio 10    Magnesium    Collection Time: 09/17/24  1:08 AM   Result Value Ref Range    Magnesium Level 2.00 1.60 - 2.60 mg/dL   Phosphorus    Collection Time: 09/17/24  1:08 AM   Result Value Ref Range    Phosphorus Level 5.6 (H) 2.3 - 4.7 mg/dL   CBC with Differential    Collection Time: 09/17/24  1:08 AM   Result Value Ref Range    WBC 13.02 (H) 4.50 - 11.50 x10(3)/mcL    RBC 3.63 (L) 4.70 - 6.10 x10(6)/mcL    Hgb 10.7 (L) 14.0 - 18.0 g/dL    Hct 31.4 (L) 42.0 - 52.0 %    MCV 86.5 80.0 - 94.0 fL    MCH 29.5 27.0 - 31.0 pg    MCHC 34.1 33.0 - 36.0 g/dL    RDW 19.0 (H) 11.5 - 17.0 %    Platelet 216 130 - 400 x10(3)/mcL    MPV 12.0 (H) 7.4 - 10.4 fL    NRBC% 0.0 %   Manual Differential    Collection Time: 09/17/24  1:08 AM   Result Value Ref Range    WBC 13.02 x10(3)/mcL    Neutrophils % 84 %    Lymphs % 5 %    Monocytes % 10 %    Metamyelocytes % 1 (H) <=0 %    Neutrophils Abs 10.9368 (H) 2.1 - 9.2 x10(3)/mcL    Lymphs Abs 0.651 0.6 - 4.6 x10(3)/mcL    Monocytes Abs 1.302 (H) 0.1 - 1.3 x10(3)/mcL    Platelets Normal Normal, Adequate    RBC Morph Abnormal (A) Normal    Poikilocytosis 2+ (A) (none)    Anisocytosis 1+ (A) (none)    Macrocytosis 1+ (A) (none)    Paula Cells 2+ (A) (none)   Lactic Acid, Plasma    Collection Time: 09/17/24  3:46 AM   Result Value Ref Range    Lactic Acid Level 1.3 0.5 - 2.2 mmol/L   POCT glucose     Collection Time: 09/17/24  7:50 AM   Result Value Ref Range    POCT Glucose 53 (L) 70 - 110 mg/dL   POCT glucose    Collection Time: 09/17/24  1:14 PM   Result Value Ref Range    POCT Glucose 65 (L) 70 - 110 mg/dL   POCT glucose    Collection Time: 09/17/24  2:40 PM   Result Value Ref Range    POCT Glucose 76 70 - 110 mg/dL   POCT glucose    Collection Time: 09/17/24  4:06 PM   Result Value Ref Range    POCT Glucose 80 70 - 110 mg/dL   POCT glucose    Collection Time: 09/17/24  6:15 PM   Result Value Ref Range    POCT Glucose 78 70 - 110 mg/dL   EKG 12-lead    Collection Time: 09/17/24  7:39 PM   Result Value Ref Range    QRS Duration 118 ms    OHS QTC Calculation 472 ms   Comprehensive Metabolic Panel    Collection Time: 09/17/24  8:43 PM   Result Value Ref Range    Sodium 132 (L) 136 - 145 mmol/L    Potassium 4.8 3.5 - 5.1 mmol/L    Chloride 94 (L) 98 - 107 mmol/L    CO2 20 (L) 22 - 29 mmol/L    Glucose 82 74 - 100 mg/dL    Blood Urea Nitrogen 50.6 (H) 8.9 - 20.6 mg/dL    Creatinine 5.58 (H) 0.73 - 1.18 mg/dL    Calcium 9.3 8.4 - 10.2 mg/dL    Protein Total 6.1 (L) 6.4 - 8.3 gm/dL    Albumin 2.5 (L) 3.5 - 5.0 g/dL    Globulin 3.6 (H) 2.4 - 3.5 gm/dL    Albumin/Globulin Ratio 0.7 (L) 1.1 - 2.0 ratio    Bilirubin Total 1.7 (H) <=1.5 mg/dL     (H) 40 - 150 unit/L    ALT 9 0 - 55 unit/L    AST 14 5 - 34 unit/L    eGFR 12 mL/min/1.73/m2    Anion Gap 18.0 mEq/L    BUN/Creatinine Ratio 9    Magnesium    Collection Time: 09/17/24  8:43 PM   Result Value Ref Range    Magnesium Level 2.10 1.60 - 2.60 mg/dL   Phosphorus    Collection Time: 09/17/24  8:43 PM   Result Value Ref Range    Phosphorus Level 5.1 (H) 2.3 - 4.7 mg/dL   CBC with Differential    Collection Time: 09/17/24  8:43 PM   Result Value Ref Range    WBC 17.87 (H) 4.50 - 11.50 x10(3)/mcL    RBC 4.00 (L) 4.70 - 6.10 x10(6)/mcL    Hgb 11.9 (L) 14.0 - 18.0 g/dL    Hct 34.6 (L) 42.0 - 52.0 %    MCV 86.5 80.0 - 94.0 fL    MCH 29.8 27.0 - 31.0 pg    MCHC 34.4  33.0 - 36.0 g/dL    RDW 19.3 (H) 11.5 - 17.0 %    Platelet 255 130 - 400 x10(3)/mcL    MPV 11.2 (H) 7.4 - 10.4 fL    NRBC% 0.0 %   Manual Differential    Collection Time: 09/17/24  8:43 PM   Result Value Ref Range    WBC 17.87 x10(3)/mcL    Neutrophils % 90 %    Lymphs % 3 %    Monocytes % 6 %    Myelocytes % 1 (H) <=0 %    Neutrophils Abs 16.083 (H) 2.1 - 9.2 x10(3)/mcL    Lymphs Abs 0.5361 (L) 0.6 - 4.6 x10(3)/mcL    Monocytes Abs 1.0722 0.1 - 1.3 x10(3)/mcL    Platelets Normal Normal, Adequate    RBC Morph Abnormal (A) Normal    Poikilocytosis 1+ (A) (none)    Anisocytosis 1+ (A) (none)    Macrocytosis 2+ (A) (none)   Gentamicin Level, Random    Collection Time: 09/17/24 10:38 PM   Result Value Ref Range    Gentamicin Level 2.1 <=25.0 ug/ml   Lactic Acid, Plasma    Collection Time: 09/17/24 10:38 PM   Result Value Ref Range    Lactic Acid Level 2.6 (H) 0.5 - 2.2 mmol/L   Blood Gas    Collection Time: 09/17/24 10:55 PM   Result Value Ref Range    Sample Type Arterial Blood     Sample site Right Radial Artery     Drawn by ch rrt     pH, Blood gas 7.390 7.350 - 7.450    pCO2, Blood gas 40.0 35.0 - 45.0 mmHg    pO2, Blood gas 465.0 (H) 80.0 - 100.0 mmHg    Sodium, Blood Gas 127 (L) 137 - 145 mmol/L    Potassium, Blood Gas 4.6 3.5 - 5.0 mmol/L    Calcium Level Ionized 1.15 1.12 - 1.23 mmol/L    TOC2, Blood gas 25.4 mmol/L    Base Excess, Blood gas -0.70 mmol/L    sO2, Blood gas 100.0 %    HCO3, Blood gas 24.2 22.0 - 26.0 mmol/L    Allens Test Yes     MODE AC     Oxygen Device, Blood gas Ventilator     FIO2, Blood gas 100 %    Mech Vt 550 ml    Mech RR 20 b/min    PEEP 8.0 cmH2O   POCT glucose    Collection Time: 09/18/24 12:56 AM   Result Value Ref Range    POCT Glucose 106 70 - 110 mg/dL   Comprehensive Metabolic Panel    Collection Time: 09/18/24  2:43 AM   Result Value Ref Range    Sodium 132 (L) 136 - 145 mmol/L    Potassium 5.8 (H) 3.5 - 5.1 mmol/L    Chloride 96 (L) 98 - 107 mmol/L    CO2 18 (L) 22 - 29 mmol/L     Glucose 102 (H) 74 - 100 mg/dL    Blood Urea Nitrogen 49.6 (H) 8.9 - 20.6 mg/dL    Creatinine 5.65 (H) 0.73 - 1.18 mg/dL    Calcium 9.0 8.4 - 10.2 mg/dL    Protein Total 5.9 (L) 6.4 - 8.3 gm/dL    Albumin 2.2 (L) 3.5 - 5.0 g/dL    Globulin 3.7 (H) 2.4 - 3.5 gm/dL    Albumin/Globulin Ratio 0.6 (L) 1.1 - 2.0 ratio    Bilirubin Total 1.3 <=1.5 mg/dL     (H) 40 - 150 unit/L    ALT 8 0 - 55 unit/L    AST 20 5 - 34 unit/L    eGFR 12 mL/min/1.73/m2    Anion Gap 18.0 mEq/L    BUN/Creatinine Ratio 9    Magnesium    Collection Time: 09/18/24  2:43 AM   Result Value Ref Range    Magnesium Level 2.20 1.60 - 2.60 mg/dL   Phosphorus    Collection Time: 09/18/24  2:43 AM   Result Value Ref Range    Phosphorus Level 5.9 (H) 2.3 - 4.7 mg/dL   CBC with Differential    Collection Time: 09/18/24  3:27 AM   Result Value Ref Range    WBC 21.16 (H) 4.50 - 11.50 x10(3)/mcL    RBC 3.85 (L) 4.70 - 6.10 x10(6)/mcL    Hgb 11.2 (L) 14.0 - 18.0 g/dL    Hct 33.0 (L) 42.0 - 52.0 %    MCV 85.7 80.0 - 94.0 fL    MCH 29.1 27.0 - 31.0 pg    MCHC 33.9 33.0 - 36.0 g/dL    RDW 19.6 (H) 11.5 - 17.0 %    Platelet 293 130 - 400 x10(3)/mcL    MPV 11.9 (H) 7.4 - 10.4 fL    NRBC% 0.1 %   Lactic Acid, Plasma    Collection Time: 09/18/24  3:27 AM   Result Value Ref Range    Lactic Acid Level 1.7 0.5 - 2.2 mmol/L   Manual Differential    Collection Time: 09/18/24  3:27 AM   Result Value Ref Range    WBC 21.16 x10(3)/mcL    Neutrophils % 80 %    Lymphs % 10 %    Monocytes % 10 %    nRBC % 1 %    Neutrophils Abs 16.928 (H) 2.1 - 9.2 x10(3)/mcL    Lymphs Abs 2.116 0.6 - 4.6 x10(3)/mcL    Monocytes Abs 2.116 (H) 0.1 - 1.3 x10(3)/mcL    Platelets Adequate Normal, Adequate    RBC Morph Abnormal (A) Normal    Poikilocytosis 1+ (A) (none)    Anisocytosis 1+ (A) (none)    Macrocytosis 1+ (A) (none)    Ovalocytes 1+ (A) (none)   POCT glucose    Collection Time: 09/18/24  6:02 AM   Result Value Ref Range    POCT Glucose 125 (H) 70 - 110 mg/dL   POCT glucose     Collection Time: 09/18/24  6:57 AM   Result Value Ref Range    POCT Glucose 120 (H) 70 - 110 mg/dL   Lactic Acid, Plasma    Collection Time: 09/18/24  7:18 AM   Result Value Ref Range    Lactic Acid Level 3.6 (HH) 0.5 - 2.2 mmol/L         Assessment/Plan:    ESRD on HD - [place], says he has a TTS chair time in [place] and has been on dialysis for 7 years. Left AVF  Hyperkalemia   CHF - 15-20% EF on echo 9/10   NSTEMI - cardiology recommending cath at some point  COPD on 2L home O2--respiratory status is improving but still less than baseline   Prostate cancer s/p radiation  Anemia of chronic kidney disease      Recommendations:  Additional dialysis today to help correct electrolytes on this patient.  Overall poor prognosis.

## 2024-09-18 NOTE — NURSING
HPI:  68 y/o MALE with a PMHx HTN, GERD, Anxiety, Parkinson's disease s/p surgery for fiducial marker implantation 3/22/22, s/p Right DBS to STN with microelectrode  recording 3/29/22, who underwent stage 3 implantation of IPG with dual extension leads 4/5/22 and fiducial markers last week.  Parkinson's disease diagnosed in 2008, was on meds, through 2016. Initial symptoms included a lip tremor and slowing of his left side movements, has progressed with some cognitive impairment and forgetfulness in taking his sinemet, now left side tremors have responded to DBS and he has right sided tremors. Currently takes: Sinemet 25/250 total 9 tabs/day.     (28 Jun 2022 06:41)    Hospital Course:  6/28: POD# 0 S/P L STN DBS with microelectrode recording. New Lead connected to dual chamber IPG that is already in place. (Prior Rt STN DBS and Lead already connected to IPG in other chamber). Postop given 1.5mg ativan, haldol 2mg and precedex gtt for agitation. Given 0.4mg flumazenil for ativan reversal due to possibility that it contributed to agitation. Cardene gtt started.   6/29: POD1, o/n NGT placed and replaced due to agitation and inability to take sinamet PO (dose delayed several hours), CT head completed for increased lethargy and not FC later in the night which showed pneumocephalus but otherwise stable, early in the morning after having recieved sinamet exam improved, neurology consulted. Precedex and cardene gtts weaned off. 1L bolus given for SBP 90s.   6/30: POD2, CAMILA o/n, stepped down from ICU, weaned off precedex, given 25mg seroquel in AM, zyprexa 5mg IM in evening followed by 3mg IM haldol for agitation.   7/1: POD3, o/n given additional 1mg IM haldol for agitation, neuro stable, DC Haldol as per Neurology and start seroquel betime 25  7/2: POD 4. CAMILA overnight. Received haldol 1 IM at change of shift yesterday for agitation. Exam stable. pending rehab, not agitated this morning, retaining urine on bladder scan - salazar placed by  due to resistance and blood tinged urine when attempted by RN. EKG and Cardiac enzymes for tachycardia and subjective chest pain  7/3: Continued agitation - seroquel increased to 50mg QHS with PRN seroquel. QTc 478. Clonidine discontinued. Started on flomax for urinary retention., restraints dc'd, episode of tachycardia, resolved with tx of agitation   7/4; POD6, QTc 449, less agitated.  7/5: POD7, CAMILA overnight, agitated overnight received prn seroquel and was placed on wrist restraints for singing at nursing staff. F/u TOV. New rash on back and inside L arm.   7/6: POD8, agitated o/n, remains on one to one supervision. off restratined. voiding, pending [facility]   7/7: POD9, agitated overnight received seroquel. Acute change with lethargy and unresponsiveness, stroke code and rapid response called CTH showing acute on chronic SDH L > R, CTA showing R carotid stenosis. Patient was placed on EEG and started on Keppra 500 BID, and has returned to baseline  7/8: POD10, CAMILA overnight, Given Zyprexa overnight for agitation - ripped off EEG leads.  7/9: POD#11 DBS placement for parkinsonian tremors. Agitated and delirious overnight that required Zyprexa.     OVERNIGHT EVENTS: Delirium.    Vital Signs Last 24 Hrs  T(C): 36.9 (08 Jul 2022 22:21), Max: 36.9 (08 Jul 2022 05:03)  T(F): 98.5 (08 Jul 2022 22:21), Max: 98.5 (08 Jul 2022 05:03)  HR: 104 (09 Jul 2022 00:38) (74 - 104)  BP: 147/79 (09 Jul 2022 00:38) (126/56 - 159/76)  BP(mean): 98 (09 Jul 2022 00:38) (80 - 116)  RR: 17 (09 Jul 2022 00:38) (17 - 18)  SpO2: 95% (09 Jul 2022 00:38) (94% - 97%)    Parameters below as of 09 Jul 2022 00:38  Patient On (Oxygen Delivery Method): room air        I&O's Summary    07 Jul 2022 07:01  -  08 Jul 2022 07:00  --------------------------------------------------------  IN: 0 mL / OUT: 100 mL / NET: -100 mL    08 Jul 2022 07:01  -  09 Jul 2022 01:33  --------------------------------------------------------  IN: 480 mL / OUT: 900 mL / NET: -420 mL        PHYSICAL EXAM:  General: NAD, pt is comfortably sitting up in bed, on room air  HEENT: CN II-XII grossly intact, PERRL 3mm briskly reactive, EOMI b/l, face symmetric, tongue midline, neck FROM  Cardiovascular: RRR, normal S1 and S2   Respiratory: lungs CTAB, no wheezing, rhonchi, or crackles   GI: normoactive BS to auscultation, abd soft, NTND   Bilateral groin rash  Neuro: A&Ox1, No aphasia, speech clear, no dysmetria, no pronator drift. Follows commands.  DOHERTY x4 spontaneously, 5/5 strength in all extremities throughout. SILT throughout   Extremities: distal pulses 2+ x4    DIET: Regular    LABS:                        14.4   8.32  )-----------( 322      ( 08 Jul 2022 05:52 )             42.7     07-08    142  |  107  |  22  ----------------------------<  142<H>  4.1   |  23  |  0.76    Ca    9.1      08 Jul 2022 05:52  Phos  3.1     07-08  Mg     2.2     07-08    TPro  7.3  /  Alb  4.0  /  TBili  0.2  /  DBili  x   /  AST  29  /  ALT  6<L>  /  AlkPhos  81  07-07        CARDIAC MARKERS ( 07 Jul 2022 16:43 )  x     / 0.01 ng/mL / 111 U/L / x     / 3.7 ng/mL      CAPILLARY BLOOD GLUCOSE          Drug Levels: [] N/A    CSF Analysis: [] N/A      Allergies    No Known Allergies    Intolerances      MEDICATIONS:  Antibiotics:    Neuro:  acetaminophen     Tablet .. 650 milliGRAM(s) Oral every 6 hours PRN  carbidopa/levodopa  25/250 1 Tablet(s) Oral <User Schedule>  levETIRAcetam  IVPB 500 milliGRAM(s) IV Intermittent every 12 hours  OLANZapine Injectable 5 milliGRAM(s) IntraMuscular once  ondansetron Injectable 4 milliGRAM(s) IV Push every 6 hours PRN  QUEtiapine 50 milliGRAM(s) Oral at bedtime  QUEtiapine 25 milliGRAM(s) Oral every 8 hours PRN  rivastigmine 1.5 milliGRAM(s) Oral two times a day    Anticoagulation:    OTHER:  bisacodyl 5 milliGRAM(s) Oral daily  nystatin Powder 1 Application(s) Topical two times a day  polyethylene glycol 3350 17 Gram(s) Oral daily  senna 2 Tablet(s) Oral at bedtime  tamsulosin 0.4 milliGRAM(s) Oral at bedtime    IVF:    CULTURES:    RADIOLOGY & ADDITIONAL TESTS:      ASSESSMENT:  68 y/o MALE with a PMHx HTN, GERD, Anxiety, Parkinson's disease s/p surgery for fiducial marker implantation 3/22/22, s/p Right DBS to STN with microelectrode  recording 3/29/22, who underwent stage 3 implantation of IPG with dual extension leads 4/5/22 and fiducial markers last week, now s/p L STN DBS (6/28/22) with Dr. Perez.  POst operaive complicated by delirium and confusion.       G20    No pertinent family history in first degree relatives    Handoff    MEWS Score    Anxiety    Parkinson disease    Shoulder joint pain, right    Hypertension    Ankle pain, right    GERD (gastroesophageal reflux disease)    Seasonal allergies    Parkinson disease    Parkinson disease    Delirium    Parkinsons disease    Delirium    Insertion of fiducial anchors for electrode lead placement of deep brain stimulator (DBS)    Insertion, deep brain stimulator, lead only    S/P knee surgery    S/P foot surgery    S/P cervical discectomy    S/P appendectomy    H/O umbilical hernia repair    Encounter for placement of fiducial surface markers for Stealth frameless stereotaxy protocol    H/O shoulder surgery    H/O: knee surgery    History of surgery    H/O shoulder surgery    [email or web address]        PLAN:  Neuro:   - Neuro/vital checks q 4  - repeat CTH 6/28 stable, pneumocephalus  - Continue Sinemet, Rivastigamine 1.5 BID started per neurology reccs  - Neurology/psych following  - Keppra 500 BID  - For agitation: Seroquel 50mg QHS, PRN seroquel 25mg Q8H for breakthrough agitation/delirium  CTH 7/7: acute on chronic subdural fluid collection left frontal/parietal, right frontal/partietal/temporal. CTP: R carotid stenosis. 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 DVT ppx    DERM  nystatin to groin rash and skin protectant cream to contact derm rash on back and L arm      DVT PROPHYLAXIS:  [] Venodynes                                [] Heparin/Lovenox    DISPOSITION: [facility] - Parkinson unit.    Assessment:  Present when checked    []  GCS  E   V  M     Heart Failure: []Acute, [] acute on chronic , []chronic  Heart Failure:  [] Diastolic (HFpEF), [] Systolic (HFrEF), []Combined (HFpEF and HFrEF), [] RHF, [] Pulm HTN, [] Other    [] PERRY, [] ATN, [] AIN, [] other  [] CKD1, [] CKD2, [] CKD 3, [] CKD 4, [] CKD 5, []ESRD    Encephalopathy: [] Metabolic, [] Hepatic, [] toxic, [] Neurological, [] Other    Abnormal Nurtitional Status: [] malnurtition (see nutrition note), [ ]underweight: BMI < 19, [] morbid obesity: BMI >40, [] Cachexia    [] Sepsis  [] hypovolemic shock,[] cardiogenic shock, [] hemorrhagic shock, [] neuogenic shock  [] Acute Respiratory Failure  []Cerebral edema, [] Brain compression/ herniation,   [] Functional quadriplegia  [] Acute blood loss anemia   Midline no longer needed. Central line placed

## 2024-09-18 NOTE — PROCEDURES
[facility]   Central Venous Catheter  Procedure Note    SUMMARY     Date of Procedure: 9/17/2024    Procedure: Insertion of Central Venous Catheter    Perfomed by: Ephraim Yeh MD    Assisting Provider: None    Indications: vascular access     Pre-Procedure Diagnosis: Shock    Post-Procedure Diagnosis: same    Anesthesia: local, 1% lidocaine 4 ml     Technical Procedures Used: Seldinger, ultrasound guided    Description of the Findings of the Procedure:    Informed consent was obtained for the procedure, including sedation.  Risks of lung perforation, hemorrhage, arrhythmia, and adverse drug reaction were discussed.     Maximum sterile technique was used including antiseptics, cap, gloves, gown, hand hygiene, mask, and sheet.    Under sterile conditions the skin above the on the right internal jugular vein was prepped with chlorhexadine and covered with a sterile drape. Local anesthesia was applied to the skin and subcutaneous tissues. A 22-gauge needle was used to identify the vein. An 18-gauge needle was then inserted into the vein. A guide wire was then attempted to be passed X 2 but met resistance approximately 2-3 cm distal to the tip of the needle. Procedure aborted  Recommendations: consult surgery for line placement    CXR ordered to verify placement.    Significant Surgical Tasks Conducted by the Assistant(s), if Applicable: N/A    Complications: No    Estimated Blood Loss (EBL): None    Condition: Critical    Disposition:  Remains in ICU, critically ill.

## 2024-09-18 NOTE — PROGRESS NOTES
[facility] [address] ICU  Wound Care    Patient Name:  Prem Saldana   MRN:  [id number]  Date: 9/18/2024  Diagnosis: <principal problem not specified>    History:     Past Medical History:   Diagnosis Date    Anemia of renal disease     ESRD on dialysis since 4/3/15     Essential hypertension     Secondary hyperparathyroidism of renal origin        Social History     Socioeconomic History    Marital status: Single   Tobacco Use    Smoking status: Former     Current packs/day: 1.00     Average packs/day: 1 pack/day for 15.0 years (15.0 ttl pk-yrs)     Types: Cigarettes    Smokeless tobacco: Never    Tobacco comments:     currently smokes 2-3 cigs/week; has been smoking for >20 years; at the most smoked 1 ppd   Substance and Sexual Activity    Alcohol use: No     Comment: previously social drinker    Drug use: No     Comment: remote use of marijuana >20 years ago    Sexual activity: Never   Social History Narrative    Lives with fiance and two dogs     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)     Social Determinants of Health     Financial Resource Strain: Low Risk  (9/11/2024)    Overall Financial Resource Strain (CARDIA)     Difficulty of Paying Living Expenses: Not very hard   Food Insecurity: No Food Insecurity (9/11/2024)    Hunger Vital Sign     Worried About Running Out of Food in the Last Year: Never true     Ran Out of Food in the Last Year: Never true   Transportation Needs: No Transportation Needs (9/11/2024)    TRANSPORTATION NEEDS     Transportation : No   Physical Activity: Sufficiently Active (9/11/2024)    Exercise Vital Sign     Days of Exercise per Week: 5 days     Minutes of Exercise per Session: 30 min   Stress: No Stress Concern Present (9/11/2024)    [employer] - Occupational Stress Questionnaire     Feeling of Stress : Only a little   Housing Stability: Low Risk  (9/11/2024)    Housing Stability  Vital Sign     Unable to Pay for Housing in the Last Year: No     Homeless in the Last Year: No       Precautions:     Allergies as of 09/10/2024 - Reviewed 09/10/2024   Allergen Reaction Noted    Hydralazine Palpitations and Other (See Comments) 11/25/2020    Amlodipine  09/24/2023    Levofloxacin  09/24/2023       [facility] Assessment Details/Treatment        09/18/24 1051   [facility] Assessment   Visit Date 09/18/24   Visit Time 1051   Consult Type New   [facility] Speciality Wound   Intervention chart review;assessed;applied;orders   Teaching on-going        Wound 09/17/24 0701 Blister(s) Left anterior;posterior Calf #1   Date First Assessed/Time First Assessed: 09/17/24 0701   Present on Original Admission: Yes  Primary Wound Type: Blister(s)  Side: Left  Orientation: anterior;posterior  Location: Calf  Wound Number: #1  Is this injury device related?: No   Wound Image    Dressing Appearance Saturated   Drainage Amount Copious   Drainage Characteristics/Odor Sanguineous   Appearance Pink;Red;Maroon;Other (see comments)  (it was draining, but draining controlled now)   Tissue loss description Partial thickness   Periwound Area Dry;Maroon   Dressing Applied;Non-adherent;Absorptive Pad;Rolled gauze     [facility] consulted for LLE blister. Discussed POC w/ nurse Jesenia who was at bedside. No family at bedside. Surgery was consulted to de-roof blister, but did not find it necessary and said to leave it alone. Blister has now popped on its own and was oozing out. Treatment recommendations: LLE: clean w/ hibiclens, dry well, apply adaptic, large abd pad, kerlix, tape. Daily/prn if soilage. Pt. Turned on purple wedge to right side. Unable to educate pt. Due to cognition and being intubated. Nursing to cont. Tx recs and preventative measures. Will follow up.     09/18/2024

## 2024-09-18 NOTE — PROGRESS NOTES
[facility] ICU  Pulmonary Critical Care Note    Patient Name: Prem Saldana  MRN: [id number]  Admission Date: 9/10/2024  Hospital Length of Stay: 8 days  Code Status: Full Code  Attending Provider: Sin Gonsalez Jr., MD,*  Primary Care Provider: Tank Saenz MD     Subjective:     HPI: Prem Sadlana is a 49 year old  male with a past medical history for tobacco use disorder, COPD on home oxygen, hypertension, hyperlipidemia, ESRD on HD, heart failure with reduced ejection fraction (EF 15-20%), prostate cancer status post radiation, who initially presented to [facility] ED (09/10/2024) due to chest pain and shortness of breath. Patient reported shortness of breath had been ongoing for several weeks however chest pain began after patient fell on a glass table resulting in persistent, severe (10/10), sharp, stabbing left lateral chest pain without radiation to neck, arm, or back. Upon presentation to the ED, temperature 99° F, heart rate 113, blood pressure 114/82, respiratory rate 86 and SpO2 98% on 3 L nasal cannula. Patient was placed on BiPAP. Labs with H&H 11.7/36.8, MCV 95.8, BUN/creatinine 70.3/08.07, alkaline phosphatase 1061, total bilirubin 14,761, troponin 0.554.  Influenza A/B and SARS-COV-2 PCR negative.  EKG shows sinus tachycardia with Left axis deviation, and LVH. Chest x-ray poor inspiratory effort accentuates the pulmonary vascular markings however there is a degree of congestive unable to be excluded, cardiomegaly, increased left retrocardiac density and silhouetting of the left hemidiaphragm and calcified densities in the left hemidiaphragm.  CTA of the chest PE study without evidence of pulmonary embolism but cardiomegaly, trace left effusion, bibasilar atelectasis and mild interstitial edema, chronic appearing compression deformity of the midthoracic spine. [facility] has consulted for NSTEMI management. Attempted to perform LHC (09/14/2024) but was  canceled prior to initiation of procedure due to patient becoming uncooperative per reports. Today patient developed worsening hypotension with SBP dropping from 100-110 to 80-90. Telemetry showed 14 beat run of Vtach. [facility] consulted by floor who recommended patient be upgraded to ICU for amiodarone gtt and vasopressor support. Discussed case with attending who agreed. Patient upgraded to ICU at that time for possible cardiogenic shock.    Hospital Course/Significant events:      24 Hour Interval History:  Patient now intubated sedated mechanically ventilated.  He is on amiodarone infusion as well as Levophed.  Yesterday developed increasing tachycardia and reportedly his AICD was discharging who is intubated to stabilize and for comfort.  Currently undergoing hemodialysis    Past Medical History:   Diagnosis Date    Anemia of renal disease     ESRD on dialysis since 4/3/15     Essential hypertension     Secondary hyperparathyroidism of renal origin        Past Surgical History:   Procedure Laterality Date    ANGIOGRAM, CORONARY, WITH LEFT HEART CATHETERIZATION N/A 9/13/2024    Procedure: Angiogram, Coronary, with Left Heart Cath;  Surgeon: Takn Scott Jr., MD;  Location: [facility] CATH LAB;  Service: Cardiology;  Laterality: N/A;    AV FISTULA PLACEMENT Left 07/2015    CENTRAL LINE  9/17/2024    Peritoneal Dialysis Catheter Placement  01/2016    Permcath Placement                    Current Outpatient Medications   Medication Instructions    calcitRIOL (ROCALTROL) 0.25 mcg, Oral, 2 times daily    labetalol (NORMODYNE) 300 MG tablet No dose, route, or frequency recorded.    minoxidiL (LONITEN) 2.5 mg, Oral, 2 times daily    vacuum erection device system Kit 1 Units, Misc.(Non-Drug; Combo Route), As needed (PRN)       Current Inpatient Medications   aspirin  81 mg Oral Daily    busPIRone  5 mg Oral BID    calcitRIOL  0.25 mcg Oral BID    dextrose 10%  50 g Intravenous Once    And    insulin regular  0.1 Units/kg  Intravenous Once    enoxparin  30 mg Subcutaneous Daily    famotidine (PF)  20 mg Intravenous Daily    sevelamer carbonate  800 mg Oral TID WM    sodium zirconium cyclosilicate  10 g Oral Every Mon, Wed, Fri       Current Intravenous Infusions   amiodarone in dextrose 5%  0.5 mg/min Intravenous Continuous 16.7 mL/hr at 09/18/24 1253 0.5 mg/min at 09/18/24 1253    NORepinephrine bitartrate-D5W  0-3 mcg/kg/min Intravenous Continuous 81.2 mL/hr at 09/18/24 1253 0.7 mcg/kg/min at 09/18/24 1253    propofoL  0-50 mcg/kg/min Intravenous Continuous 9.3 mL/hr at 09/18/24 1253 25 mcg/kg/min at 09/18/24 1253                Objective:       Intake/Output Summary (Last 24 hours) at 9/18/2024 1331  Last data filed at 9/18/2024 1253  Gross per 24 hour   Intake 3137.49 ml   Output 1500 ml   Net 1637.49 ml         Vital Signs (Most Recent):  Temp: 99.1 °F (37.3 °C) (09/18/24 1200)  Pulse: (!) 111 (09/18/24 1245)  Resp: (!) 21 (09/18/24 1245)  BP: 97/70 (09/18/24 1245)  SpO2: 97 % (09/18/24 1245)  Body mass index is 18.89 kg/m².  Weight: 61.9 kg (136 lb 7.4 oz) Vital Signs (24h Range):  Temp:  [97.7 °F (36.5 °C)-102.6 °F (39.2 °C)] 99.1 °F (37.3 °C)  Pulse:  [] 111  Resp:  [20-36] 21  SpO2:  [61 %-100 %] 97 %  BP: ()/() 97/70     Physical Exam  Constitutional:       General: He is not in acute distress.     Appearance: Normal appearance. He is normal weight. He is ill-appearing.      Comments: Intubated sedated mechanically ventilated   HENT:      Head: Normocephalic and atraumatic.   Eyes:      General: No scleral icterus.        Right eye: No discharge.         Left eye: No discharge.      Extraocular Movements: Extraocular movements intact.      Conjunctiva/sclera: Conjunctivae normal.      Pupils: Pupils are equal, round, and reactive to light.   Cardiovascular:      Rate and Rhythm: Regular rhythm. Tachycardia present.      Pulses: Normal pulses.      Heart sounds: Normal heart sounds. No murmur heard.     No  friction rub. No gallop.   Pulmonary:      Effort: Pulmonary effort is normal. No respiratory distress.      Breath sounds: No stridor. No wheezing, rhonchi or rales.   Abdominal:      General: Abdomen is flat. Bowel sounds are normal. There is no distension.      Palpations: Abdomen is soft.      Tenderness: There is no abdominal tenderness. There is no guarding.   Musculoskeletal:         General: No swelling. Normal range of motion.      Right lower leg: No edema.      Left lower leg: No edema.   Skin:     General: Skin is dry.      Coloration: Skin is not jaundiced or pale.      Findings: No bruising, erythema, lesion or rash.   Neurological:      Mental Status: He is alert.      Comments: Patient is sedated           Lines/Drains/Airways       Central Venous Catheter Line  Duration             Percutaneous Central Line - Triple Lumen  09/17/24 2030 Internal Jugular Left <1 day              Drain  Duration                  NG/OG Tube 09/17/24 2120 Center mouth <1 day              Airway  Duration                  Airway - Non-Surgical 09/17/24 2117 Endotracheal Tube <1 day              Peripheral Intravenous Line  Duration                  Hemodialysis AV Fistula 09/17/24 0701 Left forearm 1 day         Hemodialysis AV Fistula 09/17/24 0701 Left upper arm 1 day                    Significant Labs:    Lab Results   Component Value Date    WBC 21.16 (H) 09/18/2024    WBC 21.16 09/18/2024    HGB 11.2 (L) 09/18/2024    HCT 33.0 (L) 09/18/2024    MCV 85.7 09/18/2024     09/18/2024           BMP  Lab Results   Component Value Date     (L) 09/18/2024    K 4.9 09/18/2024    CO2 19 (L) 09/18/2024    BUN 51.4 (H) 09/18/2024    CREATININE 5.85 (H) 09/18/2024    CALCIUM 9.3 09/18/2024    AGAP 16.0 09/18/2024    ESTGFRAFRICA 20.8 (A) 11/11/2016    EGFRNONAA 18.0 (A) 11/11/2016         ABG  Recent Labs   Lab 09/17/24 2258   PH 7.390   PO2 465.0*   PCO2 40.0   HCO3 24.2   POCBASEDEF -0.70       Mechanical  Ventilation Support:  Vent Mode: A/C (09/18/24 1130)  Set Rate: 20 BPM (09/18/24 1130)  Vt Set: 550 mL (09/18/24 1130)  PEEP/CPAP: 8 cmH20 (09/18/24 1130)  Oxygen Concentration (%): 30 (09/18/24 1200)  Peak Airway Pressure: 24 cmH20 (09/18/24 1130)  Total Ve: 9.5 L/m (09/18/24 1130)  F/VT Ratio<105 (RSBI): (!) 55.17 (09/18/24 1130)      Significant Imaging:    Reading Physician Reading Date Result Priority   Star Oates MD  [phone] 9/18/2024 STAT     Narrative & Impression  EXAMINATION:  XR CHEST 1 VIEW     CPT 17062     CLINICAL HISTORY:  intubation;     COMPARISON:  September 17, 2024     FINDINGS:  Examination reveals cardiomediastinal silhouette and pleuroparenchymal changes to be essentially unchanged as compared with the previous exam.     There has been interval insertion of endotracheal tube with tip above the shavon nasogastric tube is seen with the tip below the diaphragm     Impression:     No significant change as compared with the previous exam.     Interval insertion of endotracheal tube and nasogastric tube        Electronically signed by:Star Oaets  Date:                                            09/18/2024  Time:                                           03:56      Assessment/Plan:     Assessment  Shock, cardiogenic versus septic  Heart failure with reduced ejection fraction (EF 15-20%)  ESRD on HD  Hyperlipidemia  COPD    Plan  Continue vasopressors and wean as tolerated  Continue antimicrobials  Follow up on cultures  Dialysis as per Nephrology  Continue amiodarone  Continue current vent settings as ABG looks good  Overall prognosis appears guarded at best due to multiple comorbidities    DVT Prophylaxis: Lovenox  GI Prophylaxis: None     31 minutes of critical care was time spent personally by me on the following activities: development of treatment plan with patient or surrogate and bedside caregivers, discussions with consultants, evaluation of patient's response to  treatment, examination of patient, ordering and performing treatments and interventions, ordering and review of laboratory studies, ordering and review of radiographic studies, pulse oximetry, re-evaluation of patient's condition.  This critical care time did not overlap with that of any other provider or involve time for any procedures.     Ephraim Yeh MD  Pulmonary Critical Care Medicine  [facility] ICU  DOS: 09/18/2024

## 2024-09-18 NOTE — PROCEDURES
"Prem Saldana is a 49 y.o. male patient.    Temp: (!) 102.6 °F (39.2 °C) (09/17/24 2100)  Pulse: (!) 146 (09/17/24 2112)  Resp: 20 (09/17/24 2250)  BP: 100/73 (09/17/24 2112)  SpO2: 98 % (09/17/24 2112)  Weight: 61.9 kg (136 lb 7.4 oz) (09/14/24 0635)  Height: 5' 11.26" (181 cm) (09/10/24 0750)  Mallampati Scale: Class III  ASA Classification: Class 3    Central Line    Date/Time: 9/17/2024 8:00 PM    Performed by: Chun Giordano MD  Authorized by: Chun Giordano MD    Location procedure was performed:  [address] INTENSIVE CARE UNIT  Assisting Provider:  Rodolfo Gutierrez MD  Pre-operative diagnosis:  Cardiogenic shock  Post-operative diagnosis:  Cardiogenic shock  Consent Done ?:  Yes  Time out complete?: Verified correct patient, procedure, equipment, staff, and site/side    Indications:  Med administration, vascular access and hemodynamic monitoring  Anesthesia:  Local infiltration  Local anesthetic:  Lidocaine 1% without epinephrine  Anesthetic total (ml):  10  Description of findings:  US visualization of patent left internal carotid  Preparation:  Skin prepped with ChloraPrep  Skin prep agent dried: Skin prep agent completely dried prior to procedure    Sterile barriers: All five maximal sterile barriers used - gloves, gown, cap, mask and large sterile sheet    Hand hygiene: Hand hygiene performed immediately prior to central venous catheter insertion    Location:  Left internal jugular  Catheter type:  Triple lumen  Catheter size:  6 Fr  Ultrasound guidance: Yes    Vessel Caliber:  Large   patent  Comprressibility:  Normal  Needle advanced into vessel with real time ultrasound guidance.    Guidewire confirmed in vessel.    Image recorded and saved.    Steril sheath on probe.    Sterile gel used.  Manometry: No    Number of attempts:  1  Securement:  Line sutured, chlorhexidine patch, sterile dressing applied and blood return through all ports  Complications: No    Estimated blood loss (mL):  " 2  Specimens: No    Implants: No    XRay:  Placement verified by x-ray, no pneumothorax on x-ray, successful placement and tip termination  Adverse Events:  NoneTermination Site: superior vena cava    Chun Giordano MD  9/17/2024

## 2024-09-18 NOTE — PLAN OF CARE
Problem: Hemodialysis  Goal: Safe, Effective Therapy Delivery  Outcome: Progressing  Goal: Effective Tissue Perfusion  Outcome: Progressing     Problem: Adult Inpatient Plan of Care  Goal: Plan of Care Review  Outcome: Progressing     Problem: Skin Injury Risk Increased  Goal: Skin Health and Integrity  Outcome: Progressing     Problem: Wound  Goal: Optimal Coping  Outcome: Progressing  Goal: Optimal Pain Control and Function  Outcome: Progressing  Goal: Skin Health and Integrity  Outcome: Progressing     Problem: Infection  Goal: Absence of Infection Signs and Symptoms  Outcome: Not Progressing     Problem: Wound  Goal: Optimal Functional Ability  Outcome: Not Progressing  Goal: Absence of Infection Signs and Symptoms  Outcome: Not Progressing  Goal: Improved Oral Intake  Outcome: Not Progressing

## 2024-09-18 NOTE — PROGRESS NOTES
Inpatient Nutrition Assessment    Admit Date: 9/10/2024   Total duration of encounter: 8 days   Patient Age: 49 y.o.    Nutrition Recommendation/Prescription     Start tube feeding when appropriate.  Tube feeding recommendation:     Novasource Renal goal rate 50 ml/hr to provide  2000 kcal/d   (96% est needs, 108% with meds)  90 g protein/d  (97% est needs)  720 ml free water/d  (calculations based on estimated 20 hr/d run time)     Communication of Recommendations: reviewed with nurse    Nutrition Assessment     Malnutrition Assessment/Nutrition-Focused Physical Exam    Malnutrition Context: chronic illness (09/18/24 1338)  Malnutrition Level: moderate (09/18/24 1338)  Energy Intake (Malnutrition): less than or equal to 50% for greater than or equal to 5 days (09/18/24 1338)  Weight Loss (Malnutrition):  (does not meet criteria) (09/18/24 1338)  Subcutaneous Fat (Malnutrition): mild depletion (09/18/24 1338)  Orbital Region (Subcutaneous Fat Loss): mild depletion  Upper Arm Region (Subcutaneous Fat Loss): mild depletion     Muscle Mass (Malnutrition): mild depletion (09/18/24 1338)  [place] Region (Muscle Loss): mild depletion  Clavicle Bone Region (Muscle Loss): mild depletion  Clavicle and Acromion Bone Region (Muscle Loss): mild depletion  Scapular Bone Region (Muscle Loss): mild depletion              Fluid Accumulation (Malnutrition): mild (09/18/24 1338)        A minimum of two characteristics is recommended for diagnosis of either severe or non-severe malnutrition.    Chart Review    Reason Seen: continuous nutrition monitoring and follow-up    Malnutrition Screening Tool Results   Have you recently lost weight without trying?: No  Have you been eating poorly because of a decreased appetite?: No   MST Score: 0   Diagnosis:  Shock, cardiogenic versus septic  Heart failure with reduced ejection fraction (EF 15-20%)  ESRD on HD  Hyperlipidemia  COPD    Relevant Medical History: tobacco use disorder, COPD, HTN,  HLD, ESRD on HD, heart failure with reduced ejection fraction (EF 15-20%), prostate cancer status post radiation     Scheduled Medications:  aspirin, 81 mg, Daily  busPIRone, 5 mg, BID  calcitRIOL, 0.25 mcg, BID  dextrose 10%, 50 g, Once   And  insulin regular, 0.1 Units/kg, Once  enoxparin, 30 mg, Daily  famotidine (PF), 20 mg, Daily  sevelamer carbonate, 800 mg, TID WM  sodium zirconium cyclosilicate, 10 g, Every Mon, Wed, Fri    Continuous Infusions:  amiodarone in dextrose 5%, Last Rate: 0.5 mg/min (09/18/24 1300)  NORepinephrine bitartrate-D5W, Last Rate: 0.7 mcg/kg/min (09/18/24 1300)  propofoL, Last Rate: 25 mcg/kg/min (09/18/24 1300)    PRN Medications:  acetaminophen, 650 mg, Q8H PRN  acetaminophen, 650 mg, Q4H PRN  dextrose 10%, 12.5 g, PRN  dextrose 10%, 25 g, PRN  dextrose 10%, 25 g, PRN  etomidate, , Code/trauma/sedation Med  fentaNYL, 50 mcg, Q1H PRN  gentamicin - pharmacy to dose, , pharmacy to manage frequency  glucagon (human recombinant), 1 mg, PRN  glucose, 16 g, PRN  glucose, 24 g, PRN  HYDROcodone-acetaminophen, 1 tablet, Q4H PRN  metoprolol, 5 mg, Q5 Min PRN  ondansetron, 4 mg, Q4H PRN  rocuronium, , Code/trauma/sedation Med  simethicone, 1 tablet, TID PRN  sodium chloride 0.9%, 10 mL, Q12H PRN  vancomycin - pharmacy to dose, , pharmacy to manage frequency    Calorie Containing IV Medications: Diprivan @ 9.3 ml/hr (provides 245 kcal/d)    Recent Labs   Lab 09/12/24  0546 09/13/24  0429 09/13/24  1218 09/14/24  1327 09/15/24  0342 09/15/24  1749 09/16/24  0534 09/17/24  0108 09/17/24  2043 09/18/24  0243 09/18/24  0327 09/18/24  0933 09/18/24  1140    137   < > 136 137 136 133* 131* 132* 132*  --  129* 128*   K 6.1* 5.6*   < > 5.2* 4.8 5.1 5.4* 5.5* 4.8 5.8*  --  6.9* 4.9   CALCIUM 9.4 9.1   < > 9.3 8.2* 8.2* 8.4 9.1 9.3 9.0  --  9.1 9.3   PHOS  --  5.5*  --   --   --   --  5.6* 5.6* 5.1* 5.9*  --   --   --    MG 1.90 1.70  --   --   --  1.70 2.20 2.00 2.10 2.20  --   --   --     100    < > 101 97* 97* 96* 95* 94* 96*  --  96* 93*   CO2 22 20*   < > 18* 23 19* 22 20* 20* 18*  --  14* 19*   BUN 62.6* 44.1*   < > 69.7* 47.0* 55.6* 67.2* 78.8* 50.6* 49.6*  --  50.3* 51.4*   CREATININE 8.02* 6.46*   < > 8.06* 5.63* 6.13* 6.65* 7.55* 5.58* 5.65*  --  5.62* 5.85*   EGFRNORACEVR 8 10   < > 8 12 10 9 8 12 12  --  12 11   GLUCOSE 75 81   < > 54* 63* 75 68* 68* 82 102*  --  89 108*   BILITOT  --  1.0  --  0.9 1.0  --  1.3 1.4 1.7* 1.3  --   --   --    ALKPHOS  --  883*  --  575* 521*  --  493* 439* 489* 458*  --   --   --    ALT  --  6  --  9 8  --  7 7 9 8  --   --   --    AST  --  14  --  19 18  --  16 11 14 20  --   --   --    ALBUMIN  --  3.2*  --  2.5* 2.3*  --  2.4* 2.2* 2.5* 2.2*  --   --   --    TRIG  --  143*  --   --   --   --   --   --   --   --   --   --   --    WBC 3.73* 5.34   < > 10.64  10.64 10.55  10.55  --  12.52* 13.02  13.02* 17.87  17.87*  --  21.16  21.16*  --   --    HGB 11.4* 12.7*  --  12.0* 11.2*  --  11.5* 10.7* 11.9*  --  11.2*  --   --    HCT 35.6* 39.8*  --  36.3* 33.9*  --  34.1* 31.4* 34.6*  --  33.0*  --   --     < > = values in this interval not displayed.     Nutrition Orders:  No diet orders on file      Appetite/Oral Intake: not applicable/not applicable  Factors Affecting Nutritional Intake: on mechanical ventilation  Social Needs Impacting Access to Food: unable to assess at this time; will attempt on follow-up  Food/Oriental orthodox/Cultural Preferences: unable to obtain  Food Allergies: no known food allergies  Last Bowel Movement: 09/17/24  Wound(s):  blister noted    Comments    9/12/24 pt NPO for procedure today, reports no appetite loss or unintentional weight loss prior to admit     9/18/24: Pt now intubated. Plans for starting TF. Receiving kcal from meds. Noted previous EMR wts indicate wt loss, but time frame is from too long ago to meet criteria for malnutrition (other criteria met though.) Noted pt with only 0-50% po intake of meals since admit, NPO since  "9/13/24.    Anthropometrics    Height: 5' 11.26" (181 cm), Height Method: Stated  Last Weight: 61.9 kg (136 lb 7.4 oz) (09/14/24 0635), Weight Method: Bed Scale  BMI (Calculated): 18.9  BMI Classification: normal (BMI 18.5-24.9)        Ideal Body Weight (IBW), Male: 173.56 lb     % Ideal Body Weight, Male (lb): 92.73 %                          Usual Weight Provided By: EMR weight history    Wt Readings from Last 5 Encounters:   09/14/24 61.9 kg (136 lb 7.4 oz)   07/30/19 77 kg (169 lb 12.1 oz)   01/29/19 79.8 kg (176 lb)   07/26/18 81.6 kg (180 lb)   01/23/18 91 kg (200 lb 9.9 oz)     Weight Change(s) Since Admission:   Wt Readings from Last 1 Encounters:   09/14/24 0635 61.9 kg (136 lb 7.4 oz)   09/10/24 0750 73 kg (160 lb 15 oz)   09/10/24 0529 72.6 kg (160 lb)   Admit Weight: 72.6 kg (160 lb) (09/10/24 0529), Weight Method: Stated    Estimated Needs    Weight Used For Calorie Calculations: 61.9 kg (136 lb 7.4 oz)  Energy Calorie Requirements (kcal): 2082kcal  Energy Need Method: [facility]  Weight Used For Protein Calculations: 61.9 kg (136 lb 7.4 oz)  Protein Requirements: 93gm (1.5g/kg)  Fluid Requirements (mL): 1000ml + urinary output  CHO Requirement: 235gm (45% est kcal needs)     Enteral Nutrition     Patient not receiving enteral nutrition at this time.    Parenteral Nutrition     Patient not receiving parenteral nutrition support at this time.    Evaluation of Received Nutrient Intake    Calories: not meeting estimated needs  Protein: not meeting estimated needs    Patient Education     Not applicable.    Nutrition Diagnosis     PES: Inadequate oral intake related to acute illness as evidenced by intubation since 9/17/24. (new)     PES: Moderate chronic disease or condition related malnutrition related to chronic illness as evidenced by less than or equal to 50% needs met for greater than or equal to 5 days, mild fat depletion, mild muscle depletion, and edema. (new)    Nutrition Interventions "     Intervention(s): modified composition of enteral nutrition, modified rate of enteral nutrition, and collaboration with other providers    Goal: Meet greater than 80% of nutritional needs by follow-up. (new)  Goal: Tolerate enteral feeding at goal rate by follow-up. (new)    Nutrition Goals & Monitoring     Dietitian will monitor: energy intake and enteral nutrition intake  Discharge planning: too early to determine; pending clinical course  Nutrition Risk/Follow-Up: high (follow-up in 1-4 days)   Please consult if re-assessment needed sooner.

## 2024-09-18 NOTE — NURSING
Nurses Note -- 4 Eyes      9/18/2024   6:06 AM      Skin assessed during: Daily Assessment      [] No Altered Skin Integrity Present    [x]Prevention Measures Documented      [x] Yes- Altered Skin Integrity Present or Discovered   [] LDA Added if Not in Epic (Describe Wound)   [] New Altered Skin Integrity was Present on Admit and Documented in LDA   [] Wound Image Taken    Wound Care Consulted? Yes    Attending Nurse:  Tracey Sena RN/Staff Member:  audrey KWON

## 2024-09-19 NOTE — PROGRESS NOTES
Nephrology Progress Note      HPI:    Prem Saldana is a 49 y.o. male from [place], with pmhx of ESRD TTS via L AVF, CHF, EF <20%, COPD, hx of prostate cancer, presenting to the ED today with respiratory failure requiring BiPAP.      History is very limited due to his condition but he was able to answer a few questions on bipap. He states that he last dialyzed in [place], [place] Saturday 9/7. He tells me he has a 6am chair time TTS set up at the dialysis unit in [place]? Will have to call the unit to verify.      CTA chest revealing cardiomegaly with trace left effusion, atelectasis and mild interstitial edema. CXR with congestion. He has 1+ edema to BLE. His electrolytes are normal., BNP is >14,000 on admission, troponin 0.55 and he states that his chest hurts because he fell and had trauma to the chest 2 days ago. Bedside echo pending in the ED. Cardiology following and trending troponins until peak. ED physician paged nephrology for HD today.     Interval History:    9/11/24  Tolerated HD yesterday with 3L off and significant improvement in respiratory status, now on 4L NC. Vitals are stable. Labs reviewed, all relatively stable but bicarb is now 20. Serial troponins remain high. He continues to complain of chest pain from falling this weekend. Chest xr and CTA are without traumatic changes. He is now able to tell me that he was in [place] because his mom lives there, but he is moving to [place] and has a chair time at [facility].   He is usually on 2L O2 support for COPD and is currently requiring 4L.     09/12/2024   No acute changes overnight.  Potassium 6 on a.m. labs.  He is scheduled for his routine dialysis run today which will be done after left heart catheterization.  Voices no new complaints.    09/13/2024   Tolerated 3 L UF with HD yesterday.  Potassium remains elevated at 5.6 on a.m. labs.  Hemoglobin stable.  Shortness of breath improved following HD. no chest pain at present.    09/16/2024   Awake  "alert and oriented.  Looks little dyspneic.  Complains of pain and asking for lot of pain medicines.  Not sure about the oral fluid intake or nutrition intake.    09/17/2024  No acute events overnight.  No new complaints.  No chest pain, shortness of breath, abdominal pain, nausea, vomiting, or lower extremity edema.  He remains on vasopressors.    09/18/2024   Overnight events noted and now patient is intubated and on the ventilator and sedated with a propofol and on high dose of Levophed also.  Also on amiodarone.    09/19/2024  Patient dialyzed off schedule yesterday due to hyperkalemia.  About to start dialysis again this morning on regular TTS schedule.  Remains intubated and on ventilator.  Still requiring Levophed for hypotension.      Vital Signs:  BP 97/74   Pulse (!) 119   Temp 99.1 °F (37.3 °C) (Oral)   Resp (!) 23   Ht 5' 11.26" (1.81 m)   Wt 61.9 kg (136 lb 7.4 oz)   SpO2 100%   BMI 18.89 kg/m²   Body mass index is 18.89 kg/m².    Physical Exam:    GEN: Chronically ill appearing AA male in NAD, sedated with propofol.  Requires Levophed for low blood pressure and amiodarone for atrial fibrillation.  CV:  Still in atrial fibrillation with rapid ventricular response  PULM: CTAB, unlabored  ABD: Soft, NT/ND abdomen with NABS  EXT: No cyanosis or edema  SKIN: Warm and dry  PSYCH:  Sedated on the ventilator  Dialysis access:  LUE AV fistula      Labs:  Recent Results (from the past 48 hour(s))   POCT glucose    Collection Time: 09/17/24  1:14 PM   Result Value Ref Range    POCT Glucose 65 (L) 70 - 110 mg/dL   POCT glucose    Collection Time: 09/17/24  2:40 PM   Result Value Ref Range    POCT Glucose 76 70 - 110 mg/dL   POCT glucose    Collection Time: 09/17/24  4:06 PM   Result Value Ref Range    POCT Glucose 80 70 - 110 mg/dL   POCT glucose    Collection Time: 09/17/24  6:15 PM   Result Value Ref Range    POCT Glucose 78 70 - 110 mg/dL   EKG 12-lead    Collection Time: 09/17/24  7:39 PM   Result Value " Ref Range    QRS Duration 118 ms    OHS QTC Calculation 472 ms   Blood Culture    Collection Time: 09/17/24  8:01 PM    Specimen: Blood, Venous   Result Value Ref Range    Blood Culture No Growth At 24 Hours    Blood Culture    Collection Time: 09/17/24  8:01 PM    Specimen: Blood, Venous   Result Value Ref Range    Blood Culture No Growth At 24 Hours    Comprehensive Metabolic Panel    Collection Time: 09/17/24  8:43 PM   Result Value Ref Range    Sodium 132 (L) 136 - 145 mmol/L    Potassium 4.8 3.5 - 5.1 mmol/L    Chloride 94 (L) 98 - 107 mmol/L    CO2 20 (L) 22 - 29 mmol/L    Glucose 82 74 - 100 mg/dL    Blood Urea Nitrogen 50.6 (H) 8.9 - 20.6 mg/dL    Creatinine 5.58 (H) 0.73 - 1.18 mg/dL    Calcium 9.3 8.4 - 10.2 mg/dL    Protein Total 6.1 (L) 6.4 - 8.3 gm/dL    Albumin 2.5 (L) 3.5 - 5.0 g/dL    Globulin 3.6 (H) 2.4 - 3.5 gm/dL    Albumin/Globulin Ratio 0.7 (L) 1.1 - 2.0 ratio    Bilirubin Total 1.7 (H) <=1.5 mg/dL     (H) 40 - 150 unit/L    ALT 9 0 - 55 unit/L    AST 14 5 - 34 unit/L    eGFR 12 mL/min/1.73/m2    Anion Gap 18.0 mEq/L    BUN/Creatinine Ratio 9    Magnesium    Collection Time: 09/17/24  8:43 PM   Result Value Ref Range    Magnesium Level 2.10 1.60 - 2.60 mg/dL   Phosphorus    Collection Time: 09/17/24  8:43 PM   Result Value Ref Range    Phosphorus Level 5.1 (H) 2.3 - 4.7 mg/dL   CBC with Differential    Collection Time: 09/17/24  8:43 PM   Result Value Ref Range    WBC 17.87 (H) 4.50 - 11.50 x10(3)/mcL    RBC 4.00 (L) 4.70 - 6.10 x10(6)/mcL    Hgb 11.9 (L) 14.0 - 18.0 g/dL    Hct 34.6 (L) 42.0 - 52.0 %    MCV 86.5 80.0 - 94.0 fL    MCH 29.8 27.0 - 31.0 pg    MCHC 34.4 33.0 - 36.0 g/dL    RDW 19.3 (H) 11.5 - 17.0 %    Platelet 255 130 - 400 x10(3)/mcL    MPV 11.2 (H) 7.4 - 10.4 fL    NRBC% 0.0 %   Manual Differential    Collection Time: 09/17/24  8:43 PM   Result Value Ref Range    WBC 17.87 x10(3)/mcL    Neutrophils % 90 %    Lymphs % 3 %    Monocytes % 6 %    Myelocytes % 1 (H) <=0 %     Neutrophils Abs 16.083 (H) 2.1 - 9.2 x10(3)/mcL    Lymphs Abs 0.5361 (L) 0.6 - 4.6 x10(3)/mcL    Monocytes Abs 1.0722 0.1 - 1.3 x10(3)/mcL    Platelets Normal Normal, Adequate    RBC Morph Abnormal (A) Normal    Poikilocytosis 1+ (A) (none)    Anisocytosis 1+ (A) (none)    Macrocytosis 2+ (A) (none)   Gentamicin Level, Random    Collection Time: 09/17/24 10:38 PM   Result Value Ref Range    Gentamicin Level 2.1 <=25.0 ug/ml   Lactic Acid, Plasma    Collection Time: 09/17/24 10:38 PM   Result Value Ref Range    Lactic Acid Level 2.6 (H) 0.5 - 2.2 mmol/L   Blood Gas    Collection Time: 09/17/24 10:55 PM   Result Value Ref Range    Sample Type Arterial Blood     Sample site Right Radial Artery     Drawn by ch rrt     pH, Blood gas 7.390 7.350 - 7.450    pCO2, Blood gas 40.0 35.0 - 45.0 mmHg    pO2, Blood gas 465.0 (H) 80.0 - 100.0 mmHg    Sodium, Blood Gas 127 (L) 137 - 145 mmol/L    Potassium, Blood Gas 4.6 3.5 - 5.0 mmol/L    Calcium Level Ionized 1.15 1.12 - 1.23 mmol/L    TOC2, Blood gas 25.4 mmol/L    Base Excess, Blood gas -0.70 mmol/L    sO2, Blood gas 100.0 %    HCO3, Blood gas 24.2 22.0 - 26.0 mmol/L    Allens Test Yes     MODE AC     Oxygen Device, Blood gas Ventilator     FIO2, Blood gas 100 %    Mech Vt 550 ml    Mech RR 20 b/min    PEEP 8.0 cmH2O   POCT glucose    Collection Time: 09/18/24 12:56 AM   Result Value Ref Range    POCT Glucose 106 70 - 110 mg/dL   Comprehensive Metabolic Panel    Collection Time: 09/18/24  2:43 AM   Result Value Ref Range    Sodium 132 (L) 136 - 145 mmol/L    Potassium 5.8 (H) 3.5 - 5.1 mmol/L    Chloride 96 (L) 98 - 107 mmol/L    CO2 18 (L) 22 - 29 mmol/L    Glucose 102 (H) 74 - 100 mg/dL    Blood Urea Nitrogen 49.6 (H) 8.9 - 20.6 mg/dL    Creatinine 5.65 (H) 0.73 - 1.18 mg/dL    Calcium 9.0 8.4 - 10.2 mg/dL    Protein Total 5.9 (L) 6.4 - 8.3 gm/dL    Albumin 2.2 (L) 3.5 - 5.0 g/dL    Globulin 3.7 (H) 2.4 - 3.5 gm/dL    Albumin/Globulin Ratio 0.6 (L) 1.1 - 2.0 ratio     Bilirubin Total 1.3 <=1.5 mg/dL     (H) 40 - 150 unit/L    ALT 8 0 - 55 unit/L    AST 20 5 - 34 unit/L    eGFR 12 mL/min/1.73/m2    Anion Gap 18.0 mEq/L    BUN/Creatinine Ratio 9    Magnesium    Collection Time: 09/18/24  2:43 AM   Result Value Ref Range    Magnesium Level 2.20 1.60 - 2.60 mg/dL   Phosphorus    Collection Time: 09/18/24  2:43 AM   Result Value Ref Range    Phosphorus Level 5.9 (H) 2.3 - 4.7 mg/dL   CBC with Differential    Collection Time: 09/18/24  3:27 AM   Result Value Ref Range    WBC 21.16 (H) 4.50 - 11.50 x10(3)/mcL    RBC 3.85 (L) 4.70 - 6.10 x10(6)/mcL    Hgb 11.2 (L) 14.0 - 18.0 g/dL    Hct 33.0 (L) 42.0 - 52.0 %    MCV 85.7 80.0 - 94.0 fL    MCH 29.1 27.0 - 31.0 pg    MCHC 33.9 33.0 - 36.0 g/dL    RDW 19.6 (H) 11.5 - 17.0 %    Platelet 293 130 - 400 x10(3)/mcL    MPV 11.9 (H) 7.4 - 10.4 fL    NRBC% 0.1 %   Lactic Acid, Plasma    Collection Time: 09/18/24  3:27 AM   Result Value Ref Range    Lactic Acid Level 1.7 0.5 - 2.2 mmol/L   Manual Differential    Collection Time: 09/18/24  3:27 AM   Result Value Ref Range    WBC 21.16 x10(3)/mcL    Neutrophils % 80 %    Lymphs % 10 %    Monocytes % 10 %    nRBC % 1 %    Neutrophils Abs 16.928 (H) 2.1 - 9.2 x10(3)/mcL    Lymphs Abs 2.116 0.6 - 4.6 x10(3)/mcL    Monocytes Abs 2.116 (H) 0.1 - 1.3 x10(3)/mcL    Platelets Adequate Normal, Adequate    RBC Morph Abnormal (A) Normal    Poikilocytosis 1+ (A) (none)    Anisocytosis 1+ (A) (none)    Macrocytosis 1+ (A) (none)    Ovalocytes 1+ (A) (none)   POCT glucose    Collection Time: 09/18/24  6:02 AM   Result Value Ref Range    POCT Glucose 125 (H) 70 - 110 mg/dL   POCT glucose    Collection Time: 09/18/24  6:57 AM   Result Value Ref Range    POCT Glucose 120 (H) 70 - 110 mg/dL   Lactic Acid, Plasma    Collection Time: 09/18/24  7:18 AM   Result Value Ref Range    Lactic Acid Level 3.6 (HH) 0.5 - 2.2 mmol/L   POCT glucose    Collection Time: 09/18/24  8:05 AM   Result Value Ref Range    POCT  Glucose 121 (H) 70 - 110 mg/dL   Basic metabolic panel    Collection Time: 09/18/24  9:33 AM   Result Value Ref Range    Sodium 129 (L) 136 - 145 mmol/L    Potassium 6.9 (HH) 3.5 - 5.1 mmol/L    Chloride 96 (L) 98 - 107 mmol/L    CO2 14 (L) 22 - 29 mmol/L    Glucose 89 74 - 100 mg/dL    Blood Urea Nitrogen 50.3 (H) 8.9 - 20.6 mg/dL    Creatinine 5.62 (H) 0.73 - 1.18 mg/dL    BUN/Creatinine Ratio 9     Calcium 9.1 8.4 - 10.2 mg/dL    Anion Gap 19.0 mEq/L    eGFR 12 mL/min/1.73/m2   EKG 12-lead    Collection Time: 09/18/24 10:45 AM   Result Value Ref Range    QRS Duration 116 ms    OHS QTC Calculation 470 ms   Lactic Acid, Plasma    Collection Time: 09/18/24 11:14 AM   Result Value Ref Range    Lactic Acid Level 3.7 (HH) 0.5 - 2.2 mmol/L   POCT glucose    Collection Time: 09/18/24 11:32 AM   Result Value Ref Range    POCT Glucose 103 70 - 110 mg/dL   Basic metabolic panel    Collection Time: 09/18/24 11:40 AM   Result Value Ref Range    Sodium 128 (L) 136 - 145 mmol/L    Potassium 4.9 3.5 - 5.1 mmol/L    Chloride 93 (L) 98 - 107 mmol/L    CO2 19 (L) 22 - 29 mmol/L    Glucose 108 (H) 74 - 100 mg/dL    Blood Urea Nitrogen 51.4 (H) 8.9 - 20.6 mg/dL    Creatinine 5.85 (H) 0.73 - 1.18 mg/dL    BUN/Creatinine Ratio 9     Calcium 9.3 8.4 - 10.2 mg/dL    Anion Gap 16.0 mEq/L    eGFR 11 mL/min/1.73/m2   Vancomycin, Random    Collection Time: 09/18/24 11:40 AM   Result Value Ref Range    Vancomycin Random 21.5 (H) 15.0 - 20.0 ug/ml   Lactic Acid, Plasma    Collection Time: 09/18/24  3:15 PM   Result Value Ref Range    Lactic Acid Level 1.5 0.5 - 2.2 mmol/L   Comprehensive Metabolic Panel    Collection Time: 09/18/24  3:15 PM   Result Value Ref Range    Sodium 135 (L) 136 - 145 mmol/L    Potassium 3.4 (L) 3.5 - 5.1 mmol/L    Chloride 96 (L) 98 - 107 mmol/L    CO2 23 22 - 29 mmol/L    Glucose 108 (H) 74 - 100 mg/dL    Blood Urea Nitrogen 24.3 (H) 8.9 - 20.6 mg/dL    Creatinine 3.05 (H) 0.73 - 1.18 mg/dL    Calcium 9.4 8.4 - 10.2  mg/dL    Protein Total 6.0 (L) 6.4 - 8.3 gm/dL    Albumin 2.3 (L) 3.5 - 5.0 g/dL    Globulin 3.7 (H) 2.4 - 3.5 gm/dL    Albumin/Globulin Ratio 0.6 (L) 1.1 - 2.0 ratio    Bilirubin Total 1.5 <=1.5 mg/dL     (H) 40 - 150 unit/L    ALT <5 0 - 55 unit/L    AST 13 5 - 34 unit/L    eGFR 24 mL/min/1.73/m2    Anion Gap 16.0 mEq/L    BUN/Creatinine Ratio 8    Magnesium    Collection Time: 09/18/24  3:15 PM   Result Value Ref Range    Magnesium Level 1.80 1.60 - 2.60 mg/dL   Phosphorus    Collection Time: 09/18/24  3:15 PM   Result Value Ref Range    Phosphorus Level 3.8 2.3 - 4.7 mg/dL   Gentamicin Level, Random    Collection Time: 09/18/24  3:15 PM   Result Value Ref Range    Gentamicin Level 1.0 <=25.0 ug/ml   POCT glucose    Collection Time: 09/18/24  5:01 PM   Result Value Ref Range    POCT Glucose 84 70 - 110 mg/dL   Lactic Acid, Plasma    Collection Time: 09/18/24  8:22 PM   Result Value Ref Range    Lactic Acid Level 4.3 (HH) 0.5 - 2.2 mmol/L   Basic metabolic panel    Collection Time: 09/18/24  8:22 PM   Result Value Ref Range    Sodium 133 (L) 136 - 145 mmol/L    Potassium 5.4 (H) 3.5 - 5.1 mmol/L    Chloride 93 (L) 98 - 107 mmol/L    CO2 20 (L) 22 - 29 mmol/L    Glucose 83 74 - 100 mg/dL    Blood Urea Nitrogen 31.8 (H) 8.9 - 20.6 mg/dL    Creatinine 4.14 (H) 0.73 - 1.18 mg/dL    BUN/Creatinine Ratio 8     Calcium 9.4 8.4 - 10.2 mg/dL    Anion Gap 20.0 mEq/L    eGFR 17 mL/min/1.73/m2   POCT glucose    Collection Time: 09/18/24  8:58 PM   Result Value Ref Range    POCT Glucose 100 70 - 110 mg/dL   POCT glucose    Collection Time: 09/19/24 12:28 AM   Result Value Ref Range    POCT Glucose <20 (LL) 70 - 110 mg/dL   POCT glucose    Collection Time: 09/19/24 12:30 AM   Result Value Ref Range    POCT Glucose <20 (LL) 70 - 110 mg/dL   POCT glucose    Collection Time: 09/19/24 12:43 AM   Result Value Ref Range    POCT Glucose 112 (H) 70 - 110 mg/dL   Lactic Acid, Plasma    Collection Time: 09/19/24  1:09 AM   Result  Value Ref Range    Lactic Acid Level 3.4 (H) 0.5 - 2.2 mmol/L   Comprehensive Metabolic Panel    Collection Time: 09/19/24  1:09 AM   Result Value Ref Range    Sodium 129 (L) 136 - 145 mmol/L    Potassium 5.5 (H) 3.5 - 5.1 mmol/L    Chloride 92 (L) 98 - 107 mmol/L    CO2 21 (L) 22 - 29 mmol/L    Glucose 133 (H) 74 - 100 mg/dL    Blood Urea Nitrogen 31.8 (H) 8.9 - 20.6 mg/dL    Creatinine 4.06 (H) 0.73 - 1.18 mg/dL    Calcium 9.0 8.4 - 10.2 mg/dL    Protein Total 6.2 (L) 6.4 - 8.3 gm/dL    Albumin 2.2 (L) 3.5 - 5.0 g/dL    Globulin 4.0 (H) 2.4 - 3.5 gm/dL    Albumin/Globulin Ratio 0.6 (L) 1.1 - 2.0 ratio    Bilirubin Total 1.4 <=1.5 mg/dL     (H) 40 - 150 unit/L    ALT 6 0 - 55 unit/L    AST 19 5 - 34 unit/L    eGFR 17 mL/min/1.73/m2    Anion Gap 16.0 mEq/L    BUN/Creatinine Ratio 8    Magnesium    Collection Time: 09/19/24  1:09 AM   Result Value Ref Range    Magnesium Level 2.00 1.60 - 2.60 mg/dL   Phosphorus    Collection Time: 09/19/24  1:09 AM   Result Value Ref Range    Phosphorus Level 5.2 (H) 2.3 - 4.7 mg/dL   Vancomycin, Random    Collection Time: 09/19/24  1:09 AM   Result Value Ref Range    Vancomycin Random 16.4 15.0 - 20.0 ug/ml   CBC with Differential    Collection Time: 09/19/24  1:16 AM   Result Value Ref Range    WBC 27.51 (H) 4.50 - 11.50 x10(3)/mcL    RBC 3.98 (L) 4.70 - 6.10 x10(6)/mcL    Hgb 11.9 (L) 14.0 - 18.0 g/dL    Hct 36.1 (L) 42.0 - 52.0 %    MCV 90.7 80.0 - 94.0 fL    MCH 29.9 27.0 - 31.0 pg    MCHC 33.0 33.0 - 36.0 g/dL    RDW 20.4 (H) 11.5 - 17.0 %    Platelet 223 130 - 400 x10(3)/mcL    MPV 13.5 (H) 7.4 - 10.4 fL    NRBC% 0.1 %    IPF 12.2 (H) 0.9 - 11.2 %   Manual Differential    Collection Time: 09/19/24  1:16 AM   Result Value Ref Range    WBC 27.51 x10(3)/mcL    Neutrophils % 97 %    Lymphs % 1 %    Monocytes % 3 %    Neutrophils Abs 26.6847 (H) 2.1 - 9.2 x10(3)/mcL    Lymphs Abs 0.2751 (L) 0.6 - 4.6 x10(3)/mcL    Monocytes Abs 0.8253 0.1 - 1.3 x10(3)/mcL    Platelets Normal  Normal, Adequate    RBC Morph Abnormal (A) Normal    Poikilocytosis 2+ (A) (none)    Anisocytosis 2+ (A) (none)    Macrocytosis 2+ (A) (none)    [place] Cells 2+ (A) (none)   POCT glucose    Collection Time: 09/19/24  5:16 AM   Result Value Ref Range    POCT Glucose 121 (H) 70 - 110 mg/dL   RT Blood Gas    Collection Time: 09/19/24  7:05 AM   Result Value Ref Range    Sample Type Arterial Blood     Sample site Right Brachial Artery     Drawn by sd rrt     pH, Blood gas 7.460 (H) 7.350 - 7.450    pCO2, Blood gas 37.0 35.0 - 45.0 mmHg    pO2, Blood gas 201.0 (H) 80.0 - 100.0 mmHg    Sodium, Blood Gas 123 (L) 137 - 145 mmol/L    Potassium, Blood Gas 3.9 3.5 - 5.0 mmol/L    Calcium Level Ionized 1.10 (L) 1.12 - 1.23 mmol/L    TOC2, Blood gas 27.4 mmol/L    Base Excess, Blood gas 2.50 mmol/L    sO2, Blood gas 100.0 %    HCO3, Blood gas 26.3 (H) 22.0 - 26.0 mmol/L    Allens Test Yes     MODE AC     Oxygen Device, Blood gas Ventilator     FIO2, Blood gas 50 %    Mech Vt 550 ml    Mech RR 20 b/min    PEEP 8.0 cmH2O         Assessment/Plan:    ESRD on HD - [place], says he has a TTS chair time in [place] and has been on dialysis for 7 years. Left AVF  Hyperkalemia   CHF - 15-20% EF on echo 9/10   NSTEMI - cardiology recommending cath at some point  COPD on 2L home O2--respiratory status is improving but still less than baseline   Prostate cancer s/p radiation  Anemia of chronic kidney disease      Recommendations:  Dialyze him today on regular TTS schedule.  3 L UF as tolerated, however, hypotension is likely going to limit our ability to ultrafiltrate.  Continue Lokelma MWF for hyperkalemia.

## 2024-09-19 NOTE — PROGRESS NOTES
[facility] - [facility] ICU  Pulmonary Critical Care Note    Patient Name: Prem Saldana  MRN: [id number]  Admission Date: 9/10/2024  Hospital Length of Stay: 9 days  Code Status: Full Code  Attending Provider: Sin Gonsalez Jr., MD,*  Primary Care Provider: Tank Saenz MD     Subjective:     HPI: Prem Saldana is a 49 year old  male with a past medical history for tobacco use disorder, COPD on home oxygen, hypertension, hyperlipidemia, ESRD on HD, heart failure with reduced ejection fraction (EF 15-20%), prostate cancer status post radiation, who initially presented to [facility] ED (09/10/2024) due to chest pain and shortness of breath. Patient reported shortness of breath had been ongoing for several weeks however chest pain began after patient fell on a glass table resulting in persistent, severe (10/10), sharp, stabbing left lateral chest pain without radiation to neck, arm, or back. Upon presentation to the ED, temperature 99° F, heart rate 113, blood pressure 114/82, respiratory rate 86 and SpO2 98% on 3 L nasal cannula. Patient was placed on BiPAP. Labs with H&H 11.7/36.8, MCV 95.8, BUN/creatinine 70.3/08.07, alkaline phosphatase 1061, total bilirubin 14,761, troponin 0.554.  Influenza A/B and SARS-COV-2 PCR negative.  EKG shows sinus tachycardia with Left axis deviation, and LVH. Chest x-ray poor inspiratory effort accentuates the pulmonary vascular markings however there is a degree of congestive unable to be excluded, cardiomegaly, increased left retrocardiac density and silhouetting of the left hemidiaphragm and calcified densities in the left hemidiaphragm.  CTA of the chest PE study without evidence of pulmonary embolism but cardiomegaly, trace left effusion, bibasilar atelectasis and mild interstitial edema, chronic appearing compression deformity of the midthoracic spine. [facility] has consulted for NSTEMI management. Attempted to perform LHC (09/14/2024) but was  canceled prior to initiation of procedure due to patient becoming uncooperative per reports. Today patient developed worsening hypotension with SBP dropping from 100-110 to 80-90. Telemetry showed 14 beat run of Vtach. [facility] consulted by floor who recommended patient be upgraded to ICU for amiodarone gtt and vasopressor support. Discussed case with attending who agreed. Patient upgraded to ICU at that time for possible cardiogenic shock.    Hospital Course/Significant events:      24 Hour Interval History:  Patient remains intubated sedated mechanically ventilated.  About to start his routine hemodialysis today.  Blood cultures are still not drawn but are being done now.  Continuing empiric antimicrobials.     Past Medical History:   Diagnosis Date    Anemia of renal disease     ESRD on dialysis since 4/3/15     Essential hypertension     Secondary hyperparathyroidism of renal origin        Past Surgical History:   Procedure Laterality Date    ANGIOGRAM, CORONARY, WITH LEFT HEART CATHETERIZATION N/A 9/13/2024    Procedure: Angiogram, Coronary, with Left Heart Cath;  Surgeon: Tank Scott Jr., MD;  Location: [facility] CATH LAB;  Service: Cardiology;  Laterality: N/A;    AV FISTULA PLACEMENT Left 07/2015    CENTRAL LINE  9/17/2024    Peritoneal Dialysis Catheter Placement  01/2016    Permcath Placement                    Current Outpatient Medications   Medication Instructions    calcitRIOL (ROCALTROL) 0.25 mcg, Oral, 2 times daily    labetalol (NORMODYNE) 300 MG tablet No dose, route, or frequency recorded.    minoxidiL (LONITEN) 2.5 mg, Oral, 2 times daily    vacuum erection device system Kit 1 Units, Misc.(Non-Drug; Combo Route), As needed (PRN)       Current Inpatient Medications   aspirin  81 mg Oral Daily    busPIRone  5 mg Oral BID    calcitRIOL  0.25 mcg Oral BID    dextrose 10%  50 g Intravenous Once    And    insulin regular  0.1 Units/kg Intravenous Once    enoxparin  30 mg Subcutaneous Daily    famotidine  (PF)  20 mg Intravenous Daily    sevelamer carbonate  800 mg Oral TID WM    sodium zirconium cyclosilicate  10 g Oral Every Mon, Wed, Fri       Current Intravenous Infusions   amiodarone in dextrose 5%  0.5 mg/min Intravenous Continuous 16.7 mL/hr at 09/19/24 0701 0.5 mg/min at 09/19/24 0701    NORepinephrine bitartrate-D5W  0-3 mcg/kg/min Intravenous Continuous 75.4 mL/hr at 09/19/24 0835 0.65 mcg/kg/min at 09/19/24 0835    propofoL  0-50 mcg/kg/min Intravenous Continuous 14.9 mL/hr at 09/19/24 0834 40 mcg/kg/min at 09/19/24 0834                Objective:       Intake/Output Summary (Last 24 hours) at 9/19/2024 1023  Last data filed at 9/19/2024 0701  Gross per 24 hour   Intake 2273.72 ml   Output 0 ml   Net 2273.72 ml         Vital Signs (Most Recent):  Temp: 98.6 °F (37 °C) (09/19/24 0715)  Pulse: (!) 124 (09/19/24 0745)  Resp: (!) 23 (09/19/24 0945)  BP: 94/70 (09/19/24 0745)  SpO2: 97 % (09/19/24 0745)  Body mass index is 18.89 kg/m².  Weight: 61.9 kg (136 lb 7.4 oz) Vital Signs (24h Range):  Temp:  [97.7 °F (36.5 °C)-99.3 °F (37.4 °C)] 98.6 °F (37 °C)  Pulse:  [107-140] 124  Resp:  [0-35] 23  SpO2:  [68 %-100 %] 97 %  BP: ()/(52-85) 94/70     Physical Exam  Constitutional:       General: He is not in acute distress.     Appearance: Normal appearance. He is normal weight. He is ill-appearing.      Comments: Intubated sedated mechanically ventilated   HENT:      Head: Normocephalic and atraumatic.   Eyes:      General: No scleral icterus.        Right eye: No discharge.         Left eye: No discharge.      Extraocular Movements: Extraocular movements intact.      Conjunctiva/sclera: Conjunctivae normal.      Pupils: Pupils are equal, round, and reactive to light.   Cardiovascular:      Rate and Rhythm: Regular rhythm. Tachycardia present.      Pulses: Normal pulses.      Heart sounds: Normal heart sounds. No murmur heard.     No friction rub. No gallop.   Pulmonary:      Effort: Pulmonary effort is normal.  No respiratory distress.      Breath sounds: No stridor. No wheezing, rhonchi or rales.   Abdominal:      General: Abdomen is flat. Bowel sounds are normal. There is no distension.      Palpations: Abdomen is soft.      Tenderness: There is no abdominal tenderness. There is no guarding.   Musculoskeletal:         General: No swelling. Normal range of motion.      Right lower leg: No edema.      Left lower leg: No edema.   Skin:     General: Skin is dry.      Coloration: Skin is not jaundiced or pale.      Findings: No bruising, erythema, lesion or rash.   Neurological:      Mental Status: He is alert.      Comments: Patient is sedated           Lines/Drains/Airways       Central Venous Catheter Line  Duration             Percutaneous Central Line - Triple Lumen  09/17/24 2030 Internal Jugular Left 1 day              Drain  Duration                  NG/OG Tube 09/17/24 2120 Center mouth 1 day              Airway  Duration                  Airway - Non-Surgical 09/17/24 2117 Endotracheal Tube 1 day              Peripheral Intravenous Line  Duration                  Hemodialysis AV Fistula 09/17/24 0701 Left forearm 2 days         Hemodialysis AV Fistula 09/17/24 0701 Left upper arm 2 days                    Significant Labs:    Lab Results   Component Value Date    WBC 27.51 (H) 09/19/2024    WBC 27.51 09/19/2024    HGB 11.9 (L) 09/19/2024    HCT 36.1 (L) 09/19/2024    MCV 90.7 09/19/2024     09/19/2024           BMP  Lab Results   Component Value Date     (L) 09/19/2024    K 5.5 (H) 09/19/2024    CO2 21 (L) 09/19/2024    BUN 31.8 (H) 09/19/2024    CREATININE 4.06 (H) 09/19/2024    CALCIUM 9.0 09/19/2024    AGAP 16.0 09/19/2024    ESTGFRAFRICA 20.8 (A) 11/11/2016    EGFRNONAA 18.0 (A) 11/11/2016         ABG  Recent Labs   Lab 09/19/24  0705   PH 7.460*   PO2 201.0*   PCO2 37.0   HCO3 26.3*   POCBASEDEF 2.50       Mechanical Ventilation Support:  Vent Mode: A/C (09/19/24 0945)  Set Rate: 20 BPM (09/19/24  945)  Vt Set: 550 mL (24)  PEEP/CPAP: 8 cmH20 (24)  Oxygen Concentration (%): 50 (24)  Peak Airway Pressure: 24 cmH20 (24)  Total Ve: 11.7 L/m (24)  F/VT Ratio<105 (RSBI): (!) 46.37 (24)      Significant Imagin2024 chest x-ray:  My read:  Endotracheal tube is in appropriate position.  There is a left IJ central line.  There is an AICD/defibrillator.  Changes of midline sternotomy.  There is significant cardiomegaly.  Lungs appear somewhat congested.      Assessment/Plan:     Assessment  Shock, cardiogenic versus septic  Heart failure with reduced ejection fraction (EF 15-20%)  ESRD on HD  Hyperlipidemia  COPD    Plan  Continue vasopressors and wean as tolerated  Continue antimicrobials  Follow up on cultures  Dialysis as per Nephrology  Continue amiodarone  Continue current vent settings   Overall prognosis appears guarded at best due to multiple comorbidities  Per Cardiology, no interventions planned and they suggest palliative care  We will consult palliative Care    DVT Prophylaxis: Lovenox  GI Prophylaxis: None     32 minutes of critical care was time spent personally by me on the following activities: development of treatment plan with patient or surrogate and bedside caregivers, discussions with consultants, evaluation of patient's response to treatment, examination of patient, ordering and performing treatments and interventions, ordering and review of laboratory studies, ordering and review of radiographic studies, pulse oximetry, re-evaluation of patient's condition.  This critical care time did not overlap with that of any other provider or involve time for any procedures.     Ephraim Yeh MD  Pulmonary Critical Care Medicine  [facility] - [facility] ICU  DOS: 2024

## 2024-09-19 NOTE — PROGRESS NOTES
09/19/24 1317   Vital Signs   Pulse (!) 126   Heart Rate Source Monitor   Resp (!) 27   SpO2 100 %   Pulse Oximetry Type Continuous   Oxygen Concentration (%) 45   Device (Oxygen Therapy) ventilator   /82   MAP (mmHg) 94   BP Location Right arm   BP Method Automatic   Patient Position Lying        Hemodialysis AV Fistula 09/17/24 0701 Left forearm   Placement Date/Time: (c) 09/17/24 0701   Present Prior to Hospital Arrival?: Yes  Location: Left forearm  Additional Comments: good bruit and thrill noted no bleeding from site noted at this time   Site Assessment Clean;Dry;Intact;No redness;No swelling;No warmth;No drainage   Patency Present;Thrill;Bruit   Status Deaccessed   Site Condition No complications   Dressing Gauze   Post-Hemodialysis Assessment   Blood Volume Processed (Liters) 80.8 L   Dialyzer Clearance Lightly streaked   Duration of Treatment 180 minutes   Additional Fluid Intake (mL) 500 mL   Total UF (mL) 181 mL   Patient Response to Treatment Tx completed, tolerated well, lines flushed and then needles pulled and pressure held till hemostasis achieved, no bleeding or hematoma noted   Post-Treatment Weight 67.7 kg (149 lb 4 oz)   Treatment Weight Change 0.1   Post-Hemodialysis Comments no distress noted

## 2024-09-19 NOTE — NURSING
Nurses Note -- 4 Eyes      9/19/2024   12:58 AM      Skin assessed during: Daily Assessment      [] No Altered Skin Integrity Present    [x]Prevention Measures Documented      [x] Yes- Altered Skin Integrity Present or Discovered   [] LDA Added if Not in Epic (Describe Wound)   [] New Altered Skin Integrity was Present on Admit and Documented in LDA   [] Wound Image Taken    Wound Care Consulted? No    Attending Nurse:  Tracey Sena RN/Staff Member:  Gonzalez KWON

## 2024-09-19 NOTE — PLAN OF CARE
Problem: Hemodialysis  Goal: Safe, Effective Therapy Delivery  9/19/2024 0922 by Jesenia Nicole RN  Outcome: Progressing  9/19/2024 0916 by Jesenia Nicole RN  Outcome: Progressing  Goal: Effective Tissue Perfusion  9/19/2024 0922 by Jesenia Nicole RN  Outcome: Progressing  9/19/2024 0916 by Jesenia Nicole RN  Outcome: Progressing  Goal: Absence of Infection Signs and Symptoms  9/19/2024 0922 by Jesenia Nicole RN  Outcome: Progressing  9/19/2024 0916 by Jesenia Nicole RN  Outcome: Progressing

## 2024-09-19 NOTE — PROGRESS NOTES
Pharmacokinetic Assessment Follow Up: IV Vancomycin    Vancomycin serum concentration assessment(s):    The random level was drawn correctly and can be used to guide therapy at this time. The measurement is within the desired definitive target range of 15 to 20 mcg/mL.    Vancomycin Regimen Plan:    Will plan  for 500 mg x 1 dose today after dialysis. The next level is scheduled for 9/21 with morning labs.    Drug levels (last 3 results):  Recent Labs   Lab Result Units 09/17/24  0108 09/18/24  1140 09/19/24  0109   Vancomycin Random ug/ml 19.1 21.5* 16.4       Vancomycin Administrations:  vancomycin given in the last 96 hours                     vancomycin (VANCOCIN) 500 mg in D5W 100 mL IVPB (MB+) (mg) 500 mg New Bag 09/17/24 2133    vancomycin 1.25 g in dextrose 5% 250 mL IVPB (ready to mix) (mg) 1,250 mg New Bag 09/16/24 1323                    Pharmacy will continue to follow and monitor vancomycin.    Please contact pharmacy at extension [phone] for questions regarding this assessment.    Thank you for the consult,   Lee Masters       Patient brief summary:  Prem Saldana is a 49 y.o. male initiated on antimicrobial therapy with IV Vancomycin for treatment of sepsis    Drug Allergies:   Review of patient's allergies indicates:   Allergen Reactions    Hydralazine Palpitations and Other (See Comments)     Other Reaction(s): feels like he is running    Palpitations    Amlodipine     Levofloxacin        Actual Body Weight:  Wt Readings from Last 1 Encounters:   09/14/24 61.9 kg (136 lb 7.4 oz)       Renal Function:   Estimated Creatinine Clearance: 19.3 mL/min (A) (based on SCr of 4.06 mg/dL (H)).,     Dialysis Method (if applicable):  intermittent HD TTS    CBC (last 72 hours):  Recent Labs   Lab Result Units 09/17/24  0108 09/17/24  2043 09/18/24  0327 09/19/24  0116   WBC x10(3)/mcL 13.02  13.02* 17.87  17.87* 21.16  21.16* 27.51  27.51*   Hgb g/dL 10.7* 11.9* 11.2* 11.9*   Hct % 31.4* 34.6* 33.0*  36.1*   Platelet x10(3)/mcL 216 255 293 223   Monocytes % % 10 6 10 3       Metabolic Panel (last 72 hours):  Recent Labs   Lab Result Units 09/17/24  0108 09/17/24 2043 09/17/24  2255 09/18/24  0243 09/18/24  0933 09/18/24  1140 09/18/24  1515 09/18/24 2022 09/19/24  0109 09/19/24  0705   Sodium mmol/L 131* 132*  --  132* 129* 128* 135* 133* 129*  --    Sodium, Blood Gas mmol/L  --   --  127*  --   --   --   --   --   --  123*   Potassium mmol/L 5.5* 4.8  --  5.8* 6.9* 4.9 3.4* 5.4* 5.5*  --    Potassium, Blood Gas mmol/L  --   --  4.6  --   --   --   --   --   --  3.9   Chloride mmol/L 95* 94*  --  96* 96* 93* 96* 93* 92*  --    CO2 mmol/L 20* 20*  --  18* 14* 19* 23 20* 21*  --    Glucose mg/dL 68* 82  --  102* 89 108* 108* 83 133*  --    Blood Urea Nitrogen mg/dL 78.8* 50.6*  --  49.6* 50.3* 51.4* 24.3* 31.8* 31.8*  --    Creatinine mg/dL 7.55* 5.58*  --  5.65* 5.62* 5.85* 3.05* 4.14* 4.06*  --    Albumin g/dL 2.2* 2.5*  --  2.2*  --   --  2.3*  --  2.2*  --    Bilirubin Total mg/dL 1.4 1.7*  --  1.3  --   --  1.5  --  1.4  --    ALP unit/L 439* 489*  --  458*  --   --  371*  --  395*  --    AST unit/L 11 14  --  20  --   --  13  --  19  --    ALT unit/L 7 9  --  8  --   --  <5  --  6  --    Magnesium Level mg/dL 2.00 2.10  --  2.20  --   --  1.80  --  2.00  --    Phosphorus Level mg/dL 5.6* 5.1*  --  5.9*  --   --  3.8  --  5.2*  --        Microbiologic Results:  Microbiology Results (last 7 days)       Procedure Component Value Units Date/Time    Blood Culture [[id number]] Collected: 09/19/24 1231    Order Status: Sent Specimen: Blood, Venous     Blood Culture [[id number]] Collected: 09/19/24 1227    Order Status: Sent Specimen: Blood, Venous     Blood Culture [[id number]]  (Normal) Collected: 09/17/24 2001    Order Status: Completed Specimen: Blood, Venous Updated: 09/18/24 2101     Blood Culture No Growth At 24 Hours    Blood Culture [[id number]]  (Normal) Collected: 09/17/24 2001    Order Status:  Completed Specimen: Blood, Venous Updated: 09/18/24 2100     Blood Culture No Growth At 24 Hours    Blood Culture [[id number]]     Order Status: Canceled Specimen: Blood     Blood Culture [[id number]]     Order Status: Canceled Specimen: Blood     Blood Culture #1 **CANNOT BE ORDERED STAT** [[id number]]  (Normal) Collected: 09/10/24 0654    Order Status: Completed Specimen: Blood Updated: 09/15/24 0901     Blood Culture No Growth at 5 days    Blood Culture #2 **CANNOT BE ORDERED STAT** [[id number]]  (Normal) Collected: 09/10/24 0654    Order Status: Completed Specimen: Blood Updated: 09/15/24 0901     Blood Culture No Growth at 5 days

## 2024-09-19 NOTE — PROGRESS NOTES
[facility]    Cardiology  Progress Note    Patient Name: Prem Saldana  MRN: [id number]  Admission Date: 9/10/2024  Hospital Length of Stay: 9 days  Code Status: Full Code   Attending Physician: Sin Gonsalez Jr., MD,*   Primary Care Physician: Tank Saenz MD  Expected Discharge Date:   Principal Problem:<principal problem not specified>    Subjective:   Brief HPI/Hospital Course:   Mr. Saldana is a 50 y/o male with a history CHF, HTN, ESRD on HD, who is unknown to [facility]. He presented to ER on 9.10.24 with complaints of shortness of breath. He reports he has a fall on 9.9.24 in which he hit his right side. He reports SOB started during the night. EMS reported he was tachypneic (breathing 55/min). He was given FD ASA and Sl Nitro x1. Significant labs include H&H 11.7/36.8, BUN/Creat 70.3/8.07, ALP 1061, Albumin 3.2, BNP 14.761, Troponin 0.554. CTA Chest negative for PE, but cardiomegaly with a trace left effusion, bibasilar atelectasis, mild interstitial edema and Chronic appearing compression deformities in the midthoracic spine. CXR shows poor inspiratory effort accentuates the pulmonary vascular markings, cardiomegaly, increased left retrocardiac density and silhouetting of the left hemidiaphragm, and calcified densities below the left hemidiaphragm. EKG shows sinus tachycardia with Left axis deviation, and LVH. [facility] has been consulted for NSTEMI management.       Hospital Course:  9.11.24: NAD. VSS. No complaints of Palps/SOB. ST on telemetry. Reports chest pain from desk falling on him. Reports back pain. H&H 10.3/32.7, Creat 6.10   9.12.24: NAD. VSS. Reports Chest Pain No complaints of SOB/Palps. H&H 11.4/35.6, K 6.1, Creat 8.02. In bed lying flat.   9.13.24: NAD. VSS. ST on telemetry. Denies SOB/Palps. H&H 12.7/39.8, K 5.6, BUN/Creat 44.1/6.46,   9.14.24: LHC cancelled due to patient uncooperative.   He is being treated for hyperkalemia. Labs pending. Currently denies  "pain  9.15.24: NAD. VSS.   9.16.24: NAD Noted. Sinus Tach on Tele. On Levophed support. Primary complaint noted to be leg pain. Legs are warm dry, dressing on LLE. Respiratory status stable. Had Runs of NSVT Yesterday afternoon, seemed to have resolved.  9.17.24: NAD. "I am good." No Further Runs of NSVT. ST. Denies CP, SOB and Palps. Levophed 0.4mcg/kg/min  9.18.24: NAD. Vented/Sedated. No Ectopy. Amiodarone 0.5mg/min. Levophed 0.07mcg/kg/min.   9.19.24: NAD. Vented/Sedated. Amiodarone 0.5mg/min, Levophed 0.5mcg/kg/min    PMH: CHF, HTN, ESRD on HD, Anemia, Secondary Hyperparathyroidism   PSH: AV Fistula   Family History: Maternal Grandmother - Cancer, Heart Disease, MI; Mother - DM II  Social History: Current Smoker (2-3 Cigarette per Day). Reports occasionally alcohol. Reports past use of Marijuana.      Previous Cardiac Diagnostics:   ECHO (9.10.24):  Left Ventricle: The left ventricle is dilated. Increased wall thickness. There is concentric remodeling. Severe global hypokinesis present. There is severely reduced systolic function with a visually estimated ejection fraction of 15 - 20%. Unable to assess diastolic function due to atrial fibrillation. Elevated left ventricular filling pressure. Right Ventricle: Severe right ventricular enlargement. Systolic function is severely reduced.  Left Atrium: Left atrium is dilated. Right Atrium: Right atrium is dilated. Aortic Valve: The aortic valve is a trileaflet valve. Mildly calcified cusps. Mildly restricted motion. Aortic valve peak velocity is 1.85 m/s. Mean gradient is 8 mmHg. There is moderate aortic regurgitation with an eccentrically directed jet. Mitral Valve: Moderately thickened leaflets. There is moderate mitral annular calcification present. There is mild to moderate regurgitation. Tricuspid Valve: There is moderate regurgitation. The estimated PA systolic pressure is at least 34 mmHg. Pulmonic Valve: There is mild to moderate regurgitation. IVC/SVC: " Elevated venous pressure at 15 mmHg. Pericardium: There is a small effusion. No indication of cardiac tamponade.    ECHO (2.19.24):  A complete two-dimensional transthoracic echocardiogram was performed (2D, M-mode, Doppler and color flow Doppler). The left ventricle is severely dilated.   Left ventricular systolic function is severely reduced. Estimated Ejection Fraction = <20%. The right ventricle is mild to moderately dilated. The left atrium is severely dilated. The right atrium is mild to moderately dilated. The mitral valve leaflets appear thickened, but open well. There is moderate to severe mitral annular calcification. There is mild mitral regurgitation. There is mild tricuspid insufficiency noted. Mild aortic regurgitation. Dialated IVC 3.8 cm. The lack of respiratory variation in the inferior vena cava diameter is noted. There is no pericardial effusion. Large left pleural effusion.There is moderate concentric left ventricular hypertrophy.      SPECT (2.19.18):  The perfusion scan is free of evidence for myocardial ischemia or injury. Resting wall motion is physiologic. There is resting LV dysfunction with a reduced ejection fraction of 40 %. The ventricular volumes are normal at rest and stress. The extracardiac distribution of radioactivity is normal.      Dobutamine Stress Test (7.27.16);  Moderate left atrial enlargement. Concentric hypertrophy. Normal left ventricular systolic function (EF 60-65%). Left ventricular diastolic dysfunction. Normal right ventricular systolic function . The estimated PA systolic pressure is greater than 26 mmHg.  Mild mitral regurgitation. Small pericardial effusion.      Review of Systems   Unable to perform ROS: Intubated     Objective:     Vital Signs (Most Recent):  Temp: 98.9 °F (37.2 °C) (09/19/24 1215)  Pulse: (!) 122 (09/19/24 1515)  Resp: (!) 23 (09/19/24 1515)  BP: 96/70 (09/19/24 1515)  SpO2: 100 % (09/19/24 1515) Vital Signs (24h Range):  Temp:  [97.7 °F (36.5  °C)-99.3 °F (37.4 °C)] 98.9 °F (37.2 °C)  Pulse:  [118-140] 122  Resp:  [18-33] 23  SpO2:  [86 %-100 %] 100 %  BP: ()/(52-88) 96/70   Weight: 61.9 kg (136 lb 7.4 oz)  Body mass index is 18.89 kg/m².  SpO2: 100 %       Intake/Output Summary (Last 24 hours) at 9/19/2024 1546  Last data filed at 9/19/2024 1526  Gross per 24 hour   Intake 3413.48 ml   Output 181 ml   Net 3232.48 ml     Lines/Drains/Airways       Central Venous Catheter Line  Duration             Percutaneous Central Line - Triple Lumen  09/17/24 2030 Internal Jugular Left 1 day              Drain  Duration                  NG/OG Tube 09/17/24 2120 Center mouth 1 day              Airway  Duration                  Airway - Non-Surgical 09/17/24 2117 Endotracheal Tube 1 day              Peripheral Intravenous Line  Duration                  Hemodialysis AV Fistula 09/17/24 0701 Left forearm 2 days         Hemodialysis AV Fistula 09/17/24 0701 Left upper arm 2 days                  Significant Labs:   Chemistries:   Recent Labs   Lab 09/15/24  1749 09/15/24  2258 09/16/24  0534 09/16/24  1048 09/16/24  1714 09/17/24  0108 09/18/24  0243 09/18/24  0933 09/18/24  1140 09/18/24  1515 09/18/24 2022 09/19/24  0109     --  133*  --   --    < > 132* 129* 128* 135* 133* 129*   K 5.1  --  5.4*  --   --    < > 5.8* 6.9* 4.9 3.4* 5.4* 5.5*   CL 97*  --  96*  --   --    < > 96* 96* 93* 96* 93* 92*   CO2 19*  --  22  --   --    < > 18* 14* 19* 23 20* 21*   BUN 55.6*  --  67.2*  --   --    < > 49.6* 50.3* 51.4* 24.3* 31.8* 31.8*   CREATININE 6.13*  --  6.65*  --   --    < > 5.65* 5.62* 5.85* 3.05* 4.14* 4.06*   CALCIUM 8.2*  --  8.4  --   --    < > 9.0 9.1 9.3 9.4 9.4 9.0   BILITOT  --   --  1.3  --   --    < > 1.3  --   --  1.5  --  1.4   ALKPHOS  --   --  493*  --   --    < > 458*  --   --  371*  --  395*   ALT  --   --  7  --   --    < > 8  --   --  <5  --  6   AST  --   --  16  --   --    < > 20  --   --  13  --  19   GLUCOSE 75  --  68*  --   --    < >  "102* 89 108* 108* 83 133*   MG 1.70  --  2.20  --   --    < > 2.20  --   --  1.80  --  2.00   PHOS  --   --  5.6*  --   --    < > 5.9*  --   --  3.8  --  5.2*   TROPONINI 0.445* 0.404* 0.465* 0.400* 0.474*  --   --   --   --   --   --   --     < > = values in this interval not displayed.        CBC/Anemia Labs: Coags:    Recent Labs   Lab 09/17/24  2043 09/18/24  0327 09/19/24  0116   WBC 17.87  17.87* 21.16  21.16* 27.51  27.51*   HGB 11.9* 11.2* 11.9*   HCT 34.6* 33.0* 36.1*    293 223   MCV 86.5 85.7 90.7   RDW 19.3* 19.6* 20.4*    No results for input(s): "PT", "INR", "APTT" in the last 168 hours.       Telemetry: ST    Physical Exam  Vitals reviewed.   Constitutional:       General: He is not in acute distress.     Appearance: He is ill-appearing.      Comments: Vented/Sedated   HENT:      Head: Normocephalic.      Mouth/Throat:      Mouth: Mucous membranes are dry.   Cardiovascular:      Rate and Rhythm: Regular rhythm. Tachycardia present.   Pulmonary:      Effort: Pulmonary effort is normal. No respiratory distress.      Comments: Ventilator Associated Breath Sounds  Vent Mode: A/C  Oxygen Concentration (%):  [30-60] 40  Resp Rate Total:  [20 br/min-24 br/min] 24 br/min  Vt Set:  [550 mL] 550 mL  PEEP/CPAP:  [8 cmH20] 8 cmH20  Mean Airway Pressure:  [11 ioZ44-68 cmH20] 11 cmH20  Abdominal:      Palpations: Abdomen is soft.   Musculoskeletal:      Right lower leg: No edema.      Left lower leg: No edema.      Comments: BLE Warm. Left AVF    Skin:     Comments: LLE Dressing in Place.    Neurological:      Comments: Vented/Sedated       Current Schedule Inpatient Medications:   aspirin  81 mg Oral Daily    busPIRone  5 mg Oral BID    calcitRIOL  0.25 mcg Oral BID    dextrose 10%  50 g Intravenous Once    And    insulin regular  0.1 Units/kg Intravenous Once    enoxparin  30 mg Subcutaneous Daily    famotidine (PF)  20 mg Intravenous Daily    sevelamer carbonate  800 mg Oral TID WM    sodium zirconium " cyclosilicate  10 g Oral Every Mon, Wed, Fri    vancomycin (VANCOCIN) IV (PEDS and ADULTS)  500 mg Intravenous Once     Continuous Infusions:   amiodarone in dextrose 5%  0.5 mg/min Intravenous Continuous 16.7 mL/hr at 09/19/24 1526 0.5 mg/min at 09/19/24 1526    NORepinephrine bitartrate-D5W  0-3 mcg/kg/min Intravenous Continuous 81.2 mL/hr at 09/19/24 1526 0.7 mcg/kg/min at 09/19/24 1526    propofoL  0-50 mcg/kg/min Intravenous Continuous 13 mL/hr at 09/19/24 1526 35 mcg/kg/min at 09/19/24 1526       Assessment:   NSTEMI -  (Unspecified) in the Setting of Hypotensive Shock Requiring Vasopressor Support- Do not Suspect Cardiogenic Shock    - Troponin Trend Flat/No Overt Ischemia on EKG  Hypotensive Shock - ? 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%    - Normal SPECT (2.19.18)    - LHC Aborted on 9.13.24 due to Ascending Aorta/root angle from RR Approach & Agitation preventing Femoral Approach  NSVT (Monomorphic)    - On Amiodarone Infusion  Acute Respiratory Failure requiring Intubation/Ventilation   VHD    - Moderate to Severe Mitral Annular Calcification    - Mild MR/TR/AR  Sinus Tachycardia - Persistent  ESRD/HD     - on HD T,Thu,Sat   COPD    - On Chronic Home Oxygen Support (2 L/Min)  Hyperkalemia (Treated per Nephrology)  History of Prostate Cancer    - Status Post Radiation  Lower Extremity Wound    - Febrile on  9.14.24  Anemia of Chronic Disease   Secondary Hyperparathyroidism   No known history of GI Bleed     Plan:   Wean Pressors for MAP > 65mmHg  Continue IV Amiodarone  Add GDMT as BP/HR Allows  Defer ACE/ARB/ARNI due to Hyperkalemia/Hypotension requiring Pressors   Fluid Management/HD per Nephrology Team  PT refusing Diagnostic Angiogram and Inotropes (Prior to being Intubated/Ventilated)  Consider Palliative  Care Consultation for Goals of Care   Will Continue to Follow  Labs in AM: CBC, CMP and Mg     Of note, [facility] attempted on 9.13.24, however procedure was aborted  from a right radial approach given angle of ascending aorta and aortic root. Femoral access not possible because of patient's agitation and respiratory status. Procedure was aborted.     Mian Carter, ANP  Cardiology  [facility]

## 2024-09-19 NOTE — PLAN OF CARE
Problem: Hemodialysis  Goal: Safe, Effective Therapy Delivery  Outcome: Progressing  Goal: Effective Tissue Perfusion  Outcome: Progressing  Goal: Absence of Infection Signs and Symptoms  Outcome: Progressing     Problem: Adult Inpatient Plan of Care  Goal: Plan of Care Review  Outcome: Progressing  Goal: Patient-Specific Goal (Individualized)  Outcome: Progressing  Goal: Absence of Hospital-Acquired Illness or Injury  Outcome: Progressing  Goal: Optimal Comfort and Wellbeing  Outcome: Progressing  Goal: Readiness for Transition of Care  Outcome: Progressing     Problem: Skin Injury Risk Increased  Goal: Skin Health and Integrity  Outcome: Progressing     Problem: Infection  Goal: Absence of Infection Signs and Symptoms  Outcome: Progressing     Problem: Wound  Goal: Optimal Coping  Outcome: Progressing  Goal: Optimal Functional Ability  Outcome: Progressing  Goal: Absence of Infection Signs and Symptoms  Outcome: Progressing  Goal: Improved Oral Intake  Outcome: Progressing  Goal: Optimal Pain Control and Function  Outcome: Progressing  Goal: Skin Health and Integrity  Outcome: Progressing  Goal: Optimal Wound Healing  Outcome: Progressing

## 2024-09-20 NOTE — PROGRESS NOTES
Pharmacokinetic Follow Up: Gentamicin    Assessment of levels:     Level of 2.4 mcg/ml is above goal of less than 1 mcg/ml.    Regimen Plan:     No dose needed today.      Recent Labs   Lab Result Units 09/17/24 2238 09/18/24  1515 09/19/24  1818   Gentamicin Level ug/ml 2.1 1.0 2.4       Patient brief summary:  Prem Saldana is a 49 y.o. male initiated on aminoglycoside therapy for treatment of sepsis      Renal Function:   Estimated Creatinine Clearance: 19.3 mL/min (A) (based on SCr of 4.06 mg/dL (H)).,     Dialysis Method (if applicable):  intermittent HD    CBC (last 72 hours):  Recent Labs   Lab Result Units 09/17/24 0108 09/17/24 2043 09/18/24  0327 09/19/24  0116   WBC x10(3)/mcL 13.02  13.02* 17.87  17.87* 21.16  21.16* 27.51  27.51*   Hgb g/dL 10.7* 11.9* 11.2* 11.9*   Hct % 31.4* 34.6* 33.0* 36.1*   Platelet x10(3)/mcL 216 255 293 223   Monocytes % % 10 6 10 3       Metabolic Panel (last 72 hours):  Recent Labs   Lab Result Units 09/17/24 0108 09/17/24 2043 09/17/24  2255 09/18/24  0243 09/18/24  0933 09/18/24  1140 09/18/24  1515 09/18/24  2022 09/19/24  0109 09/19/24  0705   Sodium mmol/L 131* 132*  --  132* 129* 128* 135* 133* 129*  --    Sodium, Blood Gas mmol/L  --   --  127*  --   --   --   --   --   --  123*   Potassium mmol/L 5.5* 4.8  --  5.8* 6.9* 4.9 3.4* 5.4* 5.5*  --    Potassium, Blood Gas mmol/L  --   --  4.6  --   --   --   --   --   --  3.9   Chloride mmol/L 95* 94*  --  96* 96* 93* 96* 93* 92*  --    CO2 mmol/L 20* 20*  --  18* 14* 19* 23 20* 21*  --    Glucose mg/dL 68* 82  --  102* 89 108* 108* 83 133*  --    Blood Urea Nitrogen mg/dL 78.8* 50.6*  --  49.6* 50.3* 51.4* 24.3* 31.8* 31.8*  --    Creatinine mg/dL 7.55* 5.58*  --  5.65* 5.62* 5.85* 3.05* 4.14* 4.06*  --    Albumin g/dL 2.2* 2.5*  --  2.2*  --   --  2.3*  --  2.2*  --    Bilirubin Total mg/dL 1.4 1.7*  --  1.3  --   --  1.5  --  1.4  --    ALP unit/L 439* 489*  --  458*  --   --  371*  --  395*  --    AST  unit/L 11 14  --  20  --   --  13  --  19  --    ALT unit/L 7 9  --  8  --   --  <5  --  6  --    Magnesium Level mg/dL 2.00 2.10  --  2.20  --   --  1.80  --  2.00  --    Phosphorus Level mg/dL 5.6* 5.1*  --  5.9*  --   --  3.8  --  5.2*  --        Aminoglycoside Administrations:  aminoglycosides given in last 96 hours                     gentamicin (GARAMYCIN) 92.8 mg in 0.9% NaCl 100 mL IVPB (mg) 92.8 mg New Bag 09/18/24 2032    gentamicin (GARAMYCIN) 124 mg in 0.9% NaCl 100 mL IVPB (mg) 124 mg New Bag 09/16/24 1244                    Microbiologic Results:  Microbiology Results (last 7 days)       Procedure Component Value Units Date/Time    Blood Culture [[id number]] Collected: 09/19/24 1227    Order Status: Resulted Specimen: Blood, Venous Updated: 09/19/24 1356    Blood Culture [[id number]] Collected: 09/19/24 1231    Order Status: Resulted Specimen: Blood, Venous Updated: 09/19/24 1356    Blood Culture [[id number]]  (Normal) Collected: 09/17/24 2001    Order Status: Completed Specimen: Blood, Venous Updated: 09/18/24 2101     Blood Culture No Growth At 24 Hours    Blood Culture [[id number]]  (Normal) Collected: 09/17/24 2001    Order Status: Completed Specimen: Blood, Venous Updated: 09/18/24 2100     Blood Culture No Growth At 24 Hours    Blood Culture [[id number]]     Order Status: Canceled Specimen: Blood     Blood Culture [[id number]]     Order Status: Canceled Specimen: Blood     Blood Culture #1 **CANNOT BE ORDERED STAT** [[id number]]  (Normal) Collected: 09/10/24 0654    Order Status: Completed Specimen: Blood Updated: 09/15/24 0901     Blood Culture No Growth at 5 days    Blood Culture #2 **CANNOT BE ORDERED STAT** [[id number]]  (Normal) Collected: 09/10/24 0654    Order Status: Completed Specimen: Blood Updated: 09/15/24 0901     Blood Culture No Growth at 5 days

## 2024-09-20 NOTE — NURSING
Nurses Note -- 4 Eyes      9/20/2024   5:25 AM      Skin assessed during: Daily Assessment      [] No Altered Skin Integrity Present    [x]Prevention Measures Documented      [x] Yes- Altered Skin Integrity Present or Discovered   [] LDA Added if Not in Epic (Describe Wound)   [] New Altered Skin Integrity was Present on Admit and Documented in LDA   [] Wound Image Taken    Wound Care Consulted? Yes    Attending Nurse:  Juventino Sena RN/Staff Member:  DOYLE Swanson

## 2024-09-20 NOTE — PROGRESS NOTES
Inpatient Nutrition Assessment    Admit Date: 9/10/2024   Total duration of encounter: 10 days   Patient Age: 49 y.o.    Nutrition Recommendation/Prescription     Tube feeding recommendation:     Novasource Renal goal rate 50 ml/hr to provide  2000 kcal/d   (96% est needs, 110% with meds)  90 g protein/d  (97% est needs)  720 ml free water/d  (calculations based on estimated 20 hr/d run time)     Communication of Recommendations: reviewed with nurse    Nutrition Assessment     Malnutrition Assessment/Nutrition-Focused Physical Exam    Malnutrition Context: chronic illness (09/18/24 1338)  Malnutrition Level: moderate (09/18/24 1338)  Energy Intake (Malnutrition): less than or equal to 50% for greater than or equal to 5 days (09/18/24 1338)  Weight Loss (Malnutrition):  (does not meet criteria) (09/18/24 1338)  Subcutaneous Fat (Malnutrition): mild depletion (09/18/24 1338)  Orbital Region (Subcutaneous Fat Loss): mild depletion  Upper Arm Region (Subcutaneous Fat Loss): mild depletion     Muscle Mass (Malnutrition): mild depletion (09/18/24 1338)  Adventism Region (Muscle Loss): mild depletion  Clavicle Bone Region (Muscle Loss): mild depletion  Clavicle and Acromion Bone Region (Muscle Loss): mild depletion  Scapular Bone Region (Muscle Loss): mild depletion              Fluid Accumulation (Malnutrition): mild (09/18/24 1338)        A minimum of two characteristics is recommended for diagnosis of either severe or non-severe malnutrition.    Chart Review    Reason Seen: continuous nutrition monitoring and follow-up    Malnutrition Screening Tool Results   Have you recently lost weight without trying?: No  Have you been eating poorly because of a decreased appetite?: No   MST Score: 0   Diagnosis:  Shock, cardiogenic versus septic  Heart failure with reduced ejection fraction (EF 15-20%)  ESRD on HD  Hyperlipidemia  COPD    Relevant Medical History: tobacco use disorder, COPD, HTN, HLD, ESRD on HD, heart failure with  reduced ejection fraction (EF 15-20%), prostate cancer status post radiation     Scheduled Medications:  aspirin, 81 mg, Daily  busPIRone, 5 mg, BID  calcitRIOL, 0.25 mcg, BID  enoxparin, 30 mg, Daily  famotidine (PF), 20 mg, Daily  piperacillin-tazobactam (Zosyn) IV (PEDS and ADULTS) (extended infusion is not appropriate), 4.5 g, Q8H  polyethylene glycol, 17 g, BID  senna-docusate 8.6-50 mg, 2 tablet, BID  sevelamer carbonate, 800 mg, TID WM  sodium zirconium cyclosilicate, 10 g, Every Mon, Wed, Fri    Continuous Infusions:  amiodarone in dextrose 5%, Last Rate: 0.5 mg/min (09/20/24 0524)  NORepinephrine bitartrate-D5W  NORepinephrine bitartrate-D5W, Last Rate: 0.71 mcg/kg/min (09/20/24 0713)  propofoL, Last Rate: 30 mcg/kg/min (09/20/24 0821)  vasopressin, Last Rate: 0.04 Units/min (09/20/24 0720)    PRN Medications:  acetaminophen, 650 mg, Q8H PRN  acetaminophen, 650 mg, Q4H PRN  dextrose 10%, 12.5 g, PRN  dextrose 10%, 25 g, PRN  dextrose 10%, 25 g, PRN  etomidate, , Code/trauma/sedation Med  fentaNYL, 50 mcg, Q1H PRN  glucagon (human recombinant), 1 mg, PRN  glucose, 16 g, PRN  glucose, 24 g, PRN  HYDROcodone-acetaminophen, 1 tablet, Q4H PRN  metoprolol, 5 mg, Q5 Min PRN  ondansetron, 4 mg, Q4H PRN  rocuronium, , Code/trauma/sedation Med  simethicone, 1 tablet, TID PRN  sodium chloride 0.9%, 10 mL, Q12H PRN  vancomycin - pharmacy to dose, , pharmacy to manage frequency    Calorie Containing IV Medications: Diprivan @ 11 ml/hr (provides 290 kcal/d)    Recent Labs   Lab 09/15/24  0342 09/15/24  1749 09/16/24  0534 09/17/24  0108 09/17/24  2043 09/18/24  0243 09/18/24  0327 09/18/24  0933 09/18/24  1515 09/18/24  2022 09/19/24  0109 09/19/24  0116 09/19/24  2036 09/19/24  2140 09/20/24  0019 09/20/24  0118 09/20/24  0816      < > 133* 131* 132* 132*  --    < > 135* 133* 129*  --  137 136 135* 135* 133*   K 4.8   < > 5.4* 5.5* 4.8 5.8*  --    < > 3.4* 5.4* 5.5*  --  4.3 4.1 3.7 5.7* 3.8   CALCIUM 8.2*   < >  8.4 9.1 9.3 9.0  --    < > 9.4 9.4 9.0  --  8.3* 8.8 8.6 8.7 7.7*   PHOS  --   --  5.6* 5.6* 5.1* 5.9*  --   --  3.8  --  5.2*  --   --   --   --  4.4  --    MG  --    < > 2.20 2.00 2.10 2.20  --   --  1.80  --  2.00  --   --   --   --  2.00  --    CL 97*   < > 96* 95* 94* 96*  --    < > 96* 93* 92*  --  98 100 99 98 97*   CO2 23   < > 22 20* 20* 18*  --    < > 23 20* 21*  --  20* 20* 19* 18* 21*   BUN 47.0*   < > 67.2* 78.8* 50.6* 49.6*  --    < > 24.3* 31.8* 31.8*  --  23.4* 24.0* 25.8* 25.2* 30.5*   CREATININE 5.63*   < > 6.65* 7.55* 5.58* 5.65*  --    < > 3.05* 4.14* 4.06*  --  3.17* 3.08* 3.20* 3.13* 3.38*   EGFRNORACEVR 12   < > 9 8 12 12  --    < > 24 17 17  --  23 24 23 23 21   GLUCOSE 63*   < > 68* 68* 82 102*  --    < > 108* 83 133*  --  115* 128* 122* 104* 119*   BILITOT 1.0  --  1.3 1.4 1.7* 1.3  --   --  1.5  --  1.4  --   --   --   --  1.6*  --    ALKPHOS 521*  --  493* 439* 489* 458*  --   --  371*  --  395*  --   --   --   --  424*  --    ALT 8  --  7 7 9 8  --   --  <5  --  6  --   --   --   --  7  --    AST 18  --  16 11 14 20  --   --  13  --  19  --   --   --   --  25  --    ALBUMIN 2.3*  --  2.4* 2.2* 2.5* 2.2*  --   --  2.3*  --  2.2*  --   --   --   --  1.9*  --    WBC 10.55  10.55  --  12.52* 13.02  13.02* 17.87  17.87*  --  21.16  21.16*  --   --   --   --  27.51  27.51*  --   --  35.60*  --   --    HGB 11.2*  --  11.5* 10.7* 11.9*  --  11.2*  --   --   --   --  11.9*  --   --  11.8*  --   --    HCT 33.9*  --  34.1* 31.4* 34.6*  --  33.0*  --   --   --   --  36.1*  --   --  33.2*  --   --     < > = values in this interval not displayed.     Nutrition Orders:  No diet orders on file  Tube Feedings/Formulas 50; 1,000; Novasource Renal - Unflavored; OG; 50; Every 4 hours    Appetite/Oral Intake: not applicable/not applicable  Factors Affecting Nutritional Intake: on mechanical ventilation  Social Needs Impacting Access to Food: unable to assess at this time; will attempt on  "follow-up  Food/Taoism/Cultural Preferences: unable to obtain  Food Allergies: no known food allergies  Last Bowel Movement: 09/17/24  Wound(s):     Wound 09/17/24 0701 Blister(s) Left anterior;posterior Calf #1-Tissue loss description: Partial thickness blister noted    Comments    9/12/24 pt NPO for procedure today, reports no appetite loss or unintentional weight loss prior to admit     9/18/24: Pt now intubated. Plans for starting TF. Receiving kcal from meds. Noted previous EMR wts indicate wt loss, but time frame is from too long ago to meet criteria for malnutrition (other criteria met though.) Noted pt with only 0-50% po intake of meals since admit, NPO since 9/13/24.    9/20/24: TF continues, tolerated per RN. Receiving kcal from meds.     Anthropometrics    Height: 5' 11.26" (181 cm), Height Method: Stated  Last Weight: 61.9 kg (136 lb 7.4 oz) (09/14/24 0635), Weight Method: Bed Scale  BMI (Calculated): 18.9  BMI Classification: normal (BMI 18.5-24.9)        Ideal Body Weight (IBW), Male: 173.56 lb     % Ideal Body Weight, Male (lb): 92.73 %                          Usual Weight Provided By: EMR weight history    Wt Readings from Last 5 Encounters:   09/14/24 61.9 kg (136 lb 7.4 oz)   07/30/19 77 kg (169 lb 12.1 oz)   01/29/19 79.8 kg (176 lb)   07/26/18 81.6 kg (180 lb)   01/23/18 91 kg (200 lb 9.9 oz)     Weight Change(s) Since Admission:   Wt Readings from Last 1 Encounters:   09/14/24 0635 61.9 kg (136 lb 7.4 oz)   09/10/24 0750 73 kg (160 lb 15 oz)   09/10/24 0529 72.6 kg (160 lb)   Admit Weight: 72.6 kg (160 lb) (09/10/24 0529), Weight Method: Stated    Estimated Needs    Weight Used For Calorie Calculations: 61.9 kg (136 lb 7.4 oz)  Energy Calorie Requirements (kcal): 2082kcal  Energy Need Method: [place]  Weight Used For Protein Calculations: 61.9 kg (136 lb 7.4 oz)  Protein Requirements: 93gm (1.5g/kg)  Fluid Requirements (mL): 1000ml + urinary output  CHO Requirement: 235gm (45% est kcal " needs)     Enteral Nutrition     Formula: Novasource Renal  Rate/Volume: 50ml/hr  Water Flushes: 50ml q4hr  Additives/Modulars: none at this time  Route: orogastric tube  Method: continuous  Total Nutrition Provided by Tube Feeding, Additives, and Flushes:  Calories Provided  2000 kcal/d, 96% needs   Protein Provided  90 g/d, 97% needs   Fluid Provided  720 ml/d, N/A% needs   Continuous feeding calculations based on estimated 20 hr/d run time unless otherwise stated.    Parenteral Nutrition     Patient not receiving parenteral nutrition support at this time.    Evaluation of Received Nutrient Intake    Calories: meeting estimated needs  Protein: meeting estimated needs    Patient Education     Not applicable.    Nutrition Diagnosis     PES: Inadequate oral intake related to acute illness as evidenced by intubation since 9/17/24. (active)     PES: Moderate chronic disease or condition related malnutrition related to chronic illness as evidenced by less than or equal to 50% needs met for greater than or equal to 5 days, mild fat depletion, mild muscle depletion, and edema. (active)    Nutrition Interventions     Intervention(s): collaboration with other providers    Goal: Meet greater than 80% of nutritional needs by follow-up. (goal progressing)  Goal: Tolerate enteral feeding at goal rate by follow-up. (goal progressing)    Nutrition Goals & Monitoring     Dietitian will monitor: energy intake and enteral nutrition intake  Discharge planning: too early to determine; pending clinical course  Nutrition Risk/Follow-Up: high (follow-up in 1-4 days)   Please consult if re-assessment needed sooner.

## 2024-09-20 NOTE — PROGRESS NOTES
Pharmacist Renal Dose Adjustment Note    Prem Saldana is a 49 y.o. male being treated with the medication zosyn    Patient Data:    Vital Signs (Most Recent):  Temp: 99.3 °F (37.4 °C) (09/20/24 0400)  Pulse: (!) 125 (09/20/24 0600)  Resp: (!) 27 (09/20/24 0600)  BP: 92/66 (09/20/24 0600)  SpO2: 100 % (09/20/24 0600) Vital Signs (72h Range):  Temp:  [97.7 °F (36.5 °C)-102.6 °F (39.2 °C)]   Pulse:  []   Resp:  [0-36]   BP: ()/()   SpO2:  [61 %-100 %]      Recent Labs   Lab 09/19/24  2140 09/20/24  0019 09/20/24  0118   CREATININE 3.08* 3.20* 3.13*     Serum creatinine: 3.13 mg/dL (H) 09/20/24 0118  Estimated creatinine clearance: 25 mL/min (A)    Medication:zosyn dose: 4.5gm frequency q12hrs will be changed to medication:zosyn dose:4.5gm frequency:q8hrs    Pharmacist's Name: Kenneth Toth  Pharmacist's Extension: 0014

## 2024-09-20 NOTE — PROGRESS NOTES
Nephrology Progress Note      HPI:    Prem Saldana is a 49 y.o. male from [place], with pmhx of ESRD TTS via L AVF, CHF, EF <20%, COPD, hx of prostate cancer, presenting to the ED today with respiratory failure requiring BiPAP.      History is very limited due to his condition but he was able to answer a few questions on bipap. He states that he last dialyzed in [place], [place] Saturday 9/7. He tells me he has a 6am chair time TTS set up at the dialysis unit in [place]? Will have to call the unit to verify.      CTA chest revealing cardiomegaly with trace left effusion, atelectasis and mild interstitial edema. CXR with congestion. He has 1+ edema to BLE. His electrolytes are normal., BNP is >14,000 on admission, troponin 0.55 and he states that his chest hurts because he fell and had trauma to the chest 2 days ago. Bedside echo pending in the ED. Cardiology following and trending troponins until peak. ED physician paged nephrology for HD today.     Interval History:    9/11/24  Tolerated HD yesterday with 3L off and significant improvement in respiratory status, now on 4L NC. Vitals are stable. Labs reviewed, all relatively stable but bicarb is now 20. Serial troponins remain high. He continues to complain of chest pain from falling this weekend. Chest xr and CTA are without traumatic changes. He is now able to tell me that he was in [place] because his mom lives there, but he is moving to [place] and has a chair time at [facility].   He is usually on 2L O2 support for COPD and is currently requiring 4L.     09/12/2024   No acute changes overnight.  Potassium 6 on a.m. labs.  He is scheduled for his routine dialysis run today which will be done after left heart catheterization.  Voices no new complaints.    09/13/2024   Tolerated 3 L UF with HD yesterday.  Potassium remains elevated at 5.6 on a.m. labs.  Hemoglobin stable.  Shortness of breath improved following HD. no chest pain at present.    09/16/2024   Awake  "alert and oriented.  Looks little dyspneic.  Complains of pain and asking for lot of pain medicines.  Not sure about the oral fluid intake or nutrition intake.    09/17/2024  No acute events overnight.  No new complaints.  No chest pain, shortness of breath, abdominal pain, nausea, vomiting, or lower extremity edema.  He remains on vasopressors.    09/18/2024   Overnight events noted and now patient is intubated and on the ventilator and sedated with a propofol and on high dose of Levophed also.  Also on amiodarone.    09/19/2024  Patient dialyzed off schedule yesterday due to hyperkalemia.  About to start dialysis again this morning on regular TTS schedule.  Remains intubated and on ventilator.  Still requiring Levophed for hypotension.    09/20/2024  Patient continues to have hypotension overnight, and Levophed had to be added.  He did dialyze yesterday, however, was only able to get 100 mL UF due to hypotension.  Remains intubated.      Vital Signs:  BP 92/66   Pulse (!) 125   Temp 99.3 °F (37.4 °C) (Oral)   Resp (!) 27   Ht 5' 11.26" (1.81 m)   Wt 61.9 kg (136 lb 7.4 oz)   SpO2 100%   BMI 18.89 kg/m²   Body mass index is 18.89 kg/m².    Physical Exam:    GEN: Ill appearing AA male   CV:  Irregular rhythm with rapid rate  PULM:  On ventilator, CTAB  ABD: Soft, NT/ND abdomen with NABS  EXT: No cyanosis or edema  SKIN: Warm and dry  PSYCH:  Sedated on the ventilator  Dialysis access:  LUE AV fistula      Labs:      Component Value Date/Time     (L) 09/20/2024 0816     (L) 09/20/2024 0118     06/02/2016 0710    K 3.8 09/20/2024 0816    K 5.7 (H) 09/20/2024 0118    K 3.7 06/02/2016 0710    CO2 21 (L) 09/20/2024 0816    CO2 18 (L) 09/20/2024 0118    CO2 20 (L) 06/02/2016 0710    BUN 30.5 (H) 09/20/2024 0816    BUN 25.2 (H) 09/20/2024 0118    BUN 29 (H) 11/11/2016 0855    BUN 32 (H) 06/02/2016 0710    CREATININE 3.38 (H) 09/20/2024 0816    CREATININE 3.13 (H) 09/20/2024 0118    CREATININE 3.9 " (H) 11/11/2016 0855    CREATININE 3.8 (H) 06/02/2016 0710    CALCIUM 7.7 (L) 09/20/2024 0816    CALCIUM 8.7 09/20/2024 0118    CALCIUM 9.3 06/02/2016 0710    PHOS 4.4 09/20/2024 0118    PHOS 2.9 06/02/2016 0710            Component Value Date/Time    WBC 35.60 (H) 09/20/2024 0019    WBC 27.51 (H) 09/19/2024 0116    WBC 27.51 09/19/2024 0116    WBC 21.16 09/18/2024 0327    WBC 6.53 06/02/2016 0710    HGB 11.8 (L) 09/20/2024 0019    HGB 11.9 (L) 09/19/2024 0116    HGB 13.6 (L) 06/02/2016 0710    HCT 33.2 (L) 09/20/2024 0019    HCT 36.1 (L) 09/19/2024 0116    HCT 40.1 06/02/2016 0710     09/20/2024 0019     09/19/2024 0116     06/02/2016 0710             Assessment/Plan:    ESRD on HD - [place], says he has a TTS chair time in [place] and has been on dialysis for 7 years. Left AVF  Hyperkalemia   CHF - 15-20% EF on echo 9/10   NSTEMI - cardiology recommending cath at some point  COPD on 2L home O2--respiratory status is improving but still less than baseline   Prostate cancer s/p radiation  Anemia of chronic kidney disease      Recommendations:  Dialyzed yesterday on TTS schedule.  Did not tolerate ultrafiltration with dialysis due to hypotension.  Vasopressors had to be increased overnight.  No acute indication for off schedule hemodialysis today.  Plan to dialyze tomorrow on TTS schedule.  Continue Lokelma MWF for hyperkalemia.

## 2024-09-20 NOTE — PROGRESS NOTES
[facility] - [facility] ICU  Pulmonary Critical Care Note    Patient Name: Prem Saldana  MRN: [id number]  Admission Date: 9/10/2024  Hospital Length of Stay: 10 days  Code Status: Full Code  Attending Provider: Sin Gonsalez Jr., MD,*  Primary Care Provider: Tank Saenz MD     Subjective:     HPI: Prem Saldana is a 49 year old  male with a past medical history for tobacco use disorder, COPD on home oxygen, hypertension, hyperlipidemia, ESRD on HD, heart failure with reduced ejection fraction (EF 15-20%), prostate cancer status post radiation, who initially presented to [facility] ED (09/10/2024) due to chest pain and shortness of breath.     [facility] consulted for NSTEMI management. Attempted to perform LHC (09/14/2024) but was canceled prior to initiation of procedure due to patient becoming uncooperative per reports.     Hospital Course/Significant events:  9/13 - LHC attempted but aborted due to anatomy  9/14 - LHC canceled due to patient being uncooperative    24 Hour Interval History:  Febrile to 101 F last night.  Levophed requirements stable at 0.71.  Intubated and mechanically ventilated on volume control 550/20/8/0.3.  White blood cell count continues to rise now up to 35.  Potassium 5.7.  Underwent dialysis yesterday with 1500 mL removed.  No surgical intervention for his left lower extremity wound per General surgery.    Past Medical History:   Diagnosis Date    Anemia of renal disease     ESRD on dialysis since 4/3/15     Essential hypertension     Secondary hyperparathyroidism of renal origin        Past Surgical History:   Procedure Laterality Date    ANGIOGRAM, CORONARY, WITH LEFT HEART CATHETERIZATION N/A 9/13/2024    Procedure: Angiogram, Coronary, with Left Heart Cath;  Surgeon: Tank Scott Jr., MD;  Location: [facility] CATH LAB;  Service: Cardiology;  Laterality: N/A;    AV FISTULA PLACEMENT Left 07/2015    CENTRAL LINE  9/17/2024    Peritoneal Dialysis  Catheter Placement  01/2016    Permcath Placement                    Current Outpatient Medications   Medication Instructions    calcitRIOL (ROCALTROL) 0.25 mcg, Oral, 2 times daily    labetalol (NORMODYNE) 300 MG tablet No dose, route, or frequency recorded.    minoxidiL (LONITEN) 2.5 mg, Oral, 2 times daily    vacuum erection device system Kit 1 Units, Misc.(Non-Drug; Combo Route), As needed (PRN)       Current Inpatient Medications   aspirin  81 mg Oral Daily    busPIRone  5 mg Oral BID    calcitRIOL  0.25 mcg Oral BID    enoxparin  30 mg Subcutaneous Daily    famotidine (PF)  20 mg Intravenous Daily    sevelamer carbonate  800 mg Oral TID WM    sodium zirconium cyclosilicate  10 g Oral Every Mon, Wed, Fri       Current Intravenous Infusions   amiodarone in dextrose 5%  0.5 mg/min Intravenous Continuous 16.7 mL/hr at 09/20/24 0524 0.5 mg/min at 09/20/24 0524    NORepinephrine bitartrate-D5W  0-3 mcg/kg/min Intravenous Continuous 82.4 mL/hr at 09/20/24 0524 0.71 mcg/kg/min at 09/20/24 0524    propofoL  0-50 mcg/kg/min Intravenous Continuous 11.1 mL/hr at 09/20/24 0524 30 mcg/kg/min at 09/20/24 0524                Objective:       Intake/Output Summary (Last 24 hours) at 9/20/2024 0610  Last data filed at 9/20/2024 0537  Gross per 24 hour   Intake 5534.1 ml   Output 181 ml   Net 5353.1 ml         Vital Signs (Most Recent):  Temp: 99.3 °F (37.4 °C) (09/20/24 0400)  Pulse: (!) 125 (09/20/24 0600)  Resp: (!) 27 (09/20/24 0600)  BP: 92/66 (09/20/24 0600)  SpO2: 100 % (09/20/24 0600)  Body mass index is 18.89 kg/m².  Weight: 61.9 kg (136 lb 7.4 oz) Vital Signs (24h Range):  Temp:  [98.6 °F (37 °C)-101 °F (38.3 °C)] 99.3 °F (37.4 °C)  Pulse:  [116-131] 125  Resp:  [20-35] 27  SpO2:  [92 %-100 %] 100 %  BP: ()/(52-88) 92/66     Physical Exam  Vitals reviewed.   Constitutional:       Appearance: He is ill-appearing.      Comments: Intubated and sedated.  Synchronous with the ventilator.   Cardiovascular:      Rate  and Rhythm: Normal rate and regular rhythm.   Pulmonary:      Breath sounds: No wheezing or rales.      Comments: Mechanical breath sounds noted.  Abdominal:      General: Abdomen is flat.      Palpations: Abdomen is soft.   Genitourinary:     Comments: Scrotal edema noted  Musculoskeletal:      Right lower leg: No edema.      Left lower leg: No edema.   Neurological:      General: No focal deficit present.           Lines/Drains/Airways       Central Venous Catheter Line  Duration             Percutaneous Central Line - Triple Lumen  09/17/24 2030 Internal Jugular Left 2 days              Drain  Duration                  NG/OG Tube 09/17/24 2120 Center mouth 2 days              Airway  Duration                  Airway - Non-Surgical 09/17/24 2117 Endotracheal Tube 2 days              Peripheral Intravenous Line  Duration                  Hemodialysis AV Fistula 09/17/24 0701 Left forearm 2 days         Hemodialysis AV Fistula 09/17/24 0701 Left upper arm 2 days                    Significant Labs:    Lab Results   Component Value Date    WBC 35.60 (H) 09/20/2024    HGB 11.8 (L) 09/20/2024    HCT 33.2 (L) 09/20/2024    MCV 83.4 09/20/2024     09/20/2024           BMP  Lab Results   Component Value Date     (L) 09/20/2024    K 5.7 (H) 09/20/2024    CO2 18 (L) 09/20/2024    BUN 25.2 (H) 09/20/2024    CREATININE 3.13 (H) 09/20/2024    CALCIUM 8.7 09/20/2024    AGAP 19.0 09/20/2024    ESTGFRAFRICA 20.8 (A) 11/11/2016    EGFRNONAA 18.0 (A) 11/11/2016         ABG  Recent Labs   Lab 09/19/24  0705   PH 7.460*   PO2 201.0*   PCO2 37.0   HCO3 26.3*   POCBASEDEF 2.50       Mechanical Ventilation Support:  Vent Mode: VOLUME A/C (09/20/24 0514)  Set Rate: 20 BPM (09/20/24 0514)  Vt Set: 550 mL (09/20/24 0514)  PEEP/CPAP: 8 cmH20 (09/20/24 0514)  Oxygen Concentration (%): 30 (09/20/24 0514)  Peak Airway Pressure: 27 cmH20 (09/20/24 0514)  Total Ve: 12.8 L/m (09/20/24 0514)  F/VT Ratio<105 (RSBI): (!) 61.03 (09/20/24  0514)      Significant Imagin2024 CXR:  Changes of midline sternotomy, AICD in place, pulmonary vascular congestion    Chest CTA 9/10/24:   Patchy infiltrates throughout the left lung with a left basilar consolidation and small pleural effusion      Assessment/Plan:     Assessment  Shock, cardiogenic and septic  Fever  Leukocytosis  Heart failure with reduced ejection fraction (EF 15-20%)  ESRD on HD  Hyperlipidemia  COPD    Plan  Continue vasopressors and wean as tolerated. Add vasopressin to levophed.  Continue mechanical ventilation.  Daily SAT/SBT.  RASS goal 0 to -1.  Broaden antibiotics to vancomycin and Zosyn  Follow up on cultures. Check CXR and sputum culture.  Bedside ultrasound with no pocket for thoracentesis  Appreciate nephrology recommendations.  Continue dialysis T//S.  Appreciate cardiology recommendations.  Not currently planning for any procedures.  Continue amiodarone drip  Start bowel regimen with senna and MiraLax  Consult palliative care    DVT Prophylaxis: Lovenox  GI Prophylaxis: famotidine     This patient remains at high risk of decompensation and death and will remain in ICU level care.    35 minutes of critical care was time spent personally by me on the following activities: development of treatment plan with patient or surrogate and bedside caregivers, discussions with consultants, evaluation of patient's response to treatment, examination of patient, ordering and performing treatments and interventions, ordering and review of laboratory studies, ordering and review of radiographic studies, pulse oximetry, re-evaluation of patient's condition.  This critical care time did not overlap with that of any other provider or involve time for any procedures.    This note was generated via Dictaphone and may contain some voice recognition errors.       Aristeo Rodrigues MD  Pulmonary Critical Care Medicine  [facility] - [facility] ICU  DOS: 2024

## 2024-09-20 NOTE — CONSULTS
Patient Name: Prem Saldana   MRN: [id number]   Admission Date: 9/10/2024   Hospital Length of Stay: 10   Attending Provider: Sin Gonsalez Jr., MD,*   Consulting Provider: Lencho Xie M.D.  Reason for Consult: Goals of Care  Primary Care Physician: Tank Saenz MD     Principal Problem: <principal problem not specified>     Patient information was obtained from ER records and Cardiology    Final diagnoses:  [R06.02] Shortness of breath  [I50.9] Congestive heart failure, unspecified HF chronicity, unspecified heart failure type (Primary)  [R79.89] Elevated troponin  [N18.6, Z99.2] ESRD on dialysis  [R07.9] Chest pain, unspecified type     Goals of care review:    We reviewed the patient's current clinical status with the nurse. We reviewed clinical documentation, labs and imaging.     After reviewing the patient's clinical condition with RN, Cardiology NP and examining the patient, we attempted to contact son, Jimi and mother, Kristen, by phone. We left messages as well, but were unsuccessful to contact anyone.     If we are able to reach anyone by Monday, 9/23/24, we will review goals of care with family regarding the patient's current critical condition together with his underlying chronic comorbities. He is prognosis is very poor per his attending and consulting physicians.    Symptom review:    Unable to obtain due to LOC    Assessment and Plan:    Goals of care/ Counseling  Cardiogenic/ septic shock   ESRD on HD  Chronic systolic CHF, EF <20%  Acute hypoxic respiratory failure    Plan: see above goals review      History of Present Illness:     50 y/o male with a history of end-stage renal disease on 3 days a week dialysis as an outpatient, chronic systolic congestive heart failure with EF of less than 20%, COPD who was hospitalized with acute hypoxic respiratory failure initially requiring BiPAP.  The patient left heart catheterization to Cardiology, refused.  He subsequently find our  intubation and mechanical ventilation.  He has developed cardiogenic and septic shock for which he is currently requiring vasopressor agents rapid ventricular response in his requiring amiodarone drip.  We were consulted to review goals of care with patient and family      Active Ambulatory Problems     Diagnosis Date Noted    ESRD on dialysis since 4/3/15     Essential hypertension     Anemia of renal disease     Secondary hyperparathyroidism of renal origin     Organ transplant candidate 06/02/2016    Pre-transplant evaluation for chronic kidney disease 06/02/2016    Prostate cancer 12/08/2016    Cancer of prostate with high recurrence risk (stage T3a or [place] 8-10 or PSA > 20) 08/18/2017    Moderate malnutrition 09/18/2024     Resolved Ambulatory Problems     Diagnosis Date Noted    No Resolved Ambulatory Problems     No Additional Past Medical History        Past Surgical History:   Procedure Laterality Date    ANGIOGRAM, CORONARY, WITH LEFT HEART CATHETERIZATION N/A 9/13/2024    Procedure: Angiogram, Coronary, with Left Heart Cath;  Surgeon: Tank Scott Jr., MD;  Location: [facility] CATH LAB;  Service: Cardiology;  Laterality: N/A;    AV FISTULA PLACEMENT Left 07/2015    CENTRAL LINE  9/17/2024    Peritoneal Dialysis Catheter Placement  01/2016    Permcath Placement           Review of patient's allergies indicates:   Allergen Reactions    Hydralazine Palpitations and Other (See Comments)     Other Reaction(s): feels like he is running    Palpitations    Amlodipine     Levofloxacin           Current Facility-Administered Medications:     acetaminophen tablet 650 mg, 650 mg, Oral, Q8H PRN, Mary Carter PA-C, 650 mg at 09/19/24 2011    acetaminophen tablet 650 mg, 650 mg, Oral, Q4H PRN, Mary Carter PA-C, 650 mg at 09/15/24 1255    amiodarone 360 mg/200 mL (1.8 mg/mL) infusion, 0.5 mg/min, Intravenous, Continuous, Chun Giordano MD, Last Rate: 16.7 mL/hr at 09/20/24 1645, 0.5 mg/min at 09/20/24  1645    aspirin EC tablet 81 mg, 81 mg, Oral, Daily, Alfonzo Viviane, FNP, 81 mg at 24 0836    busPIRone tablet 5 mg, 5 mg, Oral, BID, Jeanna Mcmullen AGACNP-BC, 5 mg at 24 0814    calcitRIOL capsule 0.25 mcg, 0.25 mcg, Oral, BID, Holly Dudley MD, 0.25 mcg at 24 0814    dextrose 10% bolus 125 mL 125 mL, 12.5 g, Intravenous, PRN, Mary Carter, PA-C, Stopped at 24 1456    dextrose 10% bolus 250 mL 250 mL, 25 g, Intravenous, PRN, Mary Carter, PA-C, Stopped at 24 0047    [COMPLETED] dextrose 10% bolus 500 mL 500 mL, 50 g, Intravenous, Once, Stopped at 24 0353 **AND** dextrose 10% bolus 250 mL 250 mL, 25 g, Intravenous, PRN **AND** [COMPLETED] insulin regular injection 6 Units 0.06 mL, 0.1 Units/kg (Order-Specific), Intravenous, Once, Sheila Gavin MD, 6 Units at 24 0350    enoxaparin injection 30 mg, 30 mg, Subcutaneous, Daily, Mary Carter PA-C, 30 mg at 24 1654    etomidate injection, , Intravenous, Code/trauma/sedation Med, Rodolfo Gutierrez MD, 20 mg at 244    famotidine (PF) injection 20 mg, 20 mg, Intravenous, Daily, Chun Giordano MD, 20 mg at 24 0814    [] fentaNYL injection 50 mcg, 50 mcg, Intravenous, Q15 Min PRN **FOLLOWED BY** fentaNYL injection 50 mcg, 50 mcg, Intravenous, Q1H PRN, Chun Giordano MD, 50 mcg at 24 1900    glucagon (human recombinant) injection 1 mg, 1 mg, Intramuscular, PRN, Mary Carter PA-PAUL    glucose chewable tablet 16 g, 16 g, Oral, PRN, Mary Carter PA-C    glucose chewable tablet 24 g, 24 g, Oral, PRN, Mray Carter PA-PAUL    HYDROcodone-acetaminophen 7.5-325 mg per tablet 1 tablet, 1 tablet, Oral, Q4H PRN, Inocencio Mcmillan MD, 1 tablet at 24 0735    metoprolol injection 5 mg, 5 mg, Intravenous, Q5 Min PRN, Viviane Mejía FNP, 5 mg at 24 1716    NORepinephrine 32 mg in D5W 250 mL infusion, 0-3 mcg/kg/min, Intravenous, Continuous, Aristeo Rodrigues MD, Last  Rate: 12.2 mL/hr at 09/20/24 1645, 0.42 mcg/kg/min at 09/20/24 1645    ondansetron injection 4 mg, 4 mg, Intravenous, Q4H PRN, Mary Carter PA-C, 4 mg at 09/12/24 0254    piperacillin-tazobactam (ZOSYN) 4.5 g in D5W 100 mL IVPB (MB+), 4.5 g, Intravenous, Q12H, Sin Gonsalez Jr., MD, FCCP    polyethylene glycol packet 17 g, 17 g, Oral, BID, Aristeo Rodrigues MD, 17 g at 09/20/24 0814    propofol (DIPRIVAN) 10 mg/mL infusion, 0-50 mcg/kg/min, Intravenous, Continuous, Chun Giordano MD, Last Rate: 11.1 mL/hr at 09/20/24 1645, 30 mcg/kg/min at 09/20/24 1645    rocuronium injection, , Intravenous, Code/trauma/sedation Med, Rodolfo Gutierrez MD, 50 mg at 09/17/24 2107    senna-docusate 8.6-50 mg per tablet 2 tablet, 2 tablet, Oral, BID, Aristeo Rodrigues MD, 2 tablet at 09/20/24 0814    sevelamer carbonate tablet 800 mg, 800 mg, Oral, TID WM, Anisha Warren, ANP, 800 mg at 09/17/24 1716    simethicone chewable tablet 80 mg, 1 tablet, Oral, TID PRN, Jeanna Mcmullen AGACNP-BC, 80 mg at 09/11/24 2315    sodium chloride 0.9% flush 10 mL, 10 mL, Intravenous, Q12H PRN, Mary Carter PA-C    sodium zirconium cyclosilicate packet 10 g, 10 g, Oral, Every Mon, Wed, Fri, Anisha Warren, ANP, 10 g at 09/20/24 0814    Pharmacy to dose Vancomycin consult, , , Once **AND** vancomycin - pharmacy to dose, , Intravenous, pharmacy to manage frequency, Ephraim Yeh MD    vasopressin (PITRESSIN) 0.2 Units/mL in D5W 100 mL infusion, 0.04 Units/min, Intravenous, Continuous, Aristeo Rodrigues MD, Last Rate: 12 mL/hr at 09/20/24 1645, 0.04 Units/min at 09/20/24 1645       Current Facility-Administered Medications:     acetaminophen, 650 mg, Oral, Q8H PRN    acetaminophen, 650 mg, Oral, Q4H PRN    dextrose 10%, 12.5 g, Intravenous, PRN    dextrose 10%, 25 g, Intravenous, PRN    [COMPLETED] dextrose 10%, 50 g, Intravenous, Once **AND** dextrose 10%, 25 g, Intravenous, PRN **AND** [COMPLETED] insulin regular, 0.1 Units/kg  "(Order-Specific), Intravenous, Once    etomidate, , Intravenous, Code/trauma/sedation Med    [] fentaNYL, 50 mcg, Intravenous, Q15 Min PRN **FOLLOWED BY** fentaNYL, 50 mcg, Intravenous, Q1H PRN    glucagon (human recombinant), 1 mg, Intramuscular, PRN    glucose, 16 g, Oral, PRN    glucose, 24 g, Oral, PRN    HYDROcodone-acetaminophen, 1 tablet, Oral, Q4H PRN    metoprolol, 5 mg, Intravenous, Q5 Min PRN    ondansetron, 4 mg, Intravenous, Q4H PRN    rocuronium, , Intravenous, Code/trauma/sedation Med    simethicone, 1 tablet, Oral, TID PRN    sodium chloride 0.9%, 10 mL, Intravenous, Q12H PRN    Pharmacy to dose Vancomycin consult, , , Once **AND** vancomycin - pharmacy to dose, , Intravenous, pharmacy to manage frequency     Family History   Problem Relation Name Age of Onset    Diabetes Mother      Cancer Maternal Grandmother      Hypertension Maternal Grandfather      Heart attack Maternal Grandfather          MI in his 60s or 70s    Kidney disease Neg Hx          Review of Systems   Unable to perform ROS: Intubated          12 point review of systems conducted, negative except as stated in the history of present illness. See HPI for details.      Objective:   BP (!) 85/61   Pulse (!) 115   Temp 99.1 °F (37.3 °C) (Oral)   Resp (!) 26   Ht 5' 11.26" (1.81 m)   Wt 61.9 kg (136 lb 7.4 oz)   SpO2 98%   BMI 18.89 kg/m²      Physical Exam  Vitals reviewed.   Constitutional:       Appearance: He is ill-appearing and toxic-appearing.   HENT:      Right Ear: External ear normal.      Left Ear: External ear normal.      Mouth/Throat:      Mouth: Mucous membranes are moist.      Pharynx: Oropharynx is clear.   Eyes:      Extraocular Movements: Extraocular movements intact.      Conjunctiva/sclera: Conjunctivae normal.      Pupils: Pupils are equal, round, and reactive to light.   Cardiovascular:      Rate and Rhythm: Tachycardia present. Rhythm irregular.   Pulmonary:      Breath sounds: Rhonchi and rales " present.   Abdominal:      General: Abdomen is flat. Bowel sounds are normal. There is no distension.      Palpations: Abdomen is soft.      Tenderness: There is no abdominal tenderness.   Musculoskeletal:      Right lower leg: Edema present.      Left lower leg: Edema present.   Skin:     General: Skin is warm.   Neurological:      Mental Status: He is disoriented.            Review of Symptoms  Review of Symptoms      Symptom Assessment (ESAS 0-10 Scale)  Unable to complete assessment due to Intubated     CAM / Delirium:  Positive      Pain Assessment in Advanced Demential Scale (PAINAD)   Breathing - Independent of vocalization:  0  Negative vocalization:  0  Facial expression:  0  Body language:  0  Consolability:  0  Total:  0    Performance Status:  10    Living Arrangements:  Lives alone and Lives in home    Psychosocial/Cultural:   See Palliative Psychosocial Note: Yes  **Primary  to Follow**  Palliative Care  Consult: No      Advance Care Planning   Advance Directives:     Decision Making:  Patient unable to communicate due to disease severity/cognitive impairment                > 50% of 30 min of encounter was spent in chart review, face to face discussion of goals of care, symptom assessment, coordination of care and emotional support.     Lencho Xie M.D.  Palliative Medicine  [facility]

## 2024-09-20 NOTE — NURSING
Nurses Note -- 4 Eyes      9/20/2024   4:46 PM      Skin assessed during: Daily Assessment      [] No Altered Skin Integrity Present    [x]Prevention Measures Documented      [x] Yes- Altered Skin Integrity Present or Discovered   [] LDA Added if Not in Epic (Describe Wound)   [] New Altered Skin Integrity was Present on Admit and Documented in LDA   [] Wound Image Taken    Wound Care Consulted? Yes    Attending Nurse:  DOYLE Mendes     Second RN/Staff Member:  DOYLE Salazar

## 2024-09-20 NOTE — PLAN OF CARE
Problem: Hemodialysis  Goal: Safe, Effective Therapy Delivery  Outcome: Progressing  Goal: Effective Tissue Perfusion  Outcome: Progressing  Goal: Absence of Infection Signs and Symptoms  Outcome: Progressing     Problem: Adult Inpatient Plan of Care  Goal: Plan of Care Review  Outcome: Progressing  Goal: Patient-Specific Goal (Individualized)  Outcome: Progressing  Goal: Absence of Hospital-Acquired Illness or Injury  Outcome: Progressing  Goal: Optimal Comfort and Wellbeing  Outcome: Progressing  Goal: Readiness for Transition of Care  Outcome: Progressing     Problem: Skin Injury Risk Increased  Goal: Skin Health and Integrity  Outcome: Progressing     Problem: Infection  Goal: Absence of Infection Signs and Symptoms  Outcome: Progressing     Problem: Wound  Goal: Optimal Coping  Outcome: Progressing  Goal: Optimal Functional Ability  Outcome: Progressing  Goal: Absence of Infection Signs and Symptoms  Outcome: Progressing  Goal: Improved Oral Intake  Outcome: Progressing  Goal: Optimal Pain Control and Function  Outcome: Progressing  Goal: Skin Health and Integrity  Outcome: Progressing  Goal: Optimal Wound Healing  Outcome: Progressing     Problem: Coping Ineffective  Goal: Effective Coping  Outcome: Progressing

## 2024-09-20 NOTE — PLAN OF CARE
Found pt's cell phone in his backpack in his room.  DOYLE PRIETOwas able to speak to pt's nurse.  Mom is aware of facility name, number, room # where he is located.

## 2024-09-20 NOTE — PROGRESS NOTES
Pharmacist Renal Dose Adjustment Note    Prem Saldana is a 49 y.o. male being treated with the medication zosyn    Patient Data:    Vital Signs (Most Recent):  Temp: 99.3 °F (37.4 °C) (09/20/24 0400)  Pulse: (!) 125 (09/20/24 0600)  Resp: (!) 32 (09/20/24 1015)  BP: 92/66 (09/20/24 0600)  SpO2: 100 % (09/20/24 0600) Vital Signs (72h Range):  Temp:  [97.7 °F (36.5 °C)-102.6 °F (39.2 °C)]   Pulse:  []   Resp:  [0-36]   BP: ()/()   SpO2:  [61 %-100 %]      Recent Labs   Lab 09/20/24  0019 09/20/24  0118 09/20/24  0816   CREATININE 3.20* 3.13* 3.38*     Serum creatinine: 3.38 mg/dL (H) 09/20/24 0816  Estimated creatinine clearance: 23.1 mL/min (A)    Medication:zosyn dose: 4.5g frequency q8h will be changed to medication:zosyn dose:4.5g frequency:q12h per renal dose adjustment for HD    Pharmacist's Name: Page Yeh  Pharmacist's Extension: [phone]

## 2024-09-20 NOTE — PROGRESS NOTES
[facility]    Cardiology  Progress Note    Patient Name: Prem Saldana  MRN: [id number]  Admission Date: 9/10/2024  Hospital Length of Stay: 10 days  Code Status: Full Code   Attending Physician: Sin Gonsalez Jr., MD,*   Primary Care Physician: Tank Saenz MD  Expected Discharge Date:   Principal Problem:<principal problem not specified>    Subjective:   Brief HPI/Hospital Course:   Mr. Saldana is a 48 y/o male with a history CHF, HTN, ESRD on HD, who is unknown to [facility]. He presented to ER on 9.10.24 with complaints of shortness of breath. He reports he has a fall on 9.9.24 in which he hit his right side. He reports SOB started during the night. EMS reported he was tachypneic (breathing 55/min). He was given FD ASA and Sl Nitro x1. Significant labs include H&H 11.7/36.8, BUN/Creat 70.3/8.07, ALP 1061, Albumin 3.2, BNP 14.761, Troponin 0.554. CTA Chest negative for PE, but cardiomegaly with a trace left effusion, bibasilar atelectasis, mild interstitial edema and Chronic appearing compression deformities in the midthoracic spine. CXR shows poor inspiratory effort accentuates the pulmonary vascular markings, cardiomegaly, increased left retrocardiac density and silhouetting of the left hemidiaphragm, and calcified densities below the left hemidiaphragm. EKG shows sinus tachycardia with Left axis deviation, and LVH. [facility] has been consulted for NSTEMI management.       Hospital Course:  9.11.24: NAD. VSS. No complaints of Palps/SOB. ST on telemetry. Reports chest pain from desk falling on him. Reports back pain. H&H 10.3/32.7, Creat 6.10   9.12.24: NAD. VSS. Reports Chest Pain No complaints of SOB/Palps. H&H 11.4/35.6, K 6.1, Creat 8.02. In bed lying flat.   9.13.24: NAD. VSS. ST on telemetry. Denies SOB/Palps. H&H 12.7/39.8, K 5.6, BUN/Creat 44.1/6.46,   9.14.24: LHC cancelled due to patient uncooperative.   He is being treated for hyperkalemia. Labs pending. Currently denies  "pain  9.15.24: NAD. VSS.   9.16.24: NAD Noted. Sinus Tach on Tele. On Levophed support. Primary complaint noted to be leg pain. Legs are warm dry, dressing on LLE. Respiratory status stable. Had Runs of NSVT Yesterday afternoon, seemed to have resolved.  9.17.24: NAD. "I am good." No Further Runs of NSVT. ST. Denies CP, SOB and Palps. Levophed 0.4mcg/kg/min  9.18.24: NAD. Vented/Sedated. No Ectopy. Amiodarone 0.5mg/min. Levophed 0.07mcg/kg/min.   9.19.24: NAD. Vented/Sedated. Amiodarone 0.5mg/min, Levophed 0.5mcg/kg/min  9.20.24: NAD. Vented/Sedated. Amiodarone 0.5mg/min, Levophed 0.46mcg/kg/min, Vasopressin 0.04unit/min.     PMH: CHF, HTN, ESRD on HD, Anemia, Secondary Hyperparathyroidism   PSH: AV Fistula   Family History: Maternal Grandmother - Cancer, Heart Disease, MI; Mother - DM II  Social History: Current Smoker (2-3 Cigarette per Day). Reports occasionally alcohol. Reports past use of Marijuana.      Previous Cardiac Diagnostics:   ECHO (9.10.24):  Left Ventricle: The left ventricle is dilated. Increased wall thickness. There is concentric remodeling. Severe global hypokinesis present. There is severely reduced systolic function with a visually estimated ejection fraction of 15 - 20%. Unable to assess diastolic function due to atrial fibrillation. Elevated left ventricular filling pressure. Right Ventricle: Severe right ventricular enlargement. Systolic function is severely reduced.  Left Atrium: Left atrium is dilated. Right Atrium: Right atrium is dilated. Aortic Valve: The aortic valve is a trileaflet valve. Mildly calcified cusps. Mildly restricted motion. Aortic valve peak velocity is 1.85 m/s. Mean gradient is 8 mmHg. There is moderate aortic regurgitation with an eccentrically directed jet. Mitral Valve: Moderately thickened leaflets. There is moderate mitral annular calcification present. There is mild to moderate regurgitation. Tricuspid Valve: There is moderate regurgitation. The estimated PA " systolic pressure is at least 34 mmHg. Pulmonic Valve: There is mild to moderate regurgitation. IVC/SVC: Elevated venous pressure at 15 mmHg. Pericardium: There is a small effusion. No indication of cardiac tamponade.    ECHO (2.19.24):  A complete two-dimensional transthoracic echocardiogram was performed (2D, M-mode, Doppler and color flow Doppler). The left ventricle is severely dilated.   Left ventricular systolic function is severely reduced. Estimated Ejection Fraction = <20%. The right ventricle is mild to moderately dilated. The left atrium is severely dilated. The right atrium is mild to moderately dilated. The mitral valve leaflets appear thickened, but open well. There is moderate to severe mitral annular calcification. There is mild mitral regurgitation. There is mild tricuspid insufficiency noted. Mild aortic regurgitation. Dialated IVC 3.8 cm. The lack of respiratory variation in the inferior vena cava diameter is noted. There is no pericardial effusion. Large left pleural effusion.There is moderate concentric left ventricular hypertrophy.      SPECT (2.19.18):  The perfusion scan is free of evidence for myocardial ischemia or injury. Resting wall motion is physiologic. There is resting LV dysfunction with a reduced ejection fraction of 40 %. The ventricular volumes are normal at rest and stress. The extracardiac distribution of radioactivity is normal.      Dobutamine Stress Test (7.27.16);  Moderate left atrial enlargement. Concentric hypertrophy. Normal left ventricular systolic function (EF 60-65%). Left ventricular diastolic dysfunction. Normal right ventricular systolic function . The estimated PA systolic pressure is greater than 26 mmHg.  Mild mitral regurgitation. Small pericardial effusion.      Review of Systems   Unable to perform ROS: Intubated     Objective:     Vital Signs (Most Recent):  Temp: 98.7 °F (37.1 °C) (09/20/24 1200)  Pulse: (!) 116 (09/20/24 1315)  Resp: (!) 29 (09/20/24  1315)  BP: 102/72 (09/20/24 1315)  SpO2: 97 % (09/20/24 1315) Vital Signs (24h Range):  Temp:  [98.7 °F (37.1 °C)-101 °F (38.3 °C)] 98.7 °F (37.1 °C)  Pulse:  [114-131] 116  Resp:  [17-35] 29  SpO2:  [92 %-100 %] 97 %  BP: ()/(52-89) 102/72   Weight: 61.9 kg (136 lb 7.4 oz)  Body mass index is 18.89 kg/m².  SpO2: 97 %       Intake/Output Summary (Last 24 hours) at 9/20/2024 1339  Last data filed at 9/20/2024 1325  Gross per 24 hour   Intake 4550.78 ml   Output --   Net 4550.78 ml     Lines/Drains/Airways       Central Venous Catheter Line  Duration             Percutaneous Central Line - Triple Lumen  09/17/24 2030 Internal Jugular Left 2 days              Drain  Duration                  NG/OG Tube 09/17/24 2120 Center mouth 2 days              Airway  Duration                  Airway - Non-Surgical 09/17/24 2117 Endotracheal Tube 2 days              Peripheral Intravenous Line  Duration                  Hemodialysis AV Fistula 09/17/24 0701 Left forearm 3 days         Hemodialysis AV Fistula 09/17/24 0701 Left upper arm 3 days                  Significant Labs:   Chemistries:   Recent Labs   Lab 09/15/24  1749 09/15/24  2258 09/16/24  0534 09/16/24  1048 09/16/24  1714 09/17/24  0108 09/18/24  1515 09/18/24  2022 09/19/24  0109 09/19/24  2036 09/19/24  2140 09/20/24  0019 09/20/24  0118 09/20/24  0816 09/20/24  1107     --  133*  --   --    < > 135*   < > 129*   < > 136 135* 135* 133* 133*   K 5.1  --  5.4*  --   --    < > 3.4*   < > 5.5*   < > 4.1 3.7 5.7* 3.8 3.5   CL 97*  --  96*  --   --    < > 96*   < > 92*   < > 100 99 98 97* 94*   CO2 19*  --  22  --   --    < > 23   < > 21*   < > 20* 19* 18* 21* 21*   BUN 55.6*  --  67.2*  --   --    < > 24.3*   < > 31.8*   < > 24.0* 25.8* 25.2* 30.5* 32.4*   CREATININE 6.13*  --  6.65*  --   --    < > 3.05*   < > 4.06*   < > 3.08* 3.20* 3.13* 3.38* 3.43*   CALCIUM 8.2*  --  8.4  --   --    < > 9.4   < > 9.0   < > 8.8 8.6 8.7 7.7* 8.6   BILITOT  --   --  1.3   "--   --    < > 1.5  --  1.4  --   --   --  1.6*  --   --    ALKPHOS  --   --  493*  --   --    < > 371*  --  395*  --   --   --  424*  --   --    ALT  --   --  7  --   --    < > <5  --  6  --   --   --  7  --   --    AST  --   --  16  --   --    < > 13  --  19  --   --   --  25  --   --    GLUCOSE 75  --  68*  --   --    < > 108*   < > 133*   < > 128* 122* 104* 119* 123*   MG 1.70  --  2.20  --   --    < > 1.80  --  2.00  --   --   --  2.00  --   --    PHOS  --   --  5.6*  --   --    < > 3.8  --  5.2*  --   --   --  4.4  --   --    TROPONINI 0.445* 0.404* 0.465* 0.400* 0.474*  --   --   --   --   --   --   --   --   --   --     < > = values in this interval not displayed.        CBC/Anemia Labs: Coags:    Recent Labs   Lab 09/18/24  0327 09/19/24  0116 09/20/24  0019   WBC 21.16  21.16* 27.51  27.51* 35.60*   HGB 11.2* 11.9* 11.8*   HCT 33.0* 36.1* 33.2*    223 265   MCV 85.7 90.7 83.4   RDW 19.6* 20.4* 19.3*    No results for input(s): "PT", "INR", "APTT" in the last 168 hours.       Telemetry: ST    Physical Exam  Vitals reviewed.   Constitutional:       General: He is not in acute distress.     Appearance: He is ill-appearing.      Comments: Vented/Sedated   HENT:      Head: Normocephalic.      Mouth/Throat:      Mouth: Mucous membranes are dry.   Cardiovascular:      Rate and Rhythm: Regular rhythm. Tachycardia present.   Pulmonary:      Effort: Pulmonary effort is normal. No respiratory distress.      Comments: Ventilator Associated Breath Sounds  Vent Mode: A/C  Oxygen Concentration (%):  [30-40] 40  Resp Rate Total:  [21 br/min-32 br/min] 30 br/min  Vt Set:  [550 mL] 550 mL  PEEP/CPAP:  [8 cmH20] 8 cmH20  Mean Airway Pressure:  [11 jaH67-94 cmH20] 12 cmH20  Abdominal:      Palpations: Abdomen is soft.   Musculoskeletal:         General: Swelling present.      Right lower leg: No edema.      Left lower leg: No edema.      Comments: BLE Warm. Left AVF    Skin:     Comments: LLE Dressing in Place.  "   Neurological:      Comments: Vented/Sedated       Current Schedule Inpatient Medications:   aspirin  81 mg Oral Daily    busPIRone  5 mg Oral BID    calcitRIOL  0.25 mcg Oral BID    enoxparin  30 mg Subcutaneous Daily    famotidine (PF)  20 mg Intravenous Daily    piperacillin-tazobactam (Zosyn) IV (PEDS and ADULTS) (extended infusion is not appropriate)  4.5 g Intravenous Q12H    polyethylene glycol  17 g Oral BID    senna-docusate 8.6-50 mg  2 tablet Oral BID    sevelamer carbonate  800 mg Oral TID WM    sodium zirconium cyclosilicate  10 g Oral Every Mon, Wed, Fri     Continuous Infusions:   amiodarone in dextrose 5%  0.5 mg/min Intravenous Continuous 16.7 mL/hr at 09/20/24 1325 0.5 mg/min at 09/20/24 1325    NORepinephrine bitartrate-D5W  0-3 mcg/kg/min Intravenous Continuous 13.3 mL/hr at 09/20/24 1325 0.46 mcg/kg/min at 09/20/24 1325    propofoL  0-50 mcg/kg/min Intravenous Continuous 11.1 mL/hr at 09/20/24 1325 30 mcg/kg/min at 09/20/24 1325    vasopressin  0.04 Units/min Intravenous Continuous 12 mL/hr at 09/20/24 1325 0.04 Units/min at 09/20/24 1325       Assessment:   NSTEMI -  (Unspecified) in the Setting of Hypotensive Shock Requiring Vasopressor Support- Do not Suspect Cardiogenic Shock    - Troponin Trend Flat/No Overt Ischemia on EKG  Hypotensive Shock - ? 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%    - Normal SPECT (2.19.18)    - LHC Aborted on 9.13.24 due to Ascending Aorta/root angle from RR Approach & Agitation preventing Femoral Approach  NSVT (Monomorphic)    - On Amiodarone Infusion  Acute Respiratory Failure requiring Intubation/Ventilation   VHD    - Moderate to Severe Mitral Annular Calcification    - Mild MR/TR/AR  Sinus Tachycardia - Persistent  ESRD/HD     - on HD T,Thu,Sat   COPD    - On Chronic  Home Oxygen Support (2 L/Min)  Hyperkalemia (Treated per Nephrology)  History of Prostate Cancer    - Status Post Radiation  Lower Extremity Wound    - Febrile on  9.14.24  Anemia of Chronic Disease   Secondary Hyperparathyroidism   No known history of GI Bleed     Plan:   Wean Pressors for MAP > 65mmHg  Continue IV Amiodarone  Add GDMT as BP/HR Allows  Defer ACE/ARB/ARNI due to Hyperkalemia/Hypotension requiring Pressors   Fluid Management/HD per Nephrology Team  PT refusing Diagnostic Angiogram and Inotropes (Prior to being Intubated/Ventilated)  Consider Palliative Care Consultation for Goals of Care   Will Continue to Follow  Labs in AM: CBC, CMP and Mg     Of note, LHC attempted on 9.13.24, however procedure was aborted  from a right radial approach given angle of ascending aorta and aortic root. Femoral access not possible because of patient's agitation and respiratory status. Procedure was aborted.     Mian Carter, ANP  Cardiology  [facility]

## 2024-09-20 NOTE — PROGRESS NOTES
Pharmacokinetic Assessment Follow Up: IV Vancomycin    Vancomycin serum concentration assessment(s):    The random level was drawn correctly and can be used to guide therapy at this time. The measurement is above the desired definitive target range of 15 to 20 mcg/mL.    Vancomycin Regimen Plan:  No HD planned today, HD TTS  Hold Vanc dose today  Re-dose when the random level is less than 20 mcg/mL, next level to be drawn at 0100 on 9/21    Scheduled Administration Times        Drug levels (last 3 results):  Recent Labs   Lab Result Units 09/18/24  1140 09/19/24  0109 09/20/24  0118   Vancomycin Random ug/ml 21.5* 16.4 20.3*       Vancomycin Administrations:  vancomycin given in the last 96 hours                     vancomycin (VANCOCIN) 500 mg in D5W 100 mL IVPB (MB+) (mg) 500 mg New Bag 09/19/24 1727    vancomycin (VANCOCIN) 500 mg in D5W 100 mL IVPB (MB+) (mg) 500 mg New Bag 09/17/24 2133    vancomycin 1.25 g in dextrose 5% 250 mL IVPB (ready to mix) (mg) 1,250 mg New Bag 09/16/24 1323                    Pharmacy will continue to follow and monitor vancomycin.    Please contact pharmacy at extension [phone] for questions regarding this assessment.    Thank you for the consult,   Pgae Yeh       Patient brief summary:  Prem Saldana is a 49 y.o. male initiated on antimicrobial therapy with IV Vancomycin for treatment of sepsis    The patient's current regimen is dosing with HD TTS    Drug Allergies:   Review of patient's allergies indicates:   Allergen Reactions    Hydralazine Palpitations and Other (See Comments)     Other Reaction(s): feels like he is running    Palpitations    Amlodipine     Levofloxacin        Actual Body Weight:  Wt Readings from Last 1 Encounters:   09/14/24 61.9 kg (136 lb 7.4 oz)       Renal Function:   Estimated Creatinine Clearance: 23.1 mL/min (A) (based on SCr of 3.38 mg/dL (H)).,     Dialysis Method (if applicable):  intermittent HD TTS    CBC (last 72 hours):  Recent Labs    Lab Result Units 09/17/24 2043 09/18/24  0327 09/19/24  0116 09/20/24  0019   WBC x10(3)/mcL 17.87  17.87* 21.16  21.16* 27.51  27.51* 35.60*   Hgb g/dL 11.9* 11.2* 11.9* 11.8*   Hct % 34.6* 33.0* 36.1* 33.2*   Platelet x10(3)/mcL 255 293 223 265   Mono % %  --   --   --  4.8   Monocytes % % 6 10 3  --    Eos % %  --   --   --  0.1   Basophil % %  --   --   --  0.9       Metabolic Panel (last 72 hours):  Recent Labs   Lab Result Units 09/17/24 2043 09/17/24  2255 09/18/24  0243 09/18/24  0933 09/18/24  1140 09/18/24  1515 09/18/24 2022 09/19/24  0109 09/19/24  0705 09/19/24  2036 09/19/24  2140 09/20/24  0019 09/20/24  0118 09/20/24  0735 09/20/24  0816   Sodium mmol/L 132*  --  132* 129* 128* 135* 133* 129*  --  137 136 135* 135*  --  133*   Sodium, Blood Gas mmol/L  --  127*  --   --   --   --   --   --  123*  --   --   --   --  128*  --    Potassium mmol/L 4.8  --  5.8* 6.9* 4.9 3.4* 5.4* 5.5*  --  4.3 4.1 3.7 5.7*  --  3.8   Potassium, Blood Gas mmol/L  --  4.6  --   --   --   --   --   --  3.9  --   --   --   --  3.0*  --    Chloride mmol/L 94*  --  96* 96* 93* 96* 93* 92*  --  98 100 99 98  --  97*   CO2 mmol/L 20*  --  18* 14* 19* 23 20* 21*  --  20* 20* 19* 18*  --  21*   Glucose mg/dL 82  --  102* 89 108* 108* 83 133*  --  115* 128* 122* 104*  --  119*   Blood Urea Nitrogen mg/dL 50.6*  --  49.6* 50.3* 51.4* 24.3* 31.8* 31.8*  --  23.4* 24.0* 25.8* 25.2*  --  30.5*   Creatinine mg/dL 5.58*  --  5.65* 5.62* 5.85* 3.05* 4.14* 4.06*  --  3.17* 3.08* 3.20* 3.13*  --  3.38*   Albumin g/dL 2.5*  --  2.2*  --   --  2.3*  --  2.2*  --   --   --   --  1.9*  --   --    Bilirubin Total mg/dL 1.7*  --  1.3  --   --  1.5  --  1.4  --   --   --   --  1.6*  --   --    ALP unit/L 489*  --  458*  --   --  371*  --  395*  --   --   --   --  424*  --   --    AST unit/L 14  --  20  --   --  13  --  19  --   --   --   --  25  --   --    ALT unit/L 9  --  8  --   --  <5  --  6  --   --   --   --  7  --   --    Magnesium  Level mg/dL 2.10  --  2.20  --   --  1.80  --  2.00  --   --   --   --  2.00  --   --    Phosphorus Level mg/dL 5.1*  --  5.9*  --   --  3.8  --  5.2*  --   --   --   --  4.4  --   --        Microbiologic Results:  Microbiology Results (last 7 days)       Procedure Component Value Units Date/Time    Respiratory Culture [[id number]]     Order Status: Sent Specimen: Sputum     Blood Culture [[id number]]  (Normal) Collected: 09/17/24 2001    Order Status: Completed Specimen: Blood, Venous Updated: 09/19/24 2100     Blood Culture No Growth At 48 Hours    Blood Culture [[id number]]  (Normal) Collected: 09/17/24 2001    Order Status: Completed Specimen: Blood, Venous Updated: 09/19/24 2100     Blood Culture No Growth At 48 Hours    Blood Culture [[id number]] Collected: 09/19/24 1227    Order Status: Resulted Specimen: Blood, Venous Updated: 09/19/24 1356    Blood Culture [[id number]] Collected: 09/19/24 1231    Order Status: Resulted Specimen: Blood, Venous Updated: 09/19/24 1356    Blood Culture [[id number]]     Order Status: Canceled Specimen: Blood     Blood Culture [[id number]]     Order Status: Canceled Specimen: Blood     Blood Culture #1 **CANNOT BE ORDERED STAT** [[id number]]  (Normal) Collected: 09/10/24 0654    Order Status: Completed Specimen: Blood Updated: 09/15/24 0901     Blood Culture No Growth at 5 days    Blood Culture #2 **CANNOT BE ORDERED STAT** [[id number]]  (Normal) Collected: 09/10/24 0654    Order Status: Completed Specimen: Blood Updated: 09/15/24 0901     Blood Culture No Growth at 5 days

## 2024-09-21 NOTE — NURSING
Nurses Note -- 4 Eyes      9/21/2024   4:55 AM      Skin assessed during: Daily Assessment      [] No Altered Skin Integrity Present    [x]Prevention Measures Documented      [x] Yes- Altered Skin Integrity Present or Discovered   [] LDA Added if Not in Epic (Describe Wound)   [] New Altered Skin Integrity was Present on Admit and Documented in LDA   [] Wound Image Taken    Wound Care Consulted? Yes    Attending Nurse:  Juventino Sena RN/Staff Member:  DOYLE Swanson

## 2024-09-21 NOTE — PROGRESS NOTES
[facility]    Cardiology  Progress Note    Patient Name: Prem Saldana  MRN: [id number]  Admission Date: 9/10/2024  Hospital Length of Stay: 11 days  Code Status: Full Code   Attending Physician: Sin Gonsalez Jr., MD,*   Primary Care Physician: Tank Saenz MD  Expected Discharge Date:   Principal Problem:<principal problem not specified>    Subjective:   Brief HPI/Hospital Course:   Mr. Saldana is a 50 y/o male with a history CHF, HTN, ESRD on HD, who is unknown to [facility]. He presented to ER on 9.10.24 with complaints of shortness of breath. He reports he has a fall on 9.9.24 in which he hit his right side. He reports SOB started during the night. EMS reported he was tachypneic (breathing 55/min). He was given FD ASA and Sl Nitro x1. Significant labs include H&H 11.7/36.8, BUN/Creat 70.3/8.07, ALP 1061, Albumin 3.2, BNP 14.761, Troponin 0.554. CTA Chest negative for PE, but cardiomegaly with a trace left effusion, bibasilar atelectasis, mild interstitial edema and Chronic appearing compression deformities in the midthoracic spine. CXR shows poor inspiratory effort accentuates the pulmonary vascular markings, cardiomegaly, increased left retrocardiac density and silhouetting of the left hemidiaphragm, and calcified densities below the left hemidiaphragm. EKG shows sinus tachycardia with Left axis deviation, and LVH. [facility] has been consulted for NSTEMI management.       Hospital Course:  9.11.24: NAD. VSS. No complaints of Palps/SOB. ST on telemetry. Reports chest pain from desk falling on him. Reports back pain. H&H 10.3/32.7, Creat 6.10   9.12.24: NAD. VSS. Reports Chest Pain No complaints of SOB/Palps. H&H 11.4/35.6, K 6.1, Creat 8.02. In bed lying flat.   9.13.24: NAD. VSS. ST on telemetry. Denies SOB/Palps. H&H 12.7/39.8, K 5.6, BUN/Creat 44.1/6.46,   9.14.24: LHC cancelled due to patient uncooperative.   He is being treated for hyperkalemia. Labs pending. Currently denies  "pain  9.15.24: NAD. VSS.   9.16.24: NAD Noted. Sinus Tach on Tele. On Levophed support. Primary complaint noted to be leg pain. Legs are warm dry, dressing on LLE. Respiratory status stable. Had Runs of NSVT Yesterday afternoon, seemed to have resolved.  9.17.24: NAD. "I am good." No Further Runs of NSVT. ST. Denies CP, SOB and Palps. Levophed 0.4mcg/kg/min  9.18.24: NAD. Vented/Sedated. No Ectopy. Amiodarone 0.5mg/min. Levophed 0.07mcg/kg/min.   9.19.24: NAD. Vented/Sedated. Amiodarone 0.5mg/min, Levophed 0.5mcg/kg/min  9.20.24: NAD. Vented/Sedated. Amiodarone 0.5mg/min, Levophed 0.46mcg/kg/min, Vasopressin 0.04unit/min.   9.21.24: NAD. Vented/Sedated. Vasopressin 0.04units/min, Levophed 0.44mcg/kg/min, Amiodarone 0.4mg/min. WBC 29.78    PMH: CHF, HTN, ESRD on HD, Anemia, Secondary Hyperparathyroidism   PSH: AV Fistula   Family History: Maternal Grandmother - Cancer, Heart Disease, MI; Mother - DM II  Social History: Current Smoker (2-3 Cigarette per Day). Reports occasionally alcohol. Reports past use of Marijuana.      Previous Cardiac Diagnostics:   ECHO (9.10.24):  Left Ventricle: The left ventricle is dilated. Increased wall thickness. There is concentric remodeling. Severe global hypokinesis present. There is severely reduced systolic function with a visually estimated ejection fraction of 15 - 20%. Unable to assess diastolic function due to atrial fibrillation. Elevated left ventricular filling pressure. Right Ventricle: Severe right ventricular enlargement. Systolic function is severely reduced.  Left Atrium: Left atrium is dilated. Right Atrium: Right atrium is dilated. Aortic Valve: The aortic valve is a trileaflet valve. Mildly calcified cusps. Mildly restricted motion. Aortic valve peak velocity is 1.85 m/s. Mean gradient is 8 mmHg. There is moderate aortic regurgitation with an eccentrically directed jet. Mitral Valve: Moderately thickened leaflets. There is moderate mitral annular calcification " present. There is mild to moderate regurgitation. Tricuspid Valve: There is moderate regurgitation. The estimated PA systolic pressure is at least 34 mmHg. Pulmonic Valve: There is mild to moderate regurgitation. IVC/SVC: Elevated venous pressure at 15 mmHg. Pericardium: There is a small effusion. No indication of cardiac tamponade.    ECHO (2.19.24):  A complete two-dimensional transthoracic echocardiogram was performed (2D, M-mode, Doppler and color flow Doppler). The left ventricle is severely dilated.   Left ventricular systolic function is severely reduced. Estimated Ejection Fraction = <20%. The right ventricle is mild to moderately dilated. The left atrium is severely dilated. The right atrium is mild to moderately dilated. The mitral valve leaflets appear thickened, but open well. There is moderate to severe mitral annular calcification. There is mild mitral regurgitation. There is mild tricuspid insufficiency noted. Mild aortic regurgitation. Dialated IVC 3.8 cm. The lack of respiratory variation in the inferior vena cava diameter is noted. There is no pericardial effusion. Large left pleural effusion.There is moderate concentric left ventricular hypertrophy.      SPECT (2.19.18):  The perfusion scan is free of evidence for myocardial ischemia or injury. Resting wall motion is physiologic. There is resting LV dysfunction with a reduced ejection fraction of 40 %. The ventricular volumes are normal at rest and stress. The extracardiac distribution of radioactivity is normal.      Dobutamine Stress Test (7.27.16);  Moderate left atrial enlargement. Concentric hypertrophy. Normal left ventricular systolic function (EF 60-65%). Left ventricular diastolic dysfunction. Normal right ventricular systolic function . The estimated PA systolic pressure is greater than 26 mmHg.  Mild mitral regurgitation. Small pericardial effusion.      Review of Systems   Unable to perform ROS: Intubated     Objective:     Vital Signs  (Most Recent):  Temp: 98.8 °F (37.1 °C) (09/21/24 0800)  Pulse: (!) 112 (09/21/24 1000)  Resp: (!) 31 (09/21/24 1030)  BP: 94/70 (09/21/24 1000)  SpO2: 100 % (09/21/24 1000) Vital Signs (24h Range):  Temp:  [98.8 °F (37.1 °C)-100.1 °F (37.8 °C)] 98.8 °F (37.1 °C)  Pulse:  [107-130] 112  Resp:  [22-35] 31  SpO2:  [95 %-100 %] 100 %  BP: ()/(54-89) 94/70   Weight: 61.9 kg (136 lb 7.4 oz)  Body mass index is 18.89 kg/m².  SpO2: 100 %       Intake/Output Summary (Last 24 hours) at 9/21/2024 1210  Last data filed at 9/21/2024 0554  Gross per 24 hour   Intake 2014.25 ml   Output --   Net 2014.25 ml     Lines/Drains/Airways       Central Venous Catheter Line  Duration             Percutaneous Central Line - Triple Lumen  09/17/24 2030 Internal Jugular Left 3 days              Drain  Duration                  NG/OG Tube 09/17/24 2120 Center mouth 3 days              Airway  Duration                  Airway - Non-Surgical 09/17/24 2117 Endotracheal Tube 3 days              Peripheral Intravenous Line  Duration                  Hemodialysis AV Fistula 09/17/24 0701 Left forearm 4 days         Hemodialysis AV Fistula 09/17/24 0701 Left upper arm 4 days                  Significant Labs:   Chemistries:   Recent Labs   Lab 09/15/24  1749 09/15/24  2258 09/16/24  0534 09/16/24  1048 09/16/24  1714 09/17/24  0108 09/19/24  0109 09/19/24  2036 09/20/24  0118 09/20/24  0816 09/20/24  1107 09/20/24  1921 09/20/24  2332 09/21/24  0410     --  133*  --   --    < > 129*   < > 135* 133* 133* 131* 130* 131*   K 5.1  --  5.4*  --   --    < > 5.5*   < > 5.7* 3.8 3.5 4.0 4.8 4.6   CL 97*  --  96*  --   --    < > 92*   < > 98 97* 94* 94* 93* 92*   CO2 19*  --  22  --   --    < > 21*   < > 18* 21* 21* 18* 20* 19*   BUN 55.6*  --  67.2*  --   --    < > 31.8*   < > 25.2* 30.5* 32.4* 37.6* 39.4* 43.0*   CREATININE 6.13*  --  6.65*  --   --    < > 4.06*   < > 3.13* 3.38* 3.43* 3.72* 3.90* 4.07*   CALCIUM 8.2*  --  8.4  --   --    < >  "9.0   < > 8.7 7.7* 8.6 7.9* 8.5 8.3*   BILITOT  --   --  1.3  --   --    < > 1.4  --  1.6*  --   --   --   --  2.3*   ALKPHOS  --   --  493*  --   --    < > 395*  --  424*  --   --   --   --  386*   ALT  --   --  7  --   --    < > 6  --  7  --   --   --   --  7   AST  --   --  16  --   --    < > 19  --  25  --   --   --   --  19   GLUCOSE 75  --  68*  --   --    < > 133*   < > 104* 119* 123* 105* 133* 109*   MG 1.70  --  2.20  --   --    < > 2.00  --  2.00  --   --   --   --  2.10   PHOS  --   --  5.6*  --   --    < > 5.2*  --  4.4  --   --   --   --  4.5   TROPONINI 0.445* 0.404* 0.465* 0.400* 0.474*  --   --   --   --   --   --   --   --   --     < > = values in this interval not displayed.        CBC/Anemia Labs: Coags:    Recent Labs   Lab 09/19/24  0116 09/20/24  0019 09/21/24  0410   WBC 27.51  27.51* 35.60* 29.78  29.78*   HGB 11.9* 11.8* 11.3*   HCT 36.1* 33.2* 30.5*    265 216   MCV 90.7 83.4 78.6*   RDW 20.4* 19.3* 19.4*    No results for input(s): "PT", "INR", "APTT" in the last 168 hours.       Telemetry: ST    Physical Exam  Vitals reviewed.   Constitutional:       General: He is not in acute distress.     Appearance: He is ill-appearing.      Comments: Vented/Sedated   HENT:      Head: Normocephalic.      Mouth/Throat:      Mouth: Mucous membranes are dry.   Cardiovascular:      Rate and Rhythm: Regular rhythm. Tachycardia present.   Pulmonary:      Effort: Pulmonary effort is normal. No respiratory distress.      Comments: Ventilator Associated Breath Sounds  Vent Mode: A/C  Oxygen Concentration (%):  [30-40] 40  Resp Rate Total:  [22 br/min-34 br/min] 31 br/min  Vt Set:  [550 mL] 550 mL  PEEP/CPAP:  [5 cmH20-8 cmH20] 8 cmH20  Mean Airway Pressure:  [11 qsQ96-23 cmH20] 13 cmH20  Abdominal:      Palpations: Abdomen is soft.   Musculoskeletal:         General: No swelling.      Right lower leg: No edema.      Left lower leg: No edema.      Comments: BLE Warm. Left AVF    Skin:     Comments: LLE " Dressing in Place.    Neurological:      Comments: Vented/Sedated       Current Schedule Inpatient Medications:   aspirin  81 mg Per NG tube Daily    busPIRone  5 mg Oral BID    calcitRIOL  0.25 mcg Oral BID    enoxparin  30 mg Subcutaneous Daily    famotidine (PF)  20 mg Intravenous Daily    polyethylene glycol  17 g Oral BID    senna-docusate 8.6-50 mg  2 tablet Oral BID    sevelamer carbonate  800 mg Oral TID WM    sodium zirconium cyclosilicate  10 g Oral Every Mon, Wed, Fri    vancomycin (VANCOCIN) IV (PEDS and ADULTS)  750 mg Intravenous Once     Continuous Infusions:   amiodarone in dextrose 5%  0.5 mg/min Intravenous Continuous 16.7 mL/hr at 09/21/24 0329 0.5 mg/min at 09/21/24 0329    NORepinephrine bitartrate-D5W  0-3 mcg/kg/min Intravenous Continuous 12.8 mL/hr at 09/21/24 0233 0.44 mcg/kg/min at 09/21/24 0233    propofoL  0-50 mcg/kg/min Intravenous Continuous 7.4 mL/hr at 09/21/24 1055 20 mcg/kg/min at 09/21/24 1055    vasopressin  0.04 Units/min Intravenous Continuous 12 mL/hr at 09/21/24 0327 0.04 Units/min at 09/21/24 0327       Assessment:   NSTEMI -  (Unspecified) in the Setting of Hypotensive Shock Requiring Vasopressor Support- Do not Suspect Cardiogenic Shock    - Troponin Trend Flat/No Overt Ischemia on EKG  Hypotensive Shock - ? 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%    - Normal SPECT (2.19.18)    - LHC Aborted on 9.13.24 due to Ascending Aorta/root angle from RR Approach & Agitation preventing Femoral Approach  NSVT (Monomorphic)    - On Amiodarone Infusion  Acute Respiratory Failure requiring Intubation/Ventilation   VHD    - Moderate to Severe Mitral Annular Calcification    - Mild MR/TR/AR  Sinus Tachycardia - Persistent  ESRD/HD     - on HD TTS  COPD    - On Chronic Home Oxygen Support (2  L/Min)  Hyperkalemia (Treated per Nephrology)  History of Prostate Cancer    - Status Post Radiation  Lower Extremity Wound    - Febrile on  9.14.24  Anemia of Chronic Disease   Secondary Hyperparathyroidism   No known history of GI Bleed     Plan:   Wean Pressors for MAP > 65mmHg  Continue IV Amiodarone   Add GDMT as BP/HR Allows  Defer ACE/ARB/ARNI due to Hyperkalemia/Hypotension requiring Pressors   Fluid Management/HD per Nephrology Team  PT Recently refusing Diagnostic Angiogram and Inotropes (Prior to being Intubated/Ventilated)  Palliative Care Consultation for Goals of Care - In Progress  Will Continue to Follow  Labs in AM: CBC, CMP and Mg     Of note, LHC attempted on 9.13.24, however procedure was aborted from a right radial approach given angle of ascending aorta and aortic root. Femoral access not possible because of patient's agitation and respiratory status. Procedure was aborted.     Mian Carter, ANP  Cardiology  [facility]

## 2024-09-21 NOTE — PLAN OF CARE
Problem: Hemodialysis  Goal: Safe, Effective Therapy Delivery  Outcome: Progressing  Goal: Effective Tissue Perfusion  Outcome: Progressing  Goal: Absence of Infection Signs and Symptoms  Outcome: Progressing     Problem: Adult Inpatient Plan of Care  Goal: Plan of Care Review  Outcome: Progressing  Goal: Patient-Specific Goal (Individualized)  Outcome: Progressing  Goal: Absence of Hospital-Acquired Illness or Injury  Outcome: Progressing  Goal: Optimal Comfort and Wellbeing  Outcome: Progressing  Goal: Readiness for Transition of Care  Outcome: Progressing     Problem: Skin Injury Risk Increased  Goal: Skin Health and Integrity  Outcome: Progressing     Problem: Infection  Goal: Absence of Infection Signs and Symptoms  Outcome: Progressing     Problem: Wound  Goal: Optimal Coping  Outcome: Progressing  Goal: Absence of Infection Signs and Symptoms  Outcome: Progressing  Goal: Improved Oral Intake  Outcome: Progressing  Goal: Optimal Pain Control and Function  Outcome: Progressing  Goal: Skin Health and Integrity  Outcome: Progressing  Goal: Optimal Wound Healing  Outcome: Progressing     Problem: Coping Ineffective  Goal: Effective Coping  Outcome: Progressing     Problem: Fall Injury Risk  Goal: Absence of Fall and Fall-Related Injury  Outcome: Progressing

## 2024-09-21 NOTE — NURSING
09/21/24 1410   Vital Signs   BP 91/67        Hemodialysis AV Fistula 09/17/24 0701 Left forearm   Placement Date/Time: (c) 09/17/24 0701   Present Prior to Hospital Arrival?: Yes  Location: Left forearm  Additional Comments: good bruit and thrill noted no bleeding from site noted at this time   Needle Size 15ga   Site Assessment Clean;Dry;Intact;No redness;No swelling;No warmth;No drainage   Patency Present;Thrill;Bruit   Status Deaccessed   Flows Good   Dressing Intervention Integrity maintained   Dressing Status Clean;Dry;Intact   Site Condition No complications   Dressing Gauze   Post-Hemodialysis Assessment   Blood Volume Processed (Liters) 59.5 L   Dialyzer Clearance Heavily streaked   Duration of Treatment 180 minutes   Total UF (mL) 1000 mL   Patient Response to Treatment PT responded to tx ok. Low BP throughout tx, MAP >60. NP Phil aware.   Post-Hemodialysis Comments 3hr tx, 1L net removed per NP Phil. AVF worked well. Pressure applied to AVF until hemostasis achieved. Post tx VS at 1435 (after hemostasis): BP-91/68, HR-105, resp-24, temp-98.2 (oral)     Total UF: 1500mL  Net UF: 1000mL

## 2024-09-21 NOTE — PROGRESS NOTES
NEPHROLOGY PROGRESS NOTE      Patient Demographics:  Name:  Prem Saldana  Age: 49 y.o.  MRN:  [id number]  Admission Date: 9/10/2024      Subjective:      Remains sedated on vent    Dyssynchrony with vent on exam      HD planned today    Current Facility-Administered Medications   Medication Dose Route Frequency Provider Last Rate Last Admin    acetaminophen tablet 650 mg  650 mg Oral Q8H PRN Mary Carter PA-C   650 mg at 09/19/24 2011    acetaminophen tablet 650 mg  650 mg Oral Q4H PRN Mary Carter PA-C   650 mg at 09/15/24 1255    amiodarone 360 mg/200 mL (1.8 mg/mL) infusion  0.5 mg/min Intravenous Continuous Chun Giordano MD 16.7 mL/hr at 09/21/24 0329 0.5 mg/min at 09/21/24 0329    aspirin chewable tablet 81 mg  81 mg Per NG tube Daily Sin Gonsalez Jr., MD, FCCP   81 mg at 09/21/24 0901    busPIRone tablet 5 mg  5 mg Oral BID Jeanna Mcmullen AGACNP-BC   5 mg at 09/21/24 0901    calcitRIOL capsule 0.25 mcg  0.25 mcg Oral BID Holly Dudley MD   0.25 mcg at 09/21/24 0901    dextrose 10% bolus 125 mL 125 mL  12.5 g Intravenous PRN Mary Carter PA-C   Stopped at 09/17/24 1456    dextrose 10% bolus 250 mL 250 mL  25 g Intravenous PRN Mary Carter PA-C   Stopped at 09/19/24 0047    enoxaparin injection 30 mg  30 mg Subcutaneous Daily Mary Carter PA-C   30 mg at 09/20/24 1654    etomidate injection   Intravenous Code/trauma/sedation Med Rodolfo Gutierrez MD   20 mg at 09/17/24 2104    famotidine (PF) injection 20 mg  20 mg Intravenous Daily Chun Giordano MD   20 mg at 09/21/24 0901    fentaNYL injection 50 mcg  50 mcg Intravenous Q1H PRN Chun Giordano MD   50 mcg at 09/18/24 1900    glucagon (human recombinant) injection 1 mg  1 mg Intramuscular PRN Mary Carter PA-C        glucose chewable tablet 16 g  16 g Oral PRN Mary Carter PA-PAUL        glucose chewable tablet 24 g  24 g Oral PRN Mary Carter PA-C        HYDROcodone-acetaminophen 7.5-325 mg per  tablet 1 tablet  1 tablet Oral Q4H PRN Inocencio Mcmillan MD   1 tablet at 09/17/24 0735    metoprolol injection 5 mg  5 mg Intravenous Q5 Min PRN Viviane Mejía FNP   5 mg at 09/17/24 1716    NORepinephrine 32 mg in D5W 250 mL infusion  0-3 mcg/kg/min Intravenous Continuous Aristeo Rodrigues MD 12.8 mL/hr at 09/21/24 0233 0.44 mcg/kg/min at 09/21/24 0233    ondansetron injection 4 mg  4 mg Intravenous Q4H PRN Mary Carter PA-C   4 mg at 09/12/24 0254    piperacillin-tazobactam (ZOSYN) 4.5 g in D5W 100 mL IVPB (MB+)  4.5 g Intravenous Q12H Sin Gonsalez Jr., MD, FCCP 25 mL/hr at 09/21/24 0740 4.5 g at 09/21/24 0740    polyethylene glycol packet 17 g  17 g Oral BID Aristeo Rodrigues MD   17 g at 09/20/24 0814    propofol (DIPRIVAN) 10 mg/mL infusion  0-50 mcg/kg/min Intravenous Continuous Cuhn Giordano MD 11.1 mL/hr at 09/21/24 0010 30 mcg/kg/min at 09/21/24 0010    rocuronium injection   Intravenous Code/trauma/sedation Med Rodolfo Gutierrez MD   50 mg at 09/17/24 2107    senna-docusate 8.6-50 mg per tablet 2 tablet  2 tablet Oral BID Aristeo Rodrigues MD   2 tablet at 09/20/24 0814    sevelamer carbonate tablet 800 mg  800 mg Oral TID Anisha Cornell ANP   800 mg at 09/17/24 1716    simethicone chewable tablet 80 mg  1 tablet Oral TID PRN Jeanna Mcmullen AGACNP-BC   80 mg at 09/11/24 2315    sodium chloride 0.9% flush 10 mL  10 mL Intravenous Q12H PRN Mary Carter PA-C        sodium zirconium cyclosilicate packet 10 g  10 g Oral Every Mon, Wed, Fri Anisha Warren, ANP   10 g at 09/20/24 0814    vancomycin - pharmacy to dose   Intravenous pharmacy to manage frequency Ephraim Yeh MD        vancomycin 750 mg in dextrose 5 % 250 mL IVPB (ready to mix)  750 mg Intravenous Once Sin Gonsalez Jr., MD, FCCP        vasopressin (PITRESSIN) 0.2 Units/mL in D5W 100 mL infusion  0.04 Units/min Intravenous Continuous Aristeo Rodrigues MD 12 mL/hr at 09/21/24 0327 0.04 Units/min at 09/21/24 0327  "          Review of Systems   Unable to perform ROS: Intubated         Objective:    BP 90/71   Pulse (!) 111   Temp 98.8 °F (37.1 °C)   Resp (!) 34   Ht 5' 11.26" (1.81 m)   Wt 61.9 kg (136 lb 7.4 oz)   SpO2 96%   BMI 18.89 kg/m²       Intake/Output Summary (Last 24 hours) at 9/21/2024 0926  Last data filed at 9/21/2024 0554  Gross per 24 hour   Intake 2731.94 ml   Output --   Net 2731.94 ml             Physical Exam  Vitals reviewed.   Constitutional:       Appearance: Normal appearance.   HENT:      Head: Normocephalic and atraumatic.      Nose: Nose normal.   Cardiovascular:      Rate and Rhythm: Normal rate and regular rhythm.      Pulses: Normal pulses.      Heart sounds: Normal heart sounds.   Pulmonary:      Comments: On vent  Diminished left side  Abdominal:      General: Bowel sounds are normal.      Palpations: Abdomen is soft.   Musculoskeletal:         General: Normal range of motion.      Cervical back: Normal range of motion.   Skin:     General: Skin is warm and dry.            Inpatient Diagnostics:  Recent Results (from the past 24 hour(s))   Lactic Acid, Plasma    Collection Time: 09/20/24 11:07 AM   Result Value Ref Range    Lactic Acid Level 3.9 (HH) 0.5 - 2.2 mmol/L   Basic metabolic panel    Collection Time: 09/20/24 11:07 AM   Result Value Ref Range    Sodium 133 (L) 136 - 145 mmol/L    Potassium 3.5 3.5 - 5.1 mmol/L    Chloride 94 (L) 98 - 107 mmol/L    CO2 21 (L) 22 - 29 mmol/L    Glucose 123 (H) 74 - 100 mg/dL    Blood Urea Nitrogen 32.4 (H) 8.9 - 20.6 mg/dL    Creatinine 3.43 (H) 0.73 - 1.18 mg/dL    BUN/Creatinine Ratio 9     Calcium 8.6 8.4 - 10.2 mg/dL    Anion Gap 18.0 mEq/L    eGFR 21 mL/min/1.73/m2   POCT glucose    Collection Time: 09/20/24 11:34 AM   Result Value Ref Range    POCT Glucose 145 (H) 70 - 110 mg/dL   Lactic Acid, Plasma    Collection Time: 09/20/24  3:13 PM   Result Value Ref Range    Lactic Acid Level 2.7 (H) 0.5 - 2.2 mmol/L   POCT glucose    Collection Time: " 09/20/24  5:30 PM   Result Value Ref Range    POCT Glucose 108 70 - 110 mg/dL   Lactic Acid, Plasma    Collection Time: 09/20/24  7:21 PM   Result Value Ref Range    Lactic Acid Level 3.3 (H) 0.5 - 2.2 mmol/L   Basic metabolic panel    Collection Time: 09/20/24  7:21 PM   Result Value Ref Range    Sodium 131 (L) 136 - 145 mmol/L    Potassium 4.0 3.5 - 5.1 mmol/L    Chloride 94 (L) 98 - 107 mmol/L    CO2 18 (L) 22 - 29 mmol/L    Glucose 105 (H) 74 - 100 mg/dL    Blood Urea Nitrogen 37.6 (H) 8.9 - 20.6 mg/dL    Creatinine 3.72 (H) 0.73 - 1.18 mg/dL    BUN/Creatinine Ratio 10     Calcium 7.9 (L) 8.4 - 10.2 mg/dL    Anion Gap 19.0 mEq/L    eGFR 19 mL/min/1.73/m2   Lactic Acid, Plasma    Collection Time: 09/20/24 11:32 PM   Result Value Ref Range    Lactic Acid Level 3.1 (H) 0.5 - 2.2 mmol/L   Basic metabolic panel    Collection Time: 09/20/24 11:32 PM   Result Value Ref Range    Sodium 130 (L) 136 - 145 mmol/L    Potassium 4.8 3.5 - 5.1 mmol/L    Chloride 93 (L) 98 - 107 mmol/L    CO2 20 (L) 22 - 29 mmol/L    Glucose 133 (H) 74 - 100 mg/dL    Blood Urea Nitrogen 39.4 (H) 8.9 - 20.6 mg/dL    Creatinine 3.90 (H) 0.73 - 1.18 mg/dL    BUN/Creatinine Ratio 10     Calcium 8.5 8.4 - 10.2 mg/dL    Anion Gap 17.0 mEq/L    eGFR 18 mL/min/1.73/m2   Vancomycin, Random    Collection Time: 09/20/24 11:32 PM   Result Value Ref Range    Vancomycin Random 18.4 15.0 - 20.0 ug/ml   Comprehensive Metabolic Panel    Collection Time: 09/21/24  4:10 AM   Result Value Ref Range    Sodium 131 (L) 136 - 145 mmol/L    Potassium 4.6 3.5 - 5.1 mmol/L    Chloride 92 (L) 98 - 107 mmol/L    CO2 19 (L) 22 - 29 mmol/L    Glucose 109 (H) 74 - 100 mg/dL    Blood Urea Nitrogen 43.0 (H) 8.9 - 20.6 mg/dL    Creatinine 4.07 (H) 0.73 - 1.18 mg/dL    Calcium 8.3 (L) 8.4 - 10.2 mg/dL    Protein Total 5.2 (L) 6.4 - 8.3 gm/dL    Albumin 1.9 (L) 3.5 - 5.0 g/dL    Globulin 3.3 2.4 - 3.5 gm/dL    Albumin/Globulin Ratio 0.6 (L) 1.1 - 2.0 ratio    Bilirubin Total 2.3  (H) <=1.5 mg/dL     (H) 40 - 150 unit/L    ALT 7 0 - 55 unit/L    AST 19 5 - 34 unit/L    eGFR 17 mL/min/1.73/m2    Anion Gap 20.0 mEq/L    BUN/Creatinine Ratio 11    Magnesium    Collection Time: 09/21/24  4:10 AM   Result Value Ref Range    Magnesium Level 2.10 1.60 - 2.60 mg/dL   Phosphorus    Collection Time: 09/21/24  4:10 AM   Result Value Ref Range    Phosphorus Level 4.5 2.3 - 4.7 mg/dL   Lactic Acid, Plasma    Collection Time: 09/21/24  4:10 AM   Result Value Ref Range    Lactic Acid Level 2.7 (H) 0.5 - 2.2 mmol/L   CBC with Differential    Collection Time: 09/21/24  4:10 AM   Result Value Ref Range    WBC 29.78 (H) 4.50 - 11.50 x10(3)/mcL    RBC 3.88 (L) 4.70 - 6.10 x10(6)/mcL    Hgb 11.3 (L) 14.0 - 18.0 g/dL    Hct 30.5 (L) 42.0 - 52.0 %    MCV 78.6 (L) 80.0 - 94.0 fL    MCH 29.1 27.0 - 31.0 pg    MCHC 37.0 (H) 33.0 - 36.0 g/dL    RDW 19.4 (H) 11.5 - 17.0 %    Platelet 216 130 - 400 x10(3)/mcL    MPV      NRBC% 0.1 %    IPF 9.4 0.9 - 11.2 %   Manual Differential    Collection Time: 09/21/24  4:10 AM   Result Value Ref Range    WBC 29.78 x10(3)/mcL    Neutrophils % 91 %    Lymphs % 3 %    Monocytes % 6 %    Neutrophils Abs 27.0998 (H) 2.1 - 9.2 x10(3)/mcL    Lymphs Abs 0.8934 0.6 - 4.6 x10(3)/mcL    Monocytes Abs 1.7868 (H) 0.1 - 1.3 x10(3)/mcL    Platelets Normal Normal, Adequate    RBC Morph Abnormal (A) Normal    Polychromasia 1+ (A) (none)    Poikilocytosis 1+ (A) (none)    Anisocytosis 1+ (A) (none)    Macrocytosis 1+ (A) (none)    Acanthocytes 1+ (A) (none)    Paula Cells 2+ (A) (none)    Target Cells 1+ (A) (none)   RT Blood Gas    Collection Time: 09/21/24  5:45 AM   Result Value Ref Range    Sample Type Arterial Blood     Sample site Right Radial Artery     Drawn by swcrt     pH, Blood gas 7.410 7.350 - 7.450    pCO2, Blood gas 36.0 35.0 - 45.0 mmHg    pO2, Blood gas 96.0 80.0 - 100.0 mmHg    Sodium, Blood Gas 124 (L) 137 - 145 mmol/L    Potassium, Blood Gas 3.8 3.5 - 5.0 mmol/L    Calcium  Level Ionized 1.11 (L) 1.12 - 1.23 mmol/L    TOC2, Blood gas 23.9 mmol/L    Base Excess, Blood gas -1.50 -2.00 - 2.00 mmol/L    sO2, Blood gas 97.5 %    HCO3, Blood gas 22.8 22.0 - 26.0 mmol/L    THb, Blood gas 10.3 (L) 12 - 16 g/dL    O2 Hb, Blood Gas 96.2 94.0 - 97.0 %    CO Hgb 1.9 (H) 0.5 - 1.5 %    Met Hgb 1.2 0.4 - 1.5 %    Allens Test Yes     MODE AC     Oxygen Device, Blood gas Ventilator     FIO2, Blood gas 30 %    Mech Vt 550 ml    Mech RR 20 b/min    PEEP 8.0 cmH2O   Lactic Acid, Plasma    Collection Time: 09/21/24  7:35 AM   Result Value Ref Range    Lactic Acid Level 3.1 (H) 0.5 - 2.2 mmol/L     A/P--NE 09/21    1---ESRD on HD--Cont TTS schedule while inpatient  2---Sepsis/ STAPH Bacteremia---per Pulm/ Wean pressors/ Cont Vanc--Will need repeat BC  3---Cardiomyopathy--per Cardiology  4---NSTEMI--LHC tried 9/14 but pt was uncooperative--per Cardiology  5---Anemia secondary to CKD---H&H stable--Follow    IV--Levophed gtt        Vasopressin gtt        Amiodarone gtt        Diprivan gtt    TF--In Progress    ORDERS:  HD today  CBC/CMP in jorge alberto Villarreal DNP, ANP-C    9/21/2024

## 2024-09-21 NOTE — PROGRESS NOTES
[facility] - [facility] ICU  Pulmonary Critical Care Note    Patient Name: Prem Saldana  MRN: [id number]  Admission Date: 9/10/2024  Hospital Length of Stay: 11 days  Code Status: Full Code  Attending Provider: Sin Gonsalez Jr., MD,*  Primary Care Provider: Tank Saenz MD     Subjective:     HPI: Prem Saldana is a 49 year old  male with a past medical history for tobacco use disorder, COPD on home oxygen, hypertension, hyperlipidemia, ESRD on HD, heart failure with reduced ejection fraction (EF 15-20%), prostate cancer status post radiation, who initially presented to [facility] ED (09/10/2024) due to chest pain and shortness of breath.     [facility] consulted for NSTEMI management. Attempted to perform LHC (09/14/2024) but was canceled prior to initiation of procedure due to patient becoming uncooperative per reports.     Hospital Course/Significant events:  9/13 - LHC attempted but aborted due to anatomy  9/14 - LHC canceled due to patient being uncooperative    24 Hour Interval History:  Vitals show patient afebrile, tachycardic (HR 110s), tachypneic (RR 28-32), with MAPs at goal. Continues to require deep sedation, mechanical ventilation, and levophed support. CBC shows mildly improved leukocytosis with left shift and stable anemia now microcytic. CMP shows hyponatremia (Na 131), hypochloremia (CL 91), high anion gap acidosis (bicarb 19; AG 20), elevated, improving alkaline phosphatase (), and worsening hyperbilirubinemia. Lactic acid 2.0. One set of two blood cultures (09/19/20247) tested positive for mecA positive staph 2/2 bottles. Continues on IV vanc for staph bacteremia. Sputum cultures show normal mami.  CXR continues to show cardiomegaly with central pulmonary vascular congestion without evidence of new or worsening consolidation. Discontinued zosyn today. Will repeat cultures today. Nephrology with plans for dialysis today. Awaiting cardiology assessment plan  today. Will need BRI given positive MRSA blood cultures.    Past Medical History:   Diagnosis Date    Anemia of renal disease     ESRD on dialysis since 4/3/15     Essential hypertension     Secondary hyperparathyroidism of renal origin        Past Surgical History:   Procedure Laterality Date    ANGIOGRAM, CORONARY, WITH LEFT HEART CATHETERIZATION N/A 9/13/2024    Procedure: Angiogram, Coronary, with Left Heart Cath;  Surgeon: Tank Scott Jr., MD;  Location: [facility] CATH LAB;  Service: Cardiology;  Laterality: N/A;    AV FISTULA PLACEMENT Left 07/2015    CENTRAL LINE  9/17/2024    Peritoneal Dialysis Catheter Placement  01/2016    Permcath Placement                    Current Outpatient Medications   Medication Instructions    calcitRIOL (ROCALTROL) 0.25 mcg, Oral, 2 times daily    labetalol (NORMODYNE) 300 MG tablet No dose, route, or frequency recorded.    minoxidiL (LONITEN) 2.5 mg, Oral, 2 times daily    vacuum erection device system Kit 1 Units, Misc.(Non-Drug; Combo Route), As needed (PRN)       Current Inpatient Medications   aspirin  81 mg Per NG tube Daily    busPIRone  5 mg Oral BID    calcitRIOL  0.25 mcg Oral BID    enoxparin  30 mg Subcutaneous Daily    famotidine (PF)  20 mg Intravenous Daily    piperacillin-tazobactam (Zosyn) IV (PEDS and ADULTS) (extended infusion is not appropriate)  4.5 g Intravenous Q12H    polyethylene glycol  17 g Oral BID    senna-docusate 8.6-50 mg  2 tablet Oral BID    sevelamer carbonate  800 mg Oral TID WM    sodium zirconium cyclosilicate  10 g Oral Every Mon, Wed, Fri    vancomycin (VANCOCIN) IV (PEDS and ADULTS)  750 mg Intravenous Once       Current Intravenous Infusions   amiodarone in dextrose 5%  0.5 mg/min Intravenous Continuous 16.7 mL/hr at 09/21/24 0329 0.5 mg/min at 09/21/24 0329    NORepinephrine bitartrate-D5W  0-3 mcg/kg/min Intravenous Continuous 12.8 mL/hr at 09/21/24 0233 0.44 mcg/kg/min at 09/21/24 0233    propofoL  0-50 mcg/kg/min Intravenous  Continuous 7.4 mL/hr at 09/21/24 1055 20 mcg/kg/min at 09/21/24 1055    vasopressin  0.04 Units/min Intravenous Continuous 12 mL/hr at 09/21/24 0327 0.04 Units/min at 09/21/24 0327                Objective:       Intake/Output Summary (Last 24 hours) at 9/21/2024 1146  Last data filed at 9/21/2024 0554  Gross per 24 hour   Intake 2014.25 ml   Output --   Net 2014.25 ml         Vital Signs (Most Recent):  Temp: 98.8 °F (37.1 °C) (09/21/24 0800)  Pulse: (!) 112 (09/21/24 1000)  Resp: (!) 31 (09/21/24 1030)  BP: 94/70 (09/21/24 1000)  SpO2: 100 % (09/21/24 1000)  Body mass index is 18.89 kg/m².  Weight: 61.9 kg (136 lb 7.4 oz) Vital Signs (24h Range):  Temp:  [98.7 °F (37.1 °C)-100.1 °F (37.8 °C)] 98.8 °F (37.1 °C)  Pulse:  [107-130] 112  Resp:  [22-35] 31  SpO2:  [95 %-100 %] 100 %  BP: ()/(54-89) 94/70     Physical Exam  Vitals reviewed.   Constitutional:       Appearance: He is ill-appearing.      Comments: Intubated and sedated.  Synchronous with the ventilator.   Cardiovascular:      Rate and Rhythm: Normal rate and regular rhythm.   Pulmonary:      Breath sounds: No wheezing or rales.      Comments: Mechanical breath sounds noted.  Abdominal:      General: Abdomen is flat.      Palpations: Abdomen is soft.   Genitourinary:     Comments: Scrotal edema noted  Musculoskeletal:      Right lower leg: No edema.      Left lower leg: No edema.   Neurological:      General: No focal deficit present.           Lines/Drains/Airways       Central Venous Catheter Line  Duration             Percutaneous Central Line - Triple Lumen  09/17/24 2030 Internal Jugular Left 3 days              Drain  Duration                  NG/OG Tube 09/17/24 2120 Center mouth 3 days              Airway  Duration                  Airway - Non-Surgical 09/17/24 2117 Endotracheal Tube 3 days              Peripheral Intravenous Line  Duration                  Hemodialysis AV Fistula 09/17/24 0701 Left forearm 4 days         Hemodialysis AV  Fistula 24 0701 Left upper arm 4 days                    Significant Labs:    Lab Results   Component Value Date    WBC 29.78 (H) 2024    WBC 29.78 2024    HGB 11.3 (L) 2024    HCT 30.5 (L) 2024    MCV 78.6 (L) 2024     2024           BMP  Lab Results   Component Value Date     (L) 2024    K 4.6 2024    CO2 19 (L) 2024    BUN 43.0 (H) 2024    CREATININE 4.07 (H) 2024    CALCIUM 8.3 (L) 2024    AGAP 20.0 2024    ESTGFRAFRICA 20.8 (A) 2016    EGFRNONAA 18.0 (A) 2016         ABG  Recent Labs   Lab 24  0545   PH 7.410   PO2 96.0   PCO2 36.0   HCO3 22.8   POCBASEDEF -1.50       Mechanical Ventilation Support:  Vent Mode: A/C (24 1030)  Set Rate: 20 BPM (24 1030)  Vt Set: 550 mL (24 1030)  PEEP/CPAP: 8 cmH20 (24 1030)  Oxygen Concentration (%): 40 (24 1030)  Peak Airway Pressure: 28 cmH20 (24 1030)  Total Ve: 13.1 L/m (24 1030)  F/VT Ratio<105 (RSBI): (!) 70.94 (24 1030)      Significant Imagin2024 CXR:  Changes of midline sternotomy, AICD in place, pulmonary vascular congestion    Chest CTA 9/10/24:   Patchy infiltrates throughout the left lung with a left basilar consolidation and small pleural effusion      Assessment/Plan:     Assessment  Shock, cardiogenic and septic  Fever  Leukocytosis  Heart failure with reduced ejection fraction (EF 15-20%)  ESRD on HD  Hyperlipidemia  COPD    Plan  Continue levophed and vasopressin support; wean as tolerated to maintain MAP > 65  Continue mechanical ventilation; wean as tolerated; daily SAT/SBT.  Continue IV vanc for one set of blood cultures growing mecA staph 2/2 bottles.  Discontinued zosyn due resp culture showing only normal mami and blood cultures only positive for staph  Will repeat blood cultures today  Previous bedside ultrasound with no pocket for thoracentesis  Nephrology consulted and following  for HD; plan for HD today  Cardiology consulted and following for NSTEMI and cardiogenic shock; currently not planning for procedure; will discuss with cardiology  Continues on amiodarone drip and vasopressors per cardiolgoy  Start bowel regimen with senna and MiraLax  Palliative care consulted and following; appreciate assistance    DVT Prophylaxis: Subcu Lovenox  GI Prophylaxis: IV Famotidine     This patient remains at high risk of decompensation and death and will remain in ICU level care.    35 minutes of critical care was time spent personally by me on the following activities: development of treatment plan with patient or surrogate and bedside caregivers, discussions with consultants, evaluation of patient's response to treatment, examination of patient, ordering and performing treatments and interventions, ordering and review of laboratory studies, ordering and review of radiographic studies, pulse oximetry, re-evaluation of patient's condition.  This critical care time did not overlap with that of any other provider or involve time for any procedures.    This note was generated via Dictaphone and may contain some voice recognition errors.       Chun Giordano MD  Pulmonary Critical Care Medicine  [facility] - [facility] ICU  DOS: 09/21/2024

## 2024-09-21 NOTE — PROGRESS NOTES
Pharmacokinetic Assessment Follow Up: IV Vancomycin    Vancomycin serum concentration assessment(s):    The random level was drawn correctly and can be used to guide therapy at this time. The measurement is within the desired definitive target range of 15 to 20 mcg/mL.    Vancomycin Regimen Plan:    Will plan for 750 mg x 1 dose today after dialysis. The next level is scheduled to be drawn with morning labs on 9/24.    Drug levels (last 3 results):  Recent Labs   Lab Result Units 09/19/24  0109 09/20/24  0118 09/20/24  2332   Vancomycin Random ug/ml 16.4 20.3* 18.4       Vancomycin Administrations:  vancomycin given in the last 96 hours                     vancomycin (VANCOCIN) 500 mg in D5W 100 mL IVPB (MB+) (mg) 500 mg New Bag 09/19/24 1727    vancomycin (VANCOCIN) 500 mg in D5W 100 mL IVPB (MB+) (mg) 500 mg New Bag 09/17/24 2133                    Pharmacy will continue to follow and monitor vancomycin.    Please contact pharmacy at extension [phone] for questions regarding this assessment.    Thank you for the consult,   Lee Masters       Patient brief summary:  Prem Saldana is a 49 y.o. male initiated on antimicrobial therapy with IV Vancomycin for treatment of sepsis    Drug Allergies:   Review of patient's allergies indicates:   Allergen Reactions    Hydralazine Palpitations and Other (See Comments)     Other Reaction(s): feels like he is running    Palpitations    Amlodipine     Levofloxacin        Actual Body Weight:  Wt Readings from Last 1 Encounters:   09/14/24 61.9 kg (136 lb 7.4 oz)       Renal Function:   Estimated Creatinine Clearance: 19.2 mL/min (A) (based on SCr of 4.07 mg/dL (H)).,     Dialysis Method (if applicable):  intermittent HD TTS    CBC (last 72 hours):  Recent Labs   Lab Result Units 09/19/24  0116 09/20/24  0019 09/21/24  0410   WBC x10(3)/mcL 27.51  27.51* 35.60* 29.78*   Hgb g/dL 11.9* 11.8* 11.3*   Hct % 36.1* 33.2* 30.5*   Platelet x10(3)/mcL 223 265 216   Mono % %  --  4.8   --    Monocytes % % 3  --   --    Eos % %  --  0.1  --    Basophil % %  --  0.9  --        Metabolic Panel (last 72 hours):  Recent Labs   Lab Result Units 09/18/24  0933 09/18/24  1140 09/18/24  1515 09/18/24 2022 09/19/24  0109 09/19/24  0705 09/19/24  2036 09/19/24  2140 09/20/24  0019 09/20/24  0118 09/20/24  0735 09/20/24  0816 09/20/24  1107 09/20/24  1921 09/20/24  2332 09/21/24  0410 09/21/24  0545   Sodium mmol/L 129* 128* 135* 133* 129*  --  137 136 135* 135*  --  133* 133* 131* 130* 131*  --    Sodium, Blood Gas mmol/L  --   --   --   --   --  123*  --   --   --   --  128*  --   --   --   --   --  124*   Potassium mmol/L 6.9* 4.9 3.4* 5.4* 5.5*  --  4.3 4.1 3.7 5.7*  --  3.8 3.5 4.0 4.8 4.6  --    Potassium, Blood Gas mmol/L  --   --   --   --   --  3.9  --   --   --   --  3.0*  --   --   --   --   --  3.8   Chloride mmol/L 96* 93* 96* 93* 92*  --  98 100 99 98  --  97* 94* 94* 93* 92*  --    CO2 mmol/L 14* 19* 23 20* 21*  --  20* 20* 19* 18*  --  21* 21* 18* 20* 19*  --    Glucose mg/dL 89 108* 108* 83 133*  --  115* 128* 122* 104*  --  119* 123* 105* 133* 109*  --    Blood Urea Nitrogen mg/dL 50.3* 51.4* 24.3* 31.8* 31.8*  --  23.4* 24.0* 25.8* 25.2*  --  30.5* 32.4* 37.6* 39.4* 43.0*  --    Creatinine mg/dL 5.62* 5.85* 3.05* 4.14* 4.06*  --  3.17* 3.08* 3.20* 3.13*  --  3.38* 3.43* 3.72* 3.90* 4.07*  --    Albumin g/dL  --   --  2.3*  --  2.2*  --   --   --   --  1.9*  --   --   --   --   --  1.9*  --    Bilirubin Total mg/dL  --   --  1.5  --  1.4  --   --   --   --  1.6*  --   --   --   --   --  2.3*  --    ALP unit/L  --   --  371*  --  395*  --   --   --   --  424*  --   --   --   --   --  386*  --    AST unit/L  --   --  13  --  19  --   --   --   --  25  --   --   --   --   --  19  --    ALT unit/L  --   --  <5  --  6  --   --   --   --  7  --   --   --   --   --  7  --    Magnesium Level mg/dL  --   --  1.80  --  2.00  --   --   --   --  2.00  --   --   --   --   --  2.10  --    Phosphorus  Level mg/dL  --   --  3.8  --  5.2*  --   --   --   --  4.4  --   --   --   --   --  4.5  --        Microbiologic Results:  Microbiology Results (last 7 days)       Procedure Component Value Units Date/Time    Blood Culture [[id number]]  (Abnormal) Collected: 09/19/24 1231    Order Status: Completed Specimen: Blood, Venous Updated: 09/20/24 2332     GRAM STAIN Gram Positive Cocci, probable Staphylococcus      Seen in gram stain of broth only      2 of 2 bottles positive    Blood Culture [[id number]]  (Normal) Collected: 09/17/24 2001    Order Status: Completed Specimen: Blood, Venous Updated: 09/20/24 2101     Blood Culture No Growth At 72 Hours    Blood Culture [[id number]]  (Normal) Collected: 09/17/24 2001    Order Status: Completed Specimen: Blood, Venous Updated: 09/20/24 2101     Blood Culture No Growth At 72 Hours    Blood Culture [[id number]]  (Normal) Collected: 09/19/24 1227    Order Status: Completed Specimen: Blood, Venous Updated: 09/20/24 1502     Blood Culture No Growth At 24 Hours    BCID2 Panel [[id number]]  (Abnormal) Collected: 09/19/24 1231    Order Status: Completed Specimen: Blood, Venous Updated: 09/20/24 1433     CTX-M (ESBL ) N/A     IMP (Cabapenemase ) N/A     KPC resistance gene (Carbapenemase ) N/A     mcr-1 N/A     mecA ID Detected     Comment: Note: Antimicrobial resistance can occur via multiple mechanisms. A Not Detected result for antimicrobial resistance gene(s) does not indicate antimicrobial susceptibility. Subculturing is required for species identification and susceptibility testing of   isolates.        mecA/C and MREJ (MRSA) gene N/A     NDM (Carbapenemase ) N/A     OXA-48-like (Carbapenemase ) N/A     Abdon/B (VRE gene) N/A     VIM (Carbapenemase ) N/A     Enterococcus faecalis Not Detected     Enterococcus faecium Not Detected     Listeria monocytogenes Not Detected     Staphylococcus spp. Detected     Staphylococcus aureus Not  Detected     Staphylococcus epidermidis Detected     Staphylococcus lugdunensis Not Detected     Streptococcus spp. Not Detected     Streptococcus agalactiae (Group B) Not Detected     Streptococcus pneumoniae Not Detected     Streptococcus pyogenes (Group A) Not Detected     Acinetobacter calcoaceticus/baumannii complex Not Detected     Bacteroides fragilis Not Detected     Enterobacterales Not Detected     Enterobacter cloacae complex Not Detected     Escherichia coli Not Detected     Klebsiella aerogenes Not Detected     Klebsiella oxytoca Not Detected     Klebsiella pneumoniae group Not Detected     Proteus spp. Not Detected     Salmonella spp. Not Detected     Serratia marcescens Not Detected     Haemophilus influenzae Not Detected     Neisseria meningitidis Not Detected     Pseudomonas aeruginosa Not Detected     Stenotrophomonas maltophilia Not Detected     Candida albicans Not Detected     Candida auris Not Detected     Candida glabrata Not Detected     Candida krusei Not Detected     Candida parapsilosis Not Detected     Candida tropicalis Not Detected     Cryptococcus neoformans/gattii Not Detected    Narrative:      The [employer] BCID2 Panel is a multiplexed nucleic acid test intended for the use with [employer]® [employer].0 or [employer]® [employer] Systems for the simultaneous qualitative detection and identification of multiple bacterial and yeast nucleic acids and select genetic determinants associated with antimicrobial resistance.  The [employer] BCID2 Panel test is performed directly on blood culture samples identified as positive by a continuous monitoring blood culture system.  Results are intended to be interpreted in conjunction with Gram stain results.    Respiratory Culture [[id number]] Collected: 09/20/24 1311    Order Status: Sent Specimen: Sputum from Endotracheal Aspirate Updated: 09/20/24 1319    Blood Culture [[id number]]     Order Status: Canceled Specimen: Blood     Blood Culture  [[id number]]     Order Status: Canceled Specimen: Blood     Blood Culture #1 **CANNOT BE ORDERED STAT** [[id number]]  (Normal) Collected: 09/10/24 0654    Order Status: Completed Specimen: Blood Updated: 09/15/24 0901     Blood Culture No Growth at 5 days    Blood Culture #2 **CANNOT BE ORDERED STAT** [[id number]]  (Normal) Collected: 09/10/24 0654    Order Status: Completed Specimen: Blood Updated: 09/15/24 0901     Blood Culture No Growth at 5 days

## 2024-09-22 NOTE — PROCEDURES
"Prem Saldana is a 49 y.o. male patient.    Temp: 99 °F (37.2 °C) (09/21/24 1600)  Pulse: (!) 166 (09/21/24 1600)  Resp: (!) 32 (09/21/24 1730)  BP: 104/80 (09/21/24 1600)  SpO2: 100 % (09/21/24 1600)  Weight: 61.9 kg (136 lb 7.4 oz) (09/14/24 0635)  Height: 5' 11.26" (181 cm) (09/18/24 1340)  Mallampati Scale: Class III  ASA Classification: Class 3    Arterial Line    Date/Time: 9/21/2024 7:01 PM  Location procedure was performed: [facility]    Performed by: Adriana Munoz MD  Authorized by: Adriana Munoz MD  Assisting provider: Chun Giordano MD  Pre-op Diagnosis: cardiogenic shock  Post-operative diagnosis: cardiogenic shock  Consent Done: Emergent Situation  Preparation: Patient was prepped and draped in the usual sterile fashion.  Indications: hemodynamic monitoring  Location: right radial    Anesthesia:  Local Anesthetic: lidocaine 1% without epinephrine  Anesthetic total: 5 mL    Patient sedated: yes  Sedation type: deep sedation    Vitals: Vital signs were monitored during sedation.  Description of findings: ultrasound guided, patent right radial artery   Technical procedures used: ultrasound guided  Significant surgical tasks conducted by the assistant(s): supervision and assistance  Complications: No  Estimated blood loss (mL): 5  Specimens: No  Implants: No  Post-procedure: dressing applied  Post-procedure CMS: normal  Patient tolerance: Patient tolerated the procedure well with no immediate complications      ADRIANA MUNOZ MD  Internal Medicine - PGY-1        9/21/2024    "

## 2024-09-22 NOTE — PROGRESS NOTES
NEPHROLOGY PROGRESS NOTE      Patient Demographics:  Name:  Prem Saldana  Age: 49 y.o.  MRN:  [id number]  Admission Date: 9/10/2024      Subjective:      Remains sedated on vent    Still on pressors x 2    HD done yesterday  1L off    Current Facility-Administered Medications   Medication Dose Route Frequency Provider Last Rate Last Admin    acetaminophen tablet 650 mg  650 mg Oral Q8H PRN Mary Carter PA-C   650 mg at 09/19/24 2011    acetaminophen tablet 650 mg  650 mg Oral Q4H PRN Mary Carter PA-C   650 mg at 09/15/24 1255    amiodarone 360 mg/200 mL (1.8 mg/mL) infusion  0.5 mg/min Intravenous Continuous Chun Giordano MD 16.7 mL/hr at 09/22/24 0648 0.5 mg/min at 09/22/24 0648    aspirin chewable tablet 81 mg  81 mg Per NG tube Daily Sin Gonsalez Jr., MD, FCCP   81 mg at 09/22/24 0832    busPIRone tablet 5 mg  5 mg Oral BID Jeanna Mcmullen AGACNP-BC   5 mg at 09/22/24 0832    calcitRIOL capsule 0.25 mcg  0.25 mcg Oral BID Holly Dudley MD   0.25 mcg at 09/22/24 0832    dextrose 10% bolus 125 mL 125 mL  12.5 g Intravenous PRN Mary Carter PA-C   Stopped at 09/17/24 1456    dextrose 10% bolus 250 mL 250 mL  25 g Intravenous PRN Mary Carter PA-C   Stopped at 09/21/24 1241    enoxaparin injection 30 mg  30 mg Subcutaneous Daily Mary Carter PA-C   30 mg at 09/21/24 1712    etomidate injection   Intravenous Code/trauma/sedation Med Rodolfo Gutierrez MD   20 mg at 09/17/24 2104    famotidine (PF) injection 20 mg  20 mg Intravenous Daily Chun Giordano MD   20 mg at 09/22/24 0832    fentaNYL 2500 mcg in 0.9% sodium chloride 250 mL infusion premix  0-250 mcg/hr Intravenous Continuous Phani Kinsey MD 10 mL/hr at 09/22/24 0648 100 mcg/hr at 09/22/24 0648    fentaNYL injection 50 mcg  50 mcg Intravenous Q1H PRN Chun Giordano MD   50 mcg at 09/18/24 1900    glucagon (human recombinant) injection 1 mg  1 mg Intramuscular PRN Mary Carter, JASKARAN        glucose  chewable tablet 16 g  16 g Oral PRN Mary Carter PA-C        glucose chewable tablet 24 g  24 g Oral PRN Mary Carter PA-C        HYDROcodone-acetaminophen 7.5-325 mg per tablet 1 tablet  1 tablet Oral Q4H PRN Inocencio Mcmillan MD   1 tablet at 09/17/24 0735    metoprolol injection 5 mg  5 mg Intravenous Q5 Min PRN Viviane Mejía FNP   5 mg at 09/17/24 1716    NORepinephrine 32 mg in D5W 250 mL infusion  0-3 mcg/kg/min Intravenous Continuous Aristeo Rodrigues MD 11 mL/hr at 09/22/24 0648 0.38 mcg/kg/min at 09/22/24 0648    ondansetron injection 4 mg  4 mg Intravenous Q4H PRN Mary Carter PA-C   4 mg at 09/12/24 0254    polyethylene glycol packet 17 g  17 g Oral BID Aristeo Rodrigues MD   17 g at 09/20/24 0814    propofol (DIPRIVAN) 10 mg/mL infusion  0-50 mcg/kg/min Intravenous Continuous Chun Giordano MD 5.6 mL/hr at 09/22/24 0648 15 mcg/kg/min at 09/22/24 0648    rocuronium injection   Intravenous Code/trauma/sedation Med DefRodolfo wood MD   50 mg at 09/17/24 2107    senna-docusate 8.6-50 mg per tablet 2 tablet  2 tablet Oral BID Aristeo Rodrigues MD   2 tablet at 09/20/24 0814    sevelamer carbonate tablet 800 mg  800 mg Oral TID  Anisha Warren ANP   800 mg at 09/17/24 1716    simethicone chewable tablet 80 mg  1 tablet Oral TID PRN Jeanna Mcmullen AGACNP-BC   80 mg at 09/11/24 2315    sodium chloride 0.9% flush 10 mL  10 mL Intravenous Q12H PRN Mary Carter PA-C        sodium zirconium cyclosilicate packet 10 g  10 g Oral Every Mon, Wed, Fri Anisha Warren, ANP   10 g at 09/20/24 0814    vancomycin - pharmacy to dose   Intravenous pharmacy to manage frequency Ephraim Yeh MD        vasopressin (PITRESSIN) 0.2 Units/mL in D5W 100 mL infusion  0.04 Units/min Intravenous Continuous Aristeo Rodrigues MD 12 mL/hr at 09/22/24 0648 0.04 Units/min at 09/22/24 0648           Review of Systems   Unable to perform ROS: Intubated         Objective:    BP 96/77   Pulse 101   Temp  "98.7 °F (37.1 °C) (Oral)   Resp (!) 23   Ht 5' 11.26" (1.81 m)   Wt 61.9 kg (136 lb 7.4 oz)   SpO2 (!) 92%   BMI 18.89 kg/m²       Intake/Output Summary (Last 24 hours) at 9/22/2024 0907  Last data filed at 9/22/2024 0648  Gross per 24 hour   Intake 3367.31 ml   Output 1000 ml   Net 2367.31 ml             Physical Exam  Vitals reviewed.   Constitutional:       Appearance: Normal appearance.   HENT:      Head: Normocephalic and atraumatic.      Nose: Nose normal.   Cardiovascular:      Rate and Rhythm: Normal rate and regular rhythm.      Pulses: Normal pulses.      Heart sounds: Normal heart sounds.   Pulmonary:      Comments: On vent  Diminished bases  Abdominal:      General: Bowel sounds are normal.      Palpations: Abdomen is soft.   Musculoskeletal:         General: Normal range of motion.      Cervical back: Normal range of motion.      Comments: Some deconditioning   Skin:     General: Skin is warm and dry.            Inpatient Diagnostics:  Recent Results (from the past 24 hour(s))   Lactic Acid, Plasma    Collection Time: 09/21/24 11:05 AM   Result Value Ref Range    Lactic Acid Level 2.0 0.5 - 2.2 mmol/L   POCT glucose    Collection Time: 09/21/24 12:21 PM   Result Value Ref Range    POCT Glucose 40 (LL) 70 - 110 mg/dL   POCT glucose    Collection Time: 09/21/24 12:50 PM   Result Value Ref Range    POCT Glucose 93 70 - 110 mg/dL   Lactic Acid, Plasma    Collection Time: 09/21/24  3:20 PM   Result Value Ref Range    Lactic Acid Level 2.8 (H) 0.5 - 2.2 mmol/L   POCT glucose    Collection Time: 09/21/24  3:49 PM   Result Value Ref Range    POCT Glucose 77 70 - 110 mg/dL   POCT glucose    Collection Time: 09/21/24  5:27 PM   Result Value Ref Range    POCT Glucose 82 70 - 110 mg/dL   POCT glucose    Collection Time: 09/21/24  6:48 PM   Result Value Ref Range    POCT Glucose 98 70 - 110 mg/dL   POCT glucose    Collection Time: 09/21/24  8:16 PM   Result Value Ref Range    POCT Glucose 184 (H) 70 - 110 mg/dL "   Comprehensive Metabolic Panel    Collection Time: 09/21/24 10:20 PM   Result Value Ref Range    Sodium 135 (L) 136 - 145 mmol/L    Potassium 3.0 (L) 3.5 - 5.1 mmol/L    Chloride 99 98 - 107 mmol/L    CO2 19 (L) 22 - 29 mmol/L    Glucose 135 (H) 74 - 100 mg/dL    Blood Urea Nitrogen 36.8 (H) 8.9 - 20.6 mg/dL    Creatinine 3.12 (H) 0.73 - 1.18 mg/dL    Calcium 8.0 (L) 8.4 - 10.2 mg/dL    Protein Total 5.0 (L) 6.4 - 8.3 gm/dL    Albumin 1.8 (L) 3.5 - 5.0 g/dL    Globulin 3.2 2.4 - 3.5 gm/dL    Albumin/Globulin Ratio 0.6 (L) 1.1 - 2.0 ratio    Bilirubin Total 2.0 (H) <=1.5 mg/dL     (H) 40 - 150 unit/L    ALT 8 0 - 55 unit/L    AST 14 5 - 34 unit/L    eGFR 24 mL/min/1.73/m2    Anion Gap 17.0 mEq/L    BUN/Creatinine Ratio 12    Lactic Acid, Plasma    Collection Time: 09/21/24 10:20 PM   Result Value Ref Range    Lactic Acid Level 2.3 (H) 0.5 - 2.2 mmol/L   Magnesium    Collection Time: 09/21/24 10:20 PM   Result Value Ref Range    Magnesium Level 2.00 1.60 - 2.60 mg/dL   Phosphorus    Collection Time: 09/21/24 10:20 PM   Result Value Ref Range    Phosphorus Level 4.1 2.3 - 4.7 mg/dL   CBC with Differential    Collection Time: 09/21/24 10:20 PM   Result Value Ref Range    WBC 26.46 (H) 4.50 - 11.50 x10(3)/mcL    RBC 3.23 (L) 4.70 - 6.10 x10(6)/mcL    Hgb 9.5 (L) 14.0 - 18.0 g/dL    Hct 26.1 (L) 42.0 - 52.0 %    MCV 80.8 80.0 - 94.0 fL    MCH 29.4 27.0 - 31.0 pg    MCHC 36.4 (H) 33.0 - 36.0 g/dL    RDW 19.3 (H) 11.5 - 17.0 %    Platelet 251 130 - 400 x10(3)/mcL    MPV 11.9 (H) 7.4 - 10.4 fL    Neut % 87.6 %    Lymph % 3.0 %    Mono % 4.7 %    Eos % 0.2 %    Basophil % 0.5 %    Lymph # 0.79 0.6 - 4.6 x10(3)/mcL    Neut # 23.20 (H) 2.1 - 9.2 x10(3)/mcL    Mono # 1.25 0.1 - 1.3 x10(3)/mcL    Eos # 0.04 0 - 0.9 x10(3)/mcL    Baso # 0.12 <=0.2 x10(3)/mcL    IG# 1.06 (H) 0 - 0.04 x10(3)/mcL    IG% 4.0 %    NRBC% 0.2 %   POCT glucose    Collection Time: 09/22/24 12:35 AM   Result Value Ref Range    POCT Glucose 113 (H)  70 - 110 mg/dL   Comprehensive Metabolic Panel    Collection Time: 09/22/24  2:58 AM   Result Value Ref Range    Sodium 133 (L) 136 - 145 mmol/L    Potassium 4.8 3.5 - 5.1 mmol/L    Chloride 97 (L) 98 - 107 mmol/L    CO2 17 (L) 22 - 29 mmol/L    Glucose 106 (H) 74 - 100 mg/dL    Blood Urea Nitrogen 43.5 (H) 8.9 - 20.6 mg/dL    Creatinine 3.18 (H) 0.73 - 1.18 mg/dL    Calcium 8.3 (L) 8.4 - 10.2 mg/dL    Protein Total 5.2 (L) 6.4 - 8.3 gm/dL    Albumin 1.8 (L) 3.5 - 5.0 g/dL    Globulin 3.4 2.4 - 3.5 gm/dL    Albumin/Globulin Ratio 0.5 (L) 1.1 - 2.0 ratio    Bilirubin Total 2.0 (H) <=1.5 mg/dL     (H) 40 - 150 unit/L    ALT 7 0 - 55 unit/L    AST 18 5 - 34 unit/L    eGFR 23 mL/min/1.73/m2    Anion Gap 19.0 mEq/L    BUN/Creatinine Ratio 14    Magnesium    Collection Time: 09/22/24  2:58 AM   Result Value Ref Range    Magnesium Level 2.20 1.60 - 2.60 mg/dL   Phosphorus    Collection Time: 09/22/24  2:58 AM   Result Value Ref Range    Phosphorus Level 5.1 (H) 2.3 - 4.7 mg/dL   CBC with Differential    Collection Time: 09/22/24  2:58 AM   Result Value Ref Range    WBC 27.05 (H) 4.50 - 11.50 x10(3)/mcL    RBC 3.39 (L) 4.70 - 6.10 x10(6)/mcL    Hgb 9.8 (L) 14.0 - 18.0 g/dL    Hct 26.7 (L) 42.0 - 52.0 %    MCV 78.8 (L) 80.0 - 94.0 fL    MCH 28.9 27.0 - 31.0 pg    MCHC 36.7 (H) 33.0 - 36.0 g/dL    RDW 19.7 (H) 11.5 - 17.0 %    Platelet 276 130 - 400 x10(3)/mcL    MPV 12.4 (H) 7.4 - 10.4 fL    NRBC% 0.3 %    IPF 10.4 0.9 - 11.2 %   Manual Differential    Collection Time: 09/22/24  2:58 AM   Result Value Ref Range    WBC 27.05 x10(3)/mcL    Neutrophils % 98 %    Lymphs % 1 %    Monocytes % 1 %    Neutrophils Abs 26.509 (H) 2.1 - 9.2 x10(3)/mcL    Lymphs Abs 0.2705 (L) 0.6 - 4.6 x10(3)/mcL    Monocytes Abs 0.2705 0.1 - 1.3 x10(3)/mcL    Platelets Normal Normal, Adequate    RBC Morph Abnormal (A) Normal    Polychromasia 2+ (A) (none)    Poikilocytosis 2+ (A) (none)    Anisocytosis 1+ (A) (none)    Macrocytosis 2+ (A)  (none)    Ovalocytes 1+ (A) (none)    Target Cells 2+ (A) (none)   POCT glucose    Collection Time: 09/22/24  4:06 AM   Result Value Ref Range    POCT Glucose 114 (H) 70 - 110 mg/dL   RT Blood Gas    Collection Time: 09/22/24  5:37 AM   Result Value Ref Range    Sample Type Arterial Blood     Sample site Arterial Line     Drawn by tdl rrt     pH, Blood gas 7.310 (L) 7.350 - 7.450    pCO2, Blood gas 34.0 (L) 35.0 - 45.0 mmHg    pO2, Blood gas 82.0 80.0 - 100.0 mmHg    Sodium, Blood Gas 126 (L) 137 - 145 mmol/L    Potassium, Blood Gas 4.3 3.5 - 5.0 mmol/L    Calcium Level Ionized 1.17 1.12 - 1.23 mmol/L    TOC2, Blood gas 18.1 mmol/L    Base Excess, Blood gas -8.30 (L) -2.00 - 2.00 mmol/L    sO2, Blood gas 94.9 %    HCO3, Blood gas 17.1 (L) 22.0 - 26.0 mmol/L    THb, Blood gas 9.5 (L) 12 - 16 g/dL    O2 Hb, Blood Gas 96.4 94.0 - 97.0 %    CO Hgb 2.3 (H) 0.5 - 1.5 %    Met Hgb 0.3 (L) 0.4 - 1.5 %    MODE AC     FIO2, Blood gas 30 %    Mech Vt 460 ml    Mech RR 22 b/min    PEEP 5.0 cmH2O   POCT glucose    Collection Time: 09/22/24  8:17 AM   Result Value Ref Range    POCT Glucose 124 (H) 70 - 110 mg/dL     A/P--NE 09/22    1---ESRD on HD--Cont TTS schedule while inpatient  2---Sepsis/ STAPH Bacteremia---per Pulm/ Wean pressors/ Cont Vanc--Will need repeat BC  3---Cardiomyopathy--per Cardiology  4---NSTEMI--LHC tried 9/14 but pt was uncooperative--per Cardiology  5---Anemia secondary to CKD---H&H stable--Follow  6---Hypokalemia---Resolved    IV--Levophed gtt        Vasopressin gtt        Amiodarone gtt        Diprivan gtt        Fentanyl gtt     TF--In Progress     ORDERS:  CBC/CMP in am  IVP Yumiko Villarreal DNP, ANP-C    9/22/2024

## 2024-09-22 NOTE — PROGRESS NOTES
[facility] - [facility] ICU  Pulmonary Critical Care Note    Patient Name: Prem Saldana  MRN: [id number]  Admission Date: 9/10/2024  Hospital Length of Stay: 12 days  Code Status: Full Code  Attending Provider: Sin Gonsalez Jr., MD,*  Primary Care Provider: Tank Saenz MD     Subjective:     HPI: Prem Saldana is a 49 year old  male with a past medical history for tobacco use disorder, COPD on home oxygen, hypertension, hyperlipidemia, ESRD on HD, heart failure with reduced ejection fraction (EF 15-20%), prostate cancer status post radiation, who initially presented to [facility] ED (09/10/2024) due to chest pain and shortness of breath.     [facility] consulted for NSTEMI management. Attempted to perform LHC (09/14/2024) but was canceled prior to initiation of procedure due to patient becoming uncooperative per reports.     Hospital Course/Significant events:  9/13 - LHC attempted but aborted due to anatomy  9/14 - LHC canceled due to patient being uncooperative  9/15 - upgraded to ICU for hypotension and runs of V-tach  9/17 - required intubation  9/20 - spiked a temp, 2/2 blood cultures growing staph epi    24 Hour Interval History:  Remains intubated and sedated for ventilator dyssynchrony. Still hypotensive on 0.43mcg/kg/min of levophed and 0.04units/min of vasopressin. HD performed yesterday, 1L removed. Afebrile overnight, repeat blood cultures pending. Repeat echo ordered this AM for bacteremia. Remains on amio, no significant arrhythmias overnight.     Past Medical History:   Diagnosis Date    Anemia of renal disease     ESRD on dialysis since 4/3/15     Essential hypertension     Secondary hyperparathyroidism of renal origin        Past Surgical History:   Procedure Laterality Date    ANGIOGRAM, CORONARY, WITH LEFT HEART CATHETERIZATION N/A 9/13/2024    Procedure: Angiogram, Coronary, with Left Heart Cath;  Surgeon: Tank Scott Jr., MD;  Location: [facility] CATH LAB;   Service: Cardiology;  Laterality: N/A;    AV FISTULA PLACEMENT Left 07/2015    CENTRAL LINE  9/17/2024    Peritoneal Dialysis Catheter Placement  01/2016    Permcath Placement                    Current Outpatient Medications   Medication Instructions    calcitRIOL (ROCALTROL) 0.25 mcg, Oral, 2 times daily    labetalol (NORMODYNE) 300 MG tablet No dose, route, or frequency recorded.    minoxidiL (LONITEN) 2.5 mg, Oral, 2 times daily    vacuum erection device system Kit 1 Units, Misc.(Non-Drug; Combo Route), As needed (PRN)       Current Inpatient Medications   aspirin  81 mg Per NG tube Daily    busPIRone  5 mg Oral BID    calcitRIOL  0.25 mcg Oral BID    enoxparin  30 mg Subcutaneous Daily    famotidine (PF)  20 mg Intravenous Daily    polyethylene glycol  17 g Oral BID    senna-docusate 8.6-50 mg  2 tablet Oral BID    sevelamer carbonate  800 mg Oral TID WM    sodium zirconium cyclosilicate  10 g Oral Every Mon, Wed, Fri       Current Intravenous Infusions   amiodarone in dextrose 5%  0.5 mg/min Intravenous Continuous 16.7 mL/hr at 09/22/24 0648 0.5 mg/min at 09/22/24 0648    fentanyl  0-250 mcg/hr Intravenous Continuous 10 mL/hr at 09/22/24 0648 100 mcg/hr at 09/22/24 0648    NORepinephrine bitartrate-D5W  0-3 mcg/kg/min Intravenous Continuous 11 mL/hr at 09/22/24 0648 0.38 mcg/kg/min at 09/22/24 0648    propofoL  0-50 mcg/kg/min Intravenous Continuous 5.6 mL/hr at 09/22/24 0648 15 mcg/kg/min at 09/22/24 0648    vasopressin  0.04 Units/min Intravenous Continuous 12 mL/hr at 09/22/24 0648 0.04 Units/min at 09/22/24 0648                Objective:       Intake/Output Summary (Last 24 hours) at 9/22/2024 1147  Last data filed at 9/22/2024 0648  Gross per 24 hour   Intake 3367.31 ml   Output 1000 ml   Net 2367.31 ml         Vital Signs (Most Recent):  Temp: 98.7 °F (37.1 °C) (09/22/24 0400)  Pulse: 103 (09/22/24 1112)  Resp: (!) 29 (09/22/24 1112)  BP: (!) 89/65 (09/22/24 1112)  SpO2: 100 % (09/22/24 1112)  Body mass  index is 18.89 kg/m².  Weight: 61.9 kg (136 lb 7.4 oz) Vital Signs (24h Range):  Temp:  [98.7 °F (37.1 °C)-99.5 °F (37.5 °C)] 98.7 °F (37.1 °C)  Pulse:  [] 103  Resp:  [16-36] 29  SpO2:  [92 %-100 %] 100 %  BP: ()/(57-82) 89/65  Arterial Line BP: ()/(32-72) 110/63     Physical Exam  Vitals reviewed.   Constitutional:       Appearance: He is ill-appearing.      Comments: Intubated and sedated.  Synchronous with the ventilator.   Cardiovascular:      Rate and Rhythm: Normal rate and regular rhythm.   Pulmonary:      Breath sounds: No wheezing or rales.      Comments: Coarse breath sounds  Abdominal:      General: Abdomen is flat.      Palpations: Abdomen is soft.   Genitourinary:     Comments: Scrotal edema noted  Musculoskeletal:      Right lower leg: No edema.      Left lower leg: No edema.   Neurological:      Comments: He is sedated on propofol and fentanyl           Lines/Drains/Airways       Central Venous Catheter Line  Duration             Percutaneous Central Line - Triple Lumen  09/17/24 2030 Internal Jugular Left 4 days              Drain  Duration                  NG/OG Tube 09/17/24 2120 Center mouth 4 days              Airway  Duration                  Airway - Non-Surgical 09/17/24 2117 Endotracheal Tube 4 days              Peripheral Intravenous Line  Duration                  Hemodialysis AV Fistula 09/17/24 0701 Left forearm 5 days         Hemodialysis AV Fistula 09/17/24 0701 Left upper arm 5 days                    Significant Labs:    Lab Results   Component Value Date    WBC 27.05 (H) 09/22/2024    WBC 27.05 09/22/2024    HGB 9.8 (L) 09/22/2024    HCT 26.7 (L) 09/22/2024    MCV 78.8 (L) 09/22/2024     09/22/2024           BMP  Lab Results   Component Value Date     (L) 09/22/2024    K 4.8 09/22/2024    CO2 17 (L) 09/22/2024    BUN 43.5 (H) 09/22/2024    CREATININE 3.18 (H) 09/22/2024    CALCIUM 8.3 (L) 09/22/2024    AGAP 19.0 09/22/2024    ESTGFRAFRICA 20.8 (A)  11/11/2016    EGFRNONAA 18.0 (A) 11/11/2016         ABG  Recent Labs   Lab 09/22/24  0537   PH 7.310*   PO2 82.0   PCO2 34.0*   HCO3 17.1*   POCBASEDEF -8.30*       Mechanical Ventilation Support:  Vent Mode: A/C (09/22/24 1112)  Set Rate: 22 BPM (09/22/24 1112)  Vt Set: 460 mL (09/22/24 1112)  PEEP/CPAP: 5 cmH20 (09/22/24 1112)  Oxygen Concentration (%): 40 (09/22/24 1112)  Peak Airway Pressure: 18 cmH20 (09/22/24 1112)  Total Ve: 9 L/m (09/22/24 1112)  F/VT Ratio<105 (RSBI): (!) 75.13 (09/22/24 1112)      Significant Imaging:  X-Ray Chest 1 View  EXAMINATION  XR CHEST 1 VIEW    CLINICAL HISTORY  s/p intubation;    TECHNIQUE  A total of 1 frontal image(s) of the chest.    COMPARISON  21 September 2024    FINDINGS  Lines/tubes/devices: Grossly unchanged positioning when allowing for differences in technique and patient rotation.    Similar enlarged cardiac silhouette and central vascular congestion.  The trachea is midline. No new or worsening consolidation is identified. There is no enlarging pleural effusion or convincing pneumothorax.    Regional osseous structures and extrathoracic soft tissues are similar.    IMPRESSION  No significant interval change.    Electronically signed by: Chance Pride  Date:    09/22/2024  Time:    08:20          Assessment/Plan:     Assessment  Shock, cardiogenic and septic  Fever & Leukocytosis - blood cultures 2/2 with staph epi  Heart failure with reduced ejection fraction (EF 15-20%)  ESRD on HD  Hyperlipidemia  COPD    Plan  Continue levophed and vasopressin support; wean as tolerated to maintain MAP > 65  Continue mechanical ventilation; wean as tolerated; daily SAT/SBT.  Continue IV vanc for staph epi bacteremia and repeat echo ordered  Repeat echo pending   Previous bedside ultrasound with no pocket for thoracentesis  Nephrology consulted and following for HD  Continue cardiology recs  Palliative care consulted and following; appreciate assistance    DVT Prophylaxis: Subcu  Lovenox  GI Prophylaxis: IV Famotidine     This patient remains at high risk of decompensation and death and will remain in ICU level care.    35 minutes of critical care was time spent personally by me on the following activities: development of treatment plan with patient or surrogate and bedside caregivers, discussions with consultants, evaluation of patient's response to treatment, examination of patient, ordering and performing treatments and interventions, ordering and review of laboratory studies, ordering and review of radiographic studies, pulse oximetry, re-evaluation of patient's condition.  This critical care time did not overlap with that of any other provider or involve time for any procedures.    This note was generated via Dictaphone and may contain some voice recognition errors.       CATRINA Benavidez  Pulmonary Critical Care Medicine  [facility] - [facility] ICU  DOS: 09/22/2024

## 2024-09-22 NOTE — PLAN OF CARE
Problem: Hemodialysis  Goal: Safe, Effective Therapy Delivery  Outcome: Progressing  Goal: Effective Tissue Perfusion  Outcome: Progressing  Goal: Absence of Infection Signs and Symptoms  Outcome: Progressing     Problem: Adult Inpatient Plan of Care  Goal: Plan of Care Review  Outcome: Progressing  Goal: Patient-Specific Goal (Individualized)  Outcome: Progressing  Goal: Absence of Hospital-Acquired Illness or Injury  Outcome: Progressing  Goal: Optimal Comfort and Wellbeing  Outcome: Progressing  Goal: Readiness for Transition of Care  Outcome: Progressing     Problem: Skin Injury Risk Increased  Goal: Skin Health and Integrity  Outcome: Progressing     Problem: Infection  Goal: Absence of Infection Signs and Symptoms  Outcome: Progressing     Problem: Wound  Goal: Optimal Coping  Outcome: Progressing  Goal: Optimal Functional Ability  Outcome: Progressing  Goal: Absence of Infection Signs and Symptoms  Outcome: Progressing  Goal: Improved Oral Intake  Outcome: Progressing  Goal: Optimal Pain Control and Function  Outcome: Progressing  Goal: Skin Health and Integrity  Outcome: Progressing  Goal: Optimal Wound Healing  Outcome: Progressing     Problem: Coping Ineffective  Goal: Effective Coping  Outcome: Progressing     Problem: Fall Injury Risk  Goal: Absence of Fall and Fall-Related Injury  Outcome: Progressing

## 2024-09-22 NOTE — NURSING
Nurses Note -- 4 Eyes      9/22/2024   5:21 AM      Skin assessed during: Daily Assessment      [] No Altered Skin Integrity Present    [x]Prevention Measures Documented      [x] Yes- Altered Skin Integrity Present or Discovered   [] LDA Added if Not in Epic (Describe Wound)   [] New Altered Skin Integrity was Present on Admit and Documented in LDA   [] Wound Image Taken    Wound Care Consulted? Yes    Attending Nurse:  Juventino Sena RN/Staff Member:  DOYLE Swanson

## 2024-09-22 NOTE — PROGRESS NOTES
[facility]    Cardiology  Progress Note    Patient Name: Prem Saldana  MRN: [id number]  Admission Date: 9/10/2024  Hospital Length of Stay: 12 days  Code Status: Full Code   Attending Physician: Sin Gonsalez Jr., MD,*   Primary Care Physician: Tank Saenz MD  Expected Discharge Date:   Principal Problem:<principal problem not specified>    Subjective:   Brief HPI/Hospital Course:   Mr. Saldana is a 50 y/o male with a history CHF, HTN, ESRD on HD, who is unknown to [facility]. He presented to ER on 9.10.24 with complaints of shortness of breath. He reports he has a fall on 9.9.24 in which he hit his right side. He reports SOB started during the night. EMS reported he was tachypneic (breathing 55/min). He was given FD ASA and Sl Nitro x1. Significant labs include H&H 11.7/36.8, BUN/Creat 70.3/8.07, ALP 1061, Albumin 3.2, BNP 14.761, Troponin 0.554. CTA Chest negative for PE, but cardiomegaly with a trace left effusion, bibasilar atelectasis, mild interstitial edema and Chronic appearing compression deformities in the midthoracic spine. CXR shows poor inspiratory effort accentuates the pulmonary vascular markings, cardiomegaly, increased left retrocardiac density and silhouetting of the left hemidiaphragm, and calcified densities below the left hemidiaphragm. EKG shows sinus tachycardia with Left axis deviation, and LVH. [facility] has been consulted for NSTEMI management.       Hospital Course:  9.11.24: NAD. VSS. No complaints of Palps/SOB. ST on telemetry. Reports chest pain from desk falling on him. Reports back pain. H&H 10.3/32.7, Creat 6.10   9.12.24: NAD. VSS. Reports Chest Pain No complaints of SOB/Palps. H&H 11.4/35.6, K 6.1, Creat 8.02. In bed lying flat.   9.13.24: NAD. VSS. ST on telemetry. Denies SOB/Palps. H&H 12.7/39.8, K 5.6, BUN/Creat 44.1/6.46,   9.14.24: LHC cancelled due to patient uncooperative.   He is being treated for hyperkalemia. Labs pending. Currently denies  "pain  9.15.24: NAD. VSS.   9.16.24: NAD Noted. Sinus Tach on Tele. On Levophed support. Primary complaint noted to be leg pain. Legs are warm dry, dressing on LLE. Respiratory status stable. Had Runs of NSVT Yesterday afternoon, seemed to have resolved.  9.17.24: NAD. "I am good." No Further Runs of NSVT. ST. Denies CP, SOB and Palps. Levophed 0.4mcg/kg/min  9.18.24: NAD. Vented/Sedated. No Ectopy. Amiodarone 0.5mg/min. Levophed 0.07mcg/kg/min.   9.19.24: NAD. Vented/Sedated. Amiodarone 0.5mg/min, Levophed 0.5mcg/kg/min  9.20.24: NAD. Vented/Sedated. Amiodarone 0.5mg/min, Levophed 0.46mcg/kg/min, Vasopressin 0.04unit/min.   9.21.24: NAD. Vented/Sedated. Vasopressin 0.04units/min, Levophed 0.44mcg/kg/min, Amiodarone 0.4mg/min. WBC 29.78  9.22.24: NAD. Vented/Sedated. Amiodarone 0.5mg/min, Levophed 0.38mcg/kg/min, Vasopressin 0.04units/min.     PMH: CHF, HTN, ESRD on HD, Anemia, Secondary Hyperparathyroidism   PSH: AV Fistula   Family History: Maternal Grandmother - Cancer, Heart Disease, MI; Mother - DM II  Social History: Current Smoker (2-3 Cigarette per Day). Reports occasionally alcohol. Reports past use of Marijuana.      Previous Cardiac Diagnostics:   ECHO (9.10.24):  Left Ventricle: The left ventricle is dilated. Increased wall thickness. There is concentric remodeling. Severe global hypokinesis present. There is severely reduced systolic function with a visually estimated ejection fraction of 15 - 20%. Unable to assess diastolic function due to atrial fibrillation. Elevated left ventricular filling pressure. Right Ventricle: Severe right ventricular enlargement. Systolic function is severely reduced.  Left Atrium: Left atrium is dilated. Right Atrium: Right atrium is dilated. Aortic Valve: The aortic valve is a trileaflet valve. Mildly calcified cusps. Mildly restricted motion. Aortic valve peak velocity is 1.85 m/s. Mean gradient is 8 mmHg. There is moderate aortic regurgitation with an eccentrically " directed jet. Mitral Valve: Moderately thickened leaflets. There is moderate mitral annular calcification present. There is mild to moderate regurgitation. Tricuspid Valve: There is moderate regurgitation. The estimated PA systolic pressure is at least 34 mmHg. Pulmonic Valve: There is mild to moderate regurgitation. IVC/SVC: Elevated venous pressure at 15 mmHg. Pericardium: There is a small effusion. No indication of cardiac tamponade.    ECHO (2.19.24):  A complete two-dimensional transthoracic echocardiogram was performed (2D, M-mode, Doppler and color flow Doppler). The left ventricle is severely dilated.   Left ventricular systolic function is severely reduced. Estimated Ejection Fraction = <20%. The right ventricle is mild to moderately dilated. The left atrium is severely dilated. The right atrium is mild to moderately dilated. The mitral valve leaflets appear thickened, but open well. There is moderate to severe mitral annular calcification. There is mild mitral regurgitation. There is mild tricuspid insufficiency noted. Mild aortic regurgitation. Dialated IVC 3.8 cm. The lack of respiratory variation in the inferior vena cava diameter is noted. There is no pericardial effusion. Large left pleural effusion.There is moderate concentric left ventricular hypertrophy.      SPECT (2.19.18):  The perfusion scan is free of evidence for myocardial ischemia or injury. Resting wall motion is physiologic. There is resting LV dysfunction with a reduced ejection fraction of 40 %. The ventricular volumes are normal at rest and stress. The extracardiac distribution of radioactivity is normal.      Dobutamine Stress Test (7.27.16);  Moderate left atrial enlargement. Concentric hypertrophy. Normal left ventricular systolic function (EF 60-65%). Left ventricular diastolic dysfunction. Normal right ventricular systolic function . The estimated PA systolic pressure is greater than 26 mmHg.  Mild mitral regurgitation. Small  pericardial effusion.      Review of Systems   Unable to perform ROS: Intubated     Objective:     Vital Signs (Most Recent):  Temp: 98.7 °F (37.1 °C) (09/22/24 0400)  Pulse: 101 (09/22/24 0846)  Resp: (!) 23 (09/22/24 0846)  BP: 96/77 (09/22/24 0600)  SpO2: (!) 92 % (09/22/24 0846) Vital Signs (24h Range):  Temp:  [98.7 °F (37.1 °C)-99.5 °F (37.5 °C)] 98.7 °F (37.1 °C)  Pulse:  [] 101  Resp:  [16-36] 23  SpO2:  [92 %-100 %] 92 %  BP: ()/(57-82) 96/77  Arterial Line BP: ()/(32-72) 110/63   Weight: 61.9 kg (136 lb 7.4 oz)  Body mass index is 18.89 kg/m².  SpO2: (!) 92 %       Intake/Output Summary (Last 24 hours) at 9/22/2024 1116  Last data filed at 9/22/2024 0648  Gross per 24 hour   Intake 3367.31 ml   Output 1000 ml   Net 2367.31 ml     Lines/Drains/Airways       Central Venous Catheter Line  Duration             Percutaneous Central Line - Triple Lumen  09/17/24 2030 Internal Jugular Left 4 days              Drain  Duration                  NG/OG Tube 09/17/24 2120 Center mouth 4 days              Airway  Duration                  Airway - Non-Surgical 09/17/24 2117 Endotracheal Tube 4 days              Peripheral Intravenous Line  Duration                  Hemodialysis AV Fistula 09/17/24 0701 Left forearm 5 days         Hemodialysis AV Fistula 09/17/24 0701 Left upper arm 5 days                  Significant Labs:   Chemistries:   Recent Labs   Lab 09/15/24  1749 09/15/24  2258 09/16/24  0534 09/16/24  1048 09/16/24  1714 09/17/24  0108 09/20/24  1921 09/20/24  2332 09/21/24  0410 09/21/24  2220 09/22/24  0258     --  133*  --   --    < > 131* 130* 131* 135* 133*   K 5.1  --  5.4*  --   --    < > 4.0 4.8 4.6 3.0* 4.8   CL 97*  --  96*  --   --    < > 94* 93* 92* 99 97*   CO2 19*  --  22  --   --    < > 18* 20* 19* 19* 17*   BUN 55.6*  --  67.2*  --   --    < > 37.6* 39.4* 43.0* 36.8* 43.5*   CREATININE 6.13*  --  6.65*  --   --    < > 3.72* 3.90* 4.07* 3.12* 3.18*   CALCIUM 8.2*  --  8.4  " --   --    < > 7.9* 8.5 8.3* 8.0* 8.3*   BILITOT  --   --  1.3  --   --    < >  --   --  2.3* 2.0* 2.0*   ALKPHOS  --   --  493*  --   --    < >  --   --  386* 356* 364*   ALT  --   --  7  --   --    < >  --   --  7 8 7   AST  --   --  16  --   --    < >  --   --  19 14 18   GLUCOSE 75  --  68*  --   --    < > 105* 133* 109* 135* 106*   MG 1.70  --  2.20  --   --    < >  --   --  2.10 2.00 2.20   PHOS  --   --  5.6*  --   --    < >  --   --  4.5 4.1 5.1*   TROPONINI 0.445* 0.404* 0.465* 0.400* 0.474*  --   --   --   --   --   --     < > = values in this interval not displayed.        CBC/Anemia Labs: Coags:    Recent Labs   Lab 09/21/24  0410 09/21/24  2220 09/22/24  0258   WBC 29.78  29.78* 26.46* 27.05  27.05*   HGB 11.3* 9.5* 9.8*   HCT 30.5* 26.1* 26.7*    251 276   MCV 78.6* 80.8 78.8*   RDW 19.4* 19.3* 19.7*    No results for input(s): "PT", "INR", "APTT" in the last 168 hours.       Telemetry: ST    Physical Exam  Vitals reviewed.   Constitutional:       General: He is not in acute distress.     Appearance: He is ill-appearing.      Comments: Vented/Sedated   HENT:      Head: Normocephalic.      Mouth/Throat:      Mouth: Mucous membranes are dry.   Cardiovascular:      Rate and Rhythm: Regular rhythm. Tachycardia present.   Pulmonary:      Effort: Pulmonary effort is normal. No respiratory distress.      Comments: Ventilator Associated Breath Sounds  Vent Mode: A/C  Oxygen Concentration (%):  [30] 30  Resp Rate Total:  [24 br/min-32 br/min] 27 br/min  Vt Set:  [460 mL] 460 mL  PEEP/CPAP:  [5 cmH20] 5 cmH20  Mean Airway Pressure:  [6 cmH20-9 cmH20] 9 cmH20  Abdominal:      Palpations: Abdomen is soft.   Musculoskeletal:         General: No swelling.      Comments: BLE Warm. Left AVF    Skin:     Comments: LLE Dressing in Place.    Neurological:      Comments: Vented/Sedated       Current Schedule Inpatient Medications:   aspirin  81 mg Per NG tube Daily    busPIRone  5 mg Oral BID    calcitRIOL  0.25 " mcg Oral BID    enoxparin  30 mg Subcutaneous Daily    famotidine (PF)  20 mg Intravenous Daily    polyethylene glycol  17 g Oral BID    senna-docusate 8.6-50 mg  2 tablet Oral BID    sevelamer carbonate  800 mg Oral TID WM    sodium zirconium cyclosilicate  10 g Oral Every Mon, Wed, Fri     Continuous Infusions:   amiodarone in dextrose 5%  0.5 mg/min Intravenous Continuous 16.7 mL/hr at 09/22/24 0648 0.5 mg/min at 09/22/24 0648    fentanyl  0-250 mcg/hr Intravenous Continuous 10 mL/hr at 09/22/24 0648 100 mcg/hr at 09/22/24 0648    NORepinephrine bitartrate-D5W  0-3 mcg/kg/min Intravenous Continuous 11 mL/hr at 09/22/24 0648 0.38 mcg/kg/min at 09/22/24 0648    propofoL  0-50 mcg/kg/min Intravenous Continuous 5.6 mL/hr at 09/22/24 0648 15 mcg/kg/min at 09/22/24 0648    vasopressin  0.04 Units/min Intravenous Continuous 12 mL/hr at 09/22/24 0648 0.04 Units/min at 09/22/24 0648       Assessment:   NSTEMI -  (Unspecified) in the Setting of Hypotensive Shock Requiring Vasopressor Support- Do not Suspect Cardiogenic Shock    - Troponin Trend Flat/No Overt Ischemia on EKG  Hypotensive Shock - ? 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%    - Normal SPECT (2.19.18)    - LHC Aborted on 9.13.24 due to Ascending Aorta/root angle from RR Approach & Agitation preventing Femoral Approach  NSVT (Monomorphic)    - On Amiodarone Infusion  Acute Respiratory Failure requiring Intubation/Ventilation   VHD    - Moderate to Severe Mitral Annular Calcification    - Mild MR/TR/AR  Sinus Tachycardia - Persistent, but Improving  ESRD/HD     - on HD TTS  COPD    - On Chronic Home Oxygen Support (2 L/Min)  Hyperkalemia (Treated per Nephrology)  History of Prostate Cancer    - Status Post Radiation  Lower Extremity Wound    - Febrile on   9.14.24  Anemia of Chronic Disease   Secondary Hyperparathyroidism   No known history of GI Bleed     Plan:   Wean Pressors for MAP > 65mmHg  Continue IV Amiodarone   Add GDMT as BP/HR Allows  Defer ACE/ARB/ARNI due to Hyperkalemia/Hypotension requiring Pressors   Fluid Management/HD per Nephrology Team  PT Recently refusing Diagnostic Angiogram and Inotropes (Prior to being Intubated/Ventilated)  Palliative Care Consultation for Goals of Care - In Progress  Consider Ethics Committee Discussion as PT does not have any Point of Contact  Will Continue to Follow  Labs in AM: CBC, CMP and Mg     Of note, LHC attempted on 9.13.24, however procedure was aborted from a right radial approach given angle of ascending aorta and aortic root. Femoral access not possible because of patient's agitation and respiratory status. Procedure was aborted.     Mian Carter, ANP  Cardiology  [facility]

## 2024-09-23 NOTE — PROGRESS NOTES
[facility]    Cardiology  Progress Note    Patient Name: Prem Saldana  MRN: [id number]  Admission Date: 9/10/2024  Hospital Length of Stay: 13 days  Code Status: Full Code   Attending Physician: Sin Gonsalez Jr., MD,*   Primary Care Physician: Tank Saenz MD  Expected Discharge Date:   Principal Problem:<principal problem not specified>    Subjective:   Brief HPI/Hospital Course:   Mr. Saldana is a 48 y/o male with a history CHF, HTN, ESRD on HD, who is unknown to [facility]. He presented to ER on 9.10.24 with complaints of shortness of breath. He reports he has a fall on 9.9.24 in which he hit his right side. He reports SOB started during the night. EMS reported he was tachypneic (breathing 55/min). He was given FD ASA and Sl Nitro x1. Significant labs include H&H 11.7/36.8, BUN/Creat 70.3/8.07, ALP 1061, Albumin 3.2, BNP 14.761, Troponin 0.554. CTA Chest negative for PE, but cardiomegaly with a trace left effusion, bibasilar atelectasis, mild interstitial edema and Chronic appearing compression deformities in the midthoracic spine. CXR shows poor inspiratory effort accentuates the pulmonary vascular markings, cardiomegaly, increased left retrocardiac density and silhouetting of the left hemidiaphragm, and calcified densities below the left hemidiaphragm. EKG shows sinus tachycardia with Left axis deviation, and LVH. [facility] has been consulted for NSTEMI management.       Hospital Course:  9.11.24: NAD. VSS. No complaints of Palps/SOB. ST on telemetry. Reports chest pain from desk falling on him. Reports back pain. H&H 10.3/32.7, Creat 6.10   9.12.24: NAD. VSS. Reports Chest Pain No complaints of SOB/Palps. H&H 11.4/35.6, K 6.1, Creat 8.02. In bed lying flat.   9.13.24: NAD. VSS. ST on telemetry. Denies SOB/Palps. H&H 12.7/39.8, K 5.6, BUN/Creat 44.1/6.46,   9.14.24: LHC cancelled due to patient uncooperative.   He is being treated for hyperkalemia. Labs pending. Currently denies  "pain  9.15.24: NAD. VSS.   9.16.24: NAD Noted. Sinus Tach on Tele. On Levophed support. Primary complaint noted to be leg pain. Legs are warm dry, dressing on LLE. Respiratory status stable. Had Runs of NSVT Yesterday afternoon, seemed to have resolved.  9.17.24: NAD. "I am good." No Further Runs of NSVT. ST. Denies CP, SOB and Palps. Levophed 0.4mcg/kg/min  9.18.24: NAD. Vented/Sedated. No Ectopy. Amiodarone 0.5mg/min. Levophed 0.07mcg/kg/min.   9.19.24: NAD. Vented/Sedated. Amiodarone 0.5mg/min, Levophed 0.5mcg/kg/min  9.20.24: NAD. Vented/Sedated. Amiodarone 0.5mg/min, Levophed 0.46mcg/kg/min, Vasopressin 0.04unit/min.   9.21.24: NAD. Vented/Sedated. Vasopressin 0.04units/min, Levophed 0.44mcg/kg/min, Amiodarone 0.4mg/min. WBC 29.78  9.22.24: NAD. Vented/Sedated. Amiodarone 0.5mg/min, Levophed 0.38mcg/kg/min, Vasopressin 0.04units/min.   9.23.24: NAD. Vented/Sedated. Amiodarone 0.5mg/min, Levophed 0.8mcg/kg/min, Vasopressin 0.4Units/min     PMH: CHF, HTN, ESRD on HD, Anemia, Secondary Hyperparathyroidism   PSH: AV Fistula   Family History: Maternal Grandmother - Cancer, Heart Disease, MI; Mother - DM II  Social History: Current Smoker (2-3 Cigarette per Day). Reports occasionally alcohol. Reports past use of Marijuana.      Previous Cardiac Diagnostics:   ECHO (9.10.24):  Left Ventricle: The left ventricle is dilated. Increased wall thickness. There is concentric remodeling. Severe global hypokinesis present. There is severely reduced systolic function with a visually estimated ejection fraction of 15 - 20%. Unable to assess diastolic function due to atrial fibrillation. Elevated left ventricular filling pressure. Right Ventricle: Severe right ventricular enlargement. Systolic function is severely reduced.  Left Atrium: Left atrium is dilated. Right Atrium: Right atrium is dilated. Aortic Valve: The aortic valve is a trileaflet valve. Mildly calcified cusps. Mildly restricted motion. Aortic valve peak velocity is " 1.85 m/s. Mean gradient is 8 mmHg. There is moderate aortic regurgitation with an eccentrically directed jet. Mitral Valve: Moderately thickened leaflets. There is moderate mitral annular calcification present. There is mild to moderate regurgitation. Tricuspid Valve: There is moderate regurgitation. The estimated PA systolic pressure is at least 34 mmHg. Pulmonic Valve: There is mild to moderate regurgitation. IVC/SVC: Elevated venous pressure at 15 mmHg. Pericardium: There is a small effusion. No indication of cardiac tamponade.    ECHO (2.19.24):  A complete two-dimensional transthoracic echocardiogram was performed (2D, M-mode, Doppler and color flow Doppler). The left ventricle is severely dilated.   Left ventricular systolic function is severely reduced. Estimated Ejection Fraction = <20%. The right ventricle is mild to moderately dilated. The left atrium is severely dilated. The right atrium is mild to moderately dilated. The mitral valve leaflets appear thickened, but open well. There is moderate to severe mitral annular calcification. There is mild mitral regurgitation. There is mild tricuspid insufficiency noted. Mild aortic regurgitation. Dialated IVC 3.8 cm. The lack of respiratory variation in the inferior vena cava diameter is noted. There is no pericardial effusion. Large left pleural effusion.There is moderate concentric left ventricular hypertrophy.      SPECT (2.19.18):  The perfusion scan is free of evidence for myocardial ischemia or injury. Resting wall motion is physiologic. There is resting LV dysfunction with a reduced ejection fraction of 40 %. The ventricular volumes are normal at rest and stress. The extracardiac distribution of radioactivity is normal.      Dobutamine Stress Test (7.27.16);  Moderate left atrial enlargement. Concentric hypertrophy. Normal left ventricular systolic function (EF 60-65%). Left ventricular diastolic dysfunction. Normal right ventricular systolic function .  The estimated PA systolic pressure is greater than 26 mmHg.  Mild mitral regurgitation. Small pericardial effusion.      Review of Systems   Unable to perform ROS: Intubated     Objective:     Vital Signs (Most Recent):  Temp: 99.2 °F (37.3 °C) (09/23/24 0400)  Pulse: 107 (09/23/24 0645)  Resp: (!) 26 (09/23/24 0645)  BP: 91/71 (09/23/24 0645)  SpO2: 100 % (09/23/24 1234) Vital Signs (24h Range):  Temp:  [99.2 °F (37.3 °C)-100.1 °F (37.8 °C)] 99.2 °F (37.3 °C)  Pulse:  [104-125] 107  Resp:  [18-27] 26  SpO2:  [90 %-100 %] 100 %  BP: ()/(56-79) 91/71  Arterial Line BP: (72-95)/(49-74) 72/58   Weight: 61.9 kg (136 lb 7.4 oz)  Body mass index is 18.89 kg/m².  SpO2: 100 %       Intake/Output Summary (Last 24 hours) at 9/23/2024 1421  Last data filed at 9/23/2024 0819  Gross per 24 hour   Intake 2834.89 ml   Output --   Net 2834.89 ml     Lines/Drains/Airways       Central Venous Catheter Line  Duration             Percutaneous Central Line - Triple Lumen  09/17/24 2030 Internal Jugular Left 5 days              Drain  Duration                  NG/OG Tube 09/17/24 2120 Center mouth 5 days              Airway  Duration                  Airway - Non-Surgical 09/17/24 2117 Endotracheal Tube 5 days              Peripheral Intravenous Line  Duration                  Hemodialysis AV Fistula 09/17/24 0701 Left forearm 6 days         Hemodialysis AV Fistula 09/17/24 0701 Left upper arm 6 days                  Significant Labs:   Chemistries:   Recent Labs   Lab 09/16/24  1714 09/17/24  0108 09/20/24  2332 09/21/24  0410 09/21/24  2220 09/22/24  0258 09/23/24  0357   NA  --    < > 130* 131* 135* 133* 134*   K  --    < > 4.8 4.6 3.0* 4.8 5.1   CL  --    < > 93* 92* 99 97* 96*   CO2  --    < > 20* 19* 19* 17* 14*   BUN  --    < > 39.4* 43.0* 36.8* 43.5* 59.2*   CREATININE  --    < > 3.90* 4.07* 3.12* 3.18* 3.82*   CALCIUM  --    < > 8.5 8.3* 8.0* 8.3* 8.5   BILITOT  --    < >  --  2.3* 2.0* 2.0* 2.0*   ALKPHOS  --    < >  --   "386* 356* 364* 393*   ALT  --    < >  --  7 8 7 65*   AST  --    < >  --  19 14 18 224*   GLUCOSE  --    < > 133* 109* 135* 106* 90   MG  --    < >  --  2.10 2.00 2.20 2.30   PHOS  --    < >  --  4.5 4.1 5.1* 5.5*   TROPONINI 0.474*  --   --   --   --   --   --     < > = values in this interval not displayed.        CBC/Anemia Labs: Coags:    Recent Labs   Lab 09/21/24  2220 09/22/24  0258 09/23/24  0357   WBC 26.46* 27.05  27.05* 35.8  35.80*   HGB 9.5* 9.8* 11.5*   HCT 26.1* 26.7* 31.8*    276 231   MCV 80.8 78.8* 81.3   RDW 19.3* 19.7* 20.4*    No results for input(s): "PT", "INR", "APTT" in the last 168 hours.       Telemetry: ST    Physical Exam  Vitals reviewed.   Constitutional:       General: He is not in acute distress.     Appearance: He is ill-appearing.      Comments: Vented/Sedated   HENT:      Head: Normocephalic.      Mouth/Throat:      Mouth: Mucous membranes are dry.   Cardiovascular:      Rate and Rhythm: Regular rhythm. Tachycardia present.   Pulmonary:      Effort: Pulmonary effort is normal. No respiratory distress.      Comments: Ventilator Associated Breath Sounds  Vent Mode: A/C  Oxygen Concentration (%):  [30-40] 40  Resp Rate Total:  [22 br/min-26 br/min] 26 br/min  Vt Set:  [460 mL] 460 mL  PEEP/CPAP:  [5 cmH20] 5 cmH20  Mean Airway Pressure:  [6 [id number] cmH20] 11 cmH20  Abdominal:      Palpations: Abdomen is soft.   Musculoskeletal:         General: No swelling.      Comments: BLE Warm. Left AVF    Skin:     Comments: LLE Dressing in Place   Neurological:      Comments: Vented/Sedated       Current Schedule Inpatient Medications:   aspirin  81 mg Per NG tube Daily    busPIRone  5 mg Oral BID    calcitRIOL  0.25 mcg Oral BID    enoxparin  30 mg Subcutaneous Daily    famotidine (PF)  20 mg Intravenous Daily    polyethylene glycol  17 g Oral BID    senna-docusate 8.6-50 mg  2 tablet Oral BID    sevelamer carbonate  800 mg Oral TID WM    sodium bicarbonate  1,950 mg Oral TID    " sodium zirconium cyclosilicate  10 g Oral Every Mon, Wed, Fri     Continuous Infusions:   amiodarone in dextrose 5%  0.5 mg/min Intravenous Continuous 16.7 mL/hr at 09/23/24 1100 0.5 mg/min at 09/23/24 1100    fentanyl  0-250 mcg/hr Intravenous Continuous 15 mL/hr at 09/23/24 0819 150 mcg/hr at 09/23/24 0819    NORepinephrine bitartrate-D5W  0-3 mcg/kg/min Intravenous Continuous 23.2 mL/hr at 09/23/24 1159 0.8 mcg/kg/min at 09/23/24 1159    propofoL  0-50 mcg/kg/min Intravenous Continuous 7.4 mL/hr at 09/23/24 1201 20 mcg/kg/min at 09/23/24 1201    vasopressin  0.04 Units/min Intravenous Continuous 12 mL/hr at 09/23/24 1200 0.04 Units/min at 09/23/24 1200       Assessment:   NSTEMI -  (Unspecified) in the Setting of Hypotensive Shock Requiring Vasopressor Support- Do not Suspect Cardiogenic Shock    - Troponin Trend Flat/No Overt Ischemia on EKG  Hypotensive Shock - ? 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%    - Normal SPECT (2.19.18)    - C Aborted on 9.13.24 due to Ascending Aorta/root angle from RR Approach & Agitation preventing Femoral Approach  NSVT (Monomorphic)    - On Amiodarone Infusion  Acute Respiratory Failure requiring Intubation/Ventilation   VHD    - Moderate to Severe Mitral Annular Calcification    - Mild MR/TR/AR  Sinus Tachycardia - Persistent, but Improving  ESRD/HD     - on HD TTS  COPD    - On Chronic Home Oxygen Support (2 L/Min)  Hyperkalemia (Treated per Nephrology)  History of Prostate Cancer    - Status Post Radiation  Lower Extremity Wound    - Febrile on  9.14.24  Anemia of Chronic Disease   Secondary Hyperparathyroidism   No known history of GI Bleed     Plan:   Wean Pressors for MAP > 65mmHg  D/C IV Amiodarone and Start Oral Amiodarone Load: Amiodarone 400mg PO BID x 3 Days then 200mg PO  BID x 3 Days then 200mg PO Daily there after   Add GDMT as BP/HR Allows  Defer ACE/ARB/ARNI due to Hyperkalemia/Hypotension requiring Pressors   Fluid Management/HD per Nephrology Team  PT Recently refusing Diagnostic Angiogram and Inotropes (Prior to being Intubated/Ventilated); Long Discussion with PTS Son, Reinaldo, about PTS Cardiac Condition. Will Hold off on Angiogram and if PT makes a Purposeful Recovered will Reconsider Angiographic Evaluation.  Palliative Care Consultation for Goals of Care - In Progress  Will Continue to Follow  Labs in AM: CBC, CMP and Mg     Of note, LHC attempted on 9.13.24, however procedure was aborted from a right radial approach given angle of ascending aorta and aortic root. Femoral access not possible because of patient's agitation and respiratory status. Procedure was aborted.     Mian Carter, ANP  Cardiology  [facility]

## 2024-09-23 NOTE — PROGRESS NOTES
[facility] - [facility] ICU  Pulmonary Critical Care Note    Patient Name: Prem Saldana  MRN: [id number]  Admission Date: 9/10/2024  Hospital Length of Stay: 13 days  Code Status: Full Code  Attending Provider: Sin Gonsalez Jr., MD,*  Primary Care Provider: Tank Saenz MD     Subjective:     HPI: Prem Saldana is a 49 year old  male with a past medical history for tobacco use disorder, COPD on home oxygen, hypertension, hyperlipidemia, ESRD on HD, heart failure with reduced ejection fraction (EF 15-20%), prostate cancer status post radiation, who initially presented to [facility] ED (09/10/2024) due to chest pain and shortness of breath.     [facility] consulted for NSTEMI management. Attempted to perform LHC (09/14/2024) but was canceled prior to initiation of procedure due to patient becoming uncooperative per reports.     Hospital Course/Significant events:  9/13 - LHC attempted but aborted due to anatomy  9/14 - LHC canceled due to patient being uncooperative  9/15 - upgraded to ICU for hypotension and runs of V-tach  9/17 - required intubation  9/20 - spiked a temp, 2/2 blood cultures growing staph epi    24 Hour Interval History:  No events overnight.  Requiring high doses of norepinephrine and vasopressin to maintain adequate blood pressure.  Arterial line failed again.  Net positive 1.6 L yesterday. MRSE in 2/2 blood cultures from 9/19.  Repeat echocardiogram with no evidence of endocarditis.  WBC increasing from 27-35.    Past Medical History:   Diagnosis Date    Anemia of renal disease     ESRD on dialysis since 4/3/15     Essential hypertension     Secondary hyperparathyroidism of renal origin        Past Surgical History:   Procedure Laterality Date    ANGIOGRAM, CORONARY, WITH LEFT HEART CATHETERIZATION N/A 9/13/2024    Procedure: Angiogram, Coronary, with Left Heart Cath;  Surgeon: Tank Scott Jr., MD;  Location: [facility] CATH LAB;  Service: Cardiology;  Laterality:  N/A;    AV FISTULA PLACEMENT Left 07/2015    CENTRAL LINE  9/17/2024    Peritoneal Dialysis Catheter Placement  01/2016    Permcath Placement                    Current Outpatient Medications   Medication Instructions    calcitRIOL (ROCALTROL) 0.25 mcg, Oral, 2 times daily    labetalol (NORMODYNE) 300 MG tablet No dose, route, or frequency recorded.    minoxidiL (LONITEN) 2.5 mg, Oral, 2 times daily    vacuum erection device system Kit 1 Units, Misc.(Non-Drug; Combo Route), As needed (PRN)       Current Inpatient Medications   aspirin  81 mg Per NG tube Daily    busPIRone  5 mg Oral BID    calcitRIOL  0.25 mcg Oral BID    enoxparin  30 mg Subcutaneous Daily    famotidine (PF)  20 mg Intravenous Daily    polyethylene glycol  17 g Oral BID    senna-docusate 8.6-50 mg  2 tablet Oral BID    sevelamer carbonate  800 mg Oral TID WM    sodium zirconium cyclosilicate  10 g Oral Every Mon, Wed, Fri       Current Intravenous Infusions   amiodarone in dextrose 5%  0.5 mg/min Intravenous Continuous 16.7 mL/hr at 09/23/24 0355 0.5 mg/min at 09/23/24 0355    fentanyl  0-250 mcg/hr Intravenous Continuous 20 mL/hr at 09/23/24 0457 200 mcg/hr at 09/23/24 0457    NORepinephrine bitartrate-D5W  0-3 mcg/kg/min Intravenous Continuous 26.1 mL/hr at 09/23/24 0355 0.9 mcg/kg/min at 09/23/24 0355    propofoL  0-50 mcg/kg/min Intravenous Continuous 11.1 mL/hr at 09/23/24 0355 30 mcg/kg/min at 09/23/24 0355    vasopressin  0.04 Units/min Intravenous Continuous 12 mL/hr at 09/23/24 0455 0.04 Units/min at 09/23/24 0455            Objective:       Intake/Output Summary (Last 24 hours) at 9/23/2024 0546  Last data filed at 9/23/2024 0355  Gross per 24 hour   Intake 2802.67 ml   Output --   Net 2802.67 ml         Vital Signs (Most Recent):  Temp: 99.2 °F (37.3 °C) (09/23/24 0400)  Pulse: 106 (09/23/24 0515)  Resp: (!) 22 (09/23/24 0515)  BP: 93/69 (09/23/24 0515)  SpO2: 100 % (09/23/24 0515)  Body mass index is 18.89 kg/m².  Weight: 61.9 kg (136  lb 7.4 oz) Vital Signs (24h Range):  Temp:  [98.9 °F (37.2 °C)-99.5 °F (37.5 °C)] 99.2 °F (37.3 °C)  Pulse:  [101-125] 106  Resp:  [16-39] 22  SpO2:  [90 %-100 %] 100 %  BP: ()/(56-79) 93/69  Arterial Line BP: ()/(42-74) 72/58     Physical Exam  Vitals reviewed.   Constitutional:       Comments: Sedated on propofol.  Synchronous with the ventilator.   Eyes:      Comments: Pupils are equal in size   Cardiovascular:      Rate and Rhythm: Normal rate and regular rhythm.   Pulmonary:      Comments: Coarse breath sounds bilaterally.  Abdominal:      General: Abdomen is flat.      Palpations: Abdomen is soft.   Musculoskeletal:      Comments: Left lower extremity wrapped in bandage.  No significant edema   Neurological:      Comments: Sedated on propofol.  Pupils are equal in size           Lines/Drains/Airways       Central Venous Catheter Line  Duration             Percutaneous Central Line - Triple Lumen  09/17/24 2030 Internal Jugular Left 5 days              Drain  Duration                  NG/OG Tube 09/17/24 2120 Center mouth 5 days              Airway  Duration                  Airway - Non-Surgical 09/17/24 2117 Endotracheal Tube 5 days              Peripheral Intravenous Line  Duration                  Hemodialysis AV Fistula 09/17/24 0701 Left forearm 5 days         Hemodialysis AV Fistula 09/17/24 0701 Left upper arm 5 days                    Significant Labs:    Lab Results   Component Value Date    WBC 35.80 (H) 09/23/2024    HGB 11.5 (L) 09/23/2024    HCT 31.8 (L) 09/23/2024    MCV 81.3 09/23/2024     09/23/2024           BMP  Lab Results   Component Value Date     (L) 09/23/2024    K 5.1 09/23/2024    CO2 14 (L) 09/23/2024    BUN 59.2 (H) 09/23/2024    CREATININE 3.82 (H) 09/23/2024    CALCIUM 8.5 09/23/2024    AGAP 24.0 09/23/2024    ESTGFRAFRICA 20.8 (A) 11/11/2016    EGFRNONAA 18.0 (A) 11/11/2016         ABG  Recent Labs   Lab 09/22/24 2037   PH 7.290*   PO2 89.0   PCO2 40.0    HCO3 19.2*   POCBASEDEF -6.90*       Mechanical Ventilation Support:  Vent Mode: A/C (09/23/24 0217)  Set Rate: 22 BPM (09/23/24 0217)  Vt Set: 460 mL (09/23/24 0217)  PEEP/CPAP: 5 cmH20 (09/23/24 0217)  Oxygen Concentration (%): 30 (09/23/24 0400)  Peak Airway Pressure: 23 cmH20 (09/23/24 0217)  Total Ve: 7.5 L/m (09/23/24 0217)  F/VT Ratio<105 (RSBI): (!) 70.59 (09/22/24 2033)      Significant Imaging:  Echo Saline Bubble? No; Ultrasound enhancing contrast? No    Left Ventricle: The left ventricle is dilated. Moderately increased   wall thickness. Severe global hypokinesis present. There is severely   reduced systolic function with a visually estimated ejection fraction of   15 - 20%.    Right Ventricle: Right ventricular enlargement. Systolic function is   reduced.TAPSE is 0.62 cm.    Left Atrium: Left atrium is dilated.    Right Atrium: Right atrium is dilated.    Aortic Valve: The aortic valve is a trileaflet valve. There is aortic   valve sclerosis. There is mild aortic regurgitation.    Mitral Valve: Severely thickened leaflets. There is mitral annular   calcification present. There is mild regurgitation.    Tricuspid Valve: Mildly thickened leaflets. There is moderate    regurgitation.    Tricuspid Valve: There is moderate to severe regurgitation with an   anteromedial eccentrically directed jet.    Pulmonic Valve: There is mild regurgitation.    Pulmonary Artery: There is pulmonary hypertension.    IVC/SVC: Patient is ventilated, cannot use IVC diameter to estimate   right atrial pressure. IVC measuring 4.08 cm.    NO DEFINITE EVIDENCE OF VEGETATIONS IN THIS TRANSTHORACIC STUDY  X-Ray Chest 1 View  EXAMINATION  XR CHEST 1 VIEW    CLINICAL HISTORY  s/p intubation;    TECHNIQUE  A total of 1 frontal image(s) of the chest.    COMPARISON  21 September 2024    FINDINGS  Lines/tubes/devices: Grossly unchanged positioning when allowing for differences in technique and patient rotation.    Similar enlarged  cardiac silhouette and central vascular congestion.  The trachea is midline. No new or worsening consolidation is identified. There is no enlarging pleural effusion or convincing pneumothorax.    Regional osseous structures and extrathoracic soft tissues are similar.    IMPRESSION  No significant interval change.    Electronically signed by: Chance Pride  Date:    09/22/2024  Time:    08:20    Chest x-ray 09/23/2024: AICD in place.  Endotracheal tube in appropriate position.  There are bilateral infiltrates and loss of the left hemidiaphragm with silhouette sign.      Assessment/Plan:     Assessment  Shock, cardiogenic and septic  Fever & Leukocytosis - blood cultures 2/2 with staph epi  Heart failure with reduced ejection fraction (EF 15-20%)  ESRD on HD  Hyperlipidemia  COPD    Plan  Continue levophed and vasopressin support; wean as tolerated to maintain MAP > 65  Continue mechanical ventilation on volume control 460/22/5/0.3; daily SAT/SBT.  Increase respiratory rate to 26.  Continue IV vanc for staph epi bacteremia.  Ultrasound the left upper extremity fistula to rule out abscess given bacteremia  Nephrology consulted and following for HD  Appreciate cardiology recs.  Continue amiodarone drip  Palliative care consulted and following; appreciate assistance    DVT Prophylaxis: Subcu Lovenox  GI Prophylaxis: IV Famotidine     This patient remains at high risk of decompensation and death and will remain in ICU level care.    35 minutes of critical care was time spent personally by me on the following activities: development of treatment plan with patient or surrogate and bedside caregivers, discussions with consultants, evaluation of patient's response to treatment, examination of patient, ordering and performing treatments and interventions, ordering and review of laboratory studies, ordering and review of radiographic studies, pulse oximetry, re-evaluation of patient's condition.  This critical care time did not  overlap with that of any other provider or involve time for any procedures.    This note was generated via Dictaphone and may contain some voice recognition errors.       Aristeo Rodrigues MD  Pulmonary Critical Care Medicine  [facility] - [facility] ICU  DOS: 09/23/2024

## 2024-09-23 NOTE — NURSING
Nurses Note -- 4 Eyes      9/23/2024   6:44 AM      Skin assessed during: Daily Assessment      [] No Altered Skin Integrity Present    [x]Prevention Measures Documented      [x] Yes- Altered Skin Integrity Present or Discovered   [] LDA Added if Not in Epic (Describe Wound)   [] New Altered Skin Integrity was Present on Admit and Documented in LDA   [] Wound Image Taken    Wound Care Consulted? Yes    Attending Nurse:  Juventino Sena RN/Staff Member:  DOYLE Swanson

## 2024-09-23 NOTE — PROGRESS NOTES
Patient Name: Prem Saldana   MRN: [id number]   Admission Date: 9/10/2024   Hospital Length of Stay: 13   Attending Provider: Sin Gonsalez Jr., MD,*   Consulting Provider: Lencho Xie MD    Primary Care Physician:  Tank Saenz MD     Principal Problem: <principal problem not specified>       Final diagnoses:  [R06.02] Shortness of breath  [I50.9] Congestive heart failure, unspecified HF chronicity, unspecified heart failure type (Primary)  [R79.89] Elevated troponin  [N18.6, Z99.2] ESRD on dialysis  [R07.9] Chest pain, unspecified type      Goals of care review:    I was able to contact the patient's son, Jimi, by phone.  Current clinical status I explained to him that the patient is quite critical and is in multiorgan failure. I reviewed further with the patient's son the fact that this patient is declining over the last few days now with rising white blood cell count, rising liver function studies probably all secondary to underlying shock and multiorgan failure. I explained that he requires 2 vasopressors to manage cardiogenic and septic shock.  I explained that at baseline the patient had end-stage renal disease on dialysis and severe systolic heart failure with a AICD in place.  Despite his young age, he is at high risk of death.  I reviewed code status.    Code Status:  I reviewed the risks and benefits of aggressive cardiopulmonary resuscitative measures taken in the event of a cardiopulmonary arrest event. I discussed the high risk of non-survival from such an event. I also discussed the risk of increased morbidity and a potential decline in quality of life with survival from such an event. The patient's son stated, I can not be talking about letting him go he has been through things like this before many times.  I also know that he would not want to give up.   He  currently elects a full code status.      He asked questions concerning the option for a left heart  catheterization.  I reviewed with him that Cardiology would have to weigh in on whether this was an option.  I had spoken with Cardiology earlier today and they had mentioned that the patient would be a candidate for this procedure but that he had declined on 2 occasions.  I pointed out to his son that in order to honor the patient's wishes, it might be important to realize that just because we can do a procedure does not mean that we should.  In addition, likely has underlying heart failure that could be unrelated to blockage.  To relieve any blockage may provide very little benefit to his current critical state.  He stated that he had this procedure done if it can be done safely.    I later spoke with Cardiology who had come by to see the patient.  It was explained to me that they felt that the patient's current severe shock was likely due to sepsis and associated acidosis and not secondary to cardiogenic cause.  They do not feel that he is safe to undergo left heart catheterization until he is extubated.  It was also revealed to me that the patient had clean coronary arteries on last heart catheterization in 2018.  This reduces the likelihood that he will have any amenable stenosis. I will relay this information to the patient's son the next time we speak.  I believe this should make a bid difference to the patient's son, as I do not believe the patient is likely to safely wean to extubation.     Symptom review:    Unable to obtain as the patient is sedated and intubated.    Assessment/Plan:     Goals of care/counseling  Cardiogenic/septic shock  Hypoxic respiratory failure on mechanical ventilator  Acute on chronic systolic congestive heart  End-stage renal disease on hemodialysis    I will follow up to provide education and support with family regarding goals of care    Interval History:     See above      Active Ambulatory Problems     Diagnosis Date Noted    ESRD on dialysis since 4/3/15     Essential  hypertension     Anemia of renal disease     Secondary hyperparathyroidism of renal origin     Organ transplant candidate 06/02/2016    Pre-transplant evaluation for chronic kidney disease 06/02/2016    Prostate cancer 12/08/2016    Cancer of prostate with high recurrence risk (stage T3a or [place] 8-10 or PSA > 20) 08/18/2017    Moderate malnutrition 09/18/2024     Resolved Ambulatory Problems     Diagnosis Date Noted    No Resolved Ambulatory Problems     No Additional Past Medical History        Past Surgical History:   Procedure Laterality Date    ANGIOGRAM, CORONARY, WITH LEFT HEART CATHETERIZATION N/A 9/13/2024    Procedure: Angiogram, Coronary, with Left Heart Cath;  Surgeon: Tank Scott Jr., MD;  Location: [facility] CATH LAB;  Service: Cardiology;  Laterality: N/A;    AV FISTULA PLACEMENT Left 07/2015    CENTRAL LINE  9/17/2024    Peritoneal Dialysis Catheter Placement  01/2016    Permcath Placement           Review of patient's allergies indicates:   Allergen Reactions    Hydralazine Palpitations and Other (See Comments)     Other Reaction(s): feels like he is running    Palpitations    Amlodipine     Levofloxacin           Current Facility-Administered Medications:     acetaminophen tablet 650 mg, 650 mg, Oral, Q8H PRN, Mary Carter PA-C, 650 mg at 09/19/24 2011    acetaminophen tablet 650 mg, 650 mg, Oral, Q4H PRN, Mary Carter PA-C, 650 mg at 09/15/24 1255    amiodarone tablet 400 mg, 400 mg, Oral, BID **FOLLOWED BY** [START ON 9/26/2024] amiodarone tablet 200 mg, 200 mg, Oral, BID **FOLLOWED BY** [START ON 9/30/2024] amiodarone tablet 200 mg, 200 mg, Oral, Daily, Mian Carter, ANP    aspirin chewable tablet 81 mg, 81 mg, Per NG tube, Daily, Sin Gonsalez Jr., MD, FCCP, 81 mg at 09/23/24 0901    busPIRone tablet 5 mg, 5 mg, Oral, BID, Jeanna Mcmullen, AGACNP-BC, 5 mg at 09/23/24 0902    calcitRIOL capsule 0.25 mcg, 0.25 mcg, Oral, BID, Holly Dudley MD, 0.25 mcg at 09/23/24 0901     calcium carbonate 500 mg/5 mL (1,250 mg/5 mL) suspension 500 mg, 500 mg, Oral, TID WM, Aristeo Rodrigues MD    dextrose 10% bolus 125 mL 125 mL, 12.5 g, Intravenous, PRN, Mary Carter PA-C, Stopped at 24 1456    dextrose 10% bolus 250 mL 250 mL, 25 g, Intravenous, PRN, Mary Carter PA-C, Stopped at 24 1241    enoxaparin injection 30 mg, 30 mg, Subcutaneous, Daily, Mary Carter PA-C, 30 mg at 24 1626    etomidate injection, , Intravenous, Code/trauma/sedation Med, Rodolfo Gutierrez MD, 20 mg at 24    famotidine (PF) injection 20 mg, 20 mg, Intravenous, Daily, Chun Giordano MD, 20 mg at 24 09    fentaNYL 2500 mcg in 0.9% sodium chloride 250 mL infusion premix, 0-250 mcg/hr, Intravenous, Continuous, Phani Kinsey MD, Last Rate: 15 mL/hr at 24 0819, 150 mcg/hr at 24 0819    [] fentaNYL injection 50 mcg, 50 mcg, Intravenous, Q15 Min PRN **FOLLOWED BY** fentaNYL injection 50 mcg, 50 mcg, Intravenous, Q1H PRN, Chun Giordano MD, 50 mcg at 24 1900    glucagon (human recombinant) injection 1 mg, 1 mg, Intramuscular, PRN, Mary Carter PA-C    glucose chewable tablet 16 g, 16 g, Oral, PRN, Mary Carter PA-C    glucose chewable tablet 24 g, 24 g, Oral, PRN, Mary Carter PA-C    HYDROcodone-acetaminophen 7.5-325 mg per tablet 1 tablet, 1 tablet, Oral, Q4H PRN, Inocencio Mcmillan MD, 1 tablet at 24 0735    metoprolol injection 5 mg, 5 mg, Intravenous, Q5 Min PRN, Viviane Mejía FNP, 5 mg at 24 1716    NORepinephrine 32 mg in D5W 250 mL infusion, 0-3 mcg/kg/min, Intravenous, Continuous, Aristeo Rodrigues MD, Last Rate: 23.2 mL/hr at 24 1159, 0.8 mcg/kg/min at 24 1159    ondansetron injection 4 mg, 4 mg, Intravenous, Q4H PRN, Mary Carter PA-C, 4 mg at 24 0254    polyethylene glycol packet 17 g, 17 g, Oral, BID, Aristeo Rodrigues MD, 17 g at 24 0814    propofol (DIPRIVAN) 10 mg/mL infusion,  0-50 mcg/kg/min, Intravenous, Continuous, Chun Giordano MD, Last Rate: 7.4 mL/hr at 24 1201, 20 mcg/kg/min at 24 1201    rocuronium injection, , Intravenous, Code/trauma/sedation Med, Rodolfo Gutierrez MD, 50 mg at 24 210    senna-docusate 8.6-50 mg per tablet 2 tablet, 2 tablet, Oral, BID, Aristeo Rodrigues MD, 2 tablet at 24 0814    simethicone chewable tablet 80 mg, 1 tablet, Oral, TID PRN, Jeanna Mcmullen AGACNP-BC, 80 mg at 24 2315    sodium bicarbonate tablet 1,950 mg, 1,950 mg, Oral, TID, Sinan Singh MD, 1,950 mg at 24 1520    sodium chloride 0.9% flush 10 mL, 10 mL, Intravenous, Q12H PRN, Mary Carter PA-C    sodium zirconium cyclosilicate packet 10 g, 10 g, Oral, Every Mon, Wed, Fri, Anisha Warren, ANP, 10 g at 24 0902    Pharmacy to dose Vancomycin consult, , , Once **AND** vancomycin - pharmacy to dose, , Intravenous, pharmacy to manage frequency, Ephraim Yeh MD    vasopressin (PITRESSIN) 0.2 Units/mL in D5W 100 mL infusion, 0.04 Units/min, Intravenous, Continuous, Aristeo Rodrigues MD, Last Rate: 12 mL/hr at 24 1200, 0.04 Units/min at 24 1200       Current Facility-Administered Medications:     acetaminophen, 650 mg, Oral, Q8H PRN    acetaminophen, 650 mg, Oral, Q4H PRN    dextrose 10%, 12.5 g, Intravenous, PRN    dextrose 10%, 25 g, Intravenous, PRN    etomidate, , Intravenous, Code/trauma/sedation Med    [] fentaNYL, 50 mcg, Intravenous, Q15 Min PRN **FOLLOWED BY** fentaNYL, 50 mcg, Intravenous, Q1H PRN    glucagon (human recombinant), 1 mg, Intramuscular, PRN    glucose, 16 g, Oral, PRN    glucose, 24 g, Oral, PRN    HYDROcodone-acetaminophen, 1 tablet, Oral, Q4H PRN    metoprolol, 5 mg, Intravenous, Q5 Min PRN    ondansetron, 4 mg, Intravenous, Q4H PRN    rocuronium, , Intravenous, Code/trauma/sedation Med    simethicone, 1 tablet, Oral, TID PRN    sodium chloride 0.9%, 10 mL, Intravenous, Q12H PRN    Pharmacy to  "dose Vancomycin consult, , , Once **AND** vancomycin - pharmacy to dose, , Intravenous, pharmacy to manage frequency     Family History   Problem Relation Name Age of Onset    Diabetes Mother      Cancer Maternal Grandmother      Hypertension Maternal Grandfather      Heart attack Maternal Grandfather          MI in his 60s or 70s    Kidney disease Neg Hx            Review of Systems         Objective:   BP (!) 88/62   Pulse 101   Temp 98.5 °F (36.9 °C) (Oral)   Resp (!) 7   Ht 5' 11.26" (1.81 m)   Wt 61.9 kg (136 lb 7.4 oz)   SpO2 97%   BMI 18.89 kg/m²      Physical Exam       Review of Symptoms  Palliative Exam  Advance Care Planning   Advance Care Planning              > 50% of 36 min of encounter was spent in chart review, face to face discussion of goals of care, symptom assessment, coordination of care and emotional support.     Lencho Xie MD  Palliative Medicine  [facility]   "

## 2024-09-23 NOTE — PROGRESS NOTES
Nephrology Progress Note      HPI:    Prem Saldana is a 49 y.o. male from [place], with pmhx of ESRD TTS via L AVF, CHF, EF <20%, COPD, hx of prostate cancer, presenting to the ED today with respiratory failure requiring BiPAP.      History is very limited due to his condition but he was able to answer a few questions on bipap. He states that he last dialyzed in [place], [place] Saturday 9/7. He tells me he has a 6am chair time TTS set up at the dialysis unit in [place]? Will have to call the unit to verify.      CTA chest revealing cardiomegaly with trace left effusion, atelectasis and mild interstitial edema. CXR with congestion. He has 1+ edema to BLE. His electrolytes are normal., BNP is >14,000 on admission, troponin 0.55 and he states that his chest hurts because he fell and had trauma to the chest 2 days ago. Bedside echo pending in the ED. Cardiology following and trending troponins until peak. ED physician paged nephrology for HD today.     Interval History:    9/11/24  Tolerated HD yesterday with 3L off and significant improvement in respiratory status, now on 4L NC. Vitals are stable. Labs reviewed, all relatively stable but bicarb is now 20. Serial troponins remain high. He continues to complain of chest pain from falling this weekend. Chest xr and CTA are without traumatic changes. He is now able to tell me that he was in [place] because his mom lives there, but he is moving to [place] and has a chair time at [facility].   He is usually on 2L O2 support for COPD and is currently requiring 4L.     09/12/2024   No acute changes overnight.  Potassium 6 on a.m. labs.  He is scheduled for his routine dialysis run today which will be done after left heart catheterization.  Voices no new complaints.    09/13/2024   Tolerated 3 L UF with HD yesterday.  Potassium remains elevated at 5.6 on a.m. labs.  Hemoglobin stable.  Shortness of breath improved following HD. no chest pain at present.    09/16/2024   Awake  "alert and oriented.  Looks little dyspneic.  Complains of pain and asking for lot of pain medicines.  Not sure about the oral fluid intake or nutrition intake.    09/17/2024  No acute events overnight.  No new complaints.  No chest pain, shortness of breath, abdominal pain, nausea, vomiting, or lower extremity edema.  He remains on vasopressors.    09/18/2024   Overnight events noted and now patient is intubated and on the ventilator and sedated with a propofol and on high dose of Levophed also.  Also on amiodarone.    09/19/2024  Patient dialyzed off schedule yesterday due to hyperkalemia.  About to start dialysis again this morning on regular TTS schedule.  Remains intubated and on ventilator.  Still requiring Levophed for hypotension.    09/20/2024  Patient continues to have hypotension overnight, and Levophed had to be added.  He did dialyze yesterday, however, was only able to get 100 mL UF due to hypotension.  Remains intubated.    09/23/2024   Weekend events noted.  He did dialyze on Saturday.  Overall condition is unchanged.  Remains on the pressors.  Also on sedation.  Also intubated.    Vital Signs:  BP 91/71   Pulse 107   Temp 99.2 °F (37.3 °C) (Oral)   Resp (!) 26   Ht 5' 11.26" (1.81 m)   Wt 61.9 kg (136 lb 7.4 oz)   SpO2 99%   BMI 18.89 kg/m²   Body mass index is 18.89 kg/m².    Physical Exam:    GEN: Ill appearing AA male   HEENT orally intubated.  CV:  Irregular rhythm with rapid rate  PULM:  On ventilator, CTAB  ABD: Soft, NT/ND abdomen with NABS  EXT: No cyanosis or edema  SKIN: Warm and dry  PSYCH:  Sedated on the ventilator  Dialysis access:  LUE AV fistula      Labs:      Component Value Date/Time     (L) 09/23/2024 0357     (L) 09/22/2024 0258     06/02/2016 0710    K 5.1 09/23/2024 0357    K 4.8 09/22/2024 0258    K 3.7 06/02/2016 0710    CO2 14 (L) 09/23/2024 0357    CO2 17 (L) 09/22/2024 0258    CO2 20 (L) 06/02/2016 0710    BUN 59.2 (H) 09/23/2024 0357    BUN 43.5 " (H) 09/22/2024 0258    BUN 29 (H) 11/11/2016 0855    BUN 32 (H) 06/02/2016 0710    CREATININE 3.82 (H) 09/23/2024 0357    CREATININE 3.18 (H) 09/22/2024 0258    CREATININE 3.9 (H) 11/11/2016 0855    CREATININE 3.8 (H) 06/02/2016 0710    CALCIUM 8.5 09/23/2024 0357    CALCIUM 8.3 (L) 09/22/2024 0258    CALCIUM 9.3 06/02/2016 0710    PHOS 5.5 (H) 09/23/2024 0357    PHOS 2.9 06/02/2016 0710            Component Value Date/Time    WBC 35.80 (H) 09/23/2024 0357    WBC 35.8 09/23/2024 0357    WBC 27.05 (H) 09/22/2024 0258    WBC 27.05 09/22/2024 0258    WBC 6.53 06/02/2016 0710    HGB 11.5 (L) 09/23/2024 0357    HGB 9.8 (L) 09/22/2024 0258    HGB 13.6 (L) 06/02/2016 0710    HCT 31.8 (L) 09/23/2024 0357    HCT 26.7 (L) 09/22/2024 0258    HCT 40.1 06/02/2016 0710     09/23/2024 0357     09/22/2024 0258     06/02/2016 0710             Assessment/Plan:    ESRD on HD - [place], says he has a TTS chair time in [place] and has been on dialysis for 7 years. Left AVF  Hyperkalemia , and metabolic acidosis.  CHF - 15-20% EF on echo 9/10   NSTEMI - cardiology recommending cath at some point  COPD on 2L home O2--respiratory status is improving but still less than baseline   Prostate cancer s/p radiation  Anemia of chronic kidney disease      Recommendations:  Start sodium bicarb 1950 mg through the feeding tube 3 times a day  Check labs tomorrow and dialysis again tomorrow

## 2024-09-23 NOTE — PROCEDURES
"Prem Saldana is a 49 y.o. male patient.    Temp: 99.2 °F (37.3 °C) (09/23/24 0400)  Pulse: 107 (09/23/24 0645)  Resp: (!) 26 (09/23/24 0645)  BP: 91/71 (09/23/24 0645)  SpO2: 100 % (09/23/24 1234)  Weight: 61.9 kg (136 lb 7.4 oz) (09/14/24 0635)  Height: 5' 11.26" (181 cm) (09/18/24 1340)  Mallampati Scale: Class III  ASA Classification: Class 3    Arterial Line    Date/Time: 9/23/2024 3:45 PM  Location procedure was performed: [facility]    Performed by: Sheila Gavin MD  Authorized by: Sheila Gavin MD  Indications: hemodynamic monitoring  Location: right radial  Needle gauge: 22  Number of attempts: 2  Estimated blood loss (mL): 4  Post-procedure: dressing applied  Patient tolerance: Patient tolerated the procedure well with no immediate complications          Sheila Gavin MD  [facility], PRG-3  9/23/2024   "

## 2024-09-23 NOTE — PLAN OF CARE
Problem: Adult Inpatient Plan of Care  Goal: Plan of Care Review  Outcome: Unable to Meet  Goal: Patient-Specific Goal (Individualized)  Outcome: Not Progressing  Goal: Optimal Comfort and Wellbeing  Outcome: Progressing  Goal: Readiness for Transition of Care  Outcome: Not Progressing     Problem: Skin Injury Risk Increased  Goal: Skin Health and Integrity  Outcome: Progressing

## 2024-09-23 NOTE — CARE UPDATE
Attempted insertion of arterial line however unsuccessful.    Marina Winters MD  [employer] Internal Medicine PGY-III

## 2024-09-24 NOTE — PROGRESS NOTES
Nephrology Progress Note      HPI:    Prem Saldana is a 49 y.o. male from [place], with pmhx of ESRD TTS via L AVF, CHF, EF <20%, COPD, hx of prostate cancer, presenting to the ED today with respiratory failure requiring BiPAP.      History is very limited due to his condition but he was able to answer a few questions on bipap. He states that he last dialyzed in [place], [place] Saturday 9/7. He tells me he has a 6am chair time TTS set up at the dialysis unit in [place]? Will have to call the unit to verify.      CTA chest revealing cardiomegaly with trace left effusion, atelectasis and mild interstitial edema. CXR with congestion. He has 1+ edema to BLE. His electrolytes are normal., BNP is >14,000 on admission, troponin 0.55 and he states that his chest hurts because he fell and had trauma to the chest 2 days ago. Bedside echo pending in the ED. Cardiology following and trending troponins until peak. ED physician paged nephrology for HD today.     Interval History:    9/11/24  Tolerated HD yesterday with 3L off and significant improvement in respiratory status, now on 4L NC. Vitals are stable. Labs reviewed, all relatively stable but bicarb is now 20. Serial troponins remain high. He continues to complain of chest pain from falling this weekend. Chest xr and CTA are without traumatic changes. He is now able to tell me that he was in [place] because his mom lives there, but he is moving to [place] and has a chair time at [facility].   He is usually on 2L O2 support for COPD and is currently requiring 4L.     09/12/2024   No acute changes overnight.  Potassium 6 on a.m. labs.  He is scheduled for his routine dialysis run today which will be done after left heart catheterization.  Voices no new complaints.    09/13/2024   Tolerated 3 L UF with HD yesterday.  Potassium remains elevated at 5.6 on a.m. labs.  Hemoglobin stable.  Shortness of breath improved following HD. no chest pain at present.    09/16/2024   Awake  "alert and oriented.  Looks little dyspneic.  Complains of pain and asking for lot of pain medicines.  Not sure about the oral fluid intake or nutrition intake.    09/17/2024  No acute events overnight.  No new complaints.  No chest pain, shortness of breath, abdominal pain, nausea, vomiting, or lower extremity edema.  He remains on vasopressors.    09/18/2024   Overnight events noted and now patient is intubated and on the ventilator and sedated with a propofol and on high dose of Levophed also.  Also on amiodarone.    09/19/2024  Patient dialyzed off schedule yesterday due to hyperkalemia.  About to start dialysis again this morning on regular TTS schedule.  Remains intubated and on ventilator.  Still requiring Levophed for hypotension.    09/20/2024  Patient continues to have hypotension overnight, and Levophed had to be added.  He did dialyze yesterday, however, was only able to get 100 mL UF due to hypotension.  Remains intubated.    09/23/2024   Weekend events noted.  He did dialyze on Saturday.  Overall condition is unchanged.  Remains on the pressors.  Also on sedation.  Also intubated.    09/24/2024   Currently tolerating dialysis.  Patient is still sedated and on the ventilator.  Also on pressors.  Overall condition has not changed.  Nurses reported that NG tube was placed to suction and 1300 cc fluid came out.  So the tube feeding is on hold at present time.      Vital Signs:  BP (!) 82/65   Pulse 108   Temp 98.1 °F (36.7 °C) (Oral)   Resp (!) 30   Ht 5' 11.26" (1.81 m)   Wt 61.9 kg (136 lb 7.4 oz)   SpO2 100%   BMI 18.89 kg/m²   Body mass index is 18.89 kg/m².    Physical Exam:    GEN: Ill appearing AA male remains on the ventilator.  Sedated with propofol and fentanyl.  Pressors include Levophed and vasopressin.  HEENT orally intubated.  CV:  Irregular rhythm with rapid rate  PULM:  On ventilator, CTAB  ABD: Soft, NT/ND abdomen with NABS  EXT: No cyanosis or edema  SKIN: Warm and dry  PSYCH:  " Sedated on the ventilator  Dialysis access:  LUE AV fistula      Labs:      Component Value Date/Time     (L) 09/24/2024 0431     (L) 09/23/2024 0357     06/02/2016 0710    K 4.1 09/24/2024 0431    K 5.1 09/23/2024 0357    K 3.7 06/02/2016 0710    CO2 16 (L) 09/24/2024 0431    CO2 14 (L) 09/23/2024 0357    CO2 20 (L) 06/02/2016 0710    BUN 81.4 (H) 09/24/2024 0431    BUN 59.2 (H) 09/23/2024 0357    BUN 29 (H) 11/11/2016 0855    BUN 32 (H) 06/02/2016 0710    CREATININE 4.50 (H) 09/24/2024 0431    CREATININE 3.82 (H) 09/23/2024 0357    CREATININE 3.9 (H) 11/11/2016 0855    CREATININE 3.8 (H) 06/02/2016 0710    CALCIUM 9.3 09/24/2024 0431    CALCIUM 8.5 09/23/2024 0357    CALCIUM 9.3 06/02/2016 0710    PHOS 6.6 (H) 09/24/2024 0431    PHOS 2.9 06/02/2016 0710            Component Value Date/Time    WBC 20.51 (H) 09/24/2024 0431    WBC 20.51 09/24/2024 0431    WBC 35.80 (H) 09/23/2024 0357    WBC 35.8 09/23/2024 0357    WBC 6.53 06/02/2016 0710    HGB 9.3 (L) 09/24/2024 0431    HGB 11.5 (L) 09/23/2024 0357    HGB 13.6 (L) 06/02/2016 0710    HCT 25.2 (L) 09/24/2024 0431    HCT 31.8 (L) 09/23/2024 0357    HCT 40.1 06/02/2016 0710     09/24/2024 0431     09/23/2024 0357     06/02/2016 0710             Assessment/Plan:    ESRD on HD - [place], says he has a TTS chair time in [place] and has been on dialysis for 7 years. Left AVF  Hyperkalemia , and metabolic acidosis.  CHF - 15-20% EF on echo 9/10   NSTEMI - cardiology recommending cath at some point  COPD on 2L home O2--respiratory status is improving but still less than baseline   Prostate cancer s/p radiation  Anemia of chronic kidney disease      Recommendations:  Start sodium bicarb 1950 mg through the feeding tube 3 times a day  Dialysis bicarb has been changed to 35 which also will help correct patient's metabolic acidosis.

## 2024-09-24 NOTE — PROGRESS NOTES
[facility] - [facility] ICU  Pulmonary Critical Care Note    Patient Name: Prem Saldana  MRN: [id number]  Admission Date: 9/10/2024  Hospital Length of Stay: 14 days  Code Status: Full Code  Attending Provider: Sin Gonsalez Jr., MD,*  Primary Care Provider: Tank Saenz MD     Subjective:     HPI: Prem Saldana is a 49 year old  male with a past medical history for tobacco use disorder, COPD on home oxygen, hypertension, hyperlipidemia, ESRD on HD, heart failure with reduced ejection fraction (EF 15-20%), prostate cancer status post radiation, who initially presented to [facility] ED (09/10/2024) due to chest pain and shortness of breath.     CIS consulted for NSTEMI management. Attempted to perform LHC (09/14/2024) but was canceled prior to initiation of procedure due to patient becoming uncooperative per reports.     Hospital Course/Significant events:  9/13 - LHC attempted but aborted due to anatomy  9/14 - LHC canceled due to patient being uncooperative  9/15 - upgraded to ICU for hypotension and runs of V-tach  9/17 - required intubation  9/20 - spiked a temp, 2/2 blood cultures growing staph epi    24 Hour Interval History:  Started on sodium bicarb by Nephrology yesterday.  Amiodarone transitioned to oral.  Femoral arterial line placed.  Right radial artery noted to be occluded on ultrasound.  No evidence of abscess in the left upper extremity graft.  White blood cell count decreased from 35 to 20.  ALT and AST are increasing.    Past Medical History:   Diagnosis Date    Anemia of renal disease     ESRD on dialysis since 4/3/15     Essential hypertension     Secondary hyperparathyroidism of renal origin        Past Surgical History:   Procedure Laterality Date    ANGIOGRAM, CORONARY, WITH LEFT HEART CATHETERIZATION N/A 9/13/2024    Procedure: Angiogram, Coronary, with Left Heart Cath;  Surgeon: Tank Scott Jr., MD;  Location: [facility] CATH LAB;  Service: Cardiology;   Laterality: N/A;    AV FISTULA PLACEMENT Left 07/2015    CENTRAL LINE  9/17/2024    Peritoneal Dialysis Catheter Placement  01/2016    Permcath Placement                    Current Outpatient Medications   Medication Instructions    calcitRIOL (ROCALTROL) 0.25 mcg, Oral, 2 times daily    labetalol (NORMODYNE) 300 MG tablet No dose, route, or frequency recorded.    minoxidiL (LONITEN) 2.5 mg, Oral, 2 times daily    vacuum erection device system Kit 1 Units, Misc.(Non-Drug; Combo Route), As needed (PRN)       Current Inpatient Medications   amiodarone  400 mg Oral BID    Followed by    [START ON 9/26/2024] amiodarone  200 mg Oral BID    Followed by    [START ON 9/30/2024] amiodarone  200 mg Oral Daily    aspirin  81 mg Per NG tube Daily    busPIRone  5 mg Oral BID    calcitRIOL  0.25 mcg Oral BID    calcium carbonate  500 mg Oral TID WM    enoxparin  30 mg Subcutaneous Daily    famotidine (PF)  20 mg Intravenous Daily    polyethylene glycol  17 g Oral BID    senna-docusate 8.6-50 mg  2 tablet Oral BID    sodium bicarbonate  1,950 mg Oral TID    sodium zirconium cyclosilicate  10 g Oral Every Mon, Wed, Fri       Current Intravenous Infusions   fentanyl  0-250 mcg/hr Intravenous Continuous 15 mL/hr at 09/23/24 2019 150 mcg/hr at 09/23/24 2019    NORepinephrine bitartrate-D5W  0-3 mcg/kg/min Intravenous Continuous 19.7 mL/hr at 09/24/24 0130 0.68 mcg/kg/min at 09/24/24 0130    propofoL  0-50 mcg/kg/min Intravenous Continuous 7.4 mL/hr at 09/24/24 0601 20 mcg/kg/min at 09/24/24 0601    vasopressin  0.04 Units/min Intravenous Continuous 12 mL/hr at 09/24/24 0200 0.04 Units/min at 09/24/24 0200            Objective:       Intake/Output Summary (Last 24 hours) at 9/24/2024 0719  Last data filed at 9/24/2024 0635  Gross per 24 hour   Intake 1253.74 ml   Output 500 ml   Net 753.74 ml         Vital Signs (Most Recent):  Temp: 98 °F (36.7 °C) (09/24/24 0000)  Pulse: 107 (09/24/24 0630)  Resp: (!) 24 (09/24/24 0630)  BP: 91/63  (09/24/24 0630)  SpO2: 100 % (09/24/24 0630)  Body mass index is 18.89 kg/m².  Weight: 61.9 kg (136 lb 7.4 oz) Vital Signs (24h Range):  Temp:  [98 °F (36.7 °C)-98.6 °F (37 °C)] 98 °F (36.7 °C)  Pulse:  [] 107  Resp:  [7-40] 24  SpO2:  [87 %-100 %] 100 %  BP: ()/(46-83) 91/63  Arterial Line BP: (100-132)/(49-66) 124/63     Physical Exam  Vitals reviewed.   Constitutional:       Comments: Sedated on propofol.  Synchronous with the ventilator.   Eyes:      Comments: Pupils are equal in size   Cardiovascular:      Rate and Rhythm: Normal rate and regular rhythm.   Pulmonary:      Comments: Coarse breath sounds bilaterally.  Abdominal:      General: Abdomen is flat.      Palpations: Abdomen is soft.   Musculoskeletal:      Comments: Left lower extremity wrapped in bandage.  No significant edema   Neurological:      Comments: Sedated on propofol.  Pupils are equal in size           Lines/Drains/Airways       Central Venous Catheter Line  Duration             Percutaneous Central Line - Triple Lumen  09/17/24 2030 Internal Jugular Left 6 days              Drain  Duration                  NG/OG Tube 09/23/24 1630 16 Fr. Center mouth <1 day              Airway  Duration                  Airway - Non-Surgical 09/17/24 2117 Endotracheal Tube 6 days              Arterial Line  Duration             Arterial Line 09/23/24 2100 Right Femoral <1 day              Peripheral Intravenous Line  Duration                  Hemodialysis AV Fistula 09/17/24 0701 Left forearm 7 days         Hemodialysis AV Fistula 09/17/24 0701 Left upper arm 7 days                    Significant Labs:    Lab Results   Component Value Date    WBC 20.51 (H) 09/24/2024    HGB 9.3 (L) 09/24/2024    HCT 25.2 (L) 09/24/2024    MCV 79.0 (L) 09/24/2024     09/24/2024           BMP  Lab Results   Component Value Date     (L) 09/24/2024    K 4.1 09/24/2024    CO2 16 (L) 09/24/2024    BUN 81.4 (H) 09/24/2024    CREATININE 4.50 (H) 09/24/2024     CALCIUM 9.3 09/24/2024    AGAP 23.0 09/24/2024    ESTGFRAFRICA 20.8 (A) 11/11/2016    EGFRNONAA 18.0 (A) 11/11/2016         ABG  Recent Labs   Lab 09/23/24 2055   PH 7.250*   PO2 74.0*   PCO2 50.0*   HCO3 21.9*   POCBASEDEF -5.50       Mechanical Ventilation Support:  Vent Mode: VOLUME A/C (09/24/24 0508)  Set Rate: 30 BPM (09/24/24 0508)  Vt Set: 460 mL (09/24/24 0508)  PEEP/CPAP: 5 cmH20 (09/24/24 0508)  Oxygen Concentration (%): 30 (09/24/24 0600)  Peak Airway Pressure: 21 cmH20 (09/24/24 0508)  Total Ve: 10.9 L/m (09/24/24 0508)  F/VT Ratio<105 (RSBI): (!) 82.19 (09/24/24 0508)      Significant Imaging:  XR Gastric tube check, non-radiologist performed  Narrative: EXAMINATION:  XR GASTRIC TUBE CHECK, NON-RADIOLOGIST PERFORMED    CLINICAL HISTORY:  OG placement verification;    TECHNIQUE:  One view    COMPARISON:  September 17, 2024    FINDINGS:  Nasogastric tube traverses the gastroesophageal junction and tip of the tube is within the mid to distal gastric body.  There is adequate length of the tube within the stomach.  Impression: Nasogastric tube terminates within the stomach.    Electronically signed by: Gordon Decker  Date:    09/23/2024  Time:    16:44  X-Ray Chest 1 View  Narrative: EXAMINATION:  XR CHEST 1 VIEW    CLINICAL HISTORY:  s/p intubation;    TECHNIQUE:  AP chest    COMPARISON:  Chest x-ray dated 09/22/2024    FINDINGS:  Endotracheal tube and enteric tube remain in place.  The heart is stable in size.  There are similar bibasilar airspace opacities.  There is no definite visible pneumothorax.  Impression: Stable exam without significant interval change.    Electronically signed by: Shahana Xie  Date:    09/23/2024  Time:    06:04    Chest x-ray 09/23/2024: AICD in place.  Endotracheal tube in appropriate position.  There are bilateral infiltrates and loss of the left hemidiaphragm with silhouette sign.      Assessment/Plan:     Assessment  Mixed shock, cardiogenic and septic. Suspect  primarily cardiogenic  Fever & Leukocytosis - blood cultures 2/2 with staph epi  Heart failure with reduced ejection fraction (EF 15-20%)  ESRD on HD  Hyperlipidemia  COPD    Plan  Continue levophed and vasopressin support; wean as tolerated to maintain MAP > 65  Check central venous saturation from central line  Right upper quadrant ultrasound to workup elevated LFTs  Continue mechanical ventilation on volume control 460/30/5/0.3; daily SAT/SBT. RASS goal 0 to -1  Continue IV vanc for staph epi bacteremia  Consult vascular surgery for right radial arterial thrombus  Nephrology consulted and following for HD  Appreciate cardiology recs  Appreciate palliative care recs    DVT Prophylaxis: Subcu Lovenox  GI Prophylaxis: IV Famotidine     This patient remains at high risk of decompensation and death and will remain in ICU level care.    32 minutes of critical care was time spent personally by me on the following activities: development of treatment plan with patient or surrogate and bedside caregivers, discussions with consultants, evaluation of patient's response to treatment, examination of patient, ordering and performing treatments and interventions, ordering and review of laboratory studies, ordering and review of radiographic studies, pulse oximetry, re-evaluation of patient's condition.  This critical care time did not overlap with that of any other provider or involve time for any procedures.    This note was generated via Dictaphone and may contain some voice recognition errors.       Aristeo Rodrigues MD  Pulmonary Critical Care Medicine  [facility] - [facility] ICU  DOS: 09/24/2024

## 2024-09-24 NOTE — NURSING
Nurses Note -- 4 Eyes      9/24/2024   4:01 AM      Skin assessed during: Q Shift Change      [] No Altered Skin Integrity Present    []Prevention Measures Documented      [x] Yes- Altered Skin Integrity Present or Discovered   [] LDA Added if Not in Epic (Describe Wound)   [] New Altered Skin Integrity was Present on Admit and Documented in LDA   [] Wound Image Taken    Wound Care Consulted? Yes    Attending Nurse:  Little Sena RN/Staff Member:  DOYLE Hendrix

## 2024-09-24 NOTE — NURSING
09/24/24 1300   Post-Hemodialysis Assessment   Blood Volume Processed (Liters) 62.3 L   Dialyzer Clearance Clear   Duration of Treatment 180 minutes   Total UF (mL) 2000 mL   Patient Response to Treatment pt tolerated tx well   Post-Hemodialysis Comments NAD noted, VSS, Net UF 2,000 mL.
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
## 2024-09-24 NOTE — PROGRESS NOTES
[facility]    Cardiology  Progress Note    Patient Name: Prem Saldnaa  MRN: [id number]  Admission Date: 9/10/2024  Hospital Length of Stay: 14 days  Code Status: Full Code   Attending Physician: Sin Gonsalez Jr., MD,*   Primary Care Physician: Tank Saenz MD  Expected Discharge Date:   Principal Problem:<principal problem not specified>    Subjective:   Brief HPI/Hospital Course:   Mr. Saldana is a 48 y/o male with a history CHF, HTN, ESRD on HD, who is unknown to [facility]. He presented to ER on 9.10.24 with complaints of shortness of breath. He reports he has a fall on 9.9.24 in which he hit his right side. He reports SOB started during the night. EMS reported he was tachypneic (breathing 55/min). He was given FD ASA and Sl Nitro x1. Significant labs include H&H 11.7/36.8, BUN/Creat 70.3/8.07, ALP 1061, Albumin 3.2, BNP 14.761, Troponin 0.554. CTA Chest negative for PE, but cardiomegaly with a trace left effusion, bibasilar atelectasis, mild interstitial edema and Chronic appearing compression deformities in the midthoracic spine. CXR shows poor inspiratory effort accentuates the pulmonary vascular markings, cardiomegaly, increased left retrocardiac density and silhouetting of the left hemidiaphragm, and calcified densities below the left hemidiaphragm. EKG shows sinus tachycardia with Left axis deviation, and LVH. [facility] has been consulted for NSTEMI management.       Hospital Course:  9.11.24: NAD. VSS. No complaints of Palps/SOB. ST on telemetry. Reports chest pain from desk falling on him. Reports back pain. H&H 10.3/32.7, Creat 6.10   9.12.24: NAD. VSS. Reports Chest Pain No complaints of SOB/Palps. H&H 11.4/35.6, K 6.1, Creat 8.02. In bed lying flat.   9.13.24: NAD. VSS. ST on telemetry. Denies SOB/Palps. H&H 12.7/39.8, K 5.6, BUN/Creat 44.1/6.46,   9.14.24: LHC cancelled due to patient uncooperative.   He is being treated for hyperkalemia. Labs pending. Currently denies  "pain  9.15.24: NAD. VSS.   9.16.24: NAD Noted. Sinus Tach on Tele. On Levophed support. Primary complaint noted to be leg pain. Legs are warm dry, dressing on LLE. Respiratory status stable. Had Runs of NSVT Yesterday afternoon, seemed to have resolved.  9.17.24: NAD. "I am good." No Further Runs of NSVT. ST. Denies CP, SOB and Palps. Levophed 0.4mcg/kg/min  9.18.24: NAD. Vented/Sedated. No Ectopy. Amiodarone 0.5mg/min. Levophed 0.07mcg/kg/min.   9.19.24: NAD. Vented/Sedated. Amiodarone 0.5mg/min, Levophed 0.5mcg/kg/min  9.20.24: NAD. Vented/Sedated. Amiodarone 0.5mg/min, Levophed 0.46mcg/kg/min, Vasopressin 0.04unit/min.   9.21.24: NAD. Vented/Sedated. Vasopressin 0.04units/min, Levophed 0.44mcg/kg/min, Amiodarone 0.4mg/min. WBC 29.78  9.22.24: NAD. Vented/Sedated. Amiodarone 0.5mg/min, Levophed 0.38mcg/kg/min, Vasopressin 0.04units/min.   9.23.24: NAD. Vented/Sedated. Amiodarone 0.5mg/min, Levophed 0.8mcg/kg/min, Vasopressin 0.4Units/min   9.24.24: NAD. Vented/Sedated. Levophed 0.68mcg/kg/min, Vasopressin 0.04Units/min. Undergoing HD during Visit.     PMH: CHF, HTN, ESRD on HD, Anemia, Secondary Hyperparathyroidism   PSH: AV Fistula   Family History: Maternal Grandmother - Cancer, Heart Disease, MI; Mother - DM II  Social History: Current Smoker (2-3 Cigarette per Day). Reports occasionally alcohol. Reports past use of Marijuana.      Previous Cardiac Diagnostics:   ECHO (9.10.24):  Left Ventricle: The left ventricle is dilated. Increased wall thickness. There is concentric remodeling. Severe global hypokinesis present. There is severely reduced systolic function with a visually estimated ejection fraction of 15 - 20%. Unable to assess diastolic function due to atrial fibrillation. Elevated left ventricular filling pressure. Right Ventricle: Severe right ventricular enlargement. Systolic function is severely reduced.  Left Atrium: Left atrium is dilated. Right Atrium: Right atrium is dilated. Aortic Valve: The " aortic valve is a trileaflet valve. Mildly calcified cusps. Mildly restricted motion. Aortic valve peak velocity is 1.85 m/s. Mean gradient is 8 mmHg. There is moderate aortic regurgitation with an eccentrically directed jet. Mitral Valve: Moderately thickened leaflets. There is moderate mitral annular calcification present. There is mild to moderate regurgitation. Tricuspid Valve: There is moderate regurgitation. The estimated PA systolic pressure is at least 34 mmHg. Pulmonic Valve: There is mild to moderate regurgitation. IVC/SVC: Elevated venous pressure at 15 mmHg. Pericardium: There is a small effusion. No indication of cardiac tamponade.    ECHO (2.19.24):  A complete two-dimensional transthoracic echocardiogram was performed (2D, M-mode, Doppler and color flow Doppler). The left ventricle is severely dilated.   Left ventricular systolic function is severely reduced. Estimated Ejection Fraction = <20%. The right ventricle is mild to moderately dilated. The left atrium is severely dilated. The right atrium is mild to moderately dilated. The mitral valve leaflets appear thickened, but open well. There is moderate to severe mitral annular calcification. There is mild mitral regurgitation. There is mild tricuspid insufficiency noted. Mild aortic regurgitation. Dialated IVC 3.8 cm. The lack of respiratory variation in the inferior vena cava diameter is noted. There is no pericardial effusion. Large left pleural effusion.There is moderate concentric left ventricular hypertrophy.      SPECT (2.19.18):  The perfusion scan is free of evidence for myocardial ischemia or injury. Resting wall motion is physiologic. There is resting LV dysfunction with a reduced ejection fraction of 40 %. The ventricular volumes are normal at rest and stress. The extracardiac distribution of radioactivity is normal.      Dobutamine Stress Test (7.27.16);  Moderate left atrial enlargement. Concentric hypertrophy. Normal left ventricular  systolic function (EF 60-65%). Left ventricular diastolic dysfunction. Normal right ventricular systolic function . The estimated PA systolic pressure is greater than 26 mmHg.  Mild mitral regurgitation. Small pericardial effusion.      Review of Systems   Unable to perform ROS: Intubated     Objective:     Vital Signs (Most Recent):  Temp: 98.3 °F (36.8 °C) (09/24/24 1200)  Pulse: 108 (09/24/24 1515)  Resp: (!) 30 (09/24/24 1515)  BP: (!) 82/59 (09/24/24 1515)  SpO2: 100 % (09/24/24 1515) Vital Signs (24h Range):  Temp:  [98 °F (36.7 °C)-98.6 °F (37 °C)] 98.3 °F (36.8 °C)  Pulse:  [101-117] 108  Resp:  [7-51] 30  SpO2:  [86 %-100 %] 100 %  BP: ()/(51-96) 82/59  Arterial Line BP: ()/(46-78) 105/56   Weight: 61.9 kg (136 lb 7.4 oz)  Body mass index is 18.89 kg/m².  SpO2: 100 %       Intake/Output Summary (Last 24 hours) at 9/24/2024 1608  Last data filed at 9/24/2024 1300  Gross per 24 hour   Intake 709.21 ml   Output 3400 ml   Net -2690.79 ml     Lines/Drains/Airways       Central Venous Catheter Line  Duration             Percutaneous Central Line - Triple Lumen  09/17/24 2030 Internal Jugular Left 6 days              Drain  Duration                  NG/OG Tube 09/23/24 1630 16 Fr. Center mouth <1 day              Airway  Duration                  Airway - Non-Surgical 09/17/24 2117 Endotracheal Tube 6 days              Arterial Line  Duration             Arterial Line 09/23/24 2100 Right Femoral <1 day              Peripheral Intravenous Line  Duration                  Hemodialysis AV Fistula 09/17/24 0701 Left forearm 7 days         Hemodialysis AV Fistula 09/17/24 0701 Left upper arm 7 days                  Significant Labs:   Chemistries:   Recent Labs   Lab 09/21/24  0410 09/21/24  2220 09/22/24  0258 09/23/24  0357 09/24/24  0431   * 135* 133* 134* 132*   K 4.6 3.0* 4.8 5.1 4.1   CL 92* 99 97* 96* 93*   CO2 19* 19* 17* 14* 16*   BUN 43.0* 36.8* 43.5* 59.2* 81.4*   CREATININE 4.07* 3.12*  "3.18* 3.82* 4.50*   CALCIUM 8.3* 8.0* 8.3* 8.5 9.3   BILITOT 2.3* 2.0* 2.0* 2.0* 2.3*   ALKPHOS 386* 356* 364* 393* 402*   ALT 7 8 7 65* 182*   AST 19 14 18 224* 489*   GLUCOSE 109* 135* 106* 90 111*   MG 2.10 2.00 2.20 2.30 2.30   PHOS 4.5 4.1 5.1* 5.5* 6.6*        CBC/Anemia Labs: Coags:    Recent Labs   Lab 09/22/24  0258 09/23/24  0357 09/24/24  0431   WBC 27.05  27.05* 35.8  35.80* 20.51  20.51*   HGB 9.8* 11.5* 9.3*   HCT 26.7* 31.8* 25.2*    231 260   MCV 78.8* 81.3 79.0*   RDW 19.7* 20.4* 20.4*    No results for input(s): "PT", "INR", "APTT" in the last 168 hours.       Telemetry: ST    Physical Exam  Vitals reviewed.   Constitutional:       General: He is not in acute distress.     Appearance: He is ill-appearing.      Comments: Vented/Sedated   HENT:      Head: Normocephalic.      Mouth/Throat:      Mouth: Mucous membranes are dry.   Cardiovascular:      Rate and Rhythm: Regular rhythm. Tachycardia present.   Pulmonary:      Effort: Pulmonary effort is normal. No respiratory distress.      Comments: Ventilator Associated Breath Sounds  Vent Mode: A/C  Oxygen Concentration (%):  [30-40] 30  Resp Rate Total:  [26 br/min-30 br/min] 30 br/min  Vt Set:  [460 mL] 460 mL  PEEP/CPAP:  [5 cmH20] 5 cmH20  Mean Airway Pressure:  [10 ycV82-71 cmH20] 10 cmH20  Abdominal:      Palpations: Abdomen is soft.   Musculoskeletal:         General: No swelling.      Comments: BLE Warm. Left AVF    Neurological:      Comments: Vented/Sedated       Current Schedule Inpatient Medications:   amiodarone  400 mg Oral BID    Followed by    [START ON 9/26/2024] amiodarone  200 mg Oral BID    Followed by    [START ON 9/30/2024] amiodarone  200 mg Oral Daily    aspirin  81 mg Per NG tube Daily    busPIRone  5 mg Oral BID    calcitRIOL  0.25 mcg Oral BID    calcium carbonate  500 mg Oral TID WM    enoxparin  30 mg Subcutaneous Daily    famotidine (PF)  20 mg Intravenous Daily    polyethylene glycol  17 g Oral BID    " senna-docusate 8.6-50 mg  2 tablet Oral BID    sodium bicarbonate  1,950 mg Oral TID    sodium zirconium cyclosilicate  10 g Oral Every Mon, Wed, Fri     Continuous Infusions:   fentanyl  0-250 mcg/hr Intravenous Continuous 15 mL/hr at 09/23/24 2019 150 mcg/hr at 09/23/24 2019    NORepinephrine bitartrate-D5W  0-3 mcg/kg/min Intravenous Continuous 19.7 mL/hr at 09/24/24 0130 0.68 mcg/kg/min at 09/24/24 0130    propofoL  0-50 mcg/kg/min Intravenous Continuous 7.4 mL/hr at 09/24/24 0601 20 mcg/kg/min at 09/24/24 0601    vasopressin  0.04 Units/min Intravenous Continuous 12 mL/hr at 09/24/24 1010 0.04 Units/min at 09/24/24 1010       Assessment:   NSTEMI -  (Unspecified) in the Setting of Hypotensive Shock Requiring Vasopressor Support- Do not Suspect Cardiogenic Shock    - Troponin Trend Flat/No Overt Ischemia on EKG  Hypotensive Shock - ? 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%    - Normal SPECT (2.19.18)    - LHC Aborted on 9.13.24 due to Ascending Aorta/root angle from RR Approach & Agitation preventing Femoral Approach  NSVT (Monomorphic)    - On Amiodarone Infusion  Acute Respiratory Failure requiring Intubation/Ventilation   VHD    - Moderate to Severe Mitral Annular Calcification    - Mild MR/TR/AR  Sinus Tachycardia - Persistent, but Improving  ESRD/HD     - on HD TTS  COPD    - On Chronic Home Oxygen Support (2 L/Min)  Hyperkalemia (Treated per Nephrology)  History of Prostate Cancer    - Status Post Radiation  Lower Extremity Wound    - Febrile on  9.14.24  Anemia of Chronic Disease   Secondary Hyperparathyroidism   No known history of GI Bleed     Plan:   Wean Pressors for MAP > 65mmHg  Continue Oral Amiodarone Load   Add GDMT as BP/HR Allows  Defer ACE/ARB/ARNI due to Hyperkalemia/Hypotension requiring Pressors    Fluid Management/HD per Nephrology Team  PT Recently refusing Diagnostic Angiogram and Inotropes (Prior to being Intubated/Ventilated); Long Discussion with PTS Son, Reinaldo, about PTS Cardiac Condition. Will Hold off on Angiogram and if PT makes a Purposeful Recovered will Reconsider Angiographic Evaluation.  Palliative Care Consultation for Goals of Care - In Progress  Please Reconsult Cardiology when PT is Extubated with Purposeful Recovery for Reevaluation of Ischemic Evaluation     Of note, C attempted on 9.13.24, however procedure was aborted from a right radial approach given angle of ascending aorta and aortic root. Femoral access not possible because of patient's agitation and respiratory status. Procedure was aborted.     Mian Carter, TORRIE  Cardiology  [facility]    I agree with the findings of the complexity of problems addressed and take responsibility for the plan's risks and complications. I approved the plan documented by Mian Carter NP.

## 2024-09-24 NOTE — PLAN OF CARE
Problem: Hemodialysis  Goal: Safe, Effective Therapy Delivery  Outcome: Progressing  Goal: Effective Tissue Perfusion  Outcome: Progressing  Goal: Absence of Infection Signs and Symptoms  Outcome: Progressing     Problem: Adult Inpatient Plan of Care  Goal: Absence of Hospital-Acquired Illness or Injury  Outcome: Progressing  Goal: Optimal Comfort and Wellbeing  Outcome: Progressing  Goal: Readiness for Transition of Care  Outcome: Progressing     Problem: Skin Injury Risk Increased  Goal: Skin Health and Integrity  Outcome: Progressing     Problem: Infection  Goal: Absence of Infection Signs and Symptoms  Outcome: Progressing     Problem: Wound  Goal: Optimal Coping  Outcome: Progressing  Goal: Optimal Functional Ability  Outcome: Progressing  Goal: Absence of Infection Signs and Symptoms  Outcome: Progressing  Goal: Optimal Pain Control and Function  Outcome: Progressing  Goal: Skin Health and Integrity  Outcome: Progressing  Goal: Optimal Wound Healing  Outcome: Progressing     Problem: Coping Ineffective  Goal: Effective Coping  Outcome: Progressing     Problem: Fall Injury Risk  Goal: Absence of Fall and Fall-Related Injury  Outcome: Progressing

## 2024-09-24 NOTE — NURSING
Nurses Note -- 4 Eyes      9/24/2024   12:18 PM      Skin assessed during: Daily Assessment      [] No Altered Skin Integrity Present    [x]Prevention Measures Documented      [x] Yes- Altered Skin Integrity Present or Discovered   [x] LDA Added if Not in Epic (L posterior calf blister)   [] New Altered Skin Integrity was Present on Admit and Documented in LDA   [] Wound Image Taken    Wound Care Consulted? Yes    Attending Nurse: DOYLE Dyer     Second RN/Staff Member:  DOYLE Hernandez

## 2024-09-24 NOTE — PROGRESS NOTES
Inpatient Nutrition Assessment    Admit Date: 9/10/2024   Total duration of encounter: 14 days   Patient Age: 49 y.o.    Nutrition Recommendation/Prescription     Restart TF when more hemodynamically stable and vomiting resolved and diarrhea improving.    Tube feeding recommendation:     Novasource Renal goal rate 50 ml/hr to provide  2000 kcal/d   (96% est needs, 105% with meds)  90 g protein/d  (97% est needs)  720 ml free water/d  (calculations based on estimated 20 hr/d run time)     Communication of Recommendations: reviewed with nurse    Nutrition Assessment     Malnutrition Assessment/Nutrition-Focused Physical Exam    Malnutrition Context: chronic illness (09/18/24 1338)  Malnutrition Level: moderate (09/18/24 1338)  Energy Intake (Malnutrition): less than or equal to 50% for greater than or equal to 5 days (09/18/24 1338)  Weight Loss (Malnutrition):  (does not meet criteria) (09/18/24 1338)  Subcutaneous Fat (Malnutrition): mild depletion (09/18/24 1338)  Orbital Region (Subcutaneous Fat Loss): mild depletion  Upper Arm Region (Subcutaneous Fat Loss): mild depletion     Muscle Mass (Malnutrition): mild depletion (09/18/24 1338)  Hillister Region (Muscle Loss): mild depletion  Clavicle Bone Region (Muscle Loss): mild depletion  Clavicle and Acromion Bone Region (Muscle Loss): mild depletion  Scapular Bone Region (Muscle Loss): mild depletion              Fluid Accumulation (Malnutrition): mild (09/18/24 1338)        A minimum of two characteristics is recommended for diagnosis of either severe or non-severe malnutrition.    Chart Review    Reason Seen: continuous nutrition monitoring and follow-up    Malnutrition Screening Tool Results   Have you recently lost weight without trying?: No  Have you been eating poorly because of a decreased appetite?: No   MST Score: 0   Diagnosis:  Shock, cardiogenic versus septic  Heart failure with reduced ejection fraction (EF 15-20%)  ESRD on  HD  Hyperlipidemia  COPD    Relevant Medical History: tobacco use disorder, COPD, HTN, HLD, ESRD on HD, heart failure with reduced ejection fraction (EF 15-20%), prostate cancer status post radiation     Scheduled Medications:  amiodarone, 400 mg, BID   Followed by  [START ON 9/26/2024] amiodarone, 200 mg, BID   Followed by  [START ON 9/30/2024] amiodarone, 200 mg, Daily  aspirin, 81 mg, Daily  busPIRone, 5 mg, BID  calcitRIOL, 0.25 mcg, BID  calcium carbonate, 500 mg, TID WM  enoxparin, 30 mg, Daily  famotidine (PF), 20 mg, Daily  polyethylene glycol, 17 g, BID  senna-docusate 8.6-50 mg, 2 tablet, BID  sodium bicarbonate, 1,950 mg, TID  sodium zirconium cyclosilicate, 10 g, Every Mon, Wed, Fri    Continuous Infusions:  fentanyl, Last Rate: 150 mcg/hr (09/23/24 2019)  NORepinephrine bitartrate-D5W, Last Rate: 0.68 mcg/kg/min (09/24/24 0130)  propofoL, Last Rate: 20 mcg/kg/min (09/24/24 0601)  vasopressin, Last Rate: 0.04 Units/min (09/24/24 1010)    PRN Medications:  acetaminophen, 650 mg, Q8H PRN  acetaminophen, 650 mg, Q4H PRN  dextrose 10%, 12.5 g, PRN  dextrose 10%, 25 g, PRN  etomidate, , Code/trauma/sedation Med  fentaNYL, 50 mcg, Q1H PRN  glucagon (human recombinant), 1 mg, PRN  glucose, 16 g, PRN  glucose, 24 g, PRN  HYDROcodone-acetaminophen, 1 tablet, Q4H PRN  metoprolol, 5 mg, Q5 Min PRN  ondansetron, 4 mg, Q4H PRN  rocuronium, , Code/trauma/sedation Med  simethicone, 1 tablet, TID PRN  sodium chloride 0.9%, 10 mL, Q12H PRN  vancomycin - pharmacy to dose, , pharmacy to manage frequency    Calorie Containing IV Medications: Diprivan @ 7 ml/hr (provides 185 kcal/d)    Recent Labs   Lab 09/19/24  0109 09/19/24  0116 09/19/24 2036 09/20/24  0019 09/20/24  0118 09/20/24  0816 09/20/24  1921 09/20/24  2332 09/21/24  0410 09/21/24  2220 09/22/24  0258 09/23/24  0357 09/24/24  0431   *  --    < > 135* 135*   < > 131* 130* 131* 135* 133* 134* 132*   K 5.5*  --    < > 3.7 5.7*   < > 4.0 4.8 4.6 3.0* 4.8 5.1  4.1   CALCIUM 9.0  --    < > 8.6 8.7   < > 7.9* 8.5 8.3* 8.0* 8.3* 8.5 9.3   PHOS 5.2*  --   --   --  4.4  --   --   --  4.5 4.1 5.1* 5.5* 6.6*   MG 2.00  --   --   --  2.00  --   --   --  2.10 2.00 2.20 2.30 2.30   CL 92*  --    < > 99 98   < > 94* 93* 92* 99 97* 96* 93*   CO2 21*  --    < > 19* 18*   < > 18* 20* 19* 19* 17* 14* 16*   BUN 31.8*  --    < > 25.8* 25.2*   < > 37.6* 39.4* 43.0* 36.8* 43.5* 59.2* 81.4*   CREATININE 4.06*  --    < > 3.20* 3.13*   < > 3.72* 3.90* 4.07* 3.12* 3.18* 3.82* 4.50*   EGFRNORACEVR 17  --    < > 23 23   < > 19 18 17 24 23 18 15   GLUCOSE 133*  --    < > 122* 104*   < > 105* 133* 109* 135* 106* 90 111*   BILITOT 1.4  --   --   --  1.6*  --   --   --  2.3* 2.0* 2.0* 2.0* 2.3*   ALKPHOS 395*  --   --   --  424*  --   --   --  386* 356* 364* 393* 402*   ALT 6  --   --   --  7  --   --   --  7 8 7 65* 182*   AST 19  --   --   --  25  --   --   --  19 14 18 224* 489*   ALBUMIN 2.2*  --   --   --  1.9*  --   --   --  1.9* 1.8* 1.8* 1.8* 1.7*   WBC  --  27.51  27.51*  --  35.60*  --   --   --   --  29.78  29.78* 26.46* 27.05  27.05* 35.8  35.80* 20.51  20.51*   HGB  --  11.9*  --  11.8*  --   --   --   --  11.3* 9.5* 9.8* 11.5* 9.3*   HCT  --  36.1*  --  33.2*  --   --   --   --  30.5* 26.1* 26.7* 31.8* 25.2*    < > = values in this interval not displayed.     Nutrition Orders:  No diet orders on file  Tube Feedings/Formulas 50; 1,000; Novasource Renal - Unflavored; OG; 50; Every 4 hours    Appetite/Oral Intake: not applicable/not applicable  Factors Affecting Nutritional Intake: diarrhea, on mechanical ventilation, and vomiting  Social Needs Impacting Access to Food: unable to assess at this time; will attempt on follow-up  Food/Temple/Cultural Preferences: unable to obtain  Food Allergies: no known food allergies  Last Bowel Movement: 09/24/24  Wound(s):     Wound 09/17/24 0701 Blister(s) Left anterior;posterior Calf #1-Tissue loss description: Partial thickness  "    Comments    9/12/24 pt NPO for procedure today, reports no appetite loss or unintentional weight loss prior to admit     9/18/24: Pt now intubated. Plans for starting TF. Receiving kcal from meds. Noted previous EMR wts indicate wt loss, but time frame is from too long ago to meet criteria for malnutrition (other criteria met though.) Noted pt with only 0-50% po intake of meals since admit, NPO since 9/13/24.    9/20/24: TF continues, tolerated per RN. Receiving kcal from meds.     9/24/24: Pt with 900ml output when OG put to suction. Also with vomiting and diarrhea. TF on hold. Pt also on two pressors.    Anthropometrics    Height: 5' 11.26" (181 cm), Height Method: Stated  Last Weight: 61.9 kg (136 lb 7.4 oz) (09/14/24 0635), Weight Method: Bed Scale  BMI (Calculated): 18.9  BMI Classification: normal (BMI 18.5-24.9)        Ideal Body Weight (IBW), Male: 173.56 lb     % Ideal Body Weight, Male (lb): 92.73 %                          Usual Weight Provided By: EMR weight history    Wt Readings from Last 5 Encounters:   09/14/24 61.9 kg (136 lb 7.4 oz)   07/30/19 77 kg (169 lb 12.1 oz)   01/29/19 79.8 kg (176 lb)   07/26/18 81.6 kg (180 lb)   01/23/18 91 kg (200 lb 9.9 oz)     Weight Change(s) Since Admission:   Wt Readings from Last 1 Encounters:   09/14/24 0635 61.9 kg (136 lb 7.4 oz)   09/10/24 0750 73 kg (160 lb 15 oz)   09/10/24 0529 72.6 kg (160 lb)   Admit Weight: 72.6 kg (160 lb) (09/10/24 0529), Weight Method: Stated    Estimated Needs    Weight Used For Calorie Calculations: 61.9 kg (136 lb 7.4 oz)  Energy Calorie Requirements (kcal): 2082kcal  Energy Need Method: [facility]  Weight Used For Protein Calculations: 61.9 kg (136 lb 7.4 oz)  Protein Requirements: 93gm (1.5g/kg)  Fluid Requirements (mL): 1000ml + urinary output  CHO Requirement: 235gm (45% est kcal needs)     Enteral Nutrition     (On hold)   Formula: Novasource Renal  Rate/Volume: 50ml/hr  Water Flushes: 50ml q4hr  Additives/Modulars: none " at this time  Route: orogastric tube  Method: continuous  Total Nutrition Provided by Tube Feeding, Additives, and Flushes:  Calories Provided  2000 kcal/d, 96% needs   Protein Provided  90 g/d, 97% needs   Fluid Provided  720 ml/d, N/A% needs   Continuous feeding calculations based on estimated 20 hr/d run time unless otherwise stated.    Parenteral Nutrition     Patient not receiving parenteral nutrition support at this time.    Evaluation of Received Nutrient Intake    Calories: not meeting estimated needs  Protein: not meeting estimated needs    Patient Education     Not applicable.    Nutrition Diagnosis     PES: Inadequate oral intake related to acute illness as evidenced by intubation since 9/17/24. (active)     PES: Moderate chronic disease or condition related malnutrition related to chronic illness as evidenced by less than or equal to 50% needs met for greater than or equal to 5 days, mild fat depletion, mild muscle depletion, and edema. (active)    Nutrition Interventions     Intervention(s): collaboration with other providers    Goal: Meet greater than 80% of nutritional needs by follow-up. (goal progressing)  Goal: Tolerate enteral feeding at goal rate by follow-up. (goal progressing)    Nutrition Goals & Monitoring     Dietitian will monitor: energy intake and enteral nutrition intake  Discharge planning: too early to determine; pending clinical course  Nutrition Risk/Follow-Up: high (follow-up in 1-4 days)   Please consult if re-assessment needed sooner.

## 2024-09-24 NOTE — PROCEDURES
"Prem Saldana is a 49 y.o. male patient.    Temp: 98.6 °F (37 °C) (09/23/24 2000)  Pulse: 101 (09/23/24 2055)  Resp: (!) 26 (09/23/24 2055)  BP: 101/75 (09/23/24 2048)  SpO2: 96 % (09/23/24 2055)  Weight: 61.9 kg (136 lb 7.4 oz) (09/14/24 0635)  Height: 5' 11.26" (181 cm) (09/18/24 1340)  Mallampati Scale: Class III  ASA Classification: Class 3    Arterial Line    Date/Time: 9/23/2024 9:03 PM  Location procedure was performed: [facility]    Performed by: Sheila Gavin MD  Authorized by: Sheila Gavin MD  Location: right femoral    Patient sedated: yes  Needle gauge: 18  Seldinger technique: Seldinger technique used  Cutdown: cutdown required  Number of attempts: 1  Estimated blood loss (mL): 10  Post-procedure: line sutured  Patient tolerance: Patient tolerated the procedure well with no immediate complications        Sheila Gavin MD  [facility], PRG-3  9/23/2024   "

## 2024-09-25 NOTE — PROGRESS NOTES
Nephrology Progress Note      HPI:    Prem Saldana is a 49 y.o. male from [place], with pmhx of ESRD TTS via L AVF, CHF, EF <20%, COPD, hx of prostate cancer, presenting to the ED today with respiratory failure requiring BiPAP.      History is very limited due to his condition but he was able to answer a few questions on bipap. He states that he last dialyzed in [place], [place] Saturday 9/7. He tells me he has a 6am chair time TTS set up at the dialysis unit in [place]? Will have to call the unit to verify.      CTA chest revealing cardiomegaly with trace left effusion, atelectasis and mild interstitial edema. CXR with congestion. He has 1+ edema to BLE. His electrolytes are normal., BNP is >14,000 on admission, troponin 0.55 and he states that his chest hurts because he fell and had trauma to the chest 2 days ago. Bedside echo pending in the ED. Cardiology following and trending troponins until peak. ED physician paged nephrology for HD today.     Interval History:    9/11/24  Tolerated HD yesterday with 3L off and significant improvement in respiratory status, now on 4L NC. Vitals are stable. Labs reviewed, all relatively stable but bicarb is now 20. Serial troponins remain high. He continues to complain of chest pain from falling this weekend. Chest xr and CTA are without traumatic changes. He is now able to tell me that he was in [place] because his mom lives there, but he is moving to [place] and has a chair time at [facility].   He is usually on 2L O2 support for COPD and is currently requiring 4L.     09/12/2024   No acute changes overnight.  Potassium 6 on a.m. labs.  He is scheduled for his routine dialysis run today which will be done after left heart catheterization.  Voices no new complaints.    09/13/2024   Tolerated 3 L UF with HD yesterday.  Potassium remains elevated at 5.6 on a.m. labs.  Hemoglobin stable.  Shortness of breath improved following HD. no chest pain at present.    09/16/2024   Awake  "alert and oriented.  Looks little dyspneic.  Complains of pain and asking for lot of pain medicines.  Not sure about the oral fluid intake or nutrition intake.    09/17/2024  No acute events overnight.  No new complaints.  No chest pain, shortness of breath, abdominal pain, nausea, vomiting, or lower extremity edema.  He remains on vasopressors.    09/18/2024   Overnight events noted and now patient is intubated and on the ventilator and sedated with a propofol and on high dose of Levophed also.  Also on amiodarone.    09/19/2024  Patient dialyzed off schedule yesterday due to hyperkalemia.  About to start dialysis again this morning on regular TTS schedule.  Remains intubated and on ventilator.  Still requiring Levophed for hypotension.    09/20/2024  Patient continues to have hypotension overnight, and Levophed had to be added.  He did dialyze yesterday, however, was only able to get 100 mL UF due to hypotension.  Remains intubated.    09/23/2024   Weekend events noted.  He did dialyze on Saturday.  Overall condition is unchanged.  Remains on the pressors.  Also on sedation.  Also intubated.    09/24/2024   Currently tolerating dialysis.  Patient is still sedated and on the ventilator.  Also on pressors.  Overall condition has not changed.  Nurses reported that NG tube was placed to suction and 1300 cc fluid came out.  So the tube feeding is on hold at present time.    09/25/2024   Patient had dialysis done yesterday.  2 L fluid was removed.  He did receive some albumin.  His pressor need has decreased.  Dr. Rodrigues thinks it maybe good to try some more fluid removal if tolerated by him and then maybe his pressors can be decreased.  Remains on the ventilator.  Remains on propofol and fentanyl for sedation and Levophed and vasopressin for hypotension.      Vital Signs:  BP (!) 96/56   Pulse 107   Temp 98.9 °F (37.2 °C) (Oral)   Resp (!) 26   Ht 5' 11.26" (1.81 m)   Wt 61.9 kg (136 lb 7.4 oz)   SpO2 96%   BMI " 18.89 kg/m²   Body mass index is 18.89 kg/m².    Physical Exam:    GEN: Ill appearing AA male remains on the ventilator.  Sedated with propofol and fentanyl.  Pressors include Levophed and vasopressin.  Responds to verbal and painful stimuli and when asked he does open his eyes.  HEENT orally intubated.  CV:  Still irregular rhythm.  PULM:  Some diffuse rhonchi noted.  ABD: Soft, NT/ND abdomen with NABS  EXT: No cyanosis or edema  SKIN: Warm and dry  PSYCH:  Sedated on the ventilator  Dialysis access:  LUE AV fistula      Labs:      Component Value Date/Time     (L) 09/25/2024 0339     (L) 09/24/2024 0431     06/02/2016 0710    K 4.0 09/25/2024 0339    K 4.1 09/24/2024 0431    K 3.7 06/02/2016 0710    CO2 21 (L) 09/25/2024 0339    CO2 16 (L) 09/24/2024 0431    CO2 20 (L) 06/02/2016 0710    BUN 60.5 (H) 09/25/2024 0339    BUN 81.4 (H) 09/24/2024 0431    BUN 29 (H) 11/11/2016 0855    BUN 32 (H) 06/02/2016 0710    CREATININE 3.81 (H) 09/25/2024 0339    CREATININE 4.50 (H) 09/24/2024 0431    CREATININE 3.9 (H) 11/11/2016 0855    CREATININE 3.8 (H) 06/02/2016 0710    CALCIUM 9.3 09/25/2024 0339    CALCIUM 9.3 09/24/2024 0431    CALCIUM 9.3 06/02/2016 0710    PHOS 6.3 (H) 09/25/2024 0339    PHOS 2.9 06/02/2016 0710            Component Value Date/Time    WBC 20.46 (H) 09/25/2024 0339    WBC 20.46 09/25/2024 0339    WBC 20.51 (H) 09/24/2024 0431    WBC 20.51 09/24/2024 0431    WBC 6.53 06/02/2016 0710    HGB 8.7 (L) 09/25/2024 0339    HGB 9.3 (L) 09/24/2024 0431    HGB 13.6 (L) 06/02/2016 0710    HCT 24.2 (L) 09/25/2024 0339    HCT 25.2 (L) 09/24/2024 0431    HCT 40.1 06/02/2016 0710     09/25/2024 0339     09/24/2024 0431     06/02/2016 0710             Assessment/Plan:    ESRD on HD -now on dialysis in the hospital on TTS schedule  Hyperkalemia , and metabolic acidosis.  Improving  CHF - 15-20% EF on echo 9/10   NSTEMI - cardiology recommending cath at some point  COPD on 2L home  O2--respiratory status is improving but still less than baseline   Prostate cancer s/p radiation  Anemia of chronic kidney disease      Recommendations:  Will give patient albumin 25 g IV piggyback now.  Attempt with another dialysis and some ultrafiltration as tolerated  Check chest x-ray and labs tomorrow.  Discussed with Dr. Rodrigues.

## 2024-09-25 NOTE — NURSING
Nurses Note -- 4 Eyes      9/25/2024   5:30 PM      Skin assessed during: Daily Assessment      [] No Altered Skin Integrity Present    [x]Prevention Measures Documented      [x] Yes- Altered Skin Integrity Present or Discovered   [x] LDA Added if Not in Epic (L Posterior Calf Blisters)   [] New Altered Skin Integrity was Present on Admit and Documented in LDA   [] Wound Image Taken    Wound Care Consulted? {YES/NO:[id number]}    Attending Nurse:  Manisha Sena RN/Staff Member:  DOYLE rg

## 2024-09-25 NOTE — PROGRESS NOTES
Patient Name: Prem Saldana   MRN: [id number]   Admission Date: 9/10/2024   Hospital Length of Stay: 15   Attending Provider: Sin Gonsalez Jr., MD,*   Consulting Provider: Lencho Xie MD    Primary Care Physician:  Tank Saenz MD     Principal Problem: <principal problem not specified>       Final diagnoses:  [R06.02] Shortness of breath  [I50.9] Congestive heart failure, unspecified HF chronicity, unspecified heart failure type (Primary)  [R79.89] Elevated troponin  [N18.6, Z99.2] ESRD on dialysis  [R07.9] Chest pain, unspecified type      Goals of care review:    I was able to contact the patient's son, Reinaldo, by phone.  Current clinical status I explained to him that the patient is quite critical and is in multiorgan failure. I reviewed further with the patient's son the fact that this patient is declining over the last few days now with rising white blood cell count, rising liver function studies probably all secondary to underlying shock and multiorgan failure. I explained that he requires 2 vasopressors to manage cardiogenic and septic shock.  I explained that at baseline the patient had end-stage renal disease on dialysis and severe systolic heart failure with a AICD in place.  Despite his young age, he is at high risk of death.  I reviewed code status.    Code Status:  I reviewed the risks and benefits of aggressive cardiopulmonary resuscitative measures taken in the event of a cardiopulmonary arrest event. I discussed the high risk of non-survival from such an event. I also discussed the risk of increased morbidity and a potential decline in quality of life with survival from such an event. The patient's son stated, I can not be talking about letting him go he has been through things like this before many times.  I also know that he would not want to give up.   He  currently elects a full code status.    Spoke to patient's son Reinaldo this afternoon, provided updates about  patient's current clinical status and that he remains intubated and sedated on multiple pressors for blood pressure maintenance. Reported to patient that we spoke to cardiology and they do not believe LHC is advisable at this time due to patient being unstable and requiring ongoing sedation/intubation as well as multiple pressors. In addition, also explained to patient that patient has had LHC a few years ago with no evidence of obstructive coronary artery disease, and it is unlikely to be causing patient's heart disease at this time, so testing may not be necessary or helpful. Patient's son voiced understanding and had no further questions. Did not discuss any changes to code status at this time, due to patient's clinical status being largely unchanged.    Symptom review:    Unable to obtain as the patient is sedated and intubated.    Assessment/Plan:     Goals of care/counseling  Cardiogenic/septic shock  Hypoxic respiratory failure on mechanical ventilator  Acute on chronic systolic congestive heart  End-stage renal disease on hemodialysis    I will follow up to provide education and support with family regarding goals of care    Interval History:     See above      Active Ambulatory Problems     Diagnosis Date Noted    ESRD on dialysis since 4/3/15     Essential hypertension     Anemia of renal disease     Secondary hyperparathyroidism of renal origin     Organ transplant candidate 06/02/2016    Pre-transplant evaluation for chronic kidney disease 06/02/2016    Prostate cancer 12/08/2016    Cancer of prostate with high recurrence risk (stage T3a or [place] 8-10 or PSA > 20) 08/18/2017    Moderate malnutrition 09/18/2024     Resolved Ambulatory Problems     Diagnosis Date Noted    No Resolved Ambulatory Problems     No Additional Past Medical History        Past Surgical History:   Procedure Laterality Date    ANGIOGRAM, CORONARY, WITH LEFT HEART CATHETERIZATION N/A 9/13/2024    Procedure: Angiogram, Coronary,  with Left Heart Cath;  Surgeon: Tank Scott Jr., MD;  Location: [facility] CATH LAB;  Service: Cardiology;  Laterality: N/A;    AV FISTULA PLACEMENT Left 07/2015    CENTRAL LINE  9/17/2024    Peritoneal Dialysis Catheter Placement  01/2016    Permcath Placement           Review of patient's allergies indicates:   Allergen Reactions    Hydralazine Palpitations and Other (See Comments)     Other Reaction(s): feels like he is running    Palpitations    Amlodipine     Levofloxacin           Current Facility-Administered Medications:     acetaminophen tablet 650 mg, 650 mg, Oral, Q8H PRN, Mary Carter PA-C, 650 mg at 09/19/24 2011    acetaminophen tablet 650 mg, 650 mg, Oral, Q4H PRN, Mary Carter PA-C, 650 mg at 09/15/24 1255    amiodarone tablet 400 mg, 400 mg, Oral, BID, 400 mg at 09/25/24 0817 **FOLLOWED BY** [START ON 9/26/2024] amiodarone tablet 200 mg, 200 mg, Oral, BID **FOLLOWED BY** [START ON 9/30/2024] amiodarone tablet 200 mg, 200 mg, Oral, Daily, Mian Carter, ANP    aspirin chewable tablet 81 mg, 81 mg, Per NG tube, Daily, Sin Gonsalez Jr., MD, FCCP, 81 mg at 09/25/24 0818    busPIRone tablet 5 mg, 5 mg, Oral, BID, Jeanna Mcmullen, AGACNP-BC, 5 mg at 09/25/24 0818    calcitRIOL capsule 0.25 mcg, 0.25 mcg, Oral, BID, Holly Dudley MD, 0.25 mcg at 09/25/24 0818    calcium carbonate 200 mg calcium (500 mg) chewable tablet 500 mg, 500 mg, Oral, TID WM, Aristeo Rodrigues MD, 500 mg at 09/25/24 1209    dextrose 10% bolus 125 mL 125 mL, 12.5 g, Intravenous, PRN, Mary Carter PA-C, Stopped at 09/17/24 1456    dextrose 10% bolus 250 mL 250 mL, 25 g, Intravenous, PRN, Mary Carter PA-C, Stopped at 09/21/24 1241    enoxaparin injection 30 mg, 30 mg, Subcutaneous, Daily, Mary Carter PA-C, 30 mg at 09/24/24 1613    etomidate injection, , Intravenous, Code/trauma/sedation Med, Rodolfo Gutierrez MD, 20 mg at 09/17/24 2104    famotidine (PF) injection 20 mg, 20 mg, Intravenous,  Daily, Chun Giordano MD, 20 mg at 24 0819    fentaNYL 2500 mcg in 0.9% sodium chloride 250 mL infusion premix, 0-250 mcg/hr, Intravenous, Continuous, Phani Kinsey MD, Last Rate: 15 mL/hr at 24 0821, 150 mcg/hr at 24 0821    [] fentaNYL injection 50 mcg, 50 mcg, Intravenous, Q15 Min PRN **FOLLOWED BY** fentaNYL injection 50 mcg, 50 mcg, Intravenous, Q1H PRN, Chun Giordano MD, 50 mcg at 24 1900    glucagon (human recombinant) injection 1 mg, 1 mg, Intramuscular, PRN, Mary Carter, PA-C    glucose chewable tablet 16 g, 16 g, Oral, PRN, Mary Carter EJustine, PA-C    glucose chewable tablet 24 g, 24 g, Oral, PRN, Mary Carter EJustine, PA-C    HYDROcodone-acetaminophen 7.5-325 mg per tablet 1 tablet, 1 tablet, Oral, Q4H PRN, Inocencio Mcmillan MD, 1 tablet at 24 0735    metoprolol injection 5 mg, 5 mg, Intravenous, Q5 Min PRN, Viviane Mejía FNP, 5 mg at 24 1716    NORepinephrine 32 mg in D5W 250 mL infusion, 0-3 mcg/kg/min, Intravenous, Continuous, Aristeo Rodrigues MD, Last Rate: 12.2 mL/hr at 24 1337, 0.42 mcg/kg/min at 24 1337    ondansetron injection 4 mg, 4 mg, Intravenous, Q4H PRN, Mary Carter, PA-C, 4 mg at 24 0254    polyethylene glycol packet 17 g, 17 g, Oral, BID, Aristeo Rodrigues MD, 17 g at 24 0814    propofol (DIPRIVAN) 10 mg/mL infusion, 0-50 mcg/kg/min, Intravenous, Continuous, Chun Giordano MD, Last Rate: 5.6 mL/hr at 24 0619, 15 mcg/kg/min at 24 0619    rocuronium injection, , Intravenous, Code/trauma/sedation Med, Rodolfo Gutierrez MD, 50 mg at 24 2107    senna-docusate 8.6-50 mg per tablet 2 tablet, 2 tablet, Oral, BID, Aristeo Rodrigues MD, 2 tablet at 24 0814    simethicone chewable tablet 80 mg, 1 tablet, Oral, TID PRN, Jeanna Mcmullen, [facility], 80 mg at 24 2315    sodium bicarbonate tablet 1,950 mg, 1,950 mg, Oral, TID, Sinan Singh MD, 1,950 mg at 24 1433    sodium chloride 0.9%  "flush 10 mL, 10 mL, Intravenous, Q12H PRN, Mary Carter, JASKARAN    sodium zirconium cyclosilicate packet 10 g, 10 g, Oral, Every Mon, Wed, Fri, Anisha Warren, TORRIE, 10 g at 24 0820    Pharmacy to dose Vancomycin consult, , , Once **AND** vancomycin - pharmacy to dose, , Intravenous, pharmacy to manage frequency, Ephraim Yeh MD    vasopressin (PITRESSIN) 0.2 Units/mL in D5W 100 mL infusion, 0.04 Units/min, Intravenous, Continuous, Aristeo Rodrigues MD, Last Rate: 12 mL/hr at 24 1336, 0.04 Units/min at 24 1336       Current Facility-Administered Medications:     acetaminophen, 650 mg, Oral, Q8H PRN    acetaminophen, 650 mg, Oral, Q4H PRN    dextrose 10%, 12.5 g, Intravenous, PRN    dextrose 10%, 25 g, Intravenous, PRN    etomidate, , Intravenous, Code/trauma/sedation Med    [] fentaNYL, 50 mcg, Intravenous, Q15 Min PRN **FOLLOWED BY** fentaNYL, 50 mcg, Intravenous, Q1H PRN    glucagon (human recombinant), 1 mg, Intramuscular, PRN    glucose, 16 g, Oral, PRN    glucose, 24 g, Oral, PRN    HYDROcodone-acetaminophen, 1 tablet, Oral, Q4H PRN    metoprolol, 5 mg, Intravenous, Q5 Min PRN    ondansetron, 4 mg, Intravenous, Q4H PRN    rocuronium, , Intravenous, Code/trauma/sedation Med    simethicone, 1 tablet, Oral, TID PRN    sodium chloride 0.9%, 10 mL, Intravenous, Q12H PRN    Pharmacy to dose Vancomycin consult, , , Once **AND** vancomycin - pharmacy to dose, , Intravenous, pharmacy to manage frequency     Family History   Problem Relation Name Age of Onset    Diabetes Mother      Cancer Maternal Grandmother      Hypertension Maternal Grandfather      Heart attack Maternal Grandfather          MI in his 60s or 70s    Kidney disease Neg Hx            Review of Systems   Unable to perform ROS: Intubated            Objective:   BP 91/70   Pulse 110   Temp 98.3 °F (36.8 °C) (Oral)   Resp (!) 24   Ht 5' 11.26" (1.81 m)   Wt 61.9 kg (136 lb 7.4 oz)   SpO2 100%   BMI 18.89 kg/m²  "     Physical Exam   Constitutional: No distress.   Intubated and sedated He appears ill.   Eyes: Pupils are equal, round, and reactive to light.   Cardiovascular: Regular rhythm, normal heart sounds and normal pulses. Tachycardia present. Pulmonary:      Breath sounds: No wheezing.      Comments: Mechanical breath sounds present bilaterally    Abdominal: Soft. He exhibits no distension. There is no abdominal tenderness.   Musculoskeletal:      Right lower leg: No edema.      Left lower leg: No edema.   Neurological:   Intubated and sedated   Skin: Skin is warm and dry.          Review of Symptoms  Review of Symptoms      Symptom Assessment (ESAS 0-10 Scale)  Unable to complete assessment due to Intubated         Pain Assessment in Advanced Demential Scale (PAINAD)   Breathing - Independent of vocalization:  0  Negative vocalization:  0  Facial expression:  0  Body language:  0  Consolability:  0  Total:  0    Psychosocial/Cultural:   See Palliative Psychosocial Note: No  **Primary  to Follow**  Palliative Care  Consult: No      Advance Care Planning   Advance Directives:   Do Not Resuscitate Status: No      Decision Making:  Family answered questions  Goals of Care: The family endorses that what is most important right now is to focus on continuing current measures    Accordingly, we have decided that the best plan to meet the patient's goals includes continuing with treatment        > 50% of 36 min of encounter was spent in chart review, face to face discussion of goals of care, symptom assessment, coordination of care and emotional support.     Yumiko Albright MD  Palliative Medicine PGY-1  [facility] - Observation Unit

## 2024-09-25 NOTE — CONSULTS
[address]  Vascular Surgery  Consult Note    Consults  Subjective:       History of Present Illness: 50 y/o male with h/o HTN/HLP/COPD/ESRD/CHF(EF~15%) presented to [facility] [place] with chest pain and SOB.  Cardiology was evaluating for NSTEMI and planned LHC which was cancelled.  He had hypotension and Vtach and required intubation.  Since then, he has become bacteremic.    Facility-Administered Medications Prior to Admission   Medication Dose Route Frequency Provider Last Rate Last Admin    leuprolide (6 month) injection 45 mg  45 mg Intramuscular Q6 Months Hanny Cox NP   45 mg at 07/30/19 1357    leuprolide (6 month) SyKt 45 mg  45 mg Intramuscular Q6 Months Satish Au MD   45 mg at 01/29/19 1402     Medications Prior to Admission   Medication Sig Dispense Refill Last Dose    calcitRIOL (ROCALTROL) 0.25 MCG Cap Take 0.25 mcg by mouth 2 (two) times daily.       labetalol (NORMODYNE) 300 MG tablet        minoxidil (LONITEN) 10 MG Tab Take 2.5 mg by mouth 2 (two) times daily.       vacuum erection device system Kit 1 Units by Misc.(Non-Drug; Combo Route) route as needed. 1 kit prn        Review of patient's allergies indicates:   Allergen Reactions    Hydralazine Palpitations and Other (See Comments)     Other Reaction(s): feels like he is running    Palpitations    Amlodipine     Levofloxacin        Past Medical History:   Diagnosis Date    Anemia of renal disease     ESRD on dialysis since 4/3/15     Essential hypertension     Secondary hyperparathyroidism of renal origin      Past Surgical History:   Procedure Laterality Date    ANGIOGRAM, CORONARY, WITH LEFT HEART CATHETERIZATION N/A 9/13/2024    Procedure: Angiogram, Coronary, with Left Heart Cath;  Surgeon: Tank Scott Jr., MD;  Location: [facility] CATH LAB;  Service: Cardiology;  Laterality: N/A;    AV FISTULA PLACEMENT Left 07/2015    CENTRAL LINE  9/17/2024    Peritoneal Dialysis Catheter Placement  01/2016    [facility]  Placement        Family History       Problem Relation (Age of Onset)    Cancer Maternal Grandmother    Diabetes Mother    Heart attack Maternal Grandfather    Hypertension Maternal Grandfather          Tobacco Use    Smoking status: Former     Current packs/day: 1.00     Average packs/day: 1 pack/day for 15.0 years (15.0 ttl pk-yrs)     Types: Cigarettes    Smokeless tobacco: Never    Tobacco comments:     currently smokes 2-3 cigs/week; has been smoking for >20 years; at the most smoked 1 ppd   Substance and Sexual Activity    Alcohol use: No     Comment: previously social drinker    Drug use: No     Comment: remote use of marijuana >20 years ago    Sexual activity: Never     Review of Systems   Unable to perform ROS: Intubated     Objective:     Vital Signs (Most Recent):  Temp: 98.9 °F (37.2 °C) (09/25/24 0400)  Pulse: 107 (09/25/24 0700)  Resp: (!) 26 (09/25/24 0700)  BP: (!) 96/56 (09/25/24 0700)  SpO2: 96 % (09/25/24 0645) Vital Signs (24h Range):  Temp:  [98.1 °F (36.7 °C)-99.7 °F (37.6 °C)] 98.9 °F (37.2 °C)  Pulse:  [101-117] 107  Resp:  [13-53] 26  SpO2:  [86 %-100 %] 96 %  BP: ()/(44-96) 96/56  Arterial Line BP: (1-142)/(1-78) 103/60     Weight: 61.9 kg (136 lb 7.4 oz)  Body mass index is 18.89 kg/m².        Physical Exam  Constitutional:       Appearance: Normal appearance.   HENT:      Head: Normocephalic and atraumatic.   Eyes:      Extraocular Movements: Extraocular movements intact.   Cardiovascular:      Rate and Rhythm: Normal rate and regular rhythm.      Comments: + normal ulnar signal and palmar arch signal  Pulmonary:      Effort: Pulmonary effort is normal. No respiratory distress.      Breath sounds: Normal breath sounds.   Abdominal:      General: Abdomen is flat. Bowel sounds are normal. There is no distension.      Palpations: Abdomen is soft.   Musculoskeletal:         General: Normal range of motion.   Skin:     Capillary Refill: Capillary refill takes less than 2 seconds.       Findings: No rash.   Neurological:      Mental Status: He is alert.         Significant Labs:  All pertinent labs from the last 24 hours have been reviewed.    Significant Diagnostics:  I have reviewed all pertinent imaging results/findings within the past 24 hours.    Assessment/Plan:     There are no hospital problems to display for this patient.    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  He has a normal ulnar and a strong palmar arch with retrograde flow to the radial.  There is no indication for intervention at thi time especially in light of his current clinical condition.    Thank you for your consult.     Tank Roth III, MD  Vascular Surgery  [facility] - [facility] ICU

## 2024-09-25 NOTE — NURSING
Nurses Note -- 4 Eyes      9/24/2024   9:58 PM      Skin assessed during: Q Shift Change      [] No Altered Skin Integrity Present    [x]Prevention Measures Documented      [x] Yes- Altered Skin Integrity Present or Discovered   [] LDA Added if Not in Epic (Describe Wound)   [] New Altered Skin Integrity was Present on Admit and Documented in LDA   [] Wound Image Taken    Wound Care Consulted? Yes    Attending Nurse:  Little Sena RN/Staff Member:  DOYLE Hendrix

## 2024-09-25 NOTE — PROGRESS NOTES
[facility] - [facility] ICU  Pulmonary Critical Care Note    Patient Name: Prem Saldana  MRN: [id number]  Admission Date: 9/10/2024  Hospital Length of Stay: 15 days  Code Status: Full Code  Attending Provider: Sin Gonsalez Jr., MD,*  Primary Care Provider: Tank Saenz MD     Subjective:     HPI: Prem Saldana is a 49 year old  male with a past medical history for tobacco use disorder, COPD on home oxygen, hypertension, hyperlipidemia, ESRD on HD, heart failure with reduced ejection fraction (EF 15-20%), prostate cancer status post radiation, who initially presented to [facility] ED (09/10/2024) due to chest pain and shortness of breath.     CIS consulted for NSTEMI management. Attempted to perform LHC (09/14/2024) but was canceled prior to initiation of procedure due to patient becoming uncooperative per reports.     Hospital Course/Significant events:  9/13 - LHC attempted but aborted due to anatomy  9/14 - LHC canceled due to patient being uncooperative  9/15 - upgraded to ICU for hypotension and runs of V-tach  9/17 - required intubation  9/20 - spiked a temp, 2/2 blood cultures growing staph epi    24 Hour Interval History:  NAEON. Patient remains in afib, ventricular rate currently 106 bpm. Transitioned to oral amiodarone 9/23 PM.  Pending vascular surgery eval for radial artery thrombus from mid forearm to distal portion. Remains on Vancomycin, Vanc trough at goal yesterday check. Continues daily sedation holidays, currently on propofol 15 mcg/kg/min and fentanyl 200 mcg/hr for sedation, vasopressin 0.04 units/min and Levophed 0.4 mcg/kg/min. Nephrology following, currently on NaBicarb 1950 mg TID.       Past Medical History:   Diagnosis Date    Anemia of renal disease     ESRD on dialysis since 4/3/15     Essential hypertension     Secondary hyperparathyroidism of renal origin        Past Surgical History:   Procedure Laterality Date    ANGIOGRAM, CORONARY, WITH LEFT  HEART CATHETERIZATION N/A 9/13/2024    Procedure: Angiogram, Coronary, with Left Heart Cath;  Surgeon: Tank Scott Jr., MD;  Location: [facility] CATH LAB;  Service: Cardiology;  Laterality: N/A;    AV FISTULA PLACEMENT Left 07/2015    CENTRAL LINE  9/17/2024    Peritoneal Dialysis Catheter Placement  01/2016    Permcath Placement                    Current Outpatient Medications   Medication Instructions    calcitRIOL (ROCALTROL) 0.25 mcg, Oral, 2 times daily    labetalol (NORMODYNE) 300 MG tablet No dose, route, or frequency recorded.    minoxidiL (LONITEN) 2.5 mg, Oral, 2 times daily    vacuum erection device system Kit 1 Units, Misc.(Non-Drug; Combo Route), As needed (PRN)       Current Inpatient Medications   amiodarone  400 mg Oral BID    Followed by    [START ON 9/26/2024] amiodarone  200 mg Oral BID    Followed by    [START ON 9/30/2024] amiodarone  200 mg Oral Daily    aspirin  81 mg Per NG tube Daily    busPIRone  5 mg Oral BID    calcitRIOL  0.25 mcg Oral BID    calcium carbonate  500 mg Oral TID WM    enoxparin  30 mg Subcutaneous Daily    famotidine (PF)  20 mg Intravenous Daily    polyethylene glycol  17 g Oral BID    senna-docusate 8.6-50 mg  2 tablet Oral BID    sodium bicarbonate  1,950 mg Oral TID    sodium zirconium cyclosilicate  10 g Oral Every Mon, Wed, Fri       Current Intravenous Infusions   fentanyl  0-250 mcg/hr Intravenous Continuous 15 mL/hr at 09/24/24 1738 150 mcg/hr at 09/24/24 1738    NORepinephrine bitartrate-D5W  0-3 mcg/kg/min Intravenous Continuous 11.6 mL/hr at 09/24/24 1738 0.4 mcg/kg/min at 09/24/24 1738    propofoL  0-50 mcg/kg/min Intravenous Continuous 5.6 mL/hr at 09/25/24 0619 15 mcg/kg/min at 09/25/24 0619    vasopressin  0.04 Units/min Intravenous Continuous 12 mL/hr at 09/25/24 0619 0.04 Units/min at 09/25/24 0619            Objective:       Intake/Output Summary (Last 24 hours) at 9/25/2024 0644  Last data filed at 9/24/2024 1738  Gross per 24 hour   Intake 1183.14 ml    Output 3400 ml   Net -2216.86 ml         Vital Signs (Most Recent):  Temp: 99.2 °F (37.3 °C) (09/25/24 0000)  Pulse: 106 (09/25/24 0635)  Resp: (!) 30 (09/25/24 0635)  BP: (!) 86/61 (09/25/24 0635)  SpO2: 97 % (09/25/24 0635)  Body mass index is 18.89 kg/m².  Weight: 61.9 kg (136 lb 7.4 oz) Vital Signs (24h Range):  Temp:  [98.1 °F (36.7 °C)-99.7 °F (37.6 °C)] 99.2 °F (37.3 °C)  Pulse:  [101-117] 106  Resp:  [13-53] 30  SpO2:  [86 %-100 %] 97 %  BP: ()/(44-96) 86/61  Arterial Line BP: (1-142)/(1-78) 114/65     Physical Exam  Vitals reviewed.   Constitutional:       Comments: Sedated on propofol.  Synchronous with the ventilator.   Eyes:      Comments: Pupils are equal in size   Cardiovascular:      Rate and Rhythm: Normal rate and regular rhythm.   Pulmonary:      Comments: Coarse breath sounds bilaterally.  Abdominal:      General: Abdomen is flat.      Palpations: Abdomen is soft.   Musculoskeletal:      Comments: Left lower extremity wrapped in bandage.  No significant edema   Neurological:      Comments: Sedated on propofol.  Pupils are equal in size           Lines/Drains/Airways       Central Venous Catheter Line  Duration             Percutaneous Central Line - Triple Lumen  09/17/24 2030 Internal Jugular Left 7 days              Drain  Duration                  NG/OG Tube 09/23/24 1630 16 Fr. Center mouth 1 day              Airway  Duration                  Airway - Non-Surgical 09/17/24 2117 Endotracheal Tube 7 days              Arterial Line  Duration             Arterial Line 09/23/24 2100 Right Femoral 1 day              Peripheral Intravenous Line  Duration                  Hemodialysis AV Fistula 09/17/24 0701 Left forearm 7 days         Hemodialysis AV Fistula 09/17/24 0701 Left upper arm 7 days                    Significant Labs:    Lab Results   Component Value Date    WBC 20.46 (H) 09/25/2024    WBC 20.46 09/25/2024    HGB 8.7 (L) 09/25/2024    HCT 24.2 (L) 09/25/2024    MCV 80.9  09/25/2024     09/25/2024           BMP  Lab Results   Component Value Date     (L) 09/25/2024    K 4.0 09/25/2024    CO2 21 (L) 09/25/2024    BUN 60.5 (H) 09/25/2024    CREATININE 3.81 (H) 09/25/2024    CALCIUM 9.3 09/25/2024    AGAP 18.0 09/25/2024    ESTGFRAFRICA 20.8 (A) 11/11/2016    EGFRNONAA 18.0 (A) 11/11/2016         ABG  Recent Labs   Lab 09/25/24 0635   PH 7.430   PO2 113.0*   PCO2 33.0*   HCO3 21.9*   POCBASEDEF -1.80       Mechanical Ventilation Support:  Vent Mode: VOLUME A/C (09/25/24 0522)  Set Rate: 30 BPM (09/25/24 0522)  Vt Set: 460 mL (09/25/24 0522)  PEEP/CPAP: 5 cmH20 (09/25/24 0522)  Oxygen Concentration (%): 30 (09/25/24 0522)  Peak Airway Pressure: 22 cmH20 (09/25/24 0522)  Total Ve: 10.8 L/m (09/25/24 0522)  F/VT Ratio<105 (RSBI): (!) 83.33 (09/25/24 0522)      Significant Imaging:  US Liver with Doppler (xpd)  Narrative: EXAMINATION:  US LIVER WITH DOPPLER    CLINICAL HISTORY:  elevated AST/ALT;    TECHNIQUE:  Gray-scale, color Doppler and spectral waveform tracings of the major hepatic blood vessels, splenic vein and inferior vena cava.    COMPARISON:  CT abdomen/pelvis 11/11/2016    FINDINGS:  Mildly limited overall assessment due to respiratory motion.  Liver measures 17.9 cm in diameter.  Main portal vein patent with normal flow direction.  Right and left portal veins also patent.  Three patent hepatic veins identified with patent common hepatic artery.  Normal CBD caliber.  Echogenic right kidney, likely medical renal disease.  No significant ascites.  Spleen and splenic vein not identified.  Impression: Mild hepatomegaly.  Patent major hepatic vasculature.    Electronically signed by: Kobe Miguel  Date:    09/24/2024  Time:    14:38    Chest x-ray 09/23/2024: AICD in place.  Endotracheal tube in appropriate position.  There are bilateral infiltrates and loss of the left hemidiaphragm with silhouette sign.      Assessment/Plan:     Assessment  Mixed shock, cardiogenic  and septic. Suspect primarily cardiogenic  Fever & Leukocytosis - blood cultures 2/2 with staph epi  Heart failure with reduced ejection fraction (EF 15-20%)  ESRD on HD  Hyperlipidemia  COPD    Plan  Continue levophed and vasopressin support; wean as tolerated to maintain MAP > 65  Check central venous saturation from central line  RUQ US 9/24/24 consistent with mild hepatomegaly, patent hepatic vasculature otherwise  Continue mechanical ventilation on volume control 460/30/5/0.3; daily SAT/SBT. RASS goal 0 to -1  Continue IV vanc for staph epi bacteremia  Consulted vascular surgery for right radial arterial thrombus, pending recs  Nephrology following, HD per nephrology  Appreciate cardiology recs  Appreciate palliative care recs    DVT Prophylaxis: Subcu Lovenox  GI Prophylaxis: IV Famotidine     This patient remains at high risk of decompensation and death and will remain in ICU level care.    32 minutes of critical care was time spent personally by me on the following activities: development of treatment plan with patient or surrogate and bedside caregivers, discussions with consultants, evaluation of patient's response to treatment, examination of patient, ordering and performing treatments and interventions, ordering and review of laboratory studies, ordering and review of radiographic studies, pulse oximetry, re-evaluation of patient's condition.  This critical care time did not overlap with that of any other provider or involve time for any procedures.    This note was generated via Dictaphone and may contain some voice recognition errors.       Gadiel Estevez MD  Pulmonary Critical Care Medicine  [facility] - [facility] ICU  DOS: 09/25/2024

## 2024-09-25 NOTE — PROGRESS NOTES
09/25/24 1227   Post-Hemodialysis Assessment   Blood Volume Processed (Liters) 63 L   Dialyzer Clearance Moderately streaked   Duration of Treatment 180 minutes   Total UF (mL) 2000 mL   Patient Response to Treatment Tolerated well

## 2024-09-25 NOTE — PLAN OF CARE
Problem: Hemodialysis  Goal: Safe, Effective Therapy Delivery  9/25/2024 1727 by Manisha Clark RN  Outcome: Progressing  9/25/2024 1254 by Manisha Clark RN  Outcome: Progressing  Goal: Effective Tissue Perfusion  9/25/2024 1727 by Manisha Clark RN  Outcome: Progressing  9/25/2024 1254 by Manisha Clark RN  Outcome: Progressing  Goal: Absence of Infection Signs and Symptoms  9/25/2024 1727 by Manisha Clark RN  Outcome: Progressing  9/25/2024 1254 by Manisha Clark RN  Outcome: Progressing     Problem: Adult Inpatient Plan of Care  Goal: Plan of Care Review  9/25/2024 1727 by Manisha Clark RN  Outcome: Progressing  9/25/2024 1254 by Manisha Clark RN  Outcome: Progressing  Goal: Patient-Specific Goal (Individualized)  9/25/2024 1727 by Manisha Clark RN  Outcome: Progressing  9/25/2024 1254 by Manisha Clark RN  Outcome: Progressing  Goal: Absence of Hospital-Acquired Illness or Injury  9/25/2024 1727 by Manisha Clark RN  Outcome: Progressing  9/25/2024 1254 by Manisha Clark RN  Outcome: Progressing  Goal: Optimal Comfort and Wellbeing  9/25/2024 1727 by Manisha Clark RN  Outcome: Progressing  9/25/2024 1254 by Manisha Clark RN  Outcome: Progressing  Goal: Readiness for Transition of Care  9/25/2024 1727 by Manisha Clark RN  Outcome: Progressing  9/25/2024 1254 by Manisha Clark RN  Outcome: Progressing     Problem: Skin Injury Risk Increased  Goal: Skin Health and Integrity  9/25/2024 1727 by Manisha Clark RN  Outcome: Progressing  9/25/2024 1254 by Manisha Clark RN  Outcome: Progressing     Problem: Infection  Goal: Absence of Infection Signs and Symptoms  9/25/2024 1727 by Manisha Clark RN  Outcome: Progressing  9/25/2024 1254 by Manisha Clark RN  Outcome: Progressing     Problem: Wound  Goal: Optimal Coping  9/25/2024 1727 by Manisha Clark RN  Outcome: Progressing  9/25/2024 1254 by Manisha Clark, RN  Outcome: Progressing  Goal: Optimal Functional Ability  9/25/2024 1727 by Manisha Clark,  RN  Outcome: Progressing  9/25/2024 1254 by Manisha Clark RN  Outcome: Progressing  Goal: Absence of Infection Signs and Symptoms  9/25/2024 1727 by Manisha Clark RN  Outcome: Progressing  9/25/2024 1254 by Manisha Clark RN  Outcome: Progressing  Goal: Optimal Pain Control and Function  9/25/2024 1727 by Manisha Clark RN  Outcome: Progressing  9/25/2024 1254 by Manisha Clark RN  Outcome: Progressing  Goal: Skin Health and Integrity  9/25/2024 1727 by Manisha Clark RN  Outcome: Progressing  9/25/2024 1254 by Manisha Clark RN  Outcome: Progressing  Goal: Optimal Wound Healing  9/25/2024 1727 by Manisha Clark RN  Outcome: Progressing  9/25/2024 1254 by Manisha Clark RN  Outcome: Progressing     Problem: Coping Ineffective  Goal: Effective Coping  9/25/2024 1727 by Manisha Clark RN  Outcome: Progressing  9/25/2024 1254 by Manisha Clark RN  Outcome: Progressing     Problem: Fall Injury Risk  Goal: Absence of Fall and Fall-Related Injury  9/25/2024 1727 by Manisha Clark RN  Outcome: Progressing  9/25/2024 1254 by Manisha Clark RN  Outcome: Progressing

## 2024-09-25 NOTE — PROGRESS NOTES
[facility] - [facility] ICU  Pulmonary Critical Care Note    Patient Name: Prem Saldana  MRN: [id number]  Admission Date: 9/10/2024  Hospital Length of Stay: 15 days  Code Status: Full Code  Attending Provider: Sin Gonsalez Jr., MD,*  Primary Care Provider: Tank Saenz MD     Subjective:     HPI: Prem Saldana is a 49 year old  male with a past medical history for tobacco use disorder, COPD on home oxygen, hypertension, hyperlipidemia, ESRD on HD, heart failure with reduced ejection fraction (EF 15-20%), prostate cancer status post radiation, who initially presented to [facility] ED (09/10/2024) due to chest pain and shortness of breath.     CIS consulted for NSTEMI management. Attempted to perform LHC (09/14/2024) but was canceled prior to initiation of procedure due to patient becoming uncooperative per reports.     Hospital Course/Significant events:  9/13 - LHC attempted but aborted due to anatomy  9/14 - LHC canceled due to patient being uncooperative  9/15 - upgraded to ICU for hypotension and runs of V-tach  9/17 - required intubation  9/20 - spiked a temp, 2/2 blood cultures growing staph epi    24 Hour Interval History:  Started on sodium bicarb by Nephrology yesterday.  Amiodarone transitioned to oral.  Femoral arterial line placed.  Right radial artery noted to be occluded on ultrasound.  No evidence of abscess in the left upper extremity graft.  White blood cell count decreased from 35 to 20.  ALT and AST are increasing.    Past Medical History:   Diagnosis Date    Anemia of renal disease     ESRD on dialysis since 4/3/15     Essential hypertension     Secondary hyperparathyroidism of renal origin        Past Surgical History:   Procedure Laterality Date    ANGIOGRAM, CORONARY, WITH LEFT HEART CATHETERIZATION N/A 9/13/2024    Procedure: Angiogram, Coronary, with Left Heart Cath;  Surgeon: Tank Scott Jr., MD;  Location: [facility] CATH LAB;  Service:  Cardiology;  Laterality: N/A;    AV FISTULA PLACEMENT Left 07/2015    CENTRAL LINE  9/17/2024    Peritoneal Dialysis Catheter Placement  01/2016    Permcath Placement                    Current Outpatient Medications   Medication Instructions    calcitRIOL (ROCALTROL) 0.25 mcg, Oral, 2 times daily    labetalol (NORMODYNE) 300 MG tablet No dose, route, or frequency recorded.    minoxidiL (LONITEN) 2.5 mg, Oral, 2 times daily    vacuum erection device system Kit 1 Units, Misc.(Non-Drug; Combo Route), As needed (PRN)       Current Inpatient Medications   amiodarone  400 mg Oral BID    Followed by    [START ON 9/26/2024] amiodarone  200 mg Oral BID    Followed by    [START ON 9/30/2024] amiodarone  200 mg Oral Daily    aspirin  81 mg Per NG tube Daily    busPIRone  5 mg Oral BID    calcitRIOL  0.25 mcg Oral BID    calcium carbonate  500 mg Oral TID WM    enoxparin  30 mg Subcutaneous Daily    famotidine (PF)  20 mg Intravenous Daily    polyethylene glycol  17 g Oral BID    senna-docusate 8.6-50 mg  2 tablet Oral BID    sodium bicarbonate  1,950 mg Oral TID    sodium zirconium cyclosilicate  10 g Oral Every Mon, Wed, Fri       Current Intravenous Infusions   fentanyl  0-250 mcg/hr Intravenous Continuous 15 mL/hr at 09/24/24 1738 150 mcg/hr at 09/24/24 1738    NORepinephrine bitartrate-D5W  0-3 mcg/kg/min Intravenous Continuous 11.6 mL/hr at 09/24/24 1738 0.4 mcg/kg/min at 09/24/24 1738    propofoL  0-50 mcg/kg/min Intravenous Continuous 5.6 mL/hr at 09/24/24 1738 15 mcg/kg/min at 09/24/24 1738    vasopressin  0.04 Units/min Intravenous Continuous 12 mL/hr at 09/24/24 1738 0.04 Units/min at 09/24/24 1738            Objective:       Intake/Output Summary (Last 24 hours) at 9/25/2024 0604  Last data filed at 9/24/2024 1738  Gross per 24 hour   Intake 1183.14 ml   Output 3900 ml   Net -2716.86 ml         Vital Signs (Most Recent):  Temp: 99.2 °F (37.3 °C) (09/25/24 0000)  Pulse: 106 (09/25/24  0522)  Resp: (!) 30 (09/25/24 0522)  BP: (!) 87/63 (09/25/24 0400)  SpO2: (!) 94 % (09/25/24 0522)  Body mass index is 18.89 kg/m².  Weight: 61.9 kg (136 lb 7.4 oz) Vital Signs (24h Range):  Temp:  [98.1 °F (36.7 °C)-99.7 °F (37.6 °C)] 99.2 °F (37.3 °C)  Pulse:  [101-117] 106  Resp:  [13-53] 30  SpO2:  [86 %-100 %] 94 %  BP: ()/(44-96) 87/63  Arterial Line BP: (1-142)/(1-78) 114/65     Physical Exam  Vitals reviewed.   Constitutional:       Comments: Sedated on propofol.  Synchronous with the ventilator.   Eyes:      Comments: Pupils are equal in size   Cardiovascular:      Rate and Rhythm: Normal rate and regular rhythm.   Pulmonary:      Comments: Coarse breath sounds bilaterally.  Abdominal:      General: Abdomen is flat.      Palpations: Abdomen is soft.   Musculoskeletal:      Comments: Left lower extremity wrapped in bandage.  No significant edema   Neurological:      Comments: Sedated on propofol.  Pupils are equal in size         Lines/Drains/Airways       Central Venous Catheter Line  Duration             Percutaneous Central Line - Triple Lumen  09/17/24 2030 Internal Jugular Left 7 days              Drain  Duration                  NG/OG Tube 09/23/24 1630 16 Fr. Center mouth 1 day              Airway  Duration                  Airway - Non-Surgical 09/17/24 2117 Endotracheal Tube 7 days              Arterial Line  Duration             Arterial Line 09/23/24 2100 Right Femoral 1 day              Peripheral Intravenous Line  Duration                  Hemodialysis AV Fistula 09/17/24 0701 Left forearm 7 days         Hemodialysis AV Fistula 09/17/24 0701 Left upper arm 7 days                    Significant Labs:    Lab Results   Component Value Date    WBC 20.46 (H) 09/25/2024    WBC 20.46 09/25/2024    HGB 8.7 (L) 09/25/2024    HCT 24.2 (L) 09/25/2024    MCV 80.9 09/25/2024     09/25/2024           BMP  Lab Results   Component Value Date     (L) 09/25/2024    K 4.0 09/25/2024    CO2 21  (L) 09/25/2024    BUN 60.5 (H) 09/25/2024    CREATININE 3.81 (H) 09/25/2024    CALCIUM 9.3 09/25/2024    AGAP 18.0 09/25/2024    ESTGFRAFRICA 20.8 (A) 11/11/2016    EGFRNONAA 18.0 (A) 11/11/2016         ABG  Recent Labs   Lab 09/24/24  0750   PH 7.310*   PO2 83.0   PCO2 39.0   HCO3 19.6*   POCBASEDEF -6.20*       Mechanical Ventilation Support:  Vent Mode: VOLUME A/C (09/25/24 0522)  Set Rate: 30 BPM (09/25/24 0522)  Vt Set: 460 mL (09/25/24 0522)  PEEP/CPAP: 5 cmH20 (09/25/24 0522)  Oxygen Concentration (%): 30 (09/25/24 0522)  Peak Airway Pressure: 22 cmH20 (09/25/24 0522)  Total Ve: 10.8 L/m (09/25/24 0522)  F/VT Ratio<105 (RSBI): (!) 83.33 (09/25/24 0522)      Significant Imaging:  US Liver with Doppler (xpd)  Narrative: EXAMINATION:  US LIVER WITH DOPPLER    CLINICAL HISTORY:  elevated AST/ALT;    TECHNIQUE:  Gray-scale, color Doppler and spectral waveform tracings of the major hepatic blood vessels, splenic vein and inferior vena cava.    COMPARISON:  CT abdomen/pelvis 11/11/2016    FINDINGS:  Mildly limited overall assessment due to respiratory motion.  Liver measures 17.9 cm in diameter.  Main portal vein patent with normal flow direction.  Right and left portal veins also patent.  Three patent hepatic veins identified with patent common hepatic artery.  Normal CBD caliber.  Echogenic right kidney, likely medical renal disease.  No significant ascites.  Spleen and splenic vein not identified.  Impression: Mild hepatomegaly.  Patent major hepatic vasculature.    Electronically signed by: Kobe Miguel  Date:    09/24/2024  Time:    14:38    Chest x-ray 09/23/2024: AICD in place.  Endotracheal tube in appropriate position.  There are bilateral infiltrates and loss of the left hemidiaphragm with silhouette sign.      Assessment/Plan:     Assessment  Mixed shock, cardiogenic and septic. Suspect primarily cardiogenic  Fever & Leukocytosis - blood cultures 2/2 with staph epi  Heart failure with reduced ejection  fraction (EF 15-20%)  ESRD on HD  Hyperlipidemia  COPD    Plan  Continue levophed and vasopressin support; wean as tolerated to maintain MAP > 65  Check central venous saturation from central line  Right upper quadrant ultrasound to workup elevated LFTs  Continue mechanical ventilation on volume control 460/30/5/0.3; daily SAT/SBT. RASS goal 0 to -1  Continue IV vanc for staph epi bacteremia  Consult vascular surgery for right radial arterial thrombus  Nephrology consulted and following for HD  Appreciate cardiology recs  Appreciate palliative care recs    DVT Prophylaxis: Subcu Lovenox  GI Prophylaxis: IV Famotidine     This patient remains at high risk of decompensation and death and will remain in ICU level care.    32 minutes of critical care was time spent personally by me on the following activities: development of treatment plan with patient or surrogate and bedside caregivers, discussions with consultants, evaluation of patient's response to treatment, examination of patient, ordering and performing treatments and interventions, ordering and review of laboratory studies, ordering and review of radiographic studies, pulse oximetry, re-evaluation of patient's condition.  This critical care time did not overlap with that of any other provider or involve time for any procedures.    This note was generated via Location[employer] and may contain some voice recognition errors.       Gadiel Estevez MD  Pulmonary Critical Care Medicine  [facility] - [facility] ICU  DOS: 09/25/2024

## 2024-09-26 NOTE — PROGRESS NOTES
[facility] - [facility] East ICU  Pulmonary Critical Care Note    Patient Name: Prem Saldana  MRN: [id number]  Admission Date: 9/10/2024  Hospital Length of Stay: 16 days  Code Status: Full Code  Attending Provider: Sin Gonsalez Jr., MD,*  Primary Care Provider: Tank Saenz MD     Subjective:     HPI: Prem Saldana is a 49 year old  male with a past medical history for tobacco use disorder, COPD on home oxygen, hypertension, hyperlipidemia, ESRD on HD, heart failure with reduced ejection fraction (EF 15-20%), prostate cancer status post radiation, who initially presented to [facility] ED (09/10/2024) due to chest pain and shortness of breath.     CIS consulted for NSTEMI management. Attempted to perform LHC (09/14/2024) but was canceled prior to initiation of procedure due to patient becoming uncooperative per reports.     Hospital Course/Significant events:  9/13 - LHC attempted but aborted due to anatomy  9/14 - LHC canceled due to patient being uncooperative  9/15 - upgraded to ICU for hypotension and runs of V-tach  9/17 - required intubation  9/20 - spiked a temp, 2/2 blood cultures growing staph epi    24 Hour Interval History:  No acute overnight events. He remains intubated on A/C 24/460/5/40, currently on Fentanyl 150 mcg/kg/min, Prop 25 mcg/kg/min, Levophed 0.5 mcg/kg/min, Vaso 0.04 units. Tachycardic, afebrile. Labs with leukocytosis, stable H/H, AGMA, elevated renal indices, transaminitis. Repeat blood cultures negative to date. CBG within goal. HD done yesterday total UF 2000 cc.      Past Medical History:   Diagnosis Date    Anemia of renal disease     ESRD on dialysis since 4/3/15     Essential hypertension     Secondary hyperparathyroidism of renal origin        Past Surgical History:   Procedure Laterality Date    ANGIOGRAM, CORONARY, WITH LEFT HEART CATHETERIZATION N/A 9/13/2024    Procedure: Angiogram, Coronary, with Left Heart Cath;  Surgeon: Tank Scott  MD Morgan;  Location: [facility] CATH LAB;  Service: Cardiology;  Laterality: N/A;    AV FISTULA PLACEMENT Left 07/2015    CENTRAL LINE  9/17/2024    Peritoneal Dialysis Catheter Placement  01/2016    Permcath Placement                    Current Outpatient Medications   Medication Instructions    calcitRIOL (ROCALTROL) 0.25 mcg, Oral, 2 times daily    labetalol (NORMODYNE) 300 MG tablet No dose, route, or frequency recorded.    minoxidiL (LONITEN) 2.5 mg, Oral, 2 times daily    vacuum erection device system Kit 1 Units, Misc.(Non-Drug; Combo Route), As needed (PRN)       Current Inpatient Medications   amiodarone  400 mg Oral BID    Followed by    amiodarone  200 mg Oral BID    Followed by    [START ON 9/30/2024] amiodarone  200 mg Oral Daily    aspirin  81 mg Per NG tube Daily    busPIRone  5 mg Oral BID    calcitRIOL  0.25 mcg Oral BID    calcium carbonate  500 mg Oral TID WM    enoxparin  30 mg Subcutaneous Daily    famotidine (PF)  20 mg Intravenous Daily    polyethylene glycol  17 g Oral BID    senna-docusate 8.6-50 mg  2 tablet Oral BID    sodium bicarbonate  1,950 mg Oral TID    sodium zirconium cyclosilicate  10 g Oral Every Mon, Wed, Fri       Current Intravenous Infusions   fentanyl  0-250 mcg/hr Intravenous Continuous 15 mL/hr at 09/26/24 0400 150 mcg/hr at 09/26/24 0400    NORepinephrine bitartrate-D5W  0-3 mcg/kg/min Intravenous Continuous 11.6 mL/hr at 09/25/24 1711 0.4 mcg/kg/min at 09/25/24 1711    propofoL  0-50 mcg/kg/min Intravenous Continuous 9.3 mL/hr at 09/26/24 0632 25 mcg/kg/min at 09/26/24 0632    vasopressin  0.04 Units/min Intravenous Continuous 12 mL/hr at 09/25/24 2200 0.04 Units/min at 09/25/24 2200            Objective:       Intake/Output Summary (Last 24 hours) at 9/26/2024 0701  Last data filed at 9/25/2024 1711  Gross per 24 hour   Intake 1381.77 ml   Output 2000 ml   Net -618.23 ml         Vital Signs (Most Recent):  Temp: 98.2 °F (36.8 °C) (09/26/24 0400)  Pulse: (!) 113 (09/26/24  0532)  Resp: (!) 24 (09/26/24 0532)  BP: 94/64 (09/26/24 0445)  SpO2: (!) 94 % (09/26/24 0532)  Body mass index is 18.89 kg/m².  Weight: 61.9 kg (136 lb 7.4 oz) Vital Signs (24h Range):  Temp:  [98.2 °F (36.8 °C)-99 °F (37.2 °C)] 98.2 °F (36.8 °C)  Pulse:  [104-120] 113  Resp:  [3-33] 24  SpO2:  [85 %-100 %] 94 %  BP: ()/(54-81) 94/64  Arterial Line BP: ()/(27-75) 109/65     Physical Exam  Vitals reviewed.   Constitutional:       Comments: Sedated on propofol.  Synchronous with the ventilator.   Eyes:      Comments: Pupils are equal in size   Cardiovascular:      Rate and Rhythm: Normal rate and regular rhythm.   Pulmonary:      Comments: Coarse breath sounds bilaterally.  Abdominal:      General: Abdomen is flat.      Palpations: Abdomen is soft.   Musculoskeletal:      Comments: Left lower extremity wrapped in bandage.  No significant edema   Neurological:      Comments: Sedated on propofol.  Pupils are equal in size           Lines/Drains/Airways       Central Venous Catheter Line  Duration             Percutaneous Central Line - Triple Lumen  09/17/24 2030 Internal Jugular Left 8 days              Drain  Duration                  NG/OG Tube 09/23/24 1630 16 Fr. Center mouth 2 days              Airway  Duration                  Airway - Non-Surgical 09/17/24 2117 Endotracheal Tube 8 days              Arterial Line  Duration             Arterial Line 09/23/24 2100 Right Femoral 2 days              Peripheral Intravenous Line  Duration                  Hemodialysis AV Fistula 09/17/24 0701 Left forearm 9 days         Hemodialysis AV Fistula 09/17/24 0701 Left upper arm 9 days                    Significant Labs:    Lab Results   Component Value Date    WBC 19.14 (H) 09/26/2024    WBC 19.14 09/26/2024    HGB 8.8 (L) 09/26/2024    HCT 24.8 (L) 09/26/2024    MCV 80.8 09/26/2024     09/26/2024           BMP  Lab Results   Component Value Date     09/26/2024    K 4.1 09/26/2024    CO2 20 (L)  09/26/2024    BUN 49.0 (H) 09/26/2024    CREATININE 3.39 (H) 09/26/2024    CALCIUM 9.7 09/26/2024    AGAP 21.0 09/26/2024    ESTGFRAFRICA 20.8 (A) 11/11/2016    EGFRNONAA 18.0 (A) 11/11/2016         ABG  Recent Labs   Lab 09/26/24 0608   PH 7.360   PO2 136.0*   PCO2 38.0   HCO3 21.5*   POCBASEDEF -3.60*       Mechanical Ventilation Support:  Vent Mode: VOLUME A/C (09/26/24 0532)  Set Rate: 24 BPM (09/26/24 0532)  Vt Set: 460 mL (09/26/24 0532)  PEEP/CPAP: 5 cmH20 (09/26/24 0532)  Oxygen Concentration (%): 40 (09/26/24 0532)  Peak Airway Pressure: 21 cmH20 (09/26/24 0532)  Total Ve: 8.8 L/m (09/26/24 0532)  F/VT Ratio<105 (RSBI): (!) 66.3 (09/26/24 0532)      Significant Imaging:  X-Ray Chest 1 View  Narrative: EXAMINATION:  XR CHEST 1 VIEW    CPT 87138    CLINICAL HISTORY:  ventilator;    COMPARISON:  September 23, 2024    FINDINGS:  Examination reveals cardiomediastinal silhouette to be unchanged as compared with the previous exam.    Slightly more confluent airspace opacities identified in the left suprahilar region infiltrate can not be completely excluded.    No other significant change as compared with the previous exam.    Support catheters remain in place  Impression: Confluent airspace opacities in the left suprahilar region infiltrate cannot be completely excluded.    No other significant change.    Support catheters remain in place    Electronically signed by: Star Oates  Date:    09/26/2024  Time:    05:23    Chest x-ray 09/23/2024: AICD in place.  Endotracheal tube in appropriate position.  There are bilateral infiltrates and loss of the left hemidiaphragm with silhouette sign.      Assessment/Plan:     Assessment  Mixed shock, cardiogenic and septic. Suspect primarily cardiogenic  Fever & Leukocytosis - blood cultures 2/2 with staph epi  Heart failure with reduced ejection fraction (EF 15-20%)  ESRD on HD  Hyperlipidemia  COPD    Plan  Continue levophed and vasopressin support; wean as tolerated  to maintain MAP > 65  RUQ US 9/24/24 consistent with mild hepatomegaly, patent hepatic vasculature otherwise  Continue mechanical ventilation on volume control 460/30/5/0.3; daily SAT/SBT. RASS goal 0 to -1  Continue IV vanc for staph epi bacteremia  Consulted vascular surgery for right radial arterial thrombus, no indication for intervention at this time  Nephrology following, HD per nephrology  Appreciate cardiology recs  Appreciate palliative care recs    DVT Prophylaxis: Subcu Lovenox  GI Prophylaxis: IV Famotidine     This patient remains at high risk of decompensation and death and will remain in ICU level care.    32 minutes of critical care was time spent personally by me on the following activities: development of treatment plan with patient or surrogate and bedside caregivers, discussions with consultants, evaluation of patient's response to treatment, examination of patient, ordering and performing treatments and interventions, ordering and review of laboratory studies, ordering and review of radiographic studies, pulse oximetry, re-evaluation of patient's condition.  This critical care time did not overlap with that of any other provider or involve time for any procedures.    This note was generated via Dictaphone and may contain some voice recognition errors.       Marina Winters MD  Pulmonary Critical Care Medicine  [facility] - [facility] East ICU  DOS: 09/26/2024

## 2024-09-26 NOTE — PROGRESS NOTES
Pharmacokinetic Assessment Follow Up: IV Vancomycin    Vancomycin serum concentration assessment(s):    The random level was drawn correctly and can be used to guide therapy at this time. The measurement is below the desired definitive target range of 15 to 20 mcg/mL.    Vancomycin Regimen Plan:    Will plan for 750 mg today after dialysis. A level is scheduled for 9/28 to be drawn with morning labs.    Drug levels (last 3 results):  Recent Labs   Lab Result Units 09/23/24  1112 09/24/24  0431 09/26/24  0123   Vancomycin Random ug/ml  --  18.3 13.0*   Vancomycin Trough ug/ml 20.9*  --   --        Vancomycin Administrations:  vancomycin given in the last 96 hours        No antibiotic orders with administrations found.                    Pharmacy will continue to follow and monitor vancomycin.    Please contact pharmacy at extension [phone] for questions regarding this assessment.    Thank you for the consult,   Lee Masters       Patient brief summary:  Prem Saldana is a 49 y.o. male initiated on antimicrobial therapy with IV Vancomycin for treatment of bacteremia    Drug Allergies:   Review of patient's allergies indicates:   Allergen Reactions    Hydralazine Palpitations and Other (See Comments)     Other Reaction(s): feels like he is running    Palpitations    Amlodipine     Levofloxacin        Actual Body Weight:  Wt Readings from Last 1 Encounters:   09/14/24 61.9 kg (136 lb 7.4 oz)       Renal Function:   Estimated Creatinine Clearance: 23.1 mL/min (A) (based on SCr of 3.39 mg/dL (H)).    Dialysis Method (if applicable):  intermittent HD TTS    CBC (last 72 hours):  Recent Labs   Lab Result Units 09/24/24  0431 09/25/24  0339 09/26/24  0123   WBC x10(3)/mcL 20.51  20.51* 20.46  20.46* 19.14  19.14*   Hgb g/dL 9.3* 8.7* 8.8*   Hct % 25.2* 24.2* 24.8*   Platelet x10(3)/mcL 260 256 270   Monocytes % % 1 4 3       Metabolic Panel (last 72 hours):  Recent Labs   Lab Result Units 09/23/24 2055 09/24/24  0431  09/24/24  0747 09/24/24  0750 09/25/24  0339 09/25/24  0635 09/25/24  1205 09/26/24  0123 09/26/24  0608   Sodium mmol/L  --  132*  --   --  134*  --   --  136  --    Sodium, Blood Gas mmol/L 124*  --  125 126*  --  130* 133*  --  131*   Potassium mmol/L  --  4.1  --   --  4.0  --   --  4.1  --    Potassium, Blood Gas mmol/L 3.8  --  4.0 4.0  --  3.8 3.4*  --  4.2   Chloride mmol/L  --  93*  --   --  95*  --   --  95*  --    CO2 mmol/L  --  16*  --   --  21*  --   --  20*  --    Glucose mg/dL  --  111*  --   --  88  --   --  96  --    Blood Urea Nitrogen mg/dL  --  81.4*  --   --  60.5*  --   --  49.0*  --    Creatinine mg/dL  --  4.50*  --   --  3.81*  --   --  3.39*  --    Albumin g/dL  --  1.7*  --   --  2.0*  --   --  2.3*  --    Bilirubin Total mg/dL  --  2.3*  --   --  3.8*  --   --  6.1*  --    ALP unit/L  --  402*  --   --  384*  --   --  355*  --    AST unit/L  --  489*  --   --  204*  --   --  87*  --    ALT unit/L  --  182*  --   --  144*  --   --  105*  --    Magnesium Level mg/dL  --  2.30  --   --  2.20  --   --  2.30  --    Phosphorus Level mg/dL  --  6.6*  --   --  6.3*  --   --  6.9*  --        Microbiologic Results:  Microbiology Results (last 7 days)       Procedure Component Value Units Date/Time    Blood Culture [[id number]]  (Normal) Collected: 09/21/24 1520    Order Status: Completed Specimen: Blood Updated: 09/25/24 1702     Blood Culture No Growth At 96 Hours    Blood Culture [[id number]]  (Normal) Collected: 09/21/24 1520    Order Status: Completed Specimen: Blood Updated: 09/25/24 1702     Blood Culture No Growth At 96 Hours    Blood Culture [[id number]]  (Normal) Collected: 09/19/24 1227    Order Status: Completed Specimen: Blood, Venous Updated: 09/24/24 1500     Blood Culture No Growth at 5 days    Blood Culture [[id number]]  (Normal) Collected: 09/17/24 2001    Order Status: Completed Specimen: Blood, Venous Updated: 09/22/24 2102     Blood Culture No Growth at 5 days    Blood Culture  [[id number]]  (Normal) Collected: 09/17/24 2001    Order Status: Completed Specimen: Blood, Venous Updated: 09/22/24 2102     Blood Culture No Growth at 5 days    Respiratory Culture [[id number]] Collected: 09/20/24 1311    Order Status: Completed Specimen: Sputum from Endotracheal Aspirate Updated: 09/22/24 0755     Respiratory Culture Normal respiratory mami     GRAM STAIN Quality 2+      Many Gram Negative Rods      Many Gram positive cocci      Rare Gram Positive Rods    Blood Culture [[id number]]  (Abnormal)  (Susceptibility) Collected: 09/19/24 1231    Order Status: Completed Specimen: Blood, Venous Updated: 09/22/24 0654     Blood Culture Staphylococcus epidermidis     GRAM STAIN Gram Positive Cocci, probable Staphylococcus      Seen in gram stain of broth only      2 of 2 bottles positive    BCID2 Panel [[id number]]  (Abnormal) Collected: 09/19/24 1231    Order Status: Completed Specimen: Blood, Venous Updated: 09/20/24 1433     CTX-M (ESBL ) N/A     IMP (Cabapenemase ) N/A     KPC resistance gene (Carbapenemase ) N/A     mcr-1 N/A     mecA ID Detected     Comment: Note: Antimicrobial resistance can occur via multiple mechanisms. A Not Detected result for antimicrobial resistance gene(s) does not indicate antimicrobial susceptibility. Subculturing is required for species identification and susceptibility testing of   isolates.        mecA/C and MREJ (MRSA) gene N/A     NDM (Carbapenemase ) N/A     OXA-48-like (Carbapenemase ) N/A     Abdon/B (VRE gene) N/A     VIM (Carbapenemase ) N/A     Enterococcus faecalis Not Detected     Enterococcus faecium Not Detected     Listeria monocytogenes Not Detected     Staphylococcus spp. Detected     Staphylococcus aureus Not Detected     Staphylococcus epidermidis Detected     Staphylococcus lugdunensis Not Detected     Streptococcus spp. Not Detected     Streptococcus agalactiae (Group B) Not Detected     Streptococcus  pneumoniae Not Detected     Streptococcus pyogenes (Group A) Not Detected     Acinetobacter calcoaceticus/baumannii complex Not Detected     Bacteroides fragilis Not Detected     Enterobacterales Not Detected     Enterobacter cloacae complex Not Detected     Escherichia coli Not Detected     Klebsiella aerogenes Not Detected     Klebsiella oxytoca Not Detected     Klebsiella pneumoniae group Not Detected     Proteus spp. Not Detected     Salmonella spp. Not Detected     Serratia marcescens Not Detected     Haemophilus influenzae Not Detected     Neisseria meningitidis Not Detected     Pseudomonas aeruginosa Not Detected     Stenotrophomonas maltophilia Not Detected     Candida albicans Not Detected     Candida auris Not Detected     Candida glabrata Not Detected     Candida krusei Not Detected     Candida parapsilosis Not Detected     Candida tropicalis Not Detected     Cryptococcus neoformans/gattii Not Detected    Narrative:      The [employer]iant BCID2 Panel is a multiplexed nucleic acid test intended for the use with KlickThru® 2.0 or KlickThru® Tealeaf Systems for the simultaneous qualitative detection and identification of multiple bacterial and yeast nucleic acids and select genetic determinants associated with antimicrobial resistance.  The Bio[employer] BCID2 Panel test is performed directly on blood culture samples identified as positive by a continuous monitoring blood culture system.  Results are intended to be interpreted in conjunction with Gram stain results.

## 2024-09-26 NOTE — NURSING
Nurses Note -- 4 Eyes      9/26/2024   5:40 AM      Skin assessed during: Q Shift Change      [] No Altered Skin Integrity Present    []Prevention Measures Documented      [x] Yes- Altered Skin Integrity Present or Discovered   [x] LDA Added if Not in Epic (Describe Wound)   [] New Altered Skin Integrity was Present on Admit and Documented in LDA   [x] Wound Image Taken    Wound Care Consulted? Yes    Attending Nurse:  Little Sena RN/Staff Member:  DOYLE Zhao

## 2024-09-26 NOTE — PROGRESS NOTES
Nephrology Progress Note      HPI:    Prem Saldana is a 49 y.o. male from [place], with pmhx of ESRD TTS via L AVF, CHF, EF <20%, COPD, hx of prostate cancer, presenting to the ED today with respiratory failure requiring BiPAP.      History is very limited due to his condition but he was able to answer a few questions on bipap. He states that he last dialyzed in [place], [place] Saturday 9/7. He tells me he has a 6am chair time TTS set up at the dialysis unit in [place]? Will have to call the unit to verify.      CTA chest revealing cardiomegaly with trace left effusion, atelectasis and mild interstitial edema. CXR with congestion. He has 1+ edema to BLE. His electrolytes are normal., BNP is >14,000 on admission, troponin 0.55 and he states that his chest hurts because he fell and had trauma to the chest 2 days ago. Bedside echo pending in the ED. Cardiology following and trending troponins until peak. ED physician paged nephrology for HD today.     Interval History:    9/11/24  Tolerated HD yesterday with 3L off and significant improvement in respiratory status, now on 4L NC. Vitals are stable. Labs reviewed, all relatively stable but bicarb is now 20. Serial troponins remain high. He continues to complain of chest pain from falling this weekend. Chest xr and CTA are without traumatic changes. He is now able to tell me that he was in [place] because his mom lives there, but he is moving to [place] and has a chair time at [facility].   He is usually on 2L O2 support for COPD and is currently requiring 4L.     09/12/2024   No acute changes overnight.  Potassium 6 on a.m. labs.  He is scheduled for his routine dialysis run today which will be done after left heart catheterization.  Voices no new complaints.    09/13/2024   Tolerated 3 L UF with HD yesterday.  Potassium remains elevated at 5.6 on a.m. labs.  Hemoglobin stable.  Shortness of breath improved following HD. no chest pain at present.    09/16/2024   Awake  alert and oriented.  Looks little dyspneic.  Complains of pain and asking for lot of pain medicines.  Not sure about the oral fluid intake or nutrition intake.    09/17/2024  No acute events overnight.  No new complaints.  No chest pain, shortness of breath, abdominal pain, nausea, vomiting, or lower extremity edema.  He remains on vasopressors.    09/18/2024   Overnight events noted and now patient is intubated and on the ventilator and sedated with a propofol and on high dose of Levophed also.  Also on amiodarone.    09/19/2024  Patient dialyzed off schedule yesterday due to hyperkalemia.  About to start dialysis again this morning on regular TTS schedule.  Remains intubated and on ventilator.  Still requiring Levophed for hypotension.    09/20/2024  Patient continues to have hypotension overnight, and Levophed had to be added.  He did dialyze yesterday, however, was only able to get 100 mL UF due to hypotension.  Remains intubated.    09/23/2024   Weekend events noted.  He did dialyze on Saturday.  Overall condition is unchanged.  Remains on the pressors.  Also on sedation.  Also intubated.    09/24/2024   Currently tolerating dialysis.  Patient is still sedated and on the ventilator.  Also on pressors.  Overall condition has not changed.  Nurses reported that NG tube was placed to suction and 1300 cc fluid came out.  So the tube feeding is on hold at present time.    09/25/2024   Patient had dialysis done yesterday.  2 L fluid was removed.  He did receive some albumin.  His pressor need has decreased.  Dr. Rodrigues thinks it maybe good to try some more fluid removal if tolerated by him and then maybe his pressors can be decreased.  Remains on the ventilator.  Remains on propofol and fentanyl for sedation and Levophed and vasopressin for hypotension.    09/26/2024.  Patient dialyzed yesterday off schedule 2 L UF.  Seen on hemodialysis again this morning.  Remains on vasopressin Levophed.  Ventilator setting in his  "continue worsen      Vital Signs:  BP (!) 77/59   Pulse (!) 115   Temp 98.8 °F (37.1 °C) (Oral)   Resp (!) 21   Ht 5' 11.26" (1.81 m)   Wt 61.9 kg (136 lb 7.4 oz)   SpO2 (!) 90%   BMI 18.89 kg/m²   Body mass index is 18.89 kg/m².    Physical Exam:    GEN:  Ill-appearing AA male  HEENT ET tube in place  CV:  Irregular rate and rhythm  PULM:  Bilateral rhonchi, 18 PEEP, 100% FiO2  ABD: Soft, NT/ND abdomen with NABS  EXT: No cyanosis or edema  SKIN: Warm and dry  PSYCH:  Sedated on the ventilator  Dialysis access:  LUE AV fistula      Labs:      Component Value Date/Time     09/26/2024 0123     (L) 09/25/2024 0339     06/02/2016 0710    K 4.1 09/26/2024 0123    K 4.0 09/25/2024 0339    K 3.7 06/02/2016 0710    CO2 20 (L) 09/26/2024 0123    CO2 21 (L) 09/25/2024 0339    CO2 20 (L) 06/02/2016 0710    BUN 49.0 (H) 09/26/2024 0123    BUN 60.5 (H) 09/25/2024 0339    BUN 29 (H) 11/11/2016 0855    BUN 32 (H) 06/02/2016 0710    CREATININE 3.39 (H) 09/26/2024 0123    CREATININE 3.81 (H) 09/25/2024 0339    CREATININE 3.9 (H) 11/11/2016 0855    CREATININE 3.8 (H) 06/02/2016 0710    CALCIUM 9.7 09/26/2024 0123    CALCIUM 9.3 09/25/2024 0339    CALCIUM 9.3 06/02/2016 0710    PHOS 6.9 (H) 09/26/2024 0123    PHOS 2.9 06/02/2016 0710            Component Value Date/Time    WBC 19.14 (H) 09/26/2024 0123    WBC 19.14 09/26/2024 0123    WBC 20.46 (H) 09/25/2024 0339    WBC 20.46 09/25/2024 0339    WBC 6.53 06/02/2016 0710    HGB 8.8 (L) 09/26/2024 0123    HGB 8.7 (L) 09/25/2024 0339    HGB 13.6 (L) 06/02/2016 0710    HCT 24.8 (L) 09/26/2024 0123    HCT 24.2 (L) 09/25/2024 0339    HCT 40.1 06/02/2016 0710     09/26/2024 0123     09/25/2024 0339     06/02/2016 0710             Assessment/Plan:    ESRD on HD -now on dialysis in the hospital on TTS schedule  Hyperkalemia , and metabolic acidosis.  Improving  CHF - 15-20% EF on echo 9/10   NSTEMI - cardiology recommending cath at some point  COPD " on 2L home O2--respiratory status is improving but still less than baseline   Prostate cancer s/p radiation  Anemia of chronic kidney disease      Recommendations:  Seen on hemodialysis at 9:30 a.m..  2 L UF as tolerated.  Plan to continue TTS hemodialysis.  Clinically patient continues to do poorly.  He is on very high ventilator settings and requiring 2 vasopressors.  Overall prognosis is very poor.

## 2024-09-26 NOTE — NURSING
Nurses Note -- 4 Eyes      9/26/2024   0700      Skin assessed during: Q Shift Change      [] No Altered Skin Integrity Present    [x]Prevention Measures Documented      [x] Yes- Altered Skin Integrity Present or Discovered   [] LDA Added if Not in Epic (Describe Wound)   [] New Altered Skin Integrity was Present on Admit and Documented in LDA   [] Wound Image Taken    Wound Care Consulted? Yes    Attending Nurse:  DOYLE Hirsch    Second RN/Staff Member:  DOYLE Fitch

## 2024-09-26 NOTE — NURSING
09/26/24 1151   Post-Hemodialysis Assessment   Rinseback Volume (mL) 250 mL   Blood Volume Processed (Liters) 64 L   Dialyzer Clearance Moderately streaked   Duration of Treatment 180 minutes   Total UF (mL) 2000 mL   Net Fluid Removal 1500

## 2024-09-27 NOTE — PLAN OF CARE
Problem: Hemodialysis  Goal: Safe, Effective Therapy Delivery  Outcome: Not Progressing  Goal: Effective Tissue Perfusion  Outcome: Not Progressing  Goal: Absence of Infection Signs and Symptoms  Outcome: Not Progressing     Problem: Adult Inpatient Plan of Care  Goal: Plan of Care Review  Outcome: Not Progressing  Goal: Patient-Specific Goal (Individualized)  Outcome: Not Progressing  Goal: Optimal Comfort and Wellbeing  Outcome: Not Progressing  Goal: Readiness for Transition of Care  Outcome: Not Progressing     Problem: Skin Injury Risk Increased  Goal: Skin Health and Integrity  Outcome: Not Progressing     Problem: Wound  Goal: Optimal Coping  Outcome: Not Progressing  Goal: Optimal Functional Ability  Outcome: Not Progressing

## 2024-09-27 NOTE — PROGRESS NOTES
Nephrology Progress Note      HPI:    Prem Saldana is a 49 y.o. male from [place], with pmhx of ESRD TTS via L AVF, CHF, EF <20%, COPD, hx of prostate cancer, presenting to the ED today with respiratory failure requiring BiPAP.      History is very limited due to his condition but he was able to answer a few questions on bipap. He states that he last dialyzed in [place], [place] Saturday 9/7. He tells me he has a 6am chair time TTS set up at the dialysis unit in [place]? Will have to call the unit to verify.      CTA chest revealing cardiomegaly with trace left effusion, atelectasis and mild interstitial edema. CXR with congestion. He has 1+ edema to BLE. His electrolytes are normal., BNP is >14,000 on admission, troponin 0.55 and he states that his chest hurts because he fell and had trauma to the chest 2 days ago. Bedside echo pending in the ED. Cardiology following and trending troponins until peak. ED physician paged nephrology for HD today.     Interval History:    9/11/24  Tolerated HD yesterday with 3L off and significant improvement in respiratory status, now on 4L NC. Vitals are stable. Labs reviewed, all relatively stable but bicarb is now 20. Serial troponins remain high. He continues to complain of chest pain from falling this weekend. Chest xr and CTA are without traumatic changes. He is now able to tell me that he was in [place] because his mom lives there, but he is moving to [place] and has a chair time at [facility] (formerly [facility]3].   He is usually on 2L O2 support for COPD and is currently requiring 4L.     09/12/2024   No acute changes overnight.  Potassium 6 on a.m. labs.  He is scheduled for his routine dialysis run today which will be done after left heart catheterization.  Voices no new complaints.    09/13/2024   Tolerated 3 L UF with HD yesterday.  Potassium remains elevated at 5.6 on a.m. labs.  Hemoglobin stable.  Shortness of breath improved following HD. no chest pain at present.    09/16/2024   Awake  alert and oriented.  Looks little dyspneic.  Complains of pain and asking for lot of pain medicines.  Not sure about the oral fluid intake or nutrition intake.    09/17/2024  No acute events overnight.  No new complaints.  No chest pain, shortness of breath, abdominal pain, nausea, vomiting, or lower extremity edema.  He remains on vasopressors.    09/18/2024   Overnight events noted and now patient is intubated and on the ventilator and sedated with a propofol and on high dose of Levophed also.  Also on amiodarone.    09/19/2024  Patient dialyzed off schedule yesterday due to hyperkalemia.  About to start dialysis again this morning on regular TTS schedule.  Remains intubated and on ventilator.  Still requiring Levophed for hypotension.    09/20/2024  Patient continues to have hypotension overnight, and Levophed had to be added.  He did dialyze yesterday, however, was only able to get 100 mL UF due to hypotension.  Remains intubated.    09/23/2024   Weekend events noted.  He did dialyze on Saturday.  Overall condition is unchanged.  Remains on the pressors.  Also on sedation.  Also intubated.    09/24/2024   Currently tolerating dialysis.  Patient is still sedated and on the ventilator.  Also on pressors.  Overall condition has not changed.  Nurses reported that NG tube was placed to suction and 1300 cc fluid came out.  So the tube feeding is on hold at present time.    09/25/2024   Patient had dialysis done yesterday.  2 L fluid was removed.  He did receive some albumin.  His pressor need has decreased.  Dr. Rodrigues thinks it maybe good to try some more fluid removal if tolerated by him and then maybe his pressors can be decreased.  Remains on the ventilator.  Remains on propofol and fentanyl for sedation and Levophed and vasopressin for hypotension.    09/26/2024.  Patient dialyzed yesterday off schedule 2 L UF.  Seen on hemodialysis again this morning.  Remains on vasopressin Levophed.  Ventilator setting in his  "continue worsen    09/27/2024   Patient has been dialyze for few days in a row with some fluid removal but patient's overall condition is getting from bed to worse.  Remains on Levophed and vasopressin and on the ventilator.  No urine output.    Vital Signs:  BP 91/67   Pulse 87   Temp 99.7 °F (37.6 °C) (Oral)   Resp 20   Ht 5' 11.26" (1.81 m)   Wt 61.9 kg (136 lb 7.4 oz)   SpO2 (!) 88%   BMI 18.89 kg/m²   Body mass index is 18.89 kg/m².    Physical Exam:    GEN:  Ill-appearing AA male,  HEENT ET tube in place  CV:  Irregular rate and rhythm  PULM:  Bilateral rhonchi,   ABD: Soft, NT/ND abdomen with NABS  EXT: No cyanosis or edema  SKIN: Warm and dry  PSYCH:  Sedated on the ventilator  Dialysis access:  LUE AV fistula      Labs:      Component Value Date/Time     (L) 09/27/2024 0414     (L) 09/27/2024 0014     06/02/2016 0710    K 5.9 (H) 09/27/2024 0414    K 5.1 09/27/2024 0014    K 3.7 06/02/2016 0710    CO2 11 (L) 09/27/2024 0414    CO2 11 (L) 09/27/2024 0014    CO2 20 (L) 06/02/2016 0710    BUN 53.2 (H) 09/27/2024 0414    BUN 50.5 (H) 09/27/2024 0014    BUN 29 (H) 11/11/2016 0855    BUN 32 (H) 06/02/2016 0710    CREATININE 3.36 (H) 09/27/2024 0414    CREATININE 3.38 (H) 09/27/2024 0014    CREATININE 3.9 (H) 11/11/2016 0855    CREATININE 3.8 (H) 06/02/2016 0710    CALCIUM 9.1 09/27/2024 0414    CALCIUM 8.5 09/27/2024 0014    CALCIUM 9.3 06/02/2016 0710    PHOS 9.6 (HH) 09/27/2024 0414    PHOS 2.9 06/02/2016 0710            Component Value Date/Time    WBC 27.04 (H) 09/27/2024 0414    WBC 22.19 (H) 09/27/2024 0014    WBC 22.19 09/27/2024 0014    WBC 19.14 09/26/2024 0123    WBC 6.53 06/02/2016 0710    HGB 9.3 (L) 09/27/2024 0414    HGB 9.1 (L) 09/27/2024 0014    HGB 13.6 (L) 06/02/2016 0710    HCT 29.4 (L) 09/27/2024 0414    HCT 28.1 (L) 09/27/2024 0014    HCT 40.1 06/02/2016 0710     09/27/2024 0414     09/27/2024 0014     06/02/2016 0710 "             Assessment/Plan:    ESRD on HD -now on dialysis in the hospital on TTS schedule  Hyperkalemia , and metabolic acidosis, worsening.  Cardiogenic shock.  Acute liver failure secondary to cardiogenic shock now  NSTEMI - cardiology recommending cath at some point  COPD on 2L home O2--respiratory status is improving but still less than baseline   Prostate cancer s/p radiation  Anemia of chronic kidney disease      Recommendations:  Sodium bicarb 100 mEq IV push x1.  Then bicarb drip.  We will not give any dialysis today and assess him tomorrow prior to considering any dialysis therapy tomorrow.  Extremely poor condition of the patient.

## 2024-09-27 NOTE — PROGRESS NOTES
Patient Name: Prem Saldana   MRN: [id number]   Admission Date: 9/10/2024   Hospital Length of Stay: 17   Attending Provider: Sin Gonsalez Jr., MD,*   Consulting Provider: Lencho Xie MD    Primary Care Physician:  Tank Saenz MD     Principal Problem: <principal problem not specified>       Final diagnoses:  [R06.02] Shortness of breath  [I50.9] Congestive heart failure, unspecified HF chronicity, unspecified heart failure type (Primary)  [R79.89] Elevated troponin  [N18.6, Z99.2] ESRD on dialysis  [R07.9] Chest pain, unspecified type      Goals of care review:    I was able to contact the patient's son, Jimi, by phone.  Current clinical status I explained to him that the patient is quite critical and is in multiorgan failure. I reviewed further with the patient's son the fact that this patient is declining over the last few days now with rising white blood cell count, rising liver function studies probably all secondary to underlying shock and multiorgan failure. I explained that he requires 2 vasopressors to manage cardiogenic and septic shock.  I explained that at baseline the patient had end-stage renal disease on dialysis and severe systolic heart failure with a AICD in place.  Despite his young age, he is at high risk of death.  I reviewed code status.    Code Status:  I reviewed the risks and benefits of aggressive cardiopulmonary resuscitative measures taken in the event of a cardiopulmonary arrest event. I discussed the high risk of non-survival from such an event. I also discussed the risk of increased morbidity and a potential decline in quality of life with survival from such an event. The patient's son stated, I can not be talking about letting him go he has been through things like this before many times.  I also know that he would not want to give up.   He  currently elects a full code status.    Spoke to patient's mother today over phone, unable to reach son. Informed  her about patient's current health status and current care. Explained to mother that patient currently in multi-organ failure with heart, liver and kidney failure. Explained that patient unlikely to make significant recovery from his current condition. Discussed current health care interventions and explained that the aggressive measures may not benefit patient, and only continue to make him uncomfortable. Explained that patient is on dialysis daily, ventilator, and pressors, and that these are aggressive measures that are present due to patient's critical condition. In addition, informed mother that patient may be more comfortable if these measures are withdrawn, and if patient is made DNR in order to prevent further suffering of patient. Mother voiced understanding and that she would attempt to convey this info to the patient's son. Remain unable to contact son at this time.    Symptom review:    Unable to obtain as the patient is sedated and intubated.    Assessment/Plan:     Goals of care/counseling  Cardiogenic/septic shock  Hypoxic respiratory failure on mechanical ventilator  Acute on chronic systolic congestive heart  End-stage renal disease on hemodialysis    I will follow up to provide education and support with family regarding goals of care  Consider multiple physician decision for code status, if remain unable to contact son    Interval History:     See above      Active Ambulatory Problems     Diagnosis Date Noted    ESRD on dialysis since 4/3/15     Essential hypertension     Anemia of renal disease     Secondary hyperparathyroidism of renal origin     Organ transplant candidate 06/02/2016    Pre-transplant evaluation for chronic kidney disease 06/02/2016    Prostate cancer 12/08/2016    Cancer of prostate with high recurrence risk (stage T3a or Lee 8-10 or PSA > 20) 08/18/2017    Moderate malnutrition 09/18/2024     Resolved Ambulatory Problems     Diagnosis Date Noted    No Resolved Ambulatory  Problems     No Additional Past Medical History        Past Surgical History:   Procedure Laterality Date    ANGIOGRAM, CORONARY, WITH LEFT HEART CATHETERIZATION N/A 9/13/2024    Procedure: Angiogram, Coronary, with Left Heart Cath;  Surgeon: Tank Scott Jr., MD;  Location: [facility] CATH LAB;  Service: Cardiology;  Laterality: N/A;    AV FISTULA PLACEMENT Left 07/2015    CENTRAL LINE  9/17/2024    Peritoneal Dialysis Catheter Placement  01/2016    Permcath Placement           Review of patient's allergies indicates:   Allergen Reactions    Hydralazine Palpitations and Other (See Comments)     Other Reaction(s): feels like he is running    Palpitations    Amlodipine     Levofloxacin           Current Facility-Administered Medications:     acetaminophen tablet 650 mg, 650 mg, Oral, Q8H PRN, Mary Carter PA-C, 650 mg at 09/19/24 2011    acetaminophen tablet 650 mg, 650 mg, Oral, Q4H PRN, Mary Carter PA-C, 650 mg at 09/15/24 1255    [COMPLETED] amiodarone tablet 400 mg, 400 mg, Oral, BID, 400 mg at 09/26/24 0811 **FOLLOWED BY** amiodarone tablet 200 mg, 200 mg, Oral, BID **FOLLOWED BY** [START ON 9/30/2024] amiodarone tablet 200 mg, 200 mg, Oral, Daily, Mian Carter ANP    aspirin chewable tablet 81 mg, 81 mg, Per NG tube, Daily, Sin Gonsalez Jr., MD, FCCP, 81 mg at 09/26/24 0811    busPIRone tablet 5 mg, 5 mg, Oral, BID, Jeanna Mcmullen, AGACNP-BC, 5 mg at 09/26/24 0811    calcitRIOL capsule 0.25 mcg, 0.25 mcg, Oral, BID, Holly Dudley MD, 0.25 mcg at 09/26/24 0811    calcium carbonate 200 mg calcium (500 mg) chewable tablet 500 mg, 500 mg, Oral, TID WM, Aristeo Rodrigues MD, 500 mg at 09/26/24 1300    [COMPLETED] calcium gluconate 1 g in NS IVPB (premixed), 1 g, Intravenous, Once, Stopped at 09/26/24 2221 **AND** calcium gluconate 1 g in NS IVPB (premixed), 1 g, Intravenous, Q10 Min PRN, Gadiel Estevez MD    dextrose 10 % infusion, , Intravenous, Continuous, Adonay Paez MD     dextrose 10% bolus 125 mL 125 mL, 12.5 g, Intravenous, PRN, Mary Carter, PA-C, Stopped at 24 1456    dextrose 10% bolus 250 mL 250 mL, 25 g, Intravenous, PRN, Mary Carter, PA-C, Stopped at 24 1155    [COMPLETED] dextrose 10% bolus 500 mL 500 mL, 50 g, Intravenous, Once, Stopped at 24 2249 **AND** dextrose 10% bolus 250 mL 250 mL, 25 g, Intravenous, PRN, Stopped at 24 0437 **AND** [COMPLETED] insulin regular injection 6.19 Units 0.0619 mL, 0.1 Units/kg, Intravenous, Once, Gadiel Estevez MD, 6.19 Units at 24 2224    enoxaparin injection 30 mg, 30 mg, Subcutaneous, Daily, Mary Carter PA-C, 30 mg at 24 1646    etomidate injection, , Intravenous, Code/trauma/sedation Med, Rodolfo Gutierrez MD, 20 mg at 24 2104    fentaNYL 2500 mcg in 0.9% sodium chloride 250 mL infusion premix, 0-250 mcg/hr, Intravenous, Continuous, Phani Kinsey MD, Last Rate: 10 mL/hr at 24 1209, 100 mcg/hr at 24 1209    [] fentaNYL injection 50 mcg, 50 mcg, Intravenous, Q15 Min PRN **FOLLOWED BY** fentaNYL injection 50 mcg, 50 mcg, Intravenous, Q1H PRN, Chun Giordano MD, 50 mcg at 24 1900    glucagon (human recombinant) injection 1 mg, 1 mg, Intramuscular, PRN, Mary Carter, PA-C    glucose chewable tablet 16 g, 16 g, Oral, PRN, Mary Carter, PA-C    glucose chewable tablet 24 g, 24 g, Oral, PRN, Mary Carter, PA-C    HYDROcodone-acetaminophen 7.5-325 mg per tablet 1 tablet, 1 tablet, Oral, Q4H PRN, Inocencio Mcmillan MD, 1 tablet at 24 0735    metoprolol injection 5 mg, 5 mg, Intravenous, Q5 Min PRN, Viviane Mejía, CATRINA, 5 mg at 24 1716    NORepinephrine 32 mg in D5W 250 mL infusion, 0-3 mcg/kg/min, Intravenous, Continuous, Aristeo Rodrigues MD, Last Rate: 24.4 mL/hr at 24 1209, 0.84 mcg/kg/min at 24 1209    octreotide (SANDOSTATIN) 500 mcg in 0.9% NaCl 100 mL infusion, 50 mcg/hr, Intravenous, Continuous, Briana Berry  D, FNP, Last Rate: 10 mL/hr at 09/27/24 1209, 50 mcg/hr at 09/27/24 1209    ondansetron injection 4 mg, 4 mg, Intravenous, Q4H PRN, Mary Carter PA-C, 4 mg at 09/12/24 0254    pantoprazole (PROTONIX) 40 mg in 0.9% NaCl 100 mL IVPB (MB+), 8 mg/hr, Intravenous, Continuous, Briana Berry, ROSE MARYP, Last Rate: 20 mL/hr at 09/27/24 1209, 8 mg/hr at 09/27/24 1209    phytonadione vitamin k (AQUA-MEPHYTON) 10 mg in D5W 50 mL IVPB, 10 mg, Intravenous, Once, Briana Berry, FNP    polyethylene glycol packet 17 g, 17 g, Oral, BID, Aristeo Rodrigues MD, 17 g at 09/20/24 0814    propofol (DIPRIVAN) 10 mg/mL infusion, 0-50 mcg/kg/min, Intravenous, Continuous, Chun Giordano MD, Stopped at 09/26/24 2055    rocuronium injection, , Intravenous, Code/trauma/sedation Med, Rodolfo Gutierrez MD, 50 mg at 09/17/24 2107    senna-docusate 8.6-50 mg per tablet 2 tablet, 2 tablet, Oral, BID, Aristeo Rodrigues MD, 2 tablet at 09/20/24 0814    simethicone chewable tablet 80 mg, 1 tablet, Oral, TID PRN, Jeanna Mcmullen AGACNP-BC, 80 mg at 09/11/24 2315    sodium bicarbonate 150 mEq in dextrose 5 % 1000 mL infusion, , Intravenous, Continuous, Sinan Singh MD, Last Rate: 100 mL/hr at 09/27/24 1209, Rate Verify at 09/27/24 1209    sodium bicarbonate tablet 1,950 mg, 1,950 mg, Oral, TID, Sinan Singh MD, 1,950 mg at 09/26/24 1416    sodium chloride 0.9% flush 10 mL, 10 mL, Intravenous, Q12H PRN, Carter, Mary E., PA-C    sodium zirconium cyclosilicate packet 10 g, 10 g, Oral, Every Mon, Wed, Fri, Anisha Warren ANP, 10 g at 09/25/24 0820    Pharmacy to dose Vancomycin consult, , , Once **AND** vancomycin - pharmacy to dose, , Intravenous, pharmacy to manage frequency, Ephraim Yeh MD    vasopressin (PITRESSIN) 0.2 Units/mL in D5W 100 mL infusion, 0.04 Units/min, Intravenous, Continuous, Aristeo Rodrigues MD, Last Rate: 12 mL/hr at 09/27/24 1209, 0.04 Units/min at 09/27/24 1209       Current Facility-Administered Medications:      "acetaminophen, 650 mg, Oral, Q8H PRN    acetaminophen, 650 mg, Oral, Q4H PRN    [COMPLETED] calcium gluconate IVPB, 1 g, Intravenous, Once **AND** calcium gluconate IVPB, 1 g, Intravenous, Q10 Min PRN    dextrose 10%, 12.5 g, Intravenous, PRN    dextrose 10%, 25 g, Intravenous, PRN    [COMPLETED] dextrose 10%, 50 g, Intravenous, Once **AND** dextrose 10%, 25 g, Intravenous, PRN **AND** [COMPLETED] insulin regular, 0.1 Units/kg, Intravenous, Once    etomidate, , Intravenous, Code/trauma/sedation Med    [] fentaNYL, 50 mcg, Intravenous, Q15 Min PRN **FOLLOWED BY** fentaNYL, 50 mcg, Intravenous, Q1H PRN    glucagon (human recombinant), 1 mg, Intramuscular, PRN    glucose, 16 g, Oral, PRN    glucose, 24 g, Oral, PRN    HYDROcodone-acetaminophen, 1 tablet, Oral, Q4H PRN    metoprolol, 5 mg, Intravenous, Q5 Min PRN    ondansetron, 4 mg, Intravenous, Q4H PRN    rocuronium, , Intravenous, Code/trauma/sedation Med    simethicone, 1 tablet, Oral, TID PRN    sodium chloride 0.9%, 10 mL, Intravenous, Q12H PRN    Pharmacy to dose Vancomycin consult, , , Once **AND** vancomycin - pharmacy to dose, , Intravenous, pharmacy to manage frequency     Family History   Problem Relation Name Age of Onset    Diabetes Mother      Cancer Maternal Grandmother      Hypertension Maternal Grandfather      Heart attack Maternal Grandfather          MI in his 60s or 70s    Kidney disease Neg Hx            Review of Systems   Unable to perform ROS: Intubated            Objective:   /81   Pulse 87   Temp 99.7 °F (37.6 °C) (Oral)   Resp (!) 30   Ht 5' 11.26" (1.81 m)   Wt 61.9 kg (136 lb 7.4 oz)   SpO2 (!) 88%   BMI 18.89 kg/m²      Physical Exam   Constitutional: No distress.   Intubated and sedated He appears ill.   Eyes: Pupils are equal, round, and reactive to light.   Cardiovascular: Regular rhythm, normal heart sounds and normal pulses. Tachycardia present. Pulmonary:      Breath sounds: No wheezing.      Comments: " Mechanical breath sounds present bilaterally    Abdominal: Soft. He exhibits no distension. There is no abdominal tenderness.   Musculoskeletal:      Right lower leg: No edema.      Left lower leg: No edema.   Neurological:   Intubated and sedated   Skin: Skin is warm and dry.          Review of Symptoms  Review of Symptoms      Symptom Assessment (ESAS 0-10 Scale)  Pain:  0  Dyspnea:  0  Anxiety:  0  Nausea:  0  Depression:  0  Anorexia:  0  Fatigue:  0  Insomnia:  0  Restlessness:  0  Agitation:  0  Unable to complete assessment due to Intubated         Pain Assessment in Advanced Demential Scale (PAINAD)   Breathing - Independent of vocalization:  0  Negative vocalization:  0  Facial expression:  0  Body language:  0  Consolability:  0  Total:  0    Psychosocial/Cultural:   See Palliative Psychosocial Note: No  **Primary  to Follow**  Palliative Care  Consult: No      Advance Care Planning   Advance Directives:   Do Not Resuscitate Status: No      Decision Making:  Family answered questions  Goals of Care: The family endorses that what is most important right now is to focus on continuing current measures    Accordingly, we have decided that the best plan to meet the patient's goals includes continuing with treatment        > 50% of 36 min of encounter was spent in chart review, face to face discussion of goals of care, symptom assessment, coordination of care and emotional support.     Yumiko Albright MD  Palliative Medicine PGY-1  [facility] - Observation Unit

## 2024-09-27 NOTE — CONSULTS
[facility] [address] ICU  Wound Care    Patient Name:  Prem Saldana   MRN:  [id number]  Date: 9/27/2024  Diagnosis: <principal problem not specified>    History:     Past Medical History:   Diagnosis Date    Anemia of renal disease     ESRD on dialysis since 4/3/15     Essential hypertension     Secondary hyperparathyroidism of renal origin        Social History     Socioeconomic History    Marital status: Single   Tobacco Use    Smoking status: Former     Current packs/day: 1.00     Average packs/day: 1 pack/day for 15.0 years (15.0 ttl pk-yrs)     Types: Cigarettes    Smokeless tobacco: Never    Tobacco comments:     currently smokes 2-3 cigs/week; has been smoking for >20 years; at the most smoked 1 ppd   Substance and Sexual Activity    Alcohol use: No     Comment: previously social drinker    Drug use: No     Comment: remote use of marijuana >20 years ago    Sexual activity: Never   Social History Narrative    Lives with fiance and two dogs     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)     Social Determinants of Health     Financial Resource Strain: Low Risk  (9/11/2024)    Overall Financial Resource Strain (CARDIA)     Difficulty of Paying Living Expenses: Not very hard   Food Insecurity: No Food Insecurity (9/11/2024)    Hunger Vital Sign     Worried About Running Out of Food in the Last Year: Never true     Ran Out of Food in the Last Year: Never true   Transportation Needs: No Transportation Needs (9/11/2024)    TRANSPORTATION NEEDS     Transportation : No   Physical Activity: Sufficiently Active (9/11/2024)    Exercise Vital Sign     Days of Exercise per Week: 5 days     Minutes of Exercise per Session: 30 min   Stress: No Stress Concern Present (9/11/2024)    [employer] - Occupational Stress Questionnaire     Feeling of Stress : Only a little   Housing Stability: Low Risk  (9/11/2024)    Housing Stability  Vital Sign     Unable to Pay for Housing in the Last Year: No     Homeless in the Last Year: No       Precautions:     Allergies as of 09/10/2024 - Reviewed 09/10/2024   Allergen Reaction Noted    Hydralazine Palpitations and Other (See Comments) 11/25/2020    Amlodipine  09/24/2023    Levofloxacin  09/24/2023       [facility] Assessment Details/Treatment        09/26/24 1306   WO Assessment   Visit Date 09/26/24   Visit Time 1306   Consult Type New;Follow Up   [facility] Wound   Intervention chart review;assessed;changed;orders   Teaching on-going        Wound 09/17/24 0701 Blister(s) Left anterior;posterior Calf #1   Date First Assessed/Time First Assessed: 09/17/24 0701   Present on Original Admission: Yes  Primary Wound Type: Blister(s)  Side: Left  Orientation: anterior;posterior  Location: Calf  Wound Number: #1  Is this injury device related?: No   Wound Image      Dressing Appearance Moist drainage   Drainage Amount Moderate   Drainage Characteristics/Odor Serous   Appearance Maroon;Purple;Yellow;Pink;Red;Slough;Adipose   Black (%), Wound Tissue Color 20 %   Red (%), Wound Tissue Color 50 %   Yellow (%), Wound Tissue Color 30 %   Periwound Area Macerated;Moist   Wound Edges Undefined   Care Cleansed with:;Wound cleanser   Dressing Changed;Non-adherent;Absorptive Pad;Rolled gauze        Wound 09/26/24 0500 Pressure Injury Sacral spine #1   Date First Assessed/Time First Assessed: 09/26/24 0500   Present on Original Admission: No  Primary Wound Type: Pressure Injury  Location: Sacral spine  Wound Number: #1  Is this injury device related?: No   Wound Image    Pressure Injury Stage DTPI   Dressing Appearance Dry;Intact;Clean   Drainage Amount None   Drainage Characteristics/Odor No odor   Appearance Maroon;Purple;Dry   Tissue loss description Not applicable   Periwound Area Intact;Maroon;Purple   Care Cleansed with:;Soap and water   Dressing Foam;Reinforced        Wound 09/26/24 0500 Pressure Injury Thoracic  spine   Date First Assessed/Time First Assessed: 09/26/24 0500   Present on Original Admission: No  Primary Wound Type: Pressure Injury  Location: Thoracic spine   Wound Image   (unable to visualize due to pt. desaturation)     WOCN consulted for sacrum/tspine. Discussed POC w/ nurse Joycelyn who assisted in turning pt. At bedside. Pt. Intubated and sedated. We were not able to keep pt. Turned very long due to pt. Desaturation. Continue tx recommendations as previously ordered for LLE. Pt.'s leg does not look better, spoke to nurse about leg, surgery team stated it is a vascular issue. Sacrum: Clean w/ soap/water, dry well, apply large bordered foam. BID/PRN if soilage. Unable to look at pt.'s tspine due to desaturation with turning.     Tried to follow up on 9/27/24. Pt. Not doing well and still not stable enough to turn. Spoke to nurse today about talking to vascular in regards to pt.'s leg.     09/27/2024

## 2024-09-27 NOTE — PROGRESS NOTES
[facility]    Cardiology  Progress Note    Patient Name: Prem Saldana  MRN: [id number]  Admission Date: 9/10/2024  Hospital Length of Stay: 17 days  Code Status: Full Code   Attending Physician: Sni Gonsalez Jr., MD,*   Primary Care Physician: Tank Saenz MD  Expected Discharge Date:   Principal Problem:<principal problem not specified>    Subjective:   Brief HPI/Hospital Course:   Mr. Saldana is a 50 y/o male with a history CHF, HTN, ESRD on HD, who is unknown to [facility]. He presented to ER on 9.10.24 with complaints of shortness of breath. He reports he has a fall on 9.9.24 in which he hit his right side. He reports SOB started during the night. EMS reported he was tachypneic (breathing 55/min). He was given FD ASA and Sl Nitro x1. Significant labs include H&H 11.7/36.8, BUN/Creat 70.3/8.07, ALP 1061, Albumin 3.2, BNP 14.761, Troponin 0.554. CTA Chest negative for PE, but cardiomegaly with a trace left effusion, bibasilar atelectasis, mild interstitial edema and Chronic appearing compression deformities in the midthoracic spine. CXR shows poor inspiratory effort accentuates the pulmonary vascular markings, cardiomegaly, increased left retrocardiac density and silhouetting of the left hemidiaphragm, and calcified densities below the left hemidiaphragm. EKG shows sinus tachycardia with Left axis deviation, and LVH. [facility] has been consulted for NSTEMI management.       Hospital Course:  9.11.24: NAD. VSS. No complaints of Palps/SOB. ST on telemetry. Reports chest pain from desk falling on him. Reports back pain. H&H 10.3/32.7, Creat 6.10   9.12.24: NAD. VSS. Reports Chest Pain No complaints of SOB/Palps. H&H 11.4/35.6, K 6.1, Creat 8.02. In bed lying flat.   9.13.24: NAD. VSS. ST on telemetry. Denies SOB/Palps. H&H 12.7/39.8, K 5.6, BUN/Creat 44.1/6.46,   9.14.24: LHC cancelled due to patient uncooperative.   He is being treated for hyperkalemia. Labs pending. Currently denies  "pain  9.15.24: NAD. VSS.   9.16.24: NAD Noted. Sinus Tach on Tele. On Levophed support. Primary complaint noted to be leg pain. Legs are warm dry, dressing on LLE. Respiratory status stable. Had Runs of NSVT Yesterday afternoon, seemed to have resolved.  9.17.24: NAD. "I am good." No Further Runs of NSVT. ST. Denies CP, SOB and Palps. Levophed 0.4mcg/kg/min  9.18.24: NAD. Vented/Sedated. No Ectopy. Amiodarone 0.5mg/min. Levophed 0.07mcg/kg/min.   9.19.24: NAD. Vented/Sedated. Amiodarone 0.5mg/min, Levophed 0.5mcg/kg/min  9.20.24: NAD. Vented/Sedated. Amiodarone 0.5mg/min, Levophed 0.46mcg/kg/min, Vasopressin 0.04unit/min.   9.21.24: NAD. Vented/Sedated. Vasopressin 0.04units/min, Levophed 0.44mcg/kg/min, Amiodarone 0.4mg/min. WBC 29.78  9.22.24: NAD. Vented/Sedated. Amiodarone 0.5mg/min, Levophed 0.38mcg/kg/min, Vasopressin 0.04units/min.   9.23.24: NAD. Vented/Sedated. Amiodarone 0.5mg/min, Levophed 0.8mcg/kg/min, Vasopressin 0.4Units/min   9.24.24: NAD. Vented/Sedated. Levophed 0.68mcg/kg/min, Vasopressin 0.04Units/min. Undergoing HD during Visit.   9.27.24: Reconsulted for CMO, Lactic Acidosis (13.6). Levophed 0.84mcg/kg/min, Sandostatin 50mcg/HR, Protonix 8mg/HR, Vasopressin 0.04Units/min    PMH: CHF, HTN, ESRD on HD, Anemia, Secondary Hyperparathyroidism   PSH: AV Fistula   Family History: Maternal Grandmother - Cancer, Heart Disease, MI; Mother - DM II  Social History: Current Smoker (2-3 Cigarette per Day). Reports occasionally alcohol. Reports past use of Marijuana.      Previous Cardiac Diagnostics:   ECHO (9.27.24):  Left Ventricle: The left ventricle is dilated. Normal wall thickness. Severe global hypokinesis and regional wall motion abnormalities present. There is severely reduced systolic function with a visually estimated ejection fraction of 10 -15%.  Right Ventricle: Right ventricular enlargement. Systolic function is severely reduced. TAPSE is 0.81 cm.  Left Atrium: Left atrium is dilated.  Right " Atrium: Right atrium is dilated.  Aortic Valve: There is moderate aortic valve sclerosis. There is mild to moderate aortic regurgitation.  Mitral Valve: Moderately calcified leaflets. There is mitral annular calcification present. There is no stenosis. The mean pressure gradient across the mitral valve is 1 mmHg at a heart rate of 106 bpm. There is mild regurgitation.  Tricuspid Valve: There is mild to moderate regurgitation.  Pulmonic Valve: There is moderate regurgitation.  IVC/SVC: Patient is ventilated, cannot use IVC diameter to estimate right atrial pressure.    ECHO (9.22.24):  Left Ventricle: The left ventricle is dilated. Moderately increased wall thickness. Severe global hypokinesis present. There is severely reduced systolic function with a visually estimated ejection fraction of 15 - 20%.  Right Ventricle: Right ventricular enlargement. Systolic function is reduced.TAPSE is 0.62 cm.  Left Atrium: Left atrium is dilated.  Right Atrium: Right atrium is dilated.  Aortic Valve: The aortic valve is a trileaflet valve. There is aortic valve sclerosis. There is mild aortic regurgitation.  Mitral Valve: Severely thickened leaflets. There is mitral annular calcification present. There is mild regurgitation.  Tricuspid Valve: Mildly thickened leaflets. There is moderate  regurgitation.  Tricuspid Valve: There is moderate to severe regurgitation with an anteromedial eccentrically directed jet.  Pulmonic Valve: There is mild regurgitation.  Pulmonary Artery: There is pulmonary hypertension.  IVC/SVC: Patient is ventilated, cannot use IVC diameter to estimate right atrial pressure. IVC measuring 4.08 cm.  NO DEFINITE EVIDENCE OF VEGETATIONS IN THIS TRANSTHORACIC STUDY    ECHO (9.10.24):  Left Ventricle: The left ventricle is dilated. Increased wall thickness. There is concentric remodeling. Severe global hypokinesis present. There is severely reduced systolic function with a visually estimated ejection fraction of 15  - 20%. Unable to assess diastolic function due to atrial fibrillation. Elevated left ventricular filling pressure. Right Ventricle: Severe right ventricular enlargement. Systolic function is severely reduced.  Left Atrium: Left atrium is dilated. Right Atrium: Right atrium is dilated. Aortic Valve: The aortic valve is a trileaflet valve. Mildly calcified cusps. Mildly restricted motion. Aortic valve peak velocity is 1.85 m/s. Mean gradient is 8 mmHg. There is moderate aortic regurgitation with an eccentrically directed jet. Mitral Valve: Moderately thickened leaflets. There is moderate mitral annular calcification present. There is mild to moderate regurgitation. Tricuspid Valve: There is moderate regurgitation. The estimated PA systolic pressure is at least 34 mmHg. Pulmonic Valve: There is mild to moderate regurgitation. IVC/SVC: Elevated venous pressure at 15 mmHg. Pericardium: There is a small effusion. No indication of cardiac tamponade.    ECHO (2.19.24):  A complete two-dimensional transthoracic echocardiogram was performed (2D, M-mode, Doppler and color flow Doppler). The left ventricle is severely dilated.   Left ventricular systolic function is severely reduced. Estimated Ejection Fraction = <20%. The right ventricle is mild to moderately dilated. The left atrium is severely dilated. The right atrium is mild to moderately dilated. The mitral valve leaflets appear thickened, but open well. There is moderate to severe mitral annular calcification. There is mild mitral regurgitation. There is mild tricuspid insufficiency noted. Mild aortic regurgitation. Dialated IVC 3.8 cm. The lack of respiratory variation in the inferior vena cava diameter is noted. There is no pericardial effusion. Large left pleural effusion.There is moderate concentric left ventricular hypertrophy.      SPECT (2.19.18):  The perfusion scan is free of evidence for myocardial ischemia or injury. Resting wall motion is physiologic. There  is resting LV dysfunction with a reduced ejection fraction of 40 %. The ventricular volumes are normal at rest and stress. The extracardiac distribution of radioactivity is normal.      Dobutamine Stress Test (7.27.16);  Moderate left atrial enlargement. Concentric hypertrophy. Normal left ventricular systolic function (EF 60-65%). Left ventricular diastolic dysfunction. Normal right ventricular systolic function . The estimated PA systolic pressure is greater than 26 mmHg.  Mild mitral regurgitation. Small pericardial effusion.      Review of Systems   Unable to perform ROS: Intubated     Objective:     Vital Signs (Most Recent):  Temp: 98.2 °F (36.8 °C) (09/27/24 1600)  Pulse: 86 (09/27/24 1615)  Resp: (!) 24 (09/27/24 1615)  BP: 96/73 (09/27/24 1615)  SpO2: 99 % (09/27/24 1615) Vital Signs (24h Range):  Temp:  [98.2 °F (36.8 °C)-101.4 °F (38.6 °C)] 98.2 °F (36.8 °C)  Pulse:  [] 86  Resp:  [9-42] 24  SpO2:  [67 %-100 %] 99 %  BP: ()/(60-91) 96/73  Arterial Line BP: ()/() 150/75   Weight: 61.9 kg (136 lb 7.4 oz)  Body mass index is 18.89 kg/m².  SpO2: 99 %       Intake/Output Summary (Last 24 hours) at 9/27/2024 1708  Last data filed at 9/27/2024 1630  Gross per 24 hour   Intake 4680.31 ml   Output 1300 ml   Net 3380.31 ml     Lines/Drains/Airways       Central Venous Catheter Line  Duration             Percutaneous Central Line - Triple Lumen  09/17/24 2030 Internal Jugular Left 9 days              Drain  Duration                  NG/OG Tube 09/23/24 1630 16 Fr. Center mouth 4 days              Airway  Duration                  Airway - Non-Surgical 09/17/24 2117 Endotracheal Tube 9 days              Arterial Line  Duration             Arterial Line 09/23/24 2100 Right Femoral 3 days              Peripheral Intravenous Line  Duration                  Hemodialysis AV Fistula 09/17/24 0701 Left forearm 10 days         Hemodialysis AV Fistula 09/17/24 0701 Left upper arm 10 days                   Significant Labs:   Chemistries:   Recent Labs   Lab 09/25/24  0339 09/25/24  0710 09/26/24  0123 09/26/24 2118 09/27/24  0014 09/27/24  0414 09/27/24  0824 09/27/24  1233   *  --  136 131* 134* 134* 131* 130*   K 4.0  --  4.1 6.9* 5.1 5.9* 5.7* 5.8*   CL 95*  --  95* 92* 91* 90* 88* 86*   CO2 21*  --  20* 10* 11* 11* 14* 13*   BUN 60.5*  --  49.0* 45.3* 50.5* 53.2* 52.3* 53.2*   CREATININE 3.81*  --  3.39* 3.21* 3.38* 3.36* 3.53* 3.68*   CALCIUM 9.3  --  9.7 9.4 8.5 9.1 8.4 8.2*   BILITOT 3.8*  --  6.1*  --   --  9.3*  --   --    ALKPHOS 384*  --  355*  --   --  466*  --   --    *  --  105*  --   --  1,258*  --   --    *  --  87*  --   --  >4,202*  --   --    GLUCOSE 88  --  96 9* 165* 39* 64* 96   MG 2.20  --  2.30  --   --  2.70*  --   --    PHOS 6.3*  --  6.9*  --   --  9.6*  --   --    TROPONINI  --  0.255*  --   --   --   --   --   --         CBC/Anemia Labs: Coags:    Recent Labs   Lab 09/26/24 2118 09/27/24  0014 09/27/24 0414   WBC 24.71* 22.19  22.19* 27.04  27.04*   HGB 9.7* 9.1* 9.3*   HCT 29.6* 28.1* 29.4*    178 141   MCV 88.6 89.5 89.9   RDW 25.8* 24.7* 25.4*    Recent Labs   Lab 09/27/24  0931   INR 3.4*          Telemetry: ST    Physical Exam  Vitals reviewed.   Constitutional:       General: He is not in acute distress.     Appearance: He is ill-appearing.      Comments: Vented/Sedated   HENT:      Head: Normocephalic.      Mouth/Throat:      Mouth: Mucous membranes are dry.   Cardiovascular:      Rate and Rhythm: Regular rhythm. Tachycardia present.   Pulmonary:      Effort: Pulmonary effort is normal. No respiratory distress.      Comments: Ventilator Associated Breath Sounds  Vent Mode: A/C  Oxygen Concentration (%):  [40-60] 50  Resp Rate Total:  [24 br/min-30 br/min] 30 br/min  Vt Set:  [460 mL] 460 mL  PEEP/CPAP:  [5 cmH20] 5 cmH20  Mean Airway Pressure:  [9 cmH20-10 cmH20] 10 cmH20  Abdominal:      Palpations: Abdomen is soft.   Musculoskeletal:          General: No swelling.      Comments: BLE Warm. Left AVF    Neurological:      Comments: Vented/Sedated       Current Schedule Inpatient Medications:   amiodarone  200 mg Oral BID    Followed by    [START ON 9/30/2024] amiodarone  200 mg Oral Daily    aspirin  81 mg Per NG tube Daily    busPIRone  5 mg Oral BID    calcitRIOL  0.25 mcg Oral BID    calcium carbonate  500 mg Oral TID WM    enoxparin  30 mg Subcutaneous Daily    polyethylene glycol  17 g Oral BID    senna-docusate 8.6-50 mg  2 tablet Oral BID    sodium bicarbonate  1,950 mg Oral TID    sodium zirconium cyclosilicate  10 g Oral Every Mon, Wed, Fri     Continuous Infusions:   D10W   Intravenous Continuous 50 mL/hr at 09/27/24 1630 Rate Verify at 09/27/24 1630    fentanyl  0-250 mcg/hr Intravenous Continuous 10 mL/hr at 09/27/24 1630 100 mcg/hr at 09/27/24 1630    NORepinephrine bitartrate-D5W  0-3 mcg/kg/min Intravenous Continuous 24.4 mL/hr at 09/27/24 1630 0.84 mcg/kg/min at 09/27/24 1630    octreotide (SANDOSTATIN) 500 mcg in 0.9% NaCl 100 mL infusion  50 mcg/hr Intravenous Continuous 10 mL/hr at 09/27/24 1630 50 mcg/hr at 09/27/24 1630    pantoprazole (PROTONIX) IV infusion  8 mg/hr Intravenous Continuous   Stopped at 09/27/24 1539    propofoL  0-50 mcg/kg/min Intravenous Continuous   Stopped at 09/26/24 2055    sodium bicarbonate 150 mEq   Intravenous Continuous 100 mL/hr at 09/27/24 1630 Rate Verify at 09/27/24 1630    vasopressin  0.04 Units/min Intravenous Continuous 12 mL/hr at 09/27/24 1630 0.04 Units/min at 09/27/24 1630       Assessment:   NSTEMI -  (Unspecified) in the Setting of Hypotensive Shock Requiring Vasopressor Support- Do not Suspect Cardiogenic Shock    - Troponin Trend Flat/No Overt Ischemia on EKG  Mixed Shock: Cardiogenic and Septic Shock Requiring Vasopressor Support     - History of Hypertension/Hypertensive Heart Disease  Acute on Chronic Systolic Heart Failure (Compensated)    - EF 15-20% on ECHO (9.10.24)  Fever/Leukocytosis    Acute GIB (Upper)    - Hematemesis/Coffee Ground via OGT  Anemia (Multifactorial)   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%    - Normal SPECT (2.19.18)    - LHC Aborted on 9.13.24 due to Ascending Aorta/root angle from RR Approach & Agitation preventing Femoral Approach  NSVT (Monomorphic)    - On Amiodarone Infusion  Acute Respiratory Failure requiring Intubation/Ventilation   VHD    - Moderate to Severe Mitral Annular Calcification    - Mild MR/TR/AR  Sinus Tachycardia - Persistent, but Improving  Transaminitis   ESRD/HD     - on HD TTS  COPD    - On Chronic Home Oxygen Support (2 L/Min)  Hyperkalemia (Stable)  History of Prostate Cancer    - Status Post Radiation  Lower Extremity Wound    - Febrile on  9.14.24  Anemia of Chronic Disease   Secondary Hyperparathyroidism   No known history of GI Bleed     Plan:   PTS Prognosis is GRIM and efforts to support the PT from a CV Standpoint would be futile and are limited given it would likely be terminal use of Mechanical Support. He is not a candidate for Long Term Mechanical Support via LVAD.     Wean Pressors for MAP > 65mmHg  Continue Oral Amiodarone Load   GIB Management to GI Services   Add GDMT as BP/HR Allows  Defer ACE/ARB/ARNI due to Hyperkalemia/Hypotension requiring Pressors   Fluid Management/HD per Nephrology Team  Palliative Care Consultation for Goals of Care - In Progress    PT Recently refusing Diagnostic Angiogram and Inotropes (Prior to being Intubated/Ventilated). Of note, C attempted on 9.13.24, however procedure was aborted from a right radial approach given angle of ascending aorta and aortic root. Femoral access not possible because of patient's agitation and respiratory status. Procedure was aborted.     Mian Carter, TORRIE  Cardiology  [facility]    I agree with the findings of the complexity of problems addressed and take responsibility for the plan's risks and complications. I approved the plan documented by Mian Carter NP.  Terminal  prognosis

## 2024-09-27 NOTE — NURSING
Spoke with son Reinaldo about patient current condition. Patient says he is aware and understands. He said that the patient told him that he did not ever want to be a DNR. He said that his other 2 kids mother is on the way to visit from [place]. We talked about what happens if the patient coded and he asked that we keep him alive until his kids mother gets to see him. He then said he would be calling me back later if he had any questions. Made him aware that multiple doctors have been calling him to talk about his condition and he says he spoke to someone last night and he would rather not go through that again.

## 2024-09-27 NOTE — CONSULTS
Consult Note    Reason for Consult:      We were consulted by Dr. Gonsalez to evaluate this patient for UGIB.     HPI:     This is a 49 year old AA male known to our group, lives in [place], w/pmhx of ESRD on Dialysis TTS, PD, HLD, prostate cancer status post radiation. EF 15-20%     He presented to the ER with 910 with complaints of chest pain and shortness of breath.  CIS attempted nondistended with heart catheterization with the procedure was canceled as patient became.  He was admitted to ICU for hypotension and V-tach, requiried intubation 9/17.  He was noted to have a temp denied 20 with blood culture showing staph epi.      Gi consulted for UGIB  Hgb 9.1--9.3- has remained relatively stable throughout admit.  Overnight, worsening hemodynamics elevated lactic acid 13.6, AST greater 40 100 ALC 1200, , K 5.1, Na 134.  He remains intubated mechanical ventilation, on fentanyl drip but no other sedation.  He is currently requiring 2 pressors and blood pressure 88/65.  He is not follow commands, nurse reports having regular bowel movements without any black or bloody stool.  Overnight he did note 1300 dark with BRBPR output in OG tube. Repeat echo pending.    No family at bedside. Has mom in [place] and son in [place].    PCP:  Tank Saenz MD    Review of patient's allergies indicates:   Allergen Reactions    Hydralazine Palpitations and Other (See Comments)     Other Reaction(s): feels like he is running    Palpitations    Amlodipine     Levofloxacin         Current Facility-Administered Medications   Medication Dose Route Frequency Provider Last Rate Last Admin    acetaminophen tablet 650 mg  650 mg Oral Q8H PRN Mary Carter PA-C   650 mg at 09/19/24 2011    acetaminophen tablet 650 mg  650 mg Oral Q4H PRN Mary Carter PA-C   650 mg at 09/15/24 1255    amiodarone tablet 200 mg  200 mg Oral BID Mian Carter ANP        Followed by    [START ON 9/30/2024] amiodarone tablet 200 mg   200 mg Oral Daily Mian Carter ANP        aspirin chewable tablet 81 mg  81 mg Per NG tube Daily Sin Gonsalez Jr., MD, FCCP   81 mg at 09/26/24 0811    busPIRone tablet 5 mg  5 mg Oral BID Jeanna Mcmullen AGACNP-BC   5 mg at 09/26/24 0811    calcitRIOL capsule 0.25 mcg  0.25 mcg Oral BID Holly Dudley MD   0.25 mcg at 09/26/24 0811    calcium carbonate 200 mg calcium (500 mg) chewable tablet 500 mg  500 mg Oral TID  Aristeo Rodrigues MD   500 mg at 09/26/24 1300    calcium gluconate 1 g in NS IVPB (premixed)  1 g Intravenous Q10 Min PRN Gadiel Estevez MD        dextrose 10 % infusion   Intravenous Continuous Gadiel Estevez  mL/hr at 09/27/24 0827 Rate Verify at 09/27/24 0827    dextrose 10% bolus 125 mL 125 mL  12.5 g Intravenous PRN Mary Carter PA-C   Stopped at 09/17/24 1456    dextrose 10% bolus 250 mL 250 mL  25 g Intravenous PRN Mary Carter PA-PAUL   Stopped at 09/26/24 2211    dextrose 10% bolus 250 mL 250 mL  25 g Intravenous PRN Gadiel Estevez MD   Stopped at 09/27/24 0437    enoxaparin injection 30 mg  30 mg Subcutaneous Daily Mary Carter PA-C   30 mg at 09/26/24 1646    etomidate injection   Intravenous Code/trauma/sedation Med [facility] salam[facility]   20 mg at 09/17/24 2104    fentaNYL 2500 mcg in 0.9% sodium chloride 250 mL infusion premix  0-250 mcg/hr Intravenous Continuous Phani Kinsey MD 10 mL/hr at 09/27/24 0827 100 mcg/hr at 09/27/24 0827    fentaNYL injection 50 mcg  50 mcg Intravenous Q1H PRN Chun Giordano MD   50 mcg at 09/18/24 1900    glucagon (human recombinant) injection 1 mg  1 mg Intramuscular PRN Mary Carter PA-C        glucose chewable tablet 16 g  16 g Oral PRN Mary Carter PA-C        glucose chewable tablet 24 g  24 g Oral PRN Mary Carter, JASKARAN        HYDROcodone-acetaminophen 7.5-325 mg per tablet 1 tablet  1 tablet Oral Q4H PRN Inocencio Mcmillan MD   1 tablet at 09/17/24 0735    metoprolol injection 5 mg  5 mg  Intravenous Q5 Min PRN Viviane Mejía FNP   5 mg at 09/17/24 1716    NORepinephrine 32 mg in D5W 250 mL infusion  0-3 mcg/kg/min Intravenous Continuous Aristeo Rodrigues MD 24.4 mL/hr at 09/27/24 0827 0.84 mcg/kg/min at 09/27/24 0827    octreotide (SANDOSTATIN) 500 mcg in 0.9% NaCl 100 mL infusion  50 mcg/hr Intravenous Continuous Briana Berry FNP        octreotide injection 50 mcg  50 mcg Subcutaneous Once Briana eBrry FNP        ondansetron injection 4 mg  4 mg Intravenous Q4H PRN Mary Carter PA-C   4 mg at 09/12/24 0254    pantoprazole (PROTONIX) 40 mg in 0.9% NaCl 100 mL IVPB (MB+)  8 mg/hr Intravenous Continuous Briana Berry FNP        polyethylene glycol packet 17 g  17 g Oral BID Aristeo Rodrigues MD   17 g at 09/20/24 0814    propofol (DIPRIVAN) 10 mg/mL infusion  0-50 mcg/kg/min Intravenous Continuous Chun Giordano MD   Stopped at 09/26/24 2055    rocuronium injection   Intravenous Code/trauma/sedation Med DefRodolfo wood MD   50 mg at 09/17/24 2107    senna-docusate 8.6-50 mg per tablet 2 tablet  2 tablet Oral BID Aristeo Rodrigues MD   2 tablet at 09/20/24 0814    simethicone chewable tablet 80 mg  1 tablet Oral TID PRN Jeanna Mcmullen AGACNP-BC   80 mg at 09/11/24 2315    sodium bicarbonate 1 mEq/mL (8.4 %) solution             sodium bicarbonate 150 mEq in dextrose 5 % 1000 mL infusion   Intravenous Continuous Sinan Singh MD        sodium bicarbonate tablet 1,950 mg  1,950 mg Oral TID Sinan Singh MD   1,950 mg at 09/26/24 1416    sodium chloride 0.9% flush 10 mL  10 mL Intravenous Q12H PRN Mary Carter PA-C        sodium zirconium cyclosilicate packet 10 g  10 g Oral Every Mon, Wed, Fri Anisha Warren, ANP   10 g at 09/25/24 0820    vancomycin - pharmacy to dose   Intravenous pharmacy to manage frequency Ephraim Yeh MD        vasopressin (PITRESSIN) 0.2 Units/mL in D5W 100 mL infusion  0.04 Units/min Intravenous Continuous Aristeo Rodrigues MD 12 mL/hr at  09/27/24 0827 0.04 Units/min at 09/27/24 0827     Facility-Administered Medications Prior to Admission   Medication Dose Route Frequency Provider Last Rate Last Admin    leuprolide (6 month) injection 45 mg  45 mg Intramuscular Q6 Months Hanny Cox NP   45 mg at 07/30/19 1357    leuprolide (6 month) SyKt 45 mg  45 mg Intramuscular Q6 Months Satish Au MD   45 mg at 01/29/19 1402     Medications Prior to Admission   Medication Sig Dispense Refill Last Dose    calcitRIOL (ROCALTROL) 0.25 MCG Cap Take 0.25 mcg by mouth 2 (two) times daily.       labetalol (NORMODYNE) 300 MG tablet        minoxidil (LONITEN) 10 MG Tab Take 2.5 mg by mouth 2 (two) times daily.       vacuum erection device system Kit 1 Units by Misc.(Non-Drug; Combo Route) route as needed. 1 kit prn        Past Medical History:  Past Medical History:   Diagnosis Date    Anemia of renal disease     ESRD on dialysis since 4/3/15     Essential hypertension     Secondary hyperparathyroidism of renal origin       Past Surgical History:  Past Surgical History:   Procedure Laterality Date    ANGIOGRAM, CORONARY, WITH LEFT HEART CATHETERIZATION N/A 9/13/2024    Procedure: Angiogram, Coronary, with Left Heart Cath;  Surgeon: Tank Scott Jr., MD;  Location: [facility] CATH LAB;  Service: Cardiology;  Laterality: N/A;    AV FISTULA PLACEMENT Left 07/2015    CENTRAL LINE  9/17/2024    Peritoneal Dialysis Catheter Placement  01/2016    Permcath Placement         Family History:  Family History   Problem Relation Name Age of Onset    Diabetes Mother      Cancer Maternal Grandmother      Hypertension Maternal Grandfather      Heart attack Maternal Grandfather          MI in his 60s or 70s    Kidney disease Neg Hx       Social History:  Social History     Tobacco Use    Smoking status: Former     Current packs/day: 1.00     Average packs/day: 1 pack/day for 15.0 years (15.0 ttl pk-yrs)     Types: Cigarettes    Smokeless tobacco: Never    Tobacco  comments:     currently smokes 2-3 cigs/week; has been smoking for >20 years; at the most smoked 1 ppd   Substance Use Topics    Alcohol use: No     Comment: previously social drinker       Review of Systems:     Review of Systems   Unable to perform ROS: Intubated       Objective:     VITAL SIGNS: 24 HR MIN & MAX LAST    Temp  Min: 98.3 °F (36.8 °C)  Max: 101.4 °F (38.6 °C)  99.7 °F (37.6 °C)        BP  Min: 68/51  Max: 128/88  90/69     Pulse  Min: 86  Max: 116  93     Resp  Min: 14  Max: 42  (!) 30    SpO2  Min: 64 %  Max: 100 %  (!) 88 %        Intake/Output Summary (Last 24 hours) at 9/27/2024 0851  Last data filed at 9/27/2024 0827  Gross per 24 hour   Intake 4147.62 ml   Output 3300 ml   Net 847.62 ml       Physical Exam  Constitutional:       General: He is not in acute distress.     Appearance: He is ill-appearing and toxic-appearing.      Comments: Intubated, on vent, fent drip   HENT:      Head: Normocephalic and atraumatic.      Mouth/Throat:      Mouth: Mucous membranes are dry.      Pharynx: Oropharynx is clear.   Eyes:      General: No scleral icterus.  Cardiovascular:      Rate and Rhythm: Normal rate and regular rhythm.      Pulses: Normal pulses.      Heart sounds: Murmur heard.   Pulmonary:      Effort: Pulmonary effort is normal. No respiratory distress.      Breath sounds: Normal breath sounds. No stridor. No wheezing or rales.      Comments: Intubated on mech vent  Abdominal:      General: Bowel sounds are normal. There is no distension.      Palpations: Abdomen is soft. There is no mass.      Tenderness: There is no abdominal tenderness. There is no guarding.      Comments: OG tube with coffee ground emesis and BRB in canister.   Genitourinary:     Comments: Scrotal edema  Skin:     General: Skin is warm and dry.      Coloration: Skin is not jaundiced or pale.   Neurological:      Comments: Unable to assess   Psychiatric:      Comments: Unable to assess           Recent Results (from the past  48 hour(s))   POCT glucose    Collection Time: 09/25/24 10:47 AM   Result Value Ref Range    POCT Glucose 81 70 - 110 mg/dL   RT Blood Gas    Collection Time: 09/25/24 12:05 PM   Result Value Ref Range    Sample Type Arterial Blood     Sample site Arterial Line     Drawn by  RRT     pH, Blood gas 7.450 7.350 - 7.450    pCO2, Blood gas 38.0 35.0 - 45.0 mmHg    pO2, Blood gas 97.0 80.0 - 100.0 mmHg    Sodium, Blood Gas 133 (L) 137 - 145 mmol/L    Potassium, Blood Gas 3.4 (L) 3.5 - 5.0 mmol/L    Calcium Level Ionized 1.09 (L) 1.12 - 1.23 mmol/L    TOC2, Blood gas 27.6 mmol/L    Base Excess, Blood gas 2.40 mmol/L    sO2, Blood gas 98.0 %    HCO3, Blood gas 26.4 (H) 22.0 - 26.0 mmol/L    Allens Test N/A     MODE AC     Oxygen Device, Blood gas Ventilator     FIO2, Blood gas 30 %    Mech Vt 460 ml    Mech RR 24 b/min    PEEP 5.0 cmH2O   POCT glucose    Collection Time: 09/25/24  5:01 PM   Result Value Ref Range    POCT Glucose 83 70 - 110 mg/dL   POCT glucose    Collection Time: 09/25/24  8:39 PM   Result Value Ref Range    POCT Glucose 85 70 - 110 mg/dL   POCT glucose    Collection Time: 09/26/24 12:30 AM   Result Value Ref Range    POCT Glucose 87 70 - 110 mg/dL   Vancomycin, Random    Collection Time: 09/26/24  1:23 AM   Result Value Ref Range    Vancomycin Random 13.0 (L) 15.0 - 20.0 ug/ml   Comprehensive Metabolic Panel    Collection Time: 09/26/24  1:23 AM   Result Value Ref Range    Sodium 136 136 - 145 mmol/L    Potassium 4.1 3.5 - 5.1 mmol/L    Chloride 95 (L) 98 - 107 mmol/L    CO2 20 (L) 22 - 29 mmol/L    Glucose 96 74 - 100 mg/dL    Blood Urea Nitrogen 49.0 (H) 8.9 - 20.6 mg/dL    Creatinine 3.39 (H) 0.73 - 1.18 mg/dL    Calcium 9.7 8.4 - 10.2 mg/dL    Protein Total 6.4 6.4 - 8.3 gm/dL    Albumin 2.3 (L) 3.5 - 5.0 g/dL    Globulin 4.1 (H) 2.4 - 3.5 gm/dL    Albumin/Globulin Ratio 0.6 (L) 1.1 - 2.0 ratio    Bilirubin Total 6.1 (H) <=1.5 mg/dL     (H) 40 - 150 unit/L     (H) 0 - 55 unit/L    AST  87 (H) 5 - 34 unit/L    eGFR 21 mL/min/1.73/m2    Anion Gap 21.0 mEq/L    BUN/Creatinine Ratio 14    Magnesium    Collection Time: 09/26/24  1:23 AM   Result Value Ref Range    Magnesium Level 2.30 1.60 - 2.60 mg/dL   Phosphorus    Collection Time: 09/26/24  1:23 AM   Result Value Ref Range    Phosphorus Level 6.9 (H) 2.3 - 4.7 mg/dL   CBC with Differential    Collection Time: 09/26/24  1:23 AM   Result Value Ref Range    WBC 19.14 (H) 4.50 - 11.50 x10(3)/mcL    RBC 3.07 (L) 4.70 - 6.10 x10(6)/mcL    Hgb 8.8 (L) 14.0 - 18.0 g/dL    Hct 24.8 (L) 42.0 - 52.0 %    MCV 80.8 80.0 - 94.0 fL    MCH 28.7 27.0 - 31.0 pg    MCHC 35.5 33.0 - 36.0 g/dL    RDW 23.5 (H) 11.5 - 17.0 %    Platelet 270 130 - 400 x10(3)/mcL    MPV 12.1 (H) 7.4 - 10.4 fL    NRBC% 2.0 %   Manual Differential    Collection Time: 09/26/24  1:23 AM   Result Value Ref Range    WBC 19.14 x10(3)/mcL    Neutrophils % 96 %    Lymphs % 1 %    Monocytes % 3 %    Myelocytes % 1 (H) <=0 %    nRBC % 3 %    Neutrophils Abs 18.3744 (H) 2.1 - 9.2 x10(3)/mcL    Lymphs Abs 0.1914 (L) 0.6 - 4.6 x10(3)/mcL    Monocytes Abs 0.5742 0.1 - 1.3 x10(3)/mcL    Platelets Normal Normal, Adequate    RBC Morph Abnormal (A) Normal    Polychromasia 3+ (A) (none)    Poikilocytosis 3+ (A) (none)    Anisocytosis 1+ (A) (none)    Macrocytosis 2+ (A) (none)    Target Cells 3+ (A) (none)   RT Blood Gas    Collection Time: 09/26/24  6:08 AM   Result Value Ref Range    Sample Type Arterial Blood     Sample site Arterial Line     Drawn by sd rrt     pH, Blood gas 7.360 7.350 - 7.450    pCO2, Blood gas 38.0 35.0 - 45.0 mmHg    pO2, Blood gas 136.0 (H) 80.0 - 100.0 mmHg    Sodium, Blood Gas 131 (L) 137 - 145 mmol/L    Potassium, Blood Gas 4.2 3.5 - 5.0 mmol/L    Calcium Level Ionized 1.12 1.12 - 1.23 mmol/L    TOC2, Blood gas 22.7 mmol/L    Base Excess, Blood gas -3.60 (L) -2.00 - 2.00 mmol/L    sO2, Blood gas 99.0 %    HCO3, Blood gas 21.5 (L) 22.0 - 26.0 mmol/L    THb, Blood gas 9.3 (L) 12 -  16 g/dL    O2 Hb, Blood Gas 97.6 (H) 94.0 - 97.0 %    CO Hgb 2.5 (H) 0.5 - 1.5 %    Met Hgb 0.6 0.4 - 1.5 %    Allens Test N/A     MODE AC     Oxygen Device, Blood gas Ventilator     FIO2, Blood gas 40 %    Mech Vt 460 ml    Mech RR 24 b/min    PEEP 5.0 cmH2O   POCT glucose    Collection Time: 09/26/24  6:29 AM   Result Value Ref Range    POCT Glucose 90 70 - 110 mg/dL   RT Blood Gas    Collection Time: 09/26/24  9:09 PM   Result Value Ref Range    Sample Type Arterial Blood     Sample site Arterial Line     Drawn by tdl rrt     pH, Blood gas 7.260 (LL) 7.350 - 7.450    pCO2, Blood gas 22.0 (L) 35.0 - 45.0 mmHg    pO2, Blood gas 87.0 80.0 - 100.0 mmHg    Sodium, Blood Gas 125 (L) 137 - 145 mmol/L    Potassium, Blood Gas 6.3 (H) 3.5 - 5.0 mmol/L    Calcium Level Ionized 1.06 (L) 1.12 - 1.23 mmol/L    TOC2, Blood gas 10.6 mmol/L    Base Excess, Blood gas -15.40 (L) -2.00 - 2.00 mmol/L    sO2, Blood gas 95.0 %    HCO3, Blood gas 9.9 (LL) 22.0 - 26.0 mmol/L    THb, Blood gas 10.2 (L) 12 - 16 g/dL    O2 Hb, Blood Gas 96.7 94.0 - 97.0 %    CO Hgb 3.3 (H) 0.5 - 1.5 %    Met Hgb 0.4 0.4 - 1.5 %    MODE AC     Oxygen Device, Blood gas Ventilator     FIO2, Blood gas 40 %    Mech Vt 460 ml    Mech RR 24 b/min    PEEP 5.0 cmH2O   Basic Metabolic Panel    Collection Time: 09/26/24  9:18 PM   Result Value Ref Range    Sodium 131 (L) 136 - 145 mmol/L    Potassium 6.9 (HH) 3.5 - 5.1 mmol/L    Chloride 92 (L) 98 - 107 mmol/L    CO2 10 (L) 22 - 29 mmol/L    Glucose 9 (LL) 74 - 100 mg/dL    Blood Urea Nitrogen 45.3 (H) 8.9 - 20.6 mg/dL    Creatinine 3.21 (H) 0.73 - 1.18 mg/dL    BUN/Creatinine Ratio 14     Calcium 9.4 8.4 - 10.2 mg/dL    Anion Gap 29.0 mEq/L    eGFR 23 mL/min/1.73/m2   CBC Without Differential    Collection Time: 09/26/24  9:18 PM   Result Value Ref Range    WBC 24.71 (H) 4.50 - 11.50 x10(3)/mcL    RBC 3.34 (L) 4.70 - 6.10 x10(6)/mcL    Hgb 9.7 (L) 14.0 - 18.0 g/dL    Hct 29.6 (L) 42.0 - 52.0 %    MCV 88.6 80.0 - 94.0  fL    MCH 29.0 27.0 - 31.0 pg    MCHC 32.8 (L) 33.0 - 36.0 g/dL    RDW 25.8 (H) 11.5 - 17.0 %    Platelet 191 130 - 400 x10(3)/mcL    MPV 11.5 (H) 7.4 - 10.4 fL    IPF 9.7 0.9 - 11.2 %    NRBC% 15.4 %   Beta-Hydroxybutyrate, Serum    Collection Time: 09/26/24  9:18 PM   Result Value Ref Range    Beta Hydroxybutyrate 0.50 (H) <=0.30 mmol/L   POCT glucose    Collection Time: 09/26/24  9:34 PM   Result Value Ref Range    POCT Glucose <20 (LL) 70 - 110 mg/dL   Lactic Acid, Plasma    Collection Time: 09/26/24  9:48 PM   Result Value Ref Range    Lactic Acid Level 13.6 (HH) 0.5 - 2.2 mmol/L   POCT glucose    Collection Time: 09/26/24 10:24 PM   Result Value Ref Range    POCT Glucose 244 (H) 70 - 110 mg/dL   POCT glucose    Collection Time: 09/26/24 11:22 PM   Result Value Ref Range    POCT Glucose 244 (H) 70 - 110 mg/dL   Basic metabolic panel    Collection Time: 09/27/24 12:14 AM   Result Value Ref Range    Sodium 134 (L) 136 - 145 mmol/L    Potassium 5.1 3.5 - 5.1 mmol/L    Chloride 91 (L) 98 - 107 mmol/L    CO2 11 (L) 22 - 29 mmol/L    Glucose 165 (H) 74 - 100 mg/dL    Blood Urea Nitrogen 50.5 (H) 8.9 - 20.6 mg/dL    Creatinine 3.38 (H) 0.73 - 1.18 mg/dL    BUN/Creatinine Ratio 15     Calcium 8.5 8.4 - 10.2 mg/dL    Anion Gap 32.0 mEq/L    eGFR 21 mL/min/1.73/m2   CBC with Differential    Collection Time: 09/27/24 12:14 AM   Result Value Ref Range    WBC 22.19 (H) 4.50 - 11.50 x10(3)/mcL    RBC 3.14 (L) 4.70 - 6.10 x10(6)/mcL    Hgb 9.1 (L) 14.0 - 18.0 g/dL    Hct 28.1 (L) 42.0 - 52.0 %    MCV 89.5 80.0 - 94.0 fL    MCH 29.0 27.0 - 31.0 pg    MCHC 32.4 (L) 33.0 - 36.0 g/dL    RDW 24.7 (H) 11.5 - 17.0 %    Platelet 178 130 - 400 x10(3)/mcL    MPV 11.4 (H) 7.4 - 10.4 fL    NRBC% 16.4 %   Manual Differential    Collection Time: 09/27/24 12:14 AM   Result Value Ref Range    WBC 22.19 x10(3)/mcL    Neutrophils % 84 %    Lymphs % 8 %    Monocytes % 4 %    Metamyelocytes % 1 (H) <=0 %    Myelocytes % 3 (H) <=0 %    nRBC % 16  %    Neutrophils Abs 18.6396 (H) 2.1 - 9.2 x10(3)/mcL    Lymphs Abs 1.7752 0.6 - 4.6 x10(3)/mcL    Monocytes Abs 0.8876 0.1 - 1.3 x10(3)/mcL    Platelets Normal Normal, Adequate    RBC Morph Abnormal (A) Normal    Polychromasia 1+ (A) (none)    Poikilocytosis 2+ (A) (none)    Anisocytosis 2+ (A) (none)    Macrocytosis 3+ (A) (none)    Target Cells 3+ (A) (none)   Comprehensive Metabolic Panel    Collection Time: 09/27/24  4:14 AM   Result Value Ref Range    Sodium 134 (L) 136 - 145 mmol/L    Potassium 5.9 (H) 3.5 - 5.1 mmol/L    Chloride 90 (L) 98 - 107 mmol/L    CO2 11 (L) 22 - 29 mmol/L    Glucose 39 (LL) 74 - 100 mg/dL    Blood Urea Nitrogen 53.2 (H) 8.9 - 20.6 mg/dL    Creatinine 3.36 (H) 0.73 - 1.18 mg/dL    Calcium 9.1 8.4 - 10.2 mg/dL    Protein Total 5.3 (L) 6.4 - 8.3 gm/dL    Albumin 2.1 (L) 3.5 - 5.0 g/dL    Globulin 3.2 2.4 - 3.5 gm/dL    Albumin/Globulin Ratio 0.7 (L) 1.1 - 2.0 ratio    Bilirubin Total 9.3 (H) <=1.5 mg/dL     (H) 40 - 150 unit/L    ALT 1,258 (H) 0 - 55 unit/L    AST >4,202 (H) 5 - 34 unit/L    eGFR 22 mL/min/1.73/m2    Anion Gap 33.0 mEq/L    BUN/Creatinine Ratio 16    Magnesium    Collection Time: 09/27/24  4:14 AM   Result Value Ref Range    Magnesium Level 2.70 (H) 1.60 - 2.60 mg/dL   Phosphorus    Collection Time: 09/27/24  4:14 AM   Result Value Ref Range    Phosphorus Level 9.6 (HH) 2.3 - 4.7 mg/dL   CBC with Differential    Collection Time: 09/27/24  4:14 AM   Result Value Ref Range    WBC 27.04 (H) 4.50 - 11.50 x10(3)/mcL    RBC 3.27 (L) 4.70 - 6.10 x10(6)/mcL    Hgb 9.3 (L) 14.0 - 18.0 g/dL    Hct 29.4 (L) 42.0 - 52.0 %    MCV 89.9 80.0 - 94.0 fL    MCH 28.4 27.0 - 31.0 pg    MCHC 31.6 (L) 33.0 - 36.0 g/dL    RDW 25.4 (H) 11.5 - 17.0 %    Platelet 141 130 - 400 x10(3)/mcL    MPV      NRBC% 15.0 %    IPF 9.8 0.9 - 11.2 %   Manual Differential    Collection Time: 09/27/24  4:14 AM   Result Value Ref Range    WBC 27.04 x10(3)/mcL    Neutrophils % 86 %    Lymphs % 4 %     Monocytes % 4 %    Metamyelocytes % 3 (H) <=0 %    Myelocytes % 1 (H) <=0 %    Promyelocytes % 3 (H) <=0 %    nRBC % 14 %    Neutrophils Abs 23.2544 (H) 2.1 - 9.2 x10(3)/mcL    Lymphs Abs 1.0816 0.6 - 4.6 x10(3)/mcL    Monocytes Abs 1.0816 0.1 - 1.3 x10(3)/mcL    Platelets Normal Normal, Adequate    RBC Morph Abnormal (A) Normal    Polychromasia 2+ (A) (none)    Poikilocytosis 2+ (A) (none)    Anisocytosis 2+ (A) (none)    Microcytosis Slight (A) (none)    Macrocytosis 3+ (A) (none)    Target Cells 3+ (A) (none)    Toxic Granulation 1+ (A) (none)   POCT glucose    Collection Time: 09/27/24  4:14 AM   Result Value Ref Range    POCT Glucose 41 (LL) 70 - 110 mg/dL   RT Blood Gas    Collection Time: 09/27/24  7:28 AM   Result Value Ref Range    Sample Type Arterial Blood     Sample site Arterial Line     Drawn by sd rrt     pH, Blood gas 7.150 (LL) 7.350 - 7.450    pCO2, Blood gas 32.0 (L) 35.0 - 45.0 mmHg    pO2, Blood gas 112.0 (H) 80.0 - 100.0 mmHg    Sodium, Blood Gas 122 (L) 137 - 145 mmol/L    Potassium, Blood Gas 5.8 (H) 3.5 - 5.0 mmol/L    Calcium Level Ionized 1.04 (L) 1.12 - 1.23 mmol/L    TOC2, Blood gas 12.1 mmol/L    Base Excess, Blood gas -16.50 (L) -2.00 - 2.00 mmol/L    sO2, Blood gas 96.8 %    HCO3, Blood gas 11.1 (LL) 22.0 - 26.0 mmol/L    THb, Blood gas 9.4 (L) 12 - 16 g/dL    O2 Hb, Blood Gas 96.4 94.0 - 97.0 %    CO Hgb 2.2 (H) 0.5 - 1.5 %    Met Hgb 1.4 0.4 - 1.5 %    Allens Test Yes     MODE AC     Oxygen Device, Blood gas Ventilator     FIO2, Blood gas 50 %    Mech Vt 460 ml    Mech RR 24 b/min    PEEP 5.0 cmH2O       X-Ray Chest 1 View    Result Date: 9/27/2024  EXAMINATION: XR CHEST 1 VIEW CLINICAL HISTORY: intubated; TECHNIQUE: AP chest COMPARISON: Chest x-ray dated 09/26/2024 FINDINGS: Endotracheal tube and enteric tube remain in place. There is stable cardiomegaly. There is a similar left retrocardiac opacity. There is no definite visible pneumothorax.     Stable exam without significant  interval change. Electronically signed by: Shahana Xie Date:    09/27/2024 Time:    06:00    X-Ray Abdomen Flat And Erect    Result Date: 9/26/2024  START OF REPORT: Technique: Supine and upright AP views of the abdomen are submitted. Comparison: None. Clinical History: Ro bowel perforation; abdominal distention. Findings: Lines and Tubes: An NG tube is present with the tip and proximal port below the GE Junction in the stomach. Bowel Gas Pattern: There are numerous loops of gas distended predominantly small bowel in the abdomen. No definitive free air is identified under the visualized right hemidiaphragm. There is prominent gas along the margins of some of the small bowel such that the possibility of gas in the walls of segments of bowel is not excluded. Bones: The imaged osseous structures appear intact. Impression: 1. There are numerous loops of gas distended predominantly small bowel in the abdomen. No definitive free air is identified under the visualized right hemidiaphragm. There is prominent gas along the margins of some of the small bowel such that the possibility of gas in the walls of segments of bowel is not excluded. Consider additional evaluation with CT scan of the abdomen pelvis for more definitive evaluation of abnormal bowel-gas. 2. Details and other findings as above.     X-Ray Chest 1 View    Result Date: 9/26/2024  EXAMINATION: XR CHEST 1 VIEW CPT 33042 CLINICAL HISTORY: ventilator; COMPARISON: September 23, 2024 FINDINGS: Examination reveals cardiomediastinal silhouette to be unchanged as compared with the previous exam. Slightly more confluent airspace opacities identified in the left suprahilar region infiltrate can not be completely excluded. No other significant change as compared with the previous exam. Support catheters remain in place     Confluent airspace opacities in the left suprahilar region infiltrate cannot be completely excluded. No other significant change. Support  catheters remain in place Electronically signed by: Star Oates Date:    09/26/2024 Time:    05:23    CV Ultrasound doppler arterial arm right    Result Date: 9/25/2024  Right upper extremity demonstrates occlusion of the radial artery from mid forearm to the distal portion. DOYLE Mclain notified of findings at time of exam.     CV US Hemodialysis Access    Result Date: 9/25/2024  The left  upper extremity AV Graft is patent with no significant stenosis.. Low  flow volume is noted. Significant calcification identified throughout the left forearm graft.     US Liver with Doppler (xpd)    Result Date: 9/24/2024  EXAMINATION: US LIVER WITH DOPPLER CLINICAL HISTORY: elevated AST/ALT; TECHNIQUE: Gray-scale, color Doppler and spectral waveform tracings of the major hepatic blood vessels, splenic vein and inferior vena cava. COMPARISON: CT abdomen/pelvis 11/11/2016 FINDINGS: Mildly limited overall assessment due to respiratory motion.  Liver measures 17.9 cm in diameter.  Main portal vein patent with normal flow direction.  Right and left portal veins also patent.  Three patent hepatic veins identified with patent common hepatic artery.  Normal CBD caliber.  Echogenic right kidney, likely medical renal disease.  No significant ascites.  Spleen and splenic vein not identified.     Mild hepatomegaly.  Patent major hepatic vasculature. Electronically signed by: Kobe Miguel Date:    09/24/2024 Time:    14:38    XR Gastric tube check, non-radiologist performed    Result Date: 9/23/2024  EXAMINATION: XR GASTRIC TUBE CHECK, NON-RADIOLOGIST PERFORMED CLINICAL HISTORY: OG placement verification; TECHNIQUE: One view COMPARISON: September 17, 2024 FINDINGS: Nasogastric tube traverses the gastroesophageal junction and tip of the tube is within the mid to distal gastric body.  There is adequate length of the tube within the stomach.     Nasogastric tube terminates within the stomach. Electronically signed by: Gordon Decker  Date:    09/23/2024 Time:    16:44    X-Ray Chest 1 View    Result Date: 9/23/2024  EXAMINATION: XR CHEST 1 VIEW CLINICAL HISTORY: s/p intubation; TECHNIQUE: AP chest COMPARISON: Chest x-ray dated 09/22/2024 FINDINGS: Endotracheal tube and enteric tube remain in place.  The heart is stable in size.  There are similar bibasilar airspace opacities.  There is no definite visible pneumothorax.     Stable exam without significant interval change. Electronically signed by: Shahana Xie Date:    09/23/2024 Time:    06:04    Echo Saline Bubble? No; Ultrasound enhancing contrast? No    Result Date: 9/22/2024    Left Ventricle: The left ventricle is dilated. Moderately increased wall thickness. Severe global hypokinesis present. There is severely reduced systolic function with a visually estimated ejection fraction of 15 - 20%.   Right Ventricle: Right ventricular enlargement. Systolic function is reduced.TAPSE is 0.62 cm.   Left Atrium: Left atrium is dilated.   Right Atrium: Right atrium is dilated.   Aortic Valve: The aortic valve is a trileaflet valve. There is aortic valve sclerosis. There is mild aortic regurgitation.   Mitral Valve: Severely thickened leaflets. There is mitral annular calcification present. There is mild regurgitation.   Tricuspid Valve: Mildly thickened leaflets. There is moderate  regurgitation.   Tricuspid Valve: There is moderate to severe regurgitation with an anteromedial eccentrically directed jet.   Pulmonic Valve: There is mild regurgitation.   Pulmonary Artery: There is pulmonary hypertension.   IVC/SVC: Patient is ventilated, cannot use IVC diameter to estimate right atrial pressure. IVC measuring 4.08 cm.   NO DEFINITE EVIDENCE OF VEGETATIONS IN THIS TRANSTHORACIC STUDY     X-Ray Chest 1 View    Result Date: 9/22/2024  EXAMINATION XR CHEST 1 VIEW CLINICAL HISTORY s/p intubation; TECHNIQUE A total of 1 frontal image(s) of the chest. COMPARISON 21 September 2024 FINDINGS  Lines/tubes/devices: Grossly unchanged positioning when allowing for differences in technique and patient rotation. Similar enlarged cardiac silhouette and central vascular congestion.  The trachea is midline. No new or worsening consolidation is identified. There is no enlarging pleural effusion or convincing pneumothorax. Regional osseous structures and extrathoracic soft tissues are similar. IMPRESSION No significant interval change. Electronically signed by: Chance Pride Date:    09/22/2024 Time:    08:20    X-Ray Chest 1 View    Result Date: 9/21/2024  EXAMINATION XR CHEST 1 VIEW CLINICAL HISTORY s/p intubation; TECHNIQUE A total of 1 frontal image(s) of the chest. COMPARISON 20 September 2024 FINDINGS Lines/tubes/devices: Grossly unchanged positioning when allowing for differences in technique and patient rotation. There is similar enlarged appearance of the cardiac silhouette and findings of central pulmonary vascular congestion.  The trachea is midline. No new or worsening consolidation is identified. There is no enlarging pleural effusion or convincing pneumothorax. Regional osseous structures and extrathoracic soft tissues are similar. IMPRESSION No significant interval change. Electronically signed by: Chance Pride Date:    09/21/2024 Time:    08:10    X-Ray Chest 1 View    Result Date: 9/20/2024  EXAMINATION XR CHEST 1 VIEW CLINICAL HISTORY fever; TECHNIQUE A total of 1 frontal image(s) of the chest. COMPARISON 19 September 2024 FINDINGS Lines/tubes/devices: Grossly unchanged positioning when allowing for differences in technique and patient rotation. The cardiac silhouette and central vascular structures are unchanged.  The trachea is midline. No new or worsening consolidation is identified. There is no enlarging pleural effusion or convincing pneumothorax. Regional osseous structures and extrathoracic soft tissues are similar. IMPRESSION No significant interval change. Electronically signed by: Chance  Bigg Date:    09/20/2024 Time:    11:23    US Scrotum And Testicles    Result Date: 9/20/2024  EXAMINATION: US SCROTUM AND TESTICLES CLINICAL HISTORY: Worsening scrotal edema, right greater than left; TECHNIQUE: Sonography of the scrotum and testes.  Grayscale, color Doppler and spectral Doppler evaluation. COMPARISON: None. FINDINGS: RIGHT TESTICLE: Size: 3.6 x 2.5 x 1.4 cm Appearance: Normal echotexture. No mass. Flow: Normal arterial and venous flow Epididymis: Normal. Hydrocele: None. Varicocele: None. LEFT TESTICLE: Size: 2.7 x 1.8 x 1.3 cm Appearance: Normal echotexture. No mass. Flow: Blunted arterial waveform. Epididymis: Normal. Hydrocele: None. Varicocele: None. OTHER: Large inguinal hernia containing bowel.  Bowel loops and fluid are demonstrated at the scrotal sac.  Sonographer does not denote laterality of the hernia.     Large inguinal hernia containing bowel and fluid.  Note sonographer does not denote laterality of the hernia.  Correlate with physical exam. Blunted arterial waveform at the left testicle.  Nonspecific.  If there is a left inguinal hernia this may be related to mass effect on the spermatic cord related to the hernia. Electronically signed by: Rosita Cooper Date:    09/20/2024 Time:    10:03    X-Ray Chest 1 View    Result Date: 9/19/2024  EXAMINATION: XR CHEST 1 VIEW CLINICAL HISTORY: increased O2 needs; TECHNIQUE: Single frontal view of the chest was performed. COMPARISON: 09/17/2024 FINDINGS: LINES AND TUBES: Endotracheal tube projects over the trachea with tip above the shavon.  Enteric tube courses below the diaphragm.  EKG/telemetry leads overlie the chest.  Single lead defibrillator device is in place via right subclavian approach. Left internal jugular CVC tip projects over the SVC. MEDIASTINUM AND DIANE: Cardiac silhouette is enlarged. Aortic atherosclerosis. LUNGS: Lung volumes are low with associated atelectatic change. PLEURA:No pleural effusion. No pneumothorax. OTHER:  Unchanged incidental rim calcified lesion at the left upper abdomen.  Defer to prior CT.     Enlarged cardiac silhouette without overt edema. Electronically signed by: Rosita Cooper Date:    09/19/2024 Time:    11:32    XR Gastric tube check, non-radiologist performed    Result Date: 9/18/2024  EXAMINATION: XR GASTRIC TUBE CHECK, NON-RADIOLOGIST PERFORMED CLINICAL HISTORY: tube; COMPARISON: None FINDINGS: Nasogastric tube is seen with the tip in the fundus of the stomach. Calcified densities in the left upper quadrant might be related to calcified splenic cyst     Nasogastric tube as above Electronically signed by: Star Oates Date:    09/18/2024 Time:    05:50    X-Ray Chest 1 View    Result Date: 9/18/2024  EXAMINATION: XR CHEST 1 VIEW CPT 03887 CLINICAL HISTORY: intubation; COMPARISON: September 17, 2024 FINDINGS: Examination reveals cardiomediastinal silhouette and pleuroparenchymal changes to be essentially unchanged as compared with the previous exam. There has been interval insertion of endotracheal tube with tip above the shavon nasogastric tube is seen with the tip below the diaphragm     No significant change as compared with the previous exam. Interval insertion of endotracheal tube and nasogastric tube Electronically signed by: Star Oates Date:    09/18/2024 Time:    03:56    X-Ray Chest 1 View    Result Date: 9/17/2024  EXAMINATION: XR CHEST 1 VIEW CLINICAL HISTORY: central line placement verification; TECHNIQUE: Single view of the chest COMPARISON: 09/10/2024 FINDINGS: Cardiomegaly with postsurgical changes of median sternotomy.  Prominent interstitial markings throughout.  Right-sided cardiac device is noted.     No adverse interval change.  Left-sided central line projects over the brachiocephalic vein. Electronically signed by: Prem Lundberg Date:    09/17/2024 Time:    20:40    Cardiac catheterization    Result Date: 9/13/2024    Unable to perform procedure from a right radial  approach given angle of ascending aorta and aortic root. Femoral access not possible because of patient's agitation and respiratory status. Procedure was aborted. The procedure log was documented by No documenter listed and verified by Tank Scott Jr, MD. Date: 9/13/2024  Time: 10:28 PM     Cardiac catheterization    Result Date: 9/13/2024  Aborted invasive cardiology procedure- see Procedure Log link for details.    CT Thoracic Spine Without Contrast    Result Date: 9/11/2024  EXAMINATION CT THORACIC SPINE WITHOUT CONTRAST CLINICAL HISTORY Chronic compression fractures now with pain in the chest and the back; TECHNIQUE Helical-acquisition CT images were obtained without the use of intravenous contrast.  Multiplanar reconstructions accomplished by a CT technologist at a separate workstation and pushed to PACS for physician review. COMPARISON 10 September 2024 chest CT FINDINGS Images were reviewed and soft tissue and bone windows. Exam quality: Degraded secondary to patient movement and resulting artifact. No short interval change of the diffusely demineralized thoracic spine and age-indeterminate T10 and T11 compression deformities.  No new site of cortical displacement, listhesis, or retropulsion. Extra-spinal osseous structures are stable in comparison.  Visualized nonosseous tissues are without short interval change by non-contrast appearance. IMPRESSION 1. No new/worsened thoracic spine findings from the chest CT performed yesterday. 2. Chronic details discussed above. RADIATION DOSE Automated tube current modulation, weight-based exposure dosing, and/or iterative reconstruction technique utilized to reach lowest reasonably achievable exposure rate. DLP: 1138 mGy*cm Electronically signed by: Chance Pride Date:    09/11/2024 Time:    14:39    X-Ray Chest PA And Lateral    Result Date: 9/11/2024  EXAMINATION: XR CHEST PA AND LATERAL CLINICAL HISTORY: sob; COMPARISON: Chest x-ray dated 09/10/2024 FINDINGS:  Right chest wall AICD is in place.  There is stable cardiomegaly.  There are similar bilateral interstitial airspace opacities.  There is no enlarging pleural effusion or visible pneumothorax.     Stable exam without significant interval change. Electronically signed by: Shahana Xie Date:    09/11/2024 Time:    08:22    Echo Saline Bubble? No    Result Date: 9/10/2024    Left Ventricle: The left ventricle is dilated. Increased wall thickness. There is concentric remodeling. Severe global hypokinesis present. There is severely reduced systolic function with a visually estimated ejection fraction of 15 - 20%. Unable to assess diastolic function due to atrial fibrillation. Elevated left ventricular filling pressure.   Right Ventricle: Severe right ventricular enlargement. Systolic function is severely reduced.   Left Atrium: Left atrium is dilated.   Right Atrium: Right atrium is dilated.   Aortic Valve: The aortic valve is a trileaflet valve. Mildly calcified cusps. Mildly restricted motion. Aortic valve peak velocity is 1.85 m/s. Mean gradient is 8 mmHg. There is moderate aortic regurgitation with an eccentrically directed jet.   Mitral Valve: Moderately thickened leaflets. There is moderate mitral annular calcification present. There is mild to moderate regurgitation.   Tricuspid Valve: There is moderate regurgitation. The estimated PA systolic pressure is at least 34 mmHg.   Pulmonic Valve: There is mild to moderate regurgitation.   IVC/SVC: Elevated venous pressure at 15 mmHg.   Pericardium: There is a small effusion. No indication of cardiac tamponade.     CTA Chest Non-Coronary (PE Studies)    Result Date: 9/10/2024  EXAMINATION: CTA CHEST NON CORONARY (PE STUDIES) CLINICAL HISTORY: shortness of breath; TECHNIQUE: Axial images of the chest were obtained with IV contrast administration.  Coronal and sagittal reconstructions were provided.  Three dimensional and MIP images were obtained and evaluated.  Total  DLP was 550 mGy-cm. Dose lowering technique and automated exposure control were utilized for this exam. COMPARISON: Chest radiograph 09/10/2024. FINDINGS: There is no filling defect within the pulmonary arteries through the level of the subsegmental pulmonary arteries.  There is no CT evidence of right heart strain. The airway is widely patent.  There is no mediastinal or hilar lymphadenopathy.  The heart is enlarged. There is a trace left pleural effusion with bilateral lower lobe atelectasis.  There is mild interstitial pulmonary edema.  There is no pneumothorax.  There is no pulmonary nodule or mass. The upper abdomen demonstrates no acute abnormality.  There is a peripherally calcified lesion in the spleen.  There is no lytic or blastic osseous lesion.  There are multiple chronic appearing compression deformities of the thoracic spine.     1. No evidence of pulmonary embolism through the level of the subsegmental pulmonary arteries. 2. Cardiomegaly with a trace left effusion, bibasilar atelectasis, and mild interstitial edema. 3. Chronic appearing compression deformities in the midthoracic spine. Electronically signed by: Tank Spence MD Date:    09/10/2024 Time:    07:55    X-Ray Chest AP Portable    Result Date: 9/10/2024  EXAMINATION: XR CHEST AP PORTABLE CLINICAL HISTORY: Shortness of breath TECHNIQUE: Single frontal view of the chest was performed. COMPARISON: March 22, 2018 FINDINGS: Examination reveals mediastinal silhouette to be within normal limits cardiac silhouette is enlarged there is some increase in interstitial and pulmonary vascular markings some of which might be related to poor inspiratory effort or with some of which might reflect some changes of pulmonary vascular congestion and cardiac decompensation. There might be increased left retrocardiac density and silhouetting of the left hemidiaphragm which might represent an infiltrate/atelectasis. Calcified densities are seen projecting over the  left hemiabdomen exact nature of these is difficult to be ascertained by this exam might be vascular in nature and or represent calcified splenic cyst other imaging modalities might prove helpful for further evaluation     Poor inspiratory effort accentuates the pulmonary vascular markings, however, a degree of congestion cannot be excluded. Cardiomegaly. Increased left retrocardiac density and silhouetting of the left hemidiaphragm as above. Calcified densities below the left hemidiaphragm with differential as above Electronically signed by: Star Oates Date:    09/10/2024 Time:    06:14      Imaging personally reviewed by myself and SP.    Assessment / Plan:     49 year old AA male known to our group, lives in [place], w/pmhx of ESRD on Dialysis TTS, PD, HLD, prostate cancer status post radiation. EF 15-20%  He presented to the ER with 9/10 with complaints of chest pain and shortness of breath.  CIS attempted nondistended with heart catheterization with the procedure was canceled as patient became.  He was admitted to ICU for hypotension and V-tach, requiried intubation 9/17.  He was noted to have a temp denied 20 with blood culture showing staph epi.      Gi consulted for UGIB  UGIB  Coffee ground emesis w/BRB via OG  -PPI and luis drip  -Monitor H/H and transfuse as needed to Hgb 7  -Monitor stools for bleeding   Transaminitis 2/2 shocked liver  AST 87-- 4200  --1258  INR pending    Patient remains critically ill with extremely poor prognosis given severe shock now with shocked liver, heart failure with low EF.  Discussed with ICU attending. Planning for palliative care vs DNR discussion. No plans for endoscopy as this time given stable hgb, no further bleeding and current condition.     Please call with any overt bleeding. Will discuss with MD.  Thank you for allowing us to participate in this patient's care.   Briana Berry NP

## 2024-09-27 NOTE — PROGRESS NOTES
[facility] - [facility]  Pulmonary Critical Care Note    Patient Name: Prem Saldana  MRN: [id number]  Admission Date: 9/10/2024  Hospital Length of Stay: 17 days  Code Status: Full Code  Attending Provider: Sin Gonsalez Jr., MD,*  Primary Care Provider: Tank Saenz MD     Subjective:     HPI: Prem Saldana is a 49 year old  male with a past medical history for tobacco use disorder, COPD on home oxygen, hypertension, hyperlipidemia, ESRD on HD, heart failure with reduced ejection fraction (EF 15-20%), prostate cancer status post radiation, who initially presented to [facility] ED (09/10/2024) due to chest pain and shortness of breath.     CIS consulted for NSTEMI management. Attempted to perform LHC (09/14/2024) but was canceled prior to initiation of procedure due to patient becoming uncooperative per reports.       Hospital Course/Significant events:  9/13 - LHC attempted but aborted due to anatomy  9/14 - LHC canceled due to patient being uncooperative  9/15 - upgraded to ICU for hypotension and runs of V-tach  9/17 - required intubation  9/20 - spiked a temp, 2/2 blood cultures growing staph epi      24 Hour Interval History:  Significantly worsened hemodynamics overnight noted, along with development of profound lactic acidosis and shock liver this morning.  Ongoing worsening of renal failure with hyperphosphatemia and hyperkalemia noted.  Significant hypoglycemia noted overnight.        Review of systems unobtainable due to intubation, critical illness.        Past Medical History:   Diagnosis Date    Anemia of renal disease     ESRD on dialysis since 4/3/15     Essential hypertension     Secondary hyperparathyroidism of renal origin        Past Surgical History:   Procedure Laterality Date    ANGIOGRAM, CORONARY, WITH LEFT HEART CATHETERIZATION N/A 9/13/2024    Procedure: Angiogram, Coronary, with Left Heart Cath;  Surgeon: Tank Scott Jr., MD;  Location: [facility]  CATH LAB;  Service: Cardiology;  Laterality: N/A;    AV FISTULA PLACEMENT Left 07/2015    CENTRAL LINE  9/17/2024    Peritoneal Dialysis Catheter Placement  01/2016    Permcath Placement          Current Outpatient Medications   Medication Instructions    calcitRIOL (ROCALTROL) 0.25 mcg, Oral, 2 times daily    labetalol (NORMODYNE) 300 MG tablet No dose, route, or frequency recorded.    minoxidiL (LONITEN) 2.5 mg, Oral, 2 times daily    vacuum erection device system Kit 1 Units, Misc.(Non-Drug; Combo Route), As needed (PRN)       Current Inpatient Medications   amiodarone  200 mg Oral BID    Followed by    [START ON 9/30/2024] amiodarone  200 mg Oral Daily    aspirin  81 mg Per NG tube Daily    busPIRone  5 mg Oral BID    calcitRIOL  0.25 mcg Oral BID    calcium carbonate  500 mg Oral TID WM    enoxparin  30 mg Subcutaneous Daily    pantoprazole  40 mg Intravenous BID    polyethylene glycol  17 g Oral BID    senna-docusate 8.6-50 mg  2 tablet Oral BID    sodium bicarbonate        sodium bicarbonate  100 mEq Intravenous Once    sodium bicarbonate  1,950 mg Oral TID    sodium zirconium cyclosilicate  10 g Oral Every Mon, Wed, Fri       Current Intravenous Infusions   D10W   Intravenous Continuous 100 mL/hr at 09/27/24 0716 Rate Change at 09/27/24 0716    fentanyl  0-250 mcg/hr Intravenous Continuous 10 mL/hr at 09/27/24 0630 100 mcg/hr at 09/27/24 0630    NORepinephrine bitartrate-D5W  0-3 mcg/kg/min Intravenous Continuous 24.4 mL/hr at 09/27/24 0630 0.84 mcg/kg/min at 09/27/24 0630    propofoL  0-50 mcg/kg/min Intravenous Continuous   Stopped at 09/26/24 2055    vasopressin  0.04 Units/min Intravenous Continuous 12 mL/hr at 09/27/24 0630 0.04 Units/min at 09/27/24 0630        Objective:       Intake/Output Summary (Last 24 hours) at 9/27/2024 0740  Last data filed at 9/27/2024 0630  Gross per 24 hour   Intake 3892.51 ml   Output 3300 ml   Net 592.51 ml       Vital Signs (Most Recent):  Temp: 99.7 °F (37.6 °C)  (09/27/24 0400)  Pulse: 87 (09/27/24 0715)  Resp: 20 (09/27/24 0715)  BP: 91/67 (09/27/24 0715)  SpO2: (!) 88 % (09/27/24 0515)  Body mass index is 18.89 kg/m².  Weight: 61.9 kg (136 lb 7.4 oz) Vital Signs (24h Range):  Temp:  [98.3 °F (36.8 °C)-101.4 °F (38.6 °C)] 99.7 °F (37.6 °C)  Pulse:  [] 87  Resp:  [14-42] 20  SpO2:  [64 %-100 %] 88 %  BP: ()/(51-91) 91/67  Arterial Line BP: ()/() 137/73         Physical exam:  Gen- intubated, sedated  HENT- ATNC, MMM, ETT in place  CV- RRR  Resp- scattered crackles bilaterally, tachypneic above ventilator set rate  MSK- 1+ pitting edema at the hips, cool LE   Neuro- unable to assess 2/2 intubation, sedation  Psych- unable to assess 2/2 intubation, sedation        Lines/Drains/Airways       Central Venous Catheter Line  Duration             Percutaneous Central Line - Triple Lumen  09/17/24 2030 Internal Jugular Left 9 days              Drain  Duration                  NG/OG Tube 09/23/24 1630 16 Fr. Center mouth 3 days              Airway  Duration                  Airway - Non-Surgical 09/17/24 2117 Endotracheal Tube 9 days              Arterial Line  Duration             Arterial Line 09/23/24 2100 Right Femoral 3 days              Peripheral Intravenous Line  Duration                  Hemodialysis AV Fistula 09/17/24 0701 Left forearm 10 days         Hemodialysis AV Fistula 09/17/24 0701 Left upper arm 10 days                    Significant Labs:  Lab Results   Component Value Date    WBC 27.04 (H) 09/27/2024    HGB 9.3 (L) 09/27/2024    HCT 29.4 (L) 09/27/2024    MCV 89.9 09/27/2024     09/27/2024       BMP  Lab Results   Component Value Date     (L) 09/27/2024    K 5.9 (H) 09/27/2024    CO2 11 (L) 09/27/2024    BUN 53.2 (H) 09/27/2024    CREATININE 3.36 (H) 09/27/2024    CALCIUM 9.1 09/27/2024    AGAP 33.0 09/27/2024    ESTGFRAFRICA 20.8 (A) 11/11/2016    EGFRNONAA 18.0 (A) 11/11/2016       ABG  Recent Labs   Lab 09/26/24 2109   PH  7.260*   PO2 87.0   PCO2 22.0*   HCO3 9.9*   POCBASEDEF -15.40*       Mechanical Ventilation Support:  Vent Mode: A/C (09/27/24 0615)  Set Rate: 24 BPM (09/27/24 0615)  Vt Set: 460 mL (09/27/24 0615)  PEEP/CPAP: 5 cmH20 (09/27/24 0615)  Oxygen Concentration (%): 50 (09/27/24 0715)  Peak Airway Pressure: 22 cmH20 (09/27/24 0615)  Total Ve: 10.5 L/m (09/27/24 0615)  F/VT Ratio<105 (RSBI): (!) 51.28 (09/27/24 0615)      Assessment/Plan:     Assessment  Mixed shock, cardiogenic and septic. Suspect primarily cardiogenic  Fever & Leukocytosis - blood cultures 2/2 with staph epi  Heart failure with reduced ejection fraction (EF 15-20%)  ESRD on HD  Hyperlipidemia  COPD    Plan  Hemodynamic instability overnight with increasing serum lactate and worsening acidosis, most consistent with progressive cardiogenic shock  Continue norepinephrine and vasopressin, markedly elevated doses from prior   Increased transaminitis with profound hypoglycemia most consistent with shock liver given worsening hemodynamic instability   Respiratory rate increased today to attempt compensation for acidosis, bicarbonate infusion started per Nephrology  Cardiology re-evaluation and stat echocardiogram ordered for today for further evaluation  Hold HD today per Nephrology  Extremely poor long-term prognosis with minimal hope for survival, palliative care following  Attempted to call patient's son several times to discuss but no answer at any listed numbers. Will ask palliative care to attempt discussions.         DVT Prophylaxis: LMWH  GI Prophylaxis: Famotidine         I spent 35 minutes providing critical care services to this patient.  This does not include time spent for separately billed procedures.         Adonay Paez MD  Pulmonary Critical Care Medicine  [facility] - [facility] ICU  DOS: 09/27/2024

## 2024-09-27 NOTE — NURSING
Nurses Note -- 4 Eyes      9/27/2024   0700      Skin assessed during: Q Shift Change      [] No Altered Skin Integrity Present    []Prevention Measures Documented      [x] Yes- Altered Skin Integrity Present or Discovered   [] LDA Added if Not in Epic (Describe Wound)   [] New Altered Skin Integrity was Present on Admit and Documented in LDA   [] Wound Image Taken    Wound Care Consulted? Yes    Attending Nurse:  Joycelyn Sena RN/Staff Member:  DOYLE Fitch

## 2024-09-27 NOTE — PROGRESS NOTES
Inpatient Nutrition Assessment    Admit Date: 9/10/2024   Total duration of encounter: 17 days   Patient Age: 49 y.o.    Nutrition Recommendation/Prescription     Restart TF when appropriate.     Tube feeding recommendation:     Novasource Renal goal rate 50 ml/hr to provide  2000 kcal/d   (96% est needs)  90 g protein/d  (97% est needs)  720 ml free water/d  (calculations based on estimated 20 hr/d run time)     Add Nik (provides 90 kcal, 2.5 g protein per serving) BID.    Communication of Recommendations: reviewed with nurse    Nutrition Assessment     Malnutrition Assessment/Nutrition-Focused Physical Exam    Malnutrition Context: chronic illness (09/18/24 1338)  Malnutrition Level: moderate (09/18/24 1338)  Energy Intake (Malnutrition): less than or equal to 50% for greater than or equal to 5 days (09/18/24 1338)  Weight Loss (Malnutrition):  (does not meet criteria) (09/18/24 1338)  Subcutaneous Fat (Malnutrition): mild depletion (09/18/24 1338)  Orbital Region (Subcutaneous Fat Loss): mild depletion  Upper Arm Region (Subcutaneous Fat Loss): mild depletion     Muscle Mass (Malnutrition): mild depletion (09/18/24 1338)  [place] Region (Muscle Loss): mild depletion  Clavicle Bone Region (Muscle Loss): mild depletion  Clavicle and Acromion Bone Region (Muscle Loss): mild depletion  Scapular Bone Region (Muscle Loss): mild depletion              Fluid Accumulation (Malnutrition): mild (09/18/24 1338)        A minimum of two characteristics is recommended for diagnosis of either severe or non-severe malnutrition.    Chart Review    Reason Seen: continuous nutrition monitoring and follow-up    Malnutrition Screening Tool Results   Have you recently lost weight without trying?: No  Have you been eating poorly because of a decreased appetite?: No   MST Score: 0   Diagnosis:  Shock, cardiogenic versus septic  Heart failure with reduced ejection fraction (EF 15-20%)  ESRD on HD  Hyperlipidemia  COPD    Relevant Medical  History: tobacco use disorder, COPD, HTN, HLD, ESRD on HD, heart failure with reduced ejection fraction (EF 15-20%), prostate cancer status post radiation     Scheduled Medications:  amiodarone, 200 mg, BID   Followed by  [START ON 9/30/2024] amiodarone, 200 mg, Daily  aspirin, 81 mg, Daily  busPIRone, 5 mg, BID  calcitRIOL, 0.25 mcg, BID  calcium carbonate, 500 mg, TID WM  enoxparin, 30 mg, Daily  pantoprazole, 40 mg, BID  polyethylene glycol, 17 g, BID  senna-docusate 8.6-50 mg, 2 tablet, BID  sodium bicarbonate, ,   sodium bicarbonate, 1,950 mg, TID  sodium zirconium cyclosilicate, 10 g, Every Mon, Wed, Fri    Continuous Infusions:  D10W, Last Rate: 100 mL/hr at 09/27/24 0716  fentanyl, Last Rate: 100 mcg/hr (09/27/24 0630)  NORepinephrine bitartrate-D5W, Last Rate: 0.84 mcg/kg/min (09/27/24 0630)  propofoL, Last Rate: Stopped (09/26/24 2055)  sodium bicarbonate 150 mEq  vasopressin, Last Rate: 0.04 Units/min (09/27/24 0630)    PRN Medications:  acetaminophen, 650 mg, Q8H PRN  acetaminophen, 650 mg, Q4H PRN  calcium gluconate IVPB, 1 g, Q10 Min PRN  dextrose 10%, 12.5 g, PRN  dextrose 10%, 25 g, PRN  dextrose 10%, 25 g, PRN  etomidate, , Code/trauma/sedation Med  fentaNYL, 50 mcg, Q1H PRN  glucagon (human recombinant), 1 mg, PRN  glucose, 16 g, PRN  glucose, 24 g, PRN  HYDROcodone-acetaminophen, 1 tablet, Q4H PRN  metoprolol, 5 mg, Q5 Min PRN  ondansetron, 4 mg, Q4H PRN  rocuronium, , Code/trauma/sedation Med  simethicone, 1 tablet, TID PRN  sodium bicarbonate, ,   sodium chloride 0.9%, 10 mL, Q12H PRN  vancomycin - pharmacy to dose, , pharmacy to manage frequency    Calorie Containing IV Medications: no significant kcals from medications at this time    Recent Labs   Lab 09/21/24 2220 09/22/24  0258 09/23/24  0357 09/24/24  0431 09/25/24  0339 09/26/24  0123 09/26/24  2118 09/27/24  0014 09/27/24  0414   * 133* 134* 132* 134* 136 131* 134* 134*   K 3.0* 4.8 5.1 4.1 4.0 4.1 6.9* 5.1 5.9*   CALCIUM 8.0*  8.3* 8.5 9.3 9.3 9.7 9.4 8.5 9.1   PHOS 4.1 5.1* 5.5* 6.6* 6.3* 6.9*  --   --  9.6*   MG 2.00 2.20 2.30 2.30 2.20 2.30  --   --  2.70*   CL 99 97* 96* 93* 95* 95* 92* 91* 90*   CO2 19* 17* 14* 16* 21* 20* 10* 11* 11*   BUN 36.8* 43.5* 59.2* 81.4* 60.5* 49.0* 45.3* 50.5* 53.2*   CREATININE 3.12* 3.18* 3.82* 4.50* 3.81* 3.39* 3.21* 3.38* 3.36*   EGFRNORACEVR 24 23 18 15 19 21 23 21 22   GLUCOSE 135* 106* 90 111* 88 96 9* 165* 39*   BILITOT 2.0* 2.0* 2.0* 2.3* 3.8* 6.1*  --   --  9.3*   ALKPHOS 356* 364* 393* 402* 384* 355*  --   --  466*   ALT 8 7 65* 182* 144* 105*  --   --  1,258*   AST 14 18 224* 489* 204* 87*  --   --  >4,202*   ALBUMIN 1.8* 1.8* 1.8* 1.7* 2.0* 2.3*  --   --  2.1*   WBC 26.46* 27.05  27.05* 35.8  35.80* 20.51  20.51* 20.46  20.46* 19.14  19.14* 24.71* 22.19  22.19* 27.04*   HGB 9.5* 9.8* 11.5* 9.3* 8.7* 8.8* 9.7* 9.1* 9.3*   HCT 26.1* 26.7* 31.8* 25.2* 24.2* 24.8* 29.6* 28.1* 29.4*     Nutrition Orders:  No diet orders on file  Tube Feedings/Formulas 50; 1,000; Novasource Renal - Unflavored; OG; 50; Every 4 hours    Appetite/Oral Intake: not applicable/not applicable  Factors Affecting Nutritional Intake: diarrhea, on mechanical ventilation, and vomiting  Social Needs Impacting Access to Food: unable to assess at this time; will attempt on follow-up  Food/Alevism/Cultural Preferences: unable to obtain  Food Allergies: no known food allergies  Last Bowel Movement: 09/24/24  Wound(s):     Wound 09/17/24 0701 Blister(s) Left anterior;posterior Calf #1-Tissue loss description: Partial thickness     Comments    9/12/24 pt NPO for procedure today, reports no appetite loss or unintentional weight loss prior to admit     9/18/24: Pt now intubated. Plans for starting TF. Receiving kcal from meds. Noted previous EMR wts indicate wt loss, but time frame is from too long ago to meet criteria for malnutrition (other criteria met though.) Noted pt with only 0-50% po intake of meals since admit, NPO  "since 9/13/24.    9/20/24: TF continues, tolerated per RN. Receiving kcal from meds.     9/24/24: Pt with 900ml output when OG put to suction. Also with vomiting and diarrhea. TF on hold. Pt also on two pressors.    9/27/24: TF continues to be off due to pt status. Noted Glu, D10 started per RN. Will also need to continue with renal formula since K and Phos elevated. Due to wounds, will add Nik for when appropriate to restart TF.     Anthropometrics    Height: 5' 11.26" (181 cm), Height Method: Stated  Last Weight: 61.9 kg (136 lb 7.4 oz) (09/14/24 0635), Weight Method: Bed Scale  BMI (Calculated): 18.9  BMI Classification: normal (BMI 18.5-24.9)        Ideal Body Weight (IBW), Male: 173.56 lb     % Ideal Body Weight, Male (lb): 92.73 %                          Usual Weight Provided By: EMR weight history    Wt Readings from Last 5 Encounters:   09/14/24 61.9 kg (136 lb 7.4 oz)   07/30/19 77 kg (169 lb 12.1 oz)   01/29/19 79.8 kg (176 lb)   07/26/18 81.6 kg (180 lb)   01/23/18 91 kg (200 lb 9.9 oz)     Weight Change(s) Since Admission:   Wt Readings from Last 1 Encounters:   09/14/24 0635 61.9 kg (136 lb 7.4 oz)   09/10/24 0750 73 kg (160 lb 15 oz)   09/10/24 0529 72.6 kg (160 lb)   Admit Weight: 72.6 kg (160 lb) (09/10/24 0529), Weight Method: Stated    Estimated Needs    Weight Used For Calorie Calculations: 61.9 kg (136 lb 7.4 oz)  Energy Calorie Requirements (kcal): 2082kcal  Energy Need Method: [facility]  Weight Used For Protein Calculations: 61.9 kg (136 lb 7.4 oz)  Protein Requirements: 93gm (1.5g/kg)  Fluid Requirements (mL): 1000ml + urinary output  CHO Requirement: 235gm (45% est kcal needs)     Enteral Nutrition     (On hold)   Formula: Novasource Renal  Rate/Volume: 50ml/hr  Water Flushes: 50ml q4hr  Additives/Modulars: none at this time  Route: orogastric tube  Method: continuous  Total Nutrition Provided by Tube Feeding, Additives, and Flushes:  Calories Provided  2000 kcal/d, 96% needs   Protein " Provided  90 g/d, 97% needs   Fluid Provided  720 ml/d, N/A% needs   Continuous feeding calculations based on estimated 20 hr/d run time unless otherwise stated.    Parenteral Nutrition     Patient not receiving parenteral nutrition support at this time.    Evaluation of Received Nutrient Intake    Calories: not meeting estimated needs  Protein: not meeting estimated needs    Patient Education     Not applicable.    Nutrition Diagnosis     PES: Inadequate oral intake related to acute illness as evidenced by intubation since 9/17/24. (active)     PES: Moderate chronic disease or condition related malnutrition related to chronic illness as evidenced by less than or equal to 50% needs met for greater than or equal to 5 days, mild fat depletion, mild muscle depletion, and edema. (active)    Nutrition Interventions     Intervention(s): collaboration with other providers    Goal: Meet greater than 80% of nutritional needs by follow-up. (goal progressing)  Goal: Tolerate enteral feeding at goal rate by follow-up. (goal progressing)    Nutrition Goals & Monitoring     Dietitian will monitor: energy intake and enteral nutrition intake  Discharge planning: too early to determine; pending clinical course  Nutrition Risk/Follow-Up: high (follow-up in 1-4 days)   Please consult if re-assessment needed sooner.

## 2024-09-27 NOTE — NURSING
Spoke to Dr. Valencia in regards to patients LLE wound. No interventions at this time. Patient is too unstable.

## 2024-09-28 NOTE — NURSING
Nurses Note -- 4 Eyes      9/28/2024   12:30 AM      Skin assessed during: Q Shift Change      [] No Altered Skin Integrity Present    [x]Prevention Measures Documented      [x] Yes- Altered Skin Integrity Present or Discovered   [] LDA Added if Not in Epic (Describe Wound)   [] New Altered Skin Integrity was Present on Admit and Documented in LDA   [] Wound Image Taken    Wound Care Consulted? Yes    Attending Nurse:  Stephanie Sena RN/Staff Member:  Joycelyn

## 2024-09-28 NOTE — DISCHARGE SUMMARY
[facility]  Pulmonology/Critical Care  Discharge Summary    Patient Name: Prem Saldana  MRN: [id number]  Admission Date: 9/10/2024  Hospital Length of Stay: 18 days  Discharge Date and Time: Patient death pronounced at 1215 by Dr. Aristeo Rodrigues    Reason for admission: cardiogenic shock    Prem Saldana is a 49 year old  male with a past medical history for tobacco use disorder, COPD on home oxygen, hypertension, hyperlipidemia, ESRD on HD, heart failure with reduced ejection fraction (EF 15-20%), prostate cancer status post radiation, who initially presented to [facility] ED (09/10/2024) due to chest pain and shortness of breath.      CIS consulted for NSTEMI management. Attempted to perform LHC (09/14/2024) but was canceled prior to initiation of procedure due to patient becoming uncooperative per reports.         Hospital Course/Significant events:  9/13 - LHC attempted but aborted due to anatomy  9/14 - LHC canceled due to patient being uncooperative  9/15 - upgraded to ICU for hypotension and runs of V-tach  9/17 - required intubation  9/20 - spiked a temp, 2/2 blood cultures growing staph epi    Primary (Principal), Secondary, Final Diagnoses:  1. Congestive heart failure, unspecified HF chronicity, unspecified heart failure type    2. Shortness of breath    3. NSTEMI (non-ST elevated myocardial infarction)    4. Elevated troponin    5. ESRD on dialysis    6. Chest pain, unspecified type    7. Chest pain of uncertain etiology    8. Chest pain    9. Cardiomyopathy, unspecified type    10. Arrhythmia    11. V-tach    12. V tach    13. Tachycardia    14. Hyperkalemia    15. CAD (coronary artery disease)    16. Follow-up exam    17. Bacteremia due to Gram-positive bacteria    18. Shock    19. Decreased vascular flow    20. ST elevation    21. ESRD on dialysis since 4/3/15    22. Cardiogenic shock    23. Gastrointestinal hemorrhage, unspecified gastrointestinal hemorrhage type       Procedures performed: Angiogram, Coronary, with Left Heart Cath (N/A)    Care, treatment & services provided:    Orders Placed This Encounter   Procedures    Critical Care    INTUBATION    CENTRAL LINE    PACK PUP MARY LOU    PACK PUP MARY LOU    Feeding Pump    Feeding Pump    HIBICLENS 4% CHG  4 OZ    OXISENSOR PEDIATRIC DIGIT    Blood Culture #1 **CANNOT BE ORDERED STAT**    Blood Culture #2 **CANNOT BE ORDERED STAT**    Blood Culture    Blood Culture    Blood Culture    Respiratory Culture    BCID2 Panel    Blood Culture    Blood Culture    Blood Culture    Blood Culture    BCID2 Panel    Blood Culture    Blood Culture    X-Ray Chest AP Portable    CTA Chest Non-Coronary (PE Studies)    X-Ray Chest PA And Lateral    CT Thoracic Spine Without Contrast    X-Ray Chest 1 View    X-Ray Chest 1 View    XR Gastric tube check, non-radiologist performed    X-Ray Chest 1 View    US Scrotum And Testicles    X-Ray Chest 1 View    X-Ray Chest 1 View    XR Gastric tube check, non-radiologist performed    US Liver with Doppler (xpd)    X-Ray Chest 1 View    X-Ray Abdomen Flat And Erect    X-Ray Chest 1 View    CBC auto differential    Comprehensive metabolic panel    APTT    Protime-INR    Troponin I    Brain natriuretic peptide    CBC with Differential    COVID/FLU A&B PCR    Lactic acid, plasma    Lactic Acid, Plasma    Hepatitis B Surface Antigen    Comprehensive Metabolic Panel (CMP)    Troponin I    Magnesium    CBC Auto Differential    CBC with Differential    CBC Auto Differential    Basic Metabolic Panel    Magnesium    CBC with Differential    Magnesium    CBC Auto Differential    Phosphorus    PTH, Intact    Comprehensive Metabolic Panel    Lipid Panel    CBC with Differential    Troponin I    CBC Auto Differential    Comprehensive Metabolic Panel    CBC with Differential    Manual Differential    CBC Auto Differential    Comprehensive Metabolic Panel    CBC with Differential    Manual Differential    Basic  Metabolic Panel    Magnesium    Troponin I    Comprehensive Metabolic Panel    Magnesium    Phosphorus    CBC Auto Differential    Troponin I    CBC with Differential    Lactic Acid, Plasma    Lactic Acid, Plasma    Vancomycin, Random    Lactic Acid, Plasma    CBC with Differential    Manual Differential    Vancomycin, Random    CBC Auto Differential    Comprehensive Metabolic Panel    Magnesium    Phosphorus    CBC with Differential    Gentamicin Level, Random    Manual Differential    Blood Gas    Lactic Acid, Plasma    CBC with Differential    Manual Differential    Vancomycin, Random    Comprehensive Metabolic Panel    Magnesium    Phosphorus    Gentamicin Level, Random    CBC with Differential    Manual Differential    RT Blood Gas    Vancomycin, Random    Gentamicin Level, Random    CBC with Differential    RT Blood Gas    Vancomycin, Random    RT Blood Gas    CBC with Differential    Manual Differential    Vancomycin, Random    Comprehensive Metabolic Panel    Lactic Acid, Plasma    Magnesium    Phosphorus    CBC Auto Differential    CBC with Differential    CBC with Differential    Manual Differential    VANCOMYCIN, TROUGH    RT Blood Gas    CBC with Differential    Manual Differential    RT Blood Gas    CBC with Differential    Manual Differential    Vancomycin, Random    RT Blood Gas    RT Blood Gas    Lactic Acid, Plasma    CBC with Differential    Manual Differential    RT Blood Gas    Troponin I    RT Blood Gas    CBC with Differential    Manual Differential    Vancomycin, Random    RT Blood Gas    Basic Metabolic Panel    CBC Without Differential    Potassium    Basic metabolic panel    Beta-Hydroxybutyrate, Serum    Lactic Acid, Plasma    CBC Auto Differential    CBC with Differential    Manual Differential    CBC with Differential    Manual Differential    Protime-INR    Potassium    Basic metabolic panel    CBC with Differential    RT Blood Gas    Manual Differential    Potassium    Basic metabolic  panel    CBC Auto Differential    Comprehensive Metabolic Panel    RT Blood Gas    Ambulatory referral/consult to Cardiology    Notify Physician    Place sequential compression device    Recheck Blood Glucose:    SUBSEQUENT HOME HEALTH ORDERS    Notify physician if BG is less than 70 mg/dL    Notify physician if potassium is greater than 6.5 mEq/L or if changes in cardiac monitoring occur    Nursing communication    Skin assessment    Moisture Management    Sacral Protection    Elevate heels off of bed    Check Medical Devices for Pressure    Central line insertion (PICC LINE)    PICC catheter may be used for therapy after catheter placements and confirmed by CXR    Implement nursing PICC line care order set (#1724) after PICC is placed    Nursing communication    Nursing communication    Notify physician if BG is less than 70 mg/dL    Notify physician if potassium is greater than 6.5 mEq/L or if changes in cardiac monitoring occur    Nursing communication    Wound care routine (specify)    Notify physician if BG is less than 70 mg/dL    Notify physician if potassium is greater than 6.5 mEq/L or if changes in cardiac monitoring occur    Perform Meeks Agitation Sedation Scale (RASS)    Perform Meeks Agitation Sedation Scale (RASS) Q2H    Performed Meeks Agitation Sedation Scale (RASS) Q15 min    Vital signs    Notify physician if BG is less than 70 mg/dL    Notify physician if potassium is greater than 6.5 mEq/L or if changes in cardiac monitoring occur    Notify physician if BG is less than 70 mg/dL    Notify physician if potassium is greater than 6.5 mEq/L or if changes in cardiac monitoring occur    Notify physician if BG is less than 70 mg/dL    Notify physician if potassium is greater than 6.5 mEq/L or if changes in cardiac monitoring occur    Cardiac Monitoring - Adult    Full code    Inpatient consult to Nephrology    Inpatient consult to Cardiology    Inpatient consult to Social Work/Case Management     Pharmacy to dose Vancomycin consult    Inpatient consult to PICC team (NIRAJ)    IP Wound Care consult Nurse    Inpatient consult to Vascular Surgery    Inpatient consult to General Surgery    Inpatient consult to Palliative Care    Pharmacy to dose Vancomycin consult    Inpatient consult to Vascular Surgery    IP Wound Care consult Nurse    Inpatient consult to Gastroenterology    Tube Feedings/Formulas 50; 1,000; Novasource Renal - Unflavored; OG; 50; Every 4 hours    Tube Feedings/Formulas Other (see comments); OG; Nik - Orange    Oxygen Continuous    Inhalation Treatment Once    RESPIRATORY COMMUNICATION    Mechanical ventilation Continuous    Cardiac monitoring strips    Cardiac monitoring strips    Cardiac monitoring strips    Cardiac monitoring strips    Cardiac monitoring strips    Cardiac monitoring strips    Cardiac monitoring strips    Cardiac monitoring strips    Cardiac monitoring strips    Cardiac monitoring strips    Cardiac monitoring strips    Cardiac monitoring strips    Cardiac monitoring strips    Cardiac monitoring strips    Cardiac monitoring strips    Cardiac monitoring strips    Cardiac monitoring strips    Cardiac monitoring strips    Cardiac monitoring strips    Cardiac monitoring strips    Cardiac monitoring strips    Cardiac monitoring strips    Cardiac monitoring strips    Cardiac monitoring strips    Cardiac monitoring strips    Cardiac monitoring strips    Cardiac monitoring strips    Cardiac monitoring strips    Cardiac monitoring strips    Cardiac monitoring strips    Cardiac monitoring strips    Cardiac monitoring strips    Cardiac monitoring strips    Cardiac monitoring strips    Cardiac monitoring strips    Cardiac monitoring strips    Cardiac monitoring strips    Cardiac monitoring strips    Cardiac monitoring strips    Cardiac monitoring strips    Cardiac monitoring strips    Cardiac monitoring strips    Cardiac monitoring strips    Cardiac monitoring strips    Cardiac  "monitoring strips    Cardiac monitoring strips    Cardiac monitoring strips    Cardiac monitoring strips    Cardiac monitoring strips    Cardiac monitoring strips    Cardiac monitoring strips    CV US Hemodialysis Access    CV Ultrasound doppler arterial arm right    Cardiac monitoring strips    Cardiac monitoring strips    Cardiac monitoring strips    Cardiac monitoring strips    Cardiac monitoring strips    Cardiac monitoring strips    Cardiac monitoring strips    POCT Glucose, Hand-Held Device    EKG 12-lead    EKG 12-lead    EKG 12-lead    EKG 12-lead    EKG 12-lead    EKG 12-lead    EKG 12-lead    EKG 12-lead    EKG 12-lead    EKG 12-lead    EKG 12-lead    EKG 12-lead    EKG 12-lead    EKG 12-lead    EKG 12-lead    EKG 12-lead    Echo Saline Bubble? No    Echo Saline Bubble? No; Ultrasound enhancing contrast? No    Echo    Saline lock IV    Insert arterial line    Insert arterial line    Insert arterial line    Prepare patient for dialysis    Prepare patient for dialysis    Hemodialysis inpatient If "per protocol" is selected for one or more ingredients (K+, Ca++, Na+, Bicarb) for the dialysate bath solution, select the hyperlink for the protocol instructions.    Hemodialysis inpatient If "per protocol" is selected for one or more ingredients (K+, Ca++, Na+, Bicarb) for the dialysate bath solution, select the hyperlink for the protocol instructions.    Dialysis    Prepare patient for dialysis    Hemodialysis inpatient If "per protocol" is selected for one or more ingredients (K+, Ca++, Na+, Bicarb) for the dialysate bath solution, select the hyperlink for the protocol instructions.    Possible Hospitalization    Admit to Inpatient    Transfer patient    Anesthesia US Guide Vascular Access     Death note:   While working outside the patient's room, on alarm was triggered signaling cardiac arrest.  Upon entering the room, the arterial line was flat and the patient was noted to be in ventricular fibrillation.  " ACLS was started and he was defibrillated x1.  On subsequent rhythm check, the rhythm was noted to be asystole.  He received 2 doses of epinephrine and 2 rounds of chest compressions.      Time of death was called at 1215 on 24. On exam patient has no pupillary response.  No heart sounds on auscultation and no lung sounds for 2 minutes.  No peripheral pulses felt.     Cause of Death:  Cardiac arrest    Discharged Condition:     Disposition:  in medical facility      Aristeo Rodrigues MD  Pulmonology

## 2024-09-28 NOTE — CARE UPDATE
Comfort Medina, patient's fiance (who can be reached at [phone]) is at bedside with patient. She expressed her desires of making the patient comfortable and not letting him suffer by stopping the AICD and withdrawing care by extubating the patient. I spoke to Reinaldo Saldana, patient's son who is making medical decisions in behalf of the patient, he stated he would call me back in 10 minutes and hung up. Unfortunately, the son never called me back.    Marina Winters MD  [facility] Internal Medicine PGY-III

## 2024-09-28 NOTE — PROGRESS NOTES
[facility] - [facility] ICU  Pulmonary Critical Care Note    Patient Name: Prem Saldana  MRN: [id number]  Admission Date: 9/10/2024  Hospital Length of Stay: 18 days  Code Status: Full Code  Attending Provider: Sin Gonsalez Jr., MD,*  Primary Care Provider: Tank Saenz MD     Subjective:     HPI: Prem Saldana is a 49 year old  male with a past medical history for tobacco use disorder, COPD on home oxygen, hypertension, hyperlipidemia, ESRD on HD, heart failure with reduced ejection fraction (EF 15-20%), prostate cancer status post radiation, who initially presented to [facility] ED (09/10/2024) due to chest pain and shortness of breath.     CIS consulted for NSTEMI management. Attempted to perform LHC (09/14/2024) but was canceled prior to initiation of procedure due to patient becoming uncooperative per reports.       Hospital Course/Significant events:  9/13 - LHC attempted but aborted due to anatomy  9/14 - LHC canceled due to patient being uncooperative  9/15 - upgraded to ICU for hypotension and runs of V-tach  9/17 - required intubation  9/20 - spiked a temp, 2/2 blood cultures growing staph epi      24 Hour Interval History:  Patient had nonsustained SVT overnight up to 180s bpm.  PO amiodarone switched to IV.  On my examination PLR equal however sluggish to react, absent cough/gag reflex, moves LUE spontaneously and slightly withdraws to pain, does not withdraw to pain to other extremities.  Son and fiancee updated of patient condition.  Axele expressed her desire to make the patient DNR however son is not willing to talk to us.  Patient intubated with the following vent settings 30/460/5/50.  He is on fentanyl 100 mcg/hour, Levophed 1 mcg/kg/minute, vasopressin 0.04 units, amiodarone drip, Protonix drip, octreotide drip.  Labs with leukocytosis, decreasing H&H, thrombocytopenia, hyponatremia, hyperkalemia, hypochloremic anion gap metabolic acidosis, elevated renal  indices, hypoglycemia, hyperphosphatemia, worsening transaminitis indicative of shock liver.  Blood cultures from 09/26/2024 still with a Gram-positive cocci probable Streptococcus 1 of 2 aerobic bottles positive. On vancomycin. CBG low.        Review of systems unobtainable due to intubation, critical illness.        Past Medical History:   Diagnosis Date    Anemia of renal disease     ESRD on dialysis since 4/3/15     Essential hypertension     Secondary hyperparathyroidism of renal origin        Past Surgical History:   Procedure Laterality Date    ANGIOGRAM, CORONARY, WITH LEFT HEART CATHETERIZATION N/A 9/13/2024    Procedure: Angiogram, Coronary, with Left Heart Cath;  Surgeon: Tank Scott Jr., MD;  Location: [facility] CATH LAB;  Service: Cardiology;  Laterality: N/A;    AV FISTULA PLACEMENT Left 07/2015    CENTRAL LINE  9/17/2024    Peritoneal Dialysis Catheter Placement  01/2016    Permcath Placement          Current Outpatient Medications   Medication Instructions    calcitRIOL (ROCALTROL) 0.25 mcg, Oral, 2 times daily    labetalol (NORMODYNE) 300 MG tablet No dose, route, or frequency recorded.    minoxidiL (LONITEN) 2.5 mg, Oral, 2 times daily    vacuum erection device system Kit 1 Units, Misc.(Non-Drug; Combo Route), As needed (PRN)       Current Inpatient Medications   aspirin  81 mg Per NG tube Daily    busPIRone  5 mg Oral BID    calcitRIOL  0.25 mcg Oral BID    calcium carbonate  500 mg Oral TID WM    enoxparin  30 mg Subcutaneous Daily    polyethylene glycol  17 g Oral BID    senna-docusate 8.6-50 mg  2 tablet Oral BID    sodium bicarbonate  1,950 mg Oral TID    sodium zirconium cyclosilicate  10 g Oral Every Mon, Wed, Fri       Current Intravenous Infusions   amiodarone in dextrose 5%  1 mg/min Intravenous Continuous 33.3 mL/hr at 09/28/24 0557 1 mg/min at 09/28/24 0557    amiodarone in dextrose 5%  0.5 mg/min Intravenous Continuous        D10W   Intravenous Continuous 50 mL/hr at 09/28/24 0417 New  Bag at 09/28/24 0417    fentanyl  0-250 mcg/hr Intravenous Continuous 10 mL/hr at 09/28/24 0616 100 mcg/hr at 09/28/24 0616    NORepinephrine bitartrate-D5W  0-3 mcg/kg/min Intravenous Continuous 29 mL/hr at 09/28/24 0616 1 mcg/kg/min at 09/28/24 0616    octreotide (SANDOSTATIN) 500 mcg in 0.9% NaCl 100 mL infusion  50 mcg/hr Intravenous Continuous 10 mL/hr at 09/28/24 0616 50 mcg/hr at 09/28/24 0616    pantoprazole (PROTONIX) IV infusion  8 mg/hr Intravenous Continuous 20 mL/hr at 09/28/24 0616 8 mg/hr at 09/28/24 0616    propofoL  0-50 mcg/kg/min Intravenous Continuous   Stopped at 09/26/24 2055    sodium bicarbonate 150 mEq   Intravenous Continuous 100 mL/hr at 09/28/24 0616 Rate Verify at 09/28/24 0616    vasopressin  0.04 Units/min Intravenous Continuous 12 mL/hr at 09/28/24 0616 0.04 Units/min at 09/28/24 0616        Objective:       Intake/Output Summary (Last 24 hours) at 9/28/2024 0654  Last data filed at 9/28/2024 0616  Gross per 24 hour   Intake 5209.69 ml   Output 350 ml   Net 4859.69 ml       Vital Signs (Most Recent):  Temp: 98.3 °F (36.8 °C) (09/28/24 0400)  Pulse: 98 (09/28/24 0630)  Resp: (!) 22 (09/28/24 0000)  BP: (!) 86/58 (09/28/24 0630)  SpO2: (!) 72 % (09/28/24 0545)  Body mass index is 18.89 kg/m².  Weight: 61.9 kg (136 lb 7.4 oz) Vital Signs (24h Range):  Temp:  [97.7 °F (36.5 °C)-98.6 °F (37 °C)] 98.3 °F (36.8 °C)  Pulse:  [] 98  Resp:  [9-31] 22  SpO2:  [30 %-100 %] 72 %  BP: ()/(58-86) 86/58  Arterial Line BP: ()/() 106/51         Physical exam:  Gen- intubated, sedated  HENT- ATNC, MMM, ETT in place  CV- RRR  Resp- scattered crackles bilaterally, tachypneic above ventilator set rate  MSK- 1+ pitting edema at the hips, cool LE   Neuro- PLR equal however sluggish to react, absent cough/gag reflex, moves LUE spontaneously and slightly withdraws to pain, does not withdraw to pain to other extremities  Psych- unable to assess 2/2 intubation,  sedation        Lines/Drains/Airways       Central Venous Catheter Line  Duration             Percutaneous Central Line - Triple Lumen  09/17/24 2030 Internal Jugular Left 10 days              Drain  Duration                  NG/OG Tube 09/23/24 1630 16 Fr. Center mouth 4 days              Airway  Duration                  Airway - Non-Surgical 09/17/24 2117 Endotracheal Tube 10 days              Arterial Line  Duration             Arterial Line 09/23/24 2100 Right Femoral 4 days              Peripheral Intravenous Line  Duration                  Hemodialysis AV Fistula 09/17/24 0701 Left forearm 10 days         Hemodialysis AV Fistula 09/17/24 0701 Left upper arm 10 days                    Significant Labs:  Lab Results   Component Value Date    WBC 23.23 (H) 09/28/2024    WBC 23.23 09/28/2024    HGB 8.3 (L) 09/28/2024    HCT 25.1 (L) 09/28/2024    MCV 86.9 09/28/2024    PLT 63 (L) 09/28/2024       BMP  Lab Results   Component Value Date     (L) 09/28/2024    K 6.0 (H) 09/28/2024    CO2 14 (L) 09/28/2024    BUN 58.4 (H) 09/28/2024    CREATININE 3.66 (H) 09/28/2024    CALCIUM 8.4 09/28/2024    AGAP 32.0 09/28/2024    ESTGFRAFRICA 20.8 (A) 11/11/2016    EGFRNONAA 18.0 (A) 11/11/2016       ABG  Recent Labs   Lab 09/28/24 0455   PH 7.250*   PO2 70.0*   PCO2 36.0   HCO3 15.8*   POCBASEDEF -10.60       Mechanical Ventilation Support:  Vent Mode: A/C (09/28/24 0440)  Set Rate: 30 BPM (09/28/24 0440)  Vt Set: 460 mL (09/28/24 0440)  PEEP/CPAP: 5 cmH20 (09/28/24 0440)  Oxygen Concentration (%): 50 (09/28/24 0440)  Peak Airway Pressure: 21 cmH20 (09/28/24 0440)  Total Ve: 10.2 L/m (09/28/24 0440)  F/VT Ratio<105 (RSBI): (!) 49.72 (09/27/24 1720)      Assessment/Plan:     Assessment  Mixed shock, cardiogenic and septic. Suspect primarily cardiogenic  Fever & Leukocytosis - blood cultures 2/2 with staph epi  Heart failure with reduced ejection fraction (EF 15-20%)  ESRD on HD  Hyperlipidemia  COPD    Plan  Continue  norepinephrine and vasopressin, markedly elevated doses from prior   Increased transaminitis with profound hypoglycemia most consistent with shock liver given worsening hemodynamic instability   Bicarbonate infusion started per Nephrology  Cardiology re-evaluation and stat echocardiogram done revealing severe global hypokinesis, regional wall motion abnormalities, severely reduced systolic function EF 10-15%  Extremely poor long-term prognosis with minimal hope for survival, palliative care following  Discussed with patient's fiancee who was agreeable to making the patient DNR however son does not want to talk to us thus patient remains full code  Replete and monitor electrolytes accordingly        DVT Prophylaxis:  Heparin  GI Prophylaxis: Famotidine         I spent 35 minutes providing critical care services to this patient.  This does not include time spent for separately billed procedures.         Marina Winters MD  Pulmonary Critical Care Medicine  [facility] - [facility] ICU  DOS: 09/28/2024

## 2024-09-28 NOTE — PLAN OF CARE
Problem: Hemodialysis  Goal: Safe, Effective Therapy Delivery  Outcome: Not Progressing  Goal: Effective Tissue Perfusion  Outcome: Not Progressing  Goal: Absence of Infection Signs and Symptoms  Outcome: Not Progressing     Problem: Adult Inpatient Plan of Care  Goal: Plan of Care Review  Outcome: Not Progressing  Goal: Patient-Specific Goal (Individualized)  Outcome: Not Progressing  Goal: Absence of Hospital-Acquired Illness or Injury  Outcome: Not Progressing  Goal: Optimal Comfort and Wellbeing  Outcome: Not Progressing  Goal: Readiness for Transition of Care  Outcome: Not Progressing     Problem: Skin Injury Risk Increased  Goal: Skin Health and Integrity  Outcome: Not Progressing     Problem: Infection  Goal: Absence of Infection Signs and Symptoms  Outcome: Not Progressing     Problem: Wound  Goal: Optimal Coping  Outcome: Not Progressing  Goal: Optimal Functional Ability  Outcome: Not Progressing  Goal: Absence of Infection Signs and Symptoms  Outcome: Not Progressing  Goal: Improved Oral Intake  Outcome: Not Progressing  Goal: Optimal Pain Control and Function  Outcome: Not Progressing  Goal: Skin Health and Integrity  Outcome: Not Progressing  Goal: Optimal Wound Healing  Outcome: Not Progressing     Problem: Coping Ineffective  Goal: Effective Coping  Outcome: Not Progressing     Problem: Fall Injury Risk  Goal: Absence of Fall and Fall-Related Injury  Outcome: Not Progressing

## 2024-09-28 NOTE — PROGRESS NOTES
Nephrology Progress Note      HPI:    Prem Saldana is a 49 y.o. male from [place], with pmhx of ESRD TTS via L AVF, CHF, EF <20%, COPD, hx of prostate cancer, presenting to the ED today with respiratory failure requiring BiPAP.      History is very limited due to his condition but he was able to answer a few questions on bipap. He states that he last dialyzed in [place], [place] Saturday 9/7. He tells me he has a 6am chair time TTS set up at the dialysis unit in [place]? Will have to call the unit to verify.      CTA chest revealing cardiomegaly with trace left effusion, atelectasis and mild interstitial edema. CXR with congestion. He has 1+ edema to BLE. His electrolytes are normal., BNP is >14,000 on admission, troponin 0.55 and he states that his chest hurts because he fell and had trauma to the chest 2 days ago. Bedside echo pending in the ED. Cardiology following and trending troponins until peak. ED physician paged nephrology for HD today.     Interval History:    9/11/24  Tolerated HD yesterday with 3L off and significant improvement in respiratory status, now on 4L NC. Vitals are stable. Labs reviewed, all relatively stable but bicarb is now 20. Serial troponins remain high. He continues to complain of chest pain from falling this weekend. Chest xr and CTA are without traumatic changes. He is now able to tell me that he was in [place] because his mom lives there, but he is moving to [place] and has a chair time at [facility].   He is usually on 2L O2 support for COPD and is currently requiring 4L.     09/12/2024   No acute changes overnight.  Potassium 6 on a.m. labs.  He is scheduled for his routine dialysis run today which will be done after left heart catheterization.  Voices no new complaints.    09/13/2024   Tolerated 3 L UF with HD yesterday.  Potassium remains elevated at 5.6 on a.m. labs.  Hemoglobin stable.  Shortness of breath improved following HD. no chest pain at present.    09/16/2024   Awake  alert and oriented.  Looks little dyspneic.  Complains of pain and asking for lot of pain medicines.  Not sure about the oral fluid intake or nutrition intake.    09/17/2024  No acute events overnight.  No new complaints.  No chest pain, shortness of breath, abdominal pain, nausea, vomiting, or lower extremity edema.  He remains on vasopressors.    09/18/2024   Overnight events noted and now patient is intubated and on the ventilator and sedated with a propofol and on high dose of Levophed also.  Also on amiodarone.    09/19/2024  Patient dialyzed off schedule yesterday due to hyperkalemia.  About to start dialysis again this morning on regular TTS schedule.  Remains intubated and on ventilator.  Still requiring Levophed for hypotension.    09/20/2024  Patient continues to have hypotension overnight, and Levophed had to be added.  He did dialyze yesterday, however, was only able to get 100 mL UF due to hypotension.  Remains intubated.    09/23/2024   Weekend events noted.  He did dialyze on Saturday.  Overall condition is unchanged.  Remains on the pressors.  Also on sedation.  Also intubated.    09/24/2024   Currently tolerating dialysis.  Patient is still sedated and on the ventilator.  Also on pressors.  Overall condition has not changed.  Nurses reported that NG tube was placed to suction and 1300 cc fluid came out.  So the tube feeding is on hold at present time.    09/25/2024   Patient had dialysis done yesterday.  2 L fluid was removed.  He did receive some albumin.  His pressor need has decreased.  Dr. Rodrigues thinks it maybe good to try some more fluid removal if tolerated by him and then maybe his pressors can be decreased.  Remains on the ventilator.  Remains on propofol and fentanyl for sedation and Levophed and vasopressin for hypotension.    09/26/2024.  Patient dialyzed yesterday off schedule 2 L UF.  Seen on hemodialysis again this morning.  Remains on vasopressin Levophed.  Ventilator setting in his  continue worsen    09/27/2024   Patient has been dialyze for few days in a row with some fluid removal but patient's overall condition is getting from bed to worse.  Remains on Levophed and vasopressin and on the ventilator.  No urine output.    9/28/24  Possible extubation of patient due to finance's wishes, awaiting son to make decision. Remains full code at this time. Hypotensive 86/58 with poor prognosis. Will speak to Dr. Singh prior to initiating HD today. Hold HD until Dr Singh rounds- nursing aware. Bicarb continuing to drop- will add bicarb push to gtt to help stabilize levels. Poor candidate for HD.    Vital Signs:  Vitals:    09/28/24 0630   BP: (!) 86/58   Pulse: 98   Resp:    Temp:         Physical Exam:    GEN:  Ill-appearing AA male, sluggish pupil response. No cough or gag reflux. No reaction to painful stimuli.   HEENT ET tube in place  CV:  Irregular rate and rhythm  PULM:  Bilateral rhonchi, ventilated.   ABD: Soft, NT/ND abdomen with NABS  EXT: No cyanosis. Cool bilateral upper and lower extremities. Edema to right forearm and significant scrotal edema.  SKIN: Warm and dry  PSYCH:  Sedated on the ventilator  Dialysis access:  LUE AV fistula       Latest Reference Range & Units 09/28/24 04:10   WBC 4.50 - 11.50 x10(3)/mcL 23.23 (H)   RBC 4.70 - 6.10 x10(6)/mcL 2.89 (L)   Hemoglobin 14.0 - 18.0 g/dL 8.3 (L)   Hematocrit 42.0 - 52.0 % 25.1 (L)   MCV 80.0 - 94.0 fL 86.9   MCH 27.0 - 31.0 pg 28.7   MCHC 33.0 - 36.0 g/dL 33.1   RDW 11.5 - 17.0 % 25.4 (H)   Platelet Count 130 - 400 x10(3)/mcL 63 (L)   Immature Platelet Fraction 0.9 - 11.2 % 11.1   MPV  See Comments   Platelet Estimate Normal, Adequate  Decreased !   Neutrophils Relative % 94   Lymphocyte % % 4   Mono % % 2   Gran # (ANC) 2.1 - 9.2 x10(3)/mcL 21.8362 (H)   Lymph # 0.6 - 4.6 x10(3)/mcL 0.9292   Mono # 0.1 - 1.3 x10(3)/mcL 0.4646   Metamyelocytes <=0 % 1 (H)   nRBC % 16.4   nRBC % % 12   Macrocytosis (none)  2+ !   Aniso (none)  2+ !    Poikilocytosis (none)  3+ !   Hypo (none)  1+ !   RBC Morph Normal  Abnormal !   Target Cells (none)  3+ !   Sodium 136 - 145 mmol/L 129 (L)   Potassium 3.5 - 5.1 mmol/L 6.0 (H)   Chloride 98 - 107 mmol/L 83 (L)   CO2 22 - 29 mmol/L 14 (L)   Anion Gap mEq/L 32.0   BUN 8.9 - 20.6 mg/dL 58.4 (H)   Creatinine 0.73 - 1.18 mg/dL 3.66 (H)   BUN/CREAT RATIO  16   eGFR mL/min/1.73/m2 19   Glucose 74 - 100 mg/dL 54 (L)   Calcium 8.4 - 10.2 mg/dL 8.4   (H): Data is abnormally high  (L): Data is abnormally low  !: Data is abnormal          Assessment/Plan:    ESRD on HD -now on dialysis in the hospital on TTS schedule. Hemodynamically unstable. Poor candidate for HD today. Will speak to Dr Singh about plan of care.  Hyperkalemia , and metabolic acidosis, worsening. K 6, co2 14. Will add bicarb push to bicarb gtt.  Cardiogenic shock.  Acute liver failure secondary to cardiogenic shock now  NSTEMI - cardiology recommending cath at some point  COPD   Prostate cancer s/p radiation  Anemia of chronic kidney disease-  today 8.3 25.1      Recommendations:  Continue on bicarb drip. Worsening of metabolic acidosis due to patients poor condition and prognosis. Will add bicarb push.  Scheduled for HD today- hold, will await MD assessment and family wishes.  Extremely poor condition of the patient.  Critical care awaiting son to make decision on extubation.   Cbc/cmp

## 2024-09-29 LAB — POCT GLUCOSE: 179 MG/DL (ref 70–110)

## 2024-09-30 LAB
BACTERIA BLD CULT: ABNORMAL
GRAM STN SPEC: ABNORMAL

## 2024-10-02 LAB — BACTERIA BLD CULT: NORMAL

## (undated) DEVICE — CATH IMPULSE FL4 5FR 100CM

## (undated) DEVICE — DRAPE ANGIO BRACH 38X44IN

## (undated) DEVICE — TUBING HPCIL ROT M/F ADPT 72IN

## (undated) DEVICE — SET ANGIO ACIST CVI ANGIOTOUCH

## (undated) DEVICE — KIT GLIDESHEATH SLEND 6FR 10CM

## (undated) DEVICE — CATH FL 3.5 5FR

## (undated) DEVICE — Device

## (undated) DEVICE — CANNULA ADULT NASAL 7FT

## (undated) DEVICE — GUIDEWIRE INQWIRE SE 3MM JTIP

## (undated) DEVICE — BAND TR COMP DEVICE REG 24CM

## (undated) DEVICE — COVER PROBE US 5.5X58L NON LTX

## (undated) DEVICE — PAD DEFIB CADENCE ADULT R2

## (undated) DEVICE — KIT MANIFOLD LOW PRESS TUBING

## (undated) DEVICE — CATH OPTITORQUE RADIAL 5FR